# Patient Record
Sex: FEMALE | Race: WHITE | Employment: UNEMPLOYED | URBAN - METROPOLITAN AREA
[De-identification: names, ages, dates, MRNs, and addresses within clinical notes are randomized per-mention and may not be internally consistent; named-entity substitution may affect disease eponyms.]

---

## 2018-10-13 ENCOUNTER — APPOINTMENT (OUTPATIENT)
Dept: GENERAL RADIOLOGY | Age: 73
DRG: 189 | End: 2018-10-13
Payer: MEDICARE

## 2018-10-13 ENCOUNTER — HOSPITAL ENCOUNTER (INPATIENT)
Age: 73
LOS: 3 days | Discharge: HOME | DRG: 189 | End: 2018-10-16
Attending: EMERGENCY MEDICINE | Admitting: INTERNAL MEDICINE
Payer: MEDICARE

## 2018-10-13 DIAGNOSIS — R09.02 HYPOXIA: ICD-10-CM

## 2018-10-13 DIAGNOSIS — J44.1 COPD EXACERBATION (HCC): Primary | ICD-10-CM

## 2018-10-13 PROBLEM — J44.9 COPD (CHRONIC OBSTRUCTIVE PULMONARY DISEASE) (HCC): Status: ACTIVE | Noted: 2018-10-13

## 2018-10-13 LAB
A/G RATIO: 1 (ref 1.1–2.2)
ALBUMIN SERPL-MCNC: 4 G/DL (ref 3.4–5)
ALP BLD-CCNC: 126 U/L (ref 40–129)
ALT SERPL-CCNC: 17 U/L (ref 10–40)
ANION GAP SERPL CALCULATED.3IONS-SCNC: 15 MMOL/L (ref 3–16)
APTT: 30.1 SEC (ref 26–36)
AST SERPL-CCNC: 67 U/L (ref 15–37)
BACTERIA: ABNORMAL /HPF
BILIRUB SERPL-MCNC: 0.7 MG/DL (ref 0–1)
BILIRUBIN URINE: NEGATIVE
BLOOD, URINE: NEGATIVE
BUN BLDV-MCNC: 16 MG/DL (ref 7–20)
CALCIUM SERPL-MCNC: 9 MG/DL (ref 8.3–10.6)
CHLORIDE BLD-SCNC: 97 MMOL/L (ref 99–110)
CLARITY: ABNORMAL
CO2: 24 MMOL/L (ref 21–32)
COLOR: YELLOW
COMMENT UA: ABNORMAL
CREAT SERPL-MCNC: 0.5 MG/DL (ref 0.6–1.2)
EPITHELIAL CELLS, UA: >36 /HPF (ref 0–5)
GFR AFRICAN AMERICAN: >60
GFR NON-AFRICAN AMERICAN: >60
GLOBULIN: 4.1 G/DL
GLUCOSE BLD-MCNC: 134 MG/DL (ref 70–99)
GLUCOSE URINE: NEGATIVE MG/DL
HCT VFR BLD CALC: 30.8 % (ref 36–48)
HEMOGLOBIN: 9.2 G/DL (ref 12–16)
HYALINE CASTS: 3 /LPF (ref 0–8)
INR BLD: 0.95 (ref 0.86–1.14)
KETONES, URINE: NEGATIVE MG/DL
LEUKOCYTE ESTERASE, URINE: NEGATIVE
MCH RBC QN AUTO: 21 PG (ref 26–34)
MCHC RBC AUTO-ENTMCNC: 29.9 G/DL (ref 31–36)
MCV RBC AUTO: 70.3 FL (ref 80–100)
MICROSCOPIC EXAMINATION: YES
NITRITE, URINE: NEGATIVE
PDW BLD-RTO: 20.4 % (ref 12.4–15.4)
PH UA: 7.5
PLATELET # BLD: 347 K/UL (ref 135–450)
PMV BLD AUTO: 7.3 FL (ref 5–10.5)
POTASSIUM SERPL-SCNC: 3.5 MMOL/L (ref 3.5–5.1)
POTASSIUM SERPL-SCNC: 5.8 MMOL/L (ref 3.5–5.1)
PRO-BNP: 204 PG/ML (ref 0–124)
PROTEIN UA: ABNORMAL MG/DL
PROTHROMBIN TIME: 10.8 SEC (ref 9.8–13)
RBC # BLD: 4.39 M/UL (ref 4–5.2)
RBC UA: 5 /HPF (ref 0–4)
REPORT: NORMAL
RESPIRATORY PANEL PCR: NORMAL
SODIUM BLD-SCNC: 136 MMOL/L (ref 136–145)
SPECIFIC GRAVITY UA: 1.01
TOTAL PROTEIN: 8.1 G/DL (ref 6.4–8.2)
TRICHOMONAS: ABNORMAL /HPF
TROPONIN: <0.01 NG/ML
URINE TYPE: ABNORMAL
UROBILINOGEN, URINE: 1 E.U./DL
WBC # BLD: 9.3 K/UL (ref 4–11)
WBC UA: 7 /HPF (ref 0–5)

## 2018-10-13 PROCEDURE — 84484 ASSAY OF TROPONIN QUANT: CPT

## 2018-10-13 PROCEDURE — 96365 THER/PROPH/DIAG IV INF INIT: CPT

## 2018-10-13 PROCEDURE — 94640 AIRWAY INHALATION TREATMENT: CPT

## 2018-10-13 PROCEDURE — 87486 CHLMYD PNEUM DNA AMP PROBE: CPT

## 2018-10-13 PROCEDURE — 96375 TX/PRO/DX INJ NEW DRUG ADDON: CPT

## 2018-10-13 PROCEDURE — 96372 THER/PROPH/DIAG INJ SC/IM: CPT

## 2018-10-13 PROCEDURE — 96376 TX/PRO/DX INJ SAME DRUG ADON: CPT

## 2018-10-13 PROCEDURE — 87798 DETECT AGENT NOS DNA AMP: CPT

## 2018-10-13 PROCEDURE — 6360000002 HC RX W HCPCS: Performed by: INTERNAL MEDICINE

## 2018-10-13 PROCEDURE — G0378 HOSPITAL OBSERVATION PER HR: HCPCS

## 2018-10-13 PROCEDURE — 96366 THER/PROPH/DIAG IV INF ADDON: CPT

## 2018-10-13 PROCEDURE — 6360000002 HC RX W HCPCS: Performed by: EMERGENCY MEDICINE

## 2018-10-13 PROCEDURE — 6370000000 HC RX 637 (ALT 250 FOR IP): Performed by: INTERNAL MEDICINE

## 2018-10-13 PROCEDURE — 99285 EMERGENCY DEPT VISIT HI MDM: CPT

## 2018-10-13 PROCEDURE — 85610 PROTHROMBIN TIME: CPT

## 2018-10-13 PROCEDURE — 93005 ELECTROCARDIOGRAM TRACING: CPT | Performed by: EMERGENCY MEDICINE

## 2018-10-13 PROCEDURE — 84132 ASSAY OF SERUM POTASSIUM: CPT

## 2018-10-13 PROCEDURE — 2700000000 HC OXYGEN THERAPY PER DAY

## 2018-10-13 PROCEDURE — 94760 N-INVAS EAR/PLS OXIMETRY 1: CPT

## 2018-10-13 PROCEDURE — 87633 RESP VIRUS 12-25 TARGETS: CPT

## 2018-10-13 PROCEDURE — 81001 URINALYSIS AUTO W/SCOPE: CPT

## 2018-10-13 PROCEDURE — 93010 ELECTROCARDIOGRAM REPORT: CPT | Performed by: INTERNAL MEDICINE

## 2018-10-13 PROCEDURE — 85730 THROMBOPLASTIN TIME PARTIAL: CPT

## 2018-10-13 PROCEDURE — 80053 COMPREHEN METABOLIC PANEL: CPT

## 2018-10-13 PROCEDURE — 96374 THER/PROPH/DIAG INJ IV PUSH: CPT

## 2018-10-13 PROCEDURE — 83880 ASSAY OF NATRIURETIC PEPTIDE: CPT

## 2018-10-13 PROCEDURE — 85027 COMPLETE CBC AUTOMATED: CPT

## 2018-10-13 PROCEDURE — 2500000003 HC RX 250 WO HCPCS: Performed by: INTERNAL MEDICINE

## 2018-10-13 PROCEDURE — 1200000000 HC SEMI PRIVATE

## 2018-10-13 PROCEDURE — 94664 DEMO&/EVAL PT USE INHALER: CPT

## 2018-10-13 PROCEDURE — 6370000000 HC RX 637 (ALT 250 FOR IP): Performed by: NURSE PRACTITIONER

## 2018-10-13 PROCEDURE — 87581 M.PNEUMON DNA AMP PROBE: CPT

## 2018-10-13 PROCEDURE — 6370000000 HC RX 637 (ALT 250 FOR IP): Performed by: EMERGENCY MEDICINE

## 2018-10-13 PROCEDURE — 2580000003 HC RX 258: Performed by: INTERNAL MEDICINE

## 2018-10-13 PROCEDURE — 71045 X-RAY EXAM CHEST 1 VIEW: CPT

## 2018-10-13 RX ORDER — AMLODIPINE BESYLATE 5 MG/1
5 TABLET ORAL DAILY
Status: ON HOLD | COMMUNITY
End: 2018-10-16

## 2018-10-13 RX ORDER — CYCLOBENZAPRINE HCL 10 MG
5 TABLET ORAL 3 TIMES DAILY PRN
Status: DISCONTINUED | OUTPATIENT
Start: 2018-10-13 | End: 2018-10-13

## 2018-10-13 RX ORDER — FERROUS SULFATE TAB EC 324 MG (65 MG FE EQUIVALENT) 324 (65 FE) MG
324 TABLET DELAYED RESPONSE ORAL
Status: DISCONTINUED | OUTPATIENT
Start: 2018-10-14 | End: 2018-10-16 | Stop reason: HOSPADM

## 2018-10-13 RX ORDER — FERROUS SULFATE 325(65) MG
325 TABLET ORAL
COMMUNITY
End: 2019-05-23

## 2018-10-13 RX ORDER — LORAZEPAM 0.5 MG/1
0.5 TABLET ORAL DAILY PRN
Status: DISCONTINUED | OUTPATIENT
Start: 2018-10-13 | End: 2018-10-13

## 2018-10-13 RX ORDER — ERGOCALCIFEROL 1.25 MG/1
50000 CAPSULE ORAL WEEKLY
Status: DISCONTINUED | OUTPATIENT
Start: 2018-10-13 | End: 2018-10-13

## 2018-10-13 RX ORDER — DULOXETIN HYDROCHLORIDE 60 MG/1
60 CAPSULE, DELAYED RELEASE ORAL DAILY
Status: DISCONTINUED | OUTPATIENT
Start: 2018-10-13 | End: 2018-10-13

## 2018-10-13 RX ORDER — METHYLPREDNISOLONE SODIUM SUCCINATE 40 MG/ML
40 INJECTION, POWDER, LYOPHILIZED, FOR SOLUTION INTRAMUSCULAR; INTRAVENOUS DAILY
Status: DISCONTINUED | OUTPATIENT
Start: 2018-10-14 | End: 2018-10-15

## 2018-10-13 RX ORDER — PANTOPRAZOLE SODIUM 40 MG/1
40 TABLET, DELAYED RELEASE ORAL
Status: DISCONTINUED | OUTPATIENT
Start: 2018-10-13 | End: 2018-10-16 | Stop reason: HOSPADM

## 2018-10-13 RX ORDER — FOLIC ACID 1 MG/1
1 TABLET ORAL DAILY
COMMUNITY
End: 2019-05-23

## 2018-10-13 RX ORDER — CHOLECALCIFEROL (VITAMIN D3) 125 MCG
500 CAPSULE ORAL DAILY
COMMUNITY
End: 2019-05-23

## 2018-10-13 RX ORDER — MULTIVITAMIN WITH IRON
100 TABLET ORAL DAILY
COMMUNITY
End: 2019-05-23

## 2018-10-13 RX ORDER — OLMESARTAN MEDOXOMIL AND HYDROCHLOROTHIAZIDE 20/12.5 20; 12.5 MG/1; MG/1
1 TABLET ORAL DAILY
Status: DISCONTINUED | OUTPATIENT
Start: 2018-10-13 | End: 2018-10-13

## 2018-10-13 RX ORDER — FOLIC ACID 1 MG/1
1 TABLET ORAL DAILY
Status: DISCONTINUED | OUTPATIENT
Start: 2018-10-14 | End: 2018-10-16 | Stop reason: HOSPADM

## 2018-10-13 RX ORDER — HYDROCHLOROTHIAZIDE 25 MG/1
12.5 TABLET ORAL DAILY
Status: DISCONTINUED | OUTPATIENT
Start: 2018-10-13 | End: 2018-10-13

## 2018-10-13 RX ORDER — DIPHENHYDRAMINE HCL 25 MG
50 TABLET ORAL ONCE
Status: COMPLETED | OUTPATIENT
Start: 2018-10-13 | End: 2018-10-13

## 2018-10-13 RX ORDER — NICOTINE 21 MG/24HR
1 PATCH, TRANSDERMAL 24 HOURS TRANSDERMAL EVERY 24 HOURS
Status: ON HOLD | COMMUNITY
End: 2018-10-16

## 2018-10-13 RX ORDER — SODIUM CHLORIDE 0.9 % (FLUSH) 0.9 %
10 SYRINGE (ML) INJECTION EVERY 12 HOURS SCHEDULED
Status: DISCONTINUED | OUTPATIENT
Start: 2018-10-13 | End: 2018-10-16 | Stop reason: HOSPADM

## 2018-10-13 RX ORDER — IPRATROPIUM BROMIDE AND ALBUTEROL SULFATE 2.5; .5 MG/3ML; MG/3ML
1 SOLUTION RESPIRATORY (INHALATION)
Status: DISCONTINUED | OUTPATIENT
Start: 2018-10-13 | End: 2018-10-16 | Stop reason: HOSPADM

## 2018-10-13 RX ORDER — DULOXETIN HYDROCHLORIDE 30 MG/1
30 CAPSULE, DELAYED RELEASE ORAL DAILY
Status: ON HOLD | COMMUNITY
End: 2018-10-16

## 2018-10-13 RX ORDER — ONDANSETRON 2 MG/ML
4 INJECTION INTRAMUSCULAR; INTRAVENOUS EVERY 6 HOURS PRN
Status: DISCONTINUED | OUTPATIENT
Start: 2018-10-13 | End: 2018-10-16 | Stop reason: HOSPADM

## 2018-10-13 RX ORDER — NICOTINE 21 MG/24HR
1 PATCH, TRANSDERMAL 24 HOURS TRANSDERMAL DAILY
Status: DISCONTINUED | OUTPATIENT
Start: 2018-10-13 | End: 2018-10-16 | Stop reason: HOSPADM

## 2018-10-13 RX ORDER — DULOXETIN HYDROCHLORIDE 30 MG/1
30 CAPSULE, DELAYED RELEASE ORAL DAILY
Status: DISCONTINUED | OUTPATIENT
Start: 2018-10-14 | End: 2018-10-16 | Stop reason: HOSPADM

## 2018-10-13 RX ORDER — DEXTROAMPHETAMINE SULFATE 10 MG/1
20 TABLET ORAL 3 TIMES DAILY
Status: ON HOLD | COMMUNITY
End: 2018-12-13 | Stop reason: ALTCHOICE

## 2018-10-13 RX ORDER — BUDESONIDE AND FORMOTEROL FUMARATE DIHYDRATE 160; 4.5 UG/1; UG/1
2 AEROSOL RESPIRATORY (INHALATION) 2 TIMES DAILY
Status: ON HOLD | COMMUNITY
End: 2018-10-16

## 2018-10-13 RX ORDER — METHYLPREDNISOLONE SODIUM SUCCINATE 125 MG/2ML
125 INJECTION, POWDER, LYOPHILIZED, FOR SOLUTION INTRAMUSCULAR; INTRAVENOUS ONCE
Status: COMPLETED | OUTPATIENT
Start: 2018-10-13 | End: 2018-10-13

## 2018-10-13 RX ORDER — AMLODIPINE BESYLATE 5 MG/1
5 TABLET ORAL DAILY
Status: DISCONTINUED | OUTPATIENT
Start: 2018-10-14 | End: 2018-10-16 | Stop reason: HOSPADM

## 2018-10-13 RX ORDER — LORAZEPAM 0.5 MG/1
0.5 TABLET ORAL NIGHTLY PRN
Status: DISCONTINUED | OUTPATIENT
Start: 2018-10-13 | End: 2018-10-16 | Stop reason: HOSPADM

## 2018-10-13 RX ORDER — LORAZEPAM 0.5 MG/1
0.5 TABLET ORAL EVERY 6 HOURS PRN
Status: DISCONTINUED | OUTPATIENT
Start: 2018-10-13 | End: 2018-10-13

## 2018-10-13 RX ORDER — DEXTROAMPHETAMINE SULFATE 10 MG/1
10 CAPSULE, EXTENDED RELEASE ORAL DAILY
Status: DISCONTINUED | OUTPATIENT
Start: 2018-10-13 | End: 2018-10-13

## 2018-10-13 RX ORDER — IPRATROPIUM BROMIDE AND ALBUTEROL SULFATE 2.5; .5 MG/3ML; MG/3ML
1 SOLUTION RESPIRATORY (INHALATION) ONCE
Status: COMPLETED | OUTPATIENT
Start: 2018-10-13 | End: 2018-10-13

## 2018-10-13 RX ORDER — LOSARTAN POTASSIUM 50 MG/1
50 TABLET ORAL DAILY
Status: DISCONTINUED | OUTPATIENT
Start: 2018-10-13 | End: 2018-10-13

## 2018-10-13 RX ORDER — SODIUM CHLORIDE 0.9 % (FLUSH) 0.9 %
10 SYRINGE (ML) INJECTION PRN
Status: DISCONTINUED | OUTPATIENT
Start: 2018-10-13 | End: 2018-10-16 | Stop reason: HOSPADM

## 2018-10-13 RX ADMIN — IPRATROPIUM BROMIDE AND ALBUTEROL SULFATE 1 AMPULE: .5; 3 SOLUTION RESPIRATORY (INHALATION) at 11:58

## 2018-10-13 RX ADMIN — IPRATROPIUM BROMIDE AND ALBUTEROL SULFATE 1 AMPULE: .5; 3 SOLUTION RESPIRATORY (INHALATION) at 06:05

## 2018-10-13 RX ADMIN — DULOXETINE HYDROCHLORIDE 60 MG: 60 CAPSULE, DELAYED RELEASE ORAL at 09:59

## 2018-10-13 RX ADMIN — HYDROCHLOROTHIAZIDE 12.5 MG: 25 TABLET ORAL at 10:00

## 2018-10-13 RX ADMIN — ENOXAPARIN SODIUM 40 MG: 40 INJECTION SUBCUTANEOUS at 10:01

## 2018-10-13 RX ADMIN — DIPHENHYDRAMINE HCL 50 MG: 25 TABLET ORAL at 23:23

## 2018-10-13 RX ADMIN — METHYLPREDNISOLONE SODIUM SUCCINATE 125 MG: 125 INJECTION, POWDER, FOR SOLUTION INTRAMUSCULAR; INTRAVENOUS at 06:14

## 2018-10-13 RX ADMIN — LOSARTAN POTASSIUM 50 MG: 50 TABLET ORAL at 09:59

## 2018-10-13 RX ADMIN — LORAZEPAM 0.5 MG: 0.5 TABLET ORAL at 16:42

## 2018-10-13 RX ADMIN — MOMETASONE FUROATE AND FORMOTEROL FUMARATE DIHYDRATE 2 PUFF: 200; 5 AEROSOL RESPIRATORY (INHALATION) at 11:58

## 2018-10-13 RX ADMIN — PANTOPRAZOLE SODIUM 40 MG: 40 TABLET, DELAYED RELEASE ORAL at 09:59

## 2018-10-13 RX ADMIN — IPRATROPIUM BROMIDE AND ALBUTEROL SULFATE 1 AMPULE: .5; 3 SOLUTION RESPIRATORY (INHALATION) at 15:41

## 2018-10-13 RX ADMIN — DOXYCYCLINE 100 MG: 100 INJECTION, POWDER, LYOPHILIZED, FOR SOLUTION INTRAVENOUS at 10:00

## 2018-10-13 RX ADMIN — IPRATROPIUM BROMIDE AND ALBUTEROL SULFATE 1 AMPULE: .5; 3 SOLUTION RESPIRATORY (INHALATION) at 20:06

## 2018-10-13 RX ADMIN — Medication 10 ML: at 22:35

## 2018-10-13 RX ADMIN — MOMETASONE FUROATE AND FORMOTEROL FUMARATE DIHYDRATE 2 PUFF: 200; 5 AEROSOL RESPIRATORY (INHALATION) at 20:06

## 2018-10-13 RX ADMIN — Medication 10 ML: at 10:00

## 2018-10-13 RX ADMIN — DOXYCYCLINE 100 MG: 100 INJECTION, POWDER, LYOPHILIZED, FOR SOLUTION INTRAVENOUS at 22:35

## 2018-10-13 NOTE — PROGRESS NOTES
Patient arrived to room 4115 from ED. Awake, a&o x4. Patient has a LLQ colostomy since May '18. Patient states she moved here in July and has not been able to get all of her prescriptions filled since then. Oriented to room and call light. Bed alarm on and call light within reach.  Will monitor Electronically signed by Selvin Hernandez RN on 10/13/2018 at 9:00 AM

## 2018-10-13 NOTE — H&P
Hospital Medicine History & Physical      PCP: Uma Jackson MD    Date of Admission: 10/13/2018    Date of Service: Pt seen/examined on 10/13/18 and Admitted to Inpatient    Chief Complaint:  Shortness of breath    History Of Present Illness: The patient is a 72 y.o. female who presents to [facility] with worsening shortness of breath. Patient states this has been ongoing for the past few days. It has recently gotten worse. She has been out of her home medications Symbicort for the past week and try to get a prescription from her primary care doctor without avail. Patient presented to the emergency department short of breath, using accessory muscles, saturating 91% on room air while sitting in the bed, wheezing. Patient admits to smoking    Past Medical History:        Diagnosis Date    [facility]     Depression     Gastric ulcer, unspecified as acute or chronic, without mention of hemorrhage, perforation, or obstruction     Hiatal hernia     Hypertension     Parkinson's disease ([facility]        Past Surgical History:        Procedure Laterality Date    CHOLECYSTECTOMY      COLOSTOMY  2018    HYSTERECTOMY      TUBAL LIGATION         Medications Prior to Admission:    Prior to Admission medications    Medication Sig Start Date End Date Taking? Authorizing Provider   duloxetine (CYMBALTA) 60 MG capsule Take 60 mg by mouth daily. Historical Provider, MD   dexlansoprazole (DEXILANT) 60 MG CPDR capsule Take 60 mg by mouth daily. Historical Provider, MD   olmesartan-hydrochlorothiazide (BENICAR HCT) 20-12.5 MG per tablet Take 1 tablet by mouth daily. Historical Provider, MD   albuterol-ipratropium (COMBIVENT)  MCG/ACT inhaler Inhale 2 puffs into the lungs every 6 hours as needed.     Historical Provider, MD vitamin D (ERGOCALCIFEROL) 01472 UNIT CAPS capsule Take 50,000 Units by mouth once a week. Historical Provider, MD   cyclobenzaprine (FLEXERIL) 10 MG tablet Take 5 mg by mouth 3 times daily as needed. Historical Provider, MD   lorazepam (ATIVAN) 0.5 MG tablet Take 0.5 mg by mouth every 6 hours as needed. Historical Provider, MD   dextroamphetamine (DEXEDRINE SPANSULE) 10 MG SR capsule Take 10 mg by mouth daily. Historical Provider, MD   albuterol (PROVENTIL HFA) 108 (90 BASE) MCG/ACT inhaler Inhale 2 puffs into the lungs every 4 hours as needed for Wheezing. Dispense with aerochamber 3/31/11   Denia Muro MD       Allergies:  Aspirin    Social History:  The patient currently lives At home    TOBACCO:   reports that she has been smoking Cigarettes. She has been smoking about 1.00 pack per day. She has never used smokeless tobacco.  ETOH:   reports that she drinks about 1.2 - 1.8 oz of alcohol per week . Family History:  Reviewed in detail and negative for DM, Early CAD, Cancer, CVA. Positive as follows:    History reviewed. No pertinent family history. REVIEW OF SYSTEMS:   Positive for as noted in the HPI. All other systems reviewed and negative. PHYSICAL EXAM:    BP (!) 168/79   Pulse 102   Temp 98.1 °F (36.7 °C)   Resp 18   Wt 145 lb 8.1 oz (66 kg)   SpO2 95%   BMI 26.61 kg/m²     General appearance: No apparent distress appears stated age and cooperative. HEENT Normal cephalic, atraumatic without obvious deformity. Pupils equal, round, and reactive to light. Extra ocular muscles intact. Conjunctivae/corneas clear. Neck: Supple, No jugular venous distention/bruits. Trachea midline without thyromegaly or adenopathy with full range of motion.   Lungs: Diminished breath sounds bilateral  Heart: Regular rate and rhythm with Normal S1/S2 without murmurs, rubs or gallops, point of maximum impulse non-displaced  Abdomen: Left-sided ostomy in place with output   Extremities: No

## 2018-10-13 NOTE — ED TRIAGE NOTES
Patient from home via ems c/o trouble breathing, r/a sat 82%. Duoneb in progress, sitting up on stretcher. Alert and oriented x 4. Has been out of all meds for 1 week. Duoneb x 2 by ems. Reluctant to take steroids, they make \"me crazy. \"  Denies pain. MD sagastume at bedside.

## 2018-10-14 LAB
ALBUMIN SERPL-MCNC: 4.1 G/DL (ref 3.4–5)
ANION GAP SERPL CALCULATED.3IONS-SCNC: 16 MMOL/L (ref 3–16)
BASOPHILS ABSOLUTE: 0.1 K/UL (ref 0–0.2)
BASOPHILS RELATIVE PERCENT: 0.4 %
BUN BLDV-MCNC: 17 MG/DL (ref 7–20)
CALCIUM SERPL-MCNC: 9.5 MG/DL (ref 8.3–10.6)
CHLORIDE BLD-SCNC: 92 MMOL/L (ref 99–110)
CO2: 26 MMOL/L (ref 21–32)
CREAT SERPL-MCNC: 0.7 MG/DL (ref 0.6–1.2)
EOSINOPHILS ABSOLUTE: 0 K/UL (ref 0–0.6)
EOSINOPHILS RELATIVE PERCENT: 0 %
GFR AFRICAN AMERICAN: >60
GFR NON-AFRICAN AMERICAN: >60
GLUCOSE BLD-MCNC: 111 MG/DL (ref 70–99)
HCT VFR BLD CALC: 30.4 % (ref 36–48)
HEMOGLOBIN: 9 G/DL (ref 12–16)
LYMPHOCYTES ABSOLUTE: 2 K/UL (ref 1–5.1)
LYMPHOCYTES RELATIVE PERCENT: 13.1 %
MCH RBC QN AUTO: 20.7 PG (ref 26–34)
MCHC RBC AUTO-ENTMCNC: 29.5 G/DL (ref 31–36)
MCV RBC AUTO: 70.4 FL (ref 80–100)
MONOCYTES ABSOLUTE: 0.7 K/UL (ref 0–1.3)
MONOCYTES RELATIVE PERCENT: 4.8 %
NEUTROPHILS ABSOLUTE: 12.5 K/UL (ref 1.7–7.7)
NEUTROPHILS RELATIVE PERCENT: 81.7 %
PDW BLD-RTO: 20.5 % (ref 12.4–15.4)
PHOSPHORUS: 3.7 MG/DL (ref 2.5–4.9)
PLATELET # BLD: 306 K/UL (ref 135–450)
PMV BLD AUTO: 7 FL (ref 5–10.5)
POTASSIUM SERPL-SCNC: 3.2 MMOL/L (ref 3.5–5.1)
RBC # BLD: 4.33 M/UL (ref 4–5.2)
SODIUM BLD-SCNC: 134 MMOL/L (ref 136–145)
WBC # BLD: 15.2 K/UL (ref 4–11)

## 2018-10-14 PROCEDURE — 2700000000 HC OXYGEN THERAPY PER DAY

## 2018-10-14 PROCEDURE — 6360000002 HC RX W HCPCS: Performed by: INTERNAL MEDICINE

## 2018-10-14 PROCEDURE — 6370000000 HC RX 637 (ALT 250 FOR IP): Performed by: INTERNAL MEDICINE

## 2018-10-14 PROCEDURE — 85025 COMPLETE CBC W/AUTO DIFF WBC: CPT

## 2018-10-14 PROCEDURE — 94760 N-INVAS EAR/PLS OXIMETRY 1: CPT

## 2018-10-14 PROCEDURE — 2500000003 HC RX 250 WO HCPCS: Performed by: INTERNAL MEDICINE

## 2018-10-14 PROCEDURE — 94640 AIRWAY INHALATION TREATMENT: CPT

## 2018-10-14 PROCEDURE — G0378 HOSPITAL OBSERVATION PER HR: HCPCS

## 2018-10-14 PROCEDURE — 96366 THER/PROPH/DIAG IV INF ADDON: CPT

## 2018-10-14 PROCEDURE — 1200000000 HC SEMI PRIVATE

## 2018-10-14 PROCEDURE — 36415 COLL VENOUS BLD VENIPUNCTURE: CPT

## 2018-10-14 PROCEDURE — 2580000003 HC RX 258: Performed by: INTERNAL MEDICINE

## 2018-10-14 PROCEDURE — 6370000000 HC RX 637 (ALT 250 FOR IP): Performed by: NURSE PRACTITIONER

## 2018-10-14 PROCEDURE — 96376 TX/PRO/DX INJ SAME DRUG ADON: CPT

## 2018-10-14 PROCEDURE — 96372 THER/PROPH/DIAG INJ SC/IM: CPT

## 2018-10-14 PROCEDURE — 80069 RENAL FUNCTION PANEL: CPT

## 2018-10-14 RX ORDER — POTASSIUM CHLORIDE 20 MEQ/1
40 TABLET, EXTENDED RELEASE ORAL PRN
Status: DISCONTINUED | OUTPATIENT
Start: 2018-10-14 | End: 2018-10-16 | Stop reason: HOSPADM

## 2018-10-14 RX ORDER — LORAZEPAM 0.5 MG/1
0.5 TABLET ORAL ONCE
Status: DISCONTINUED | OUTPATIENT
Start: 2018-10-14 | End: 2018-10-15

## 2018-10-14 RX ORDER — POTASSIUM CHLORIDE 1.5 G/1.77G
40 POWDER, FOR SOLUTION ORAL PRN
Status: DISCONTINUED | OUTPATIENT
Start: 2018-10-14 | End: 2018-10-16 | Stop reason: HOSPADM

## 2018-10-14 RX ORDER — POTASSIUM CHLORIDE 7.45 MG/ML
10 INJECTION INTRAVENOUS PRN
Status: DISCONTINUED | OUTPATIENT
Start: 2018-10-14 | End: 2018-10-16 | Stop reason: HOSPADM

## 2018-10-14 RX ADMIN — LORAZEPAM 0.5 MG: 0.5 TABLET ORAL at 21:01

## 2018-10-14 RX ADMIN — DOXYCYCLINE 100 MG: 100 INJECTION, POWDER, LYOPHILIZED, FOR SOLUTION INTRAVENOUS at 08:53

## 2018-10-14 RX ADMIN — DULOXETINE HYDROCHLORIDE 30 MG: 30 CAPSULE, DELAYED RELEASE ORAL at 08:57

## 2018-10-14 RX ADMIN — Medication 10 ML: at 08:57

## 2018-10-14 RX ADMIN — PANTOPRAZOLE SODIUM 40 MG: 40 TABLET, DELAYED RELEASE ORAL at 06:43

## 2018-10-14 RX ADMIN — IPRATROPIUM BROMIDE AND ALBUTEROL SULFATE 1 AMPULE: .5; 3 SOLUTION RESPIRATORY (INHALATION) at 15:47

## 2018-10-14 RX ADMIN — IPRATROPIUM BROMIDE AND ALBUTEROL SULFATE 1 AMPULE: .5; 3 SOLUTION RESPIRATORY (INHALATION) at 11:38

## 2018-10-14 RX ADMIN — Medication 10 ML: at 21:03

## 2018-10-14 RX ADMIN — FERROUS SULFATE TAB EC 324 MG (65 MG FE EQUIVALENT) 324 MG: 324 (65 FE) TABLET DELAYED RESPONSE at 08:57

## 2018-10-14 RX ADMIN — IPRATROPIUM BROMIDE AND ALBUTEROL SULFATE 1 AMPULE: .5; 3 SOLUTION RESPIRATORY (INHALATION) at 07:50

## 2018-10-14 RX ADMIN — MOMETASONE FUROATE AND FORMOTEROL FUMARATE DIHYDRATE 2 PUFF: 200; 5 AEROSOL RESPIRATORY (INHALATION) at 07:50

## 2018-10-14 RX ADMIN — MOMETASONE FUROATE AND FORMOTEROL FUMARATE DIHYDRATE 2 PUFF: 200; 5 AEROSOL RESPIRATORY (INHALATION) at 21:08

## 2018-10-14 RX ADMIN — METHYLPREDNISOLONE SODIUM SUCCINATE 40 MG: 40 INJECTION, POWDER, FOR SOLUTION INTRAMUSCULAR; INTRAVENOUS at 08:57

## 2018-10-14 RX ADMIN — POTASSIUM CHLORIDE 40 MEQ: 20 TABLET, EXTENDED RELEASE ORAL at 13:27

## 2018-10-14 RX ADMIN — DOXYCYCLINE 100 MG: 100 INJECTION, POWDER, LYOPHILIZED, FOR SOLUTION INTRAVENOUS at 21:03

## 2018-10-14 RX ADMIN — FOLIC ACID 1 MG: 1 TABLET ORAL at 08:57

## 2018-10-14 RX ADMIN — IPRATROPIUM BROMIDE AND ALBUTEROL SULFATE 1 AMPULE: .5; 3 SOLUTION RESPIRATORY (INHALATION) at 21:08

## 2018-10-14 RX ADMIN — ENOXAPARIN SODIUM 40 MG: 40 INJECTION SUBCUTANEOUS at 08:56

## 2018-10-14 RX ADMIN — AMLODIPINE BESYLATE 5 MG: 5 TABLET ORAL at 08:57

## 2018-10-14 NOTE — PROGRESS NOTES
GENERAL;  Code Status: Full Code    PT/OT Eval Status: NA    Dispo - Inpt, DC niharika if off oxygen    Chato Chau MD

## 2018-10-15 PROBLEM — G20 PARKINSON'S DISEASE (HCC): Chronic | Status: ACTIVE | Noted: 2018-10-15

## 2018-10-15 PROBLEM — J96.01 ACUTE RESPIRATORY FAILURE WITH HYPOXIA (HCC): Status: ACTIVE | Noted: 2018-10-15

## 2018-10-15 PROBLEM — G20.A1 PARKINSON'S DISEASE: Chronic | Status: ACTIVE | Noted: 2018-10-15

## 2018-10-15 PROBLEM — Z72.0 TOBACCO ABUSE: Chronic | Status: ACTIVE | Noted: 2018-10-15

## 2018-10-15 PROBLEM — E87.6 HYPOKALEMIA: Status: ACTIVE | Noted: 2018-10-15

## 2018-10-15 PROBLEM — I10 BENIGN ESSENTIAL HTN: Chronic | Status: ACTIVE | Noted: 2018-10-15

## 2018-10-15 PROBLEM — J44.1 COPD WITH ACUTE EXACERBATION (HCC): Status: ACTIVE | Noted: 2018-10-13

## 2018-10-15 LAB
ANION GAP SERPL CALCULATED.3IONS-SCNC: 15 MMOL/L (ref 3–16)
BUN BLDV-MCNC: 28 MG/DL (ref 7–20)
CALCIUM SERPL-MCNC: 9.7 MG/DL (ref 8.3–10.6)
CHLORIDE BLD-SCNC: 92 MMOL/L (ref 99–110)
CO2: 25 MMOL/L (ref 21–32)
CREAT SERPL-MCNC: 0.9 MG/DL (ref 0.6–1.2)
EKG ATRIAL RATE: 111 BPM
EKG DIAGNOSIS: NORMAL
EKG P AXIS: 78 DEGREES
EKG P-R INTERVAL: 162 MS
EKG Q-T INTERVAL: 344 MS
EKG QRS DURATION: 76 MS
EKG QTC CALCULATION (BAZETT): 467 MS
EKG R AXIS: 35 DEGREES
EKG T AXIS: 38 DEGREES
EKG VENTRICULAR RATE: 111 BPM
GFR AFRICAN AMERICAN: >60
GFR NON-AFRICAN AMERICAN: >60
GLUCOSE BLD-MCNC: 147 MG/DL (ref 70–99)
POTASSIUM SERPL-SCNC: 4.1 MMOL/L (ref 3.5–5.1)
PROCALCITONIN: 0.04 NG/ML (ref 0–0.15)
SODIUM BLD-SCNC: 132 MMOL/L (ref 136–145)

## 2018-10-15 PROCEDURE — 1200000000 HC SEMI PRIVATE

## 2018-10-15 PROCEDURE — G0378 HOSPITAL OBSERVATION PER HR: HCPCS

## 2018-10-15 PROCEDURE — 6370000000 HC RX 637 (ALT 250 FOR IP): Performed by: INTERNAL MEDICINE

## 2018-10-15 PROCEDURE — 94761 N-INVAS EAR/PLS OXIMETRY MLT: CPT

## 2018-10-15 PROCEDURE — 36415 COLL VENOUS BLD VENIPUNCTURE: CPT

## 2018-10-15 PROCEDURE — 96372 THER/PROPH/DIAG INJ SC/IM: CPT

## 2018-10-15 PROCEDURE — 6360000002 HC RX W HCPCS: Performed by: INTERNAL MEDICINE

## 2018-10-15 PROCEDURE — 94640 AIRWAY INHALATION TREATMENT: CPT

## 2018-10-15 PROCEDURE — 2700000000 HC OXYGEN THERAPY PER DAY

## 2018-10-15 PROCEDURE — 2500000003 HC RX 250 WO HCPCS: Performed by: INTERNAL MEDICINE

## 2018-10-15 PROCEDURE — 6370000000 HC RX 637 (ALT 250 FOR IP): Performed by: NURSE PRACTITIONER

## 2018-10-15 PROCEDURE — 84145 PROCALCITONIN (PCT): CPT

## 2018-10-15 PROCEDURE — 94668 MNPJ CHEST WALL SBSQ: CPT

## 2018-10-15 PROCEDURE — 96376 TX/PRO/DX INJ SAME DRUG ADON: CPT

## 2018-10-15 PROCEDURE — 2580000003 HC RX 258: Performed by: INTERNAL MEDICINE

## 2018-10-15 PROCEDURE — 96366 THER/PROPH/DIAG IV INF ADDON: CPT

## 2018-10-15 PROCEDURE — 94667 MNPJ CHEST WALL 1ST: CPT

## 2018-10-15 PROCEDURE — 80048 BASIC METABOLIC PNL TOTAL CA: CPT

## 2018-10-15 PROCEDURE — 94664 DEMO&/EVAL PT USE INHALER: CPT

## 2018-10-15 RX ORDER — HYDROXYZINE PAMOATE 25 MG/1
25 CAPSULE ORAL 3 TIMES DAILY PRN
Status: DISCONTINUED | OUTPATIENT
Start: 2018-10-15 | End: 2018-10-16 | Stop reason: HOSPADM

## 2018-10-15 RX ORDER — GUAIFENESIN 600 MG/1
600 TABLET, EXTENDED RELEASE ORAL 2 TIMES DAILY
Status: DISCONTINUED | OUTPATIENT
Start: 2018-10-15 | End: 2018-10-16 | Stop reason: HOSPADM

## 2018-10-15 RX ORDER — PREDNISONE 20 MG/1
40 TABLET ORAL DAILY
Status: DISCONTINUED | OUTPATIENT
Start: 2018-10-15 | End: 2018-10-16 | Stop reason: HOSPADM

## 2018-10-15 RX ADMIN — DOXYCYCLINE 100 MG: 100 INJECTION, POWDER, LYOPHILIZED, FOR SOLUTION INTRAVENOUS at 09:22

## 2018-10-15 RX ADMIN — IPRATROPIUM BROMIDE AND ALBUTEROL SULFATE 1 AMPULE: .5; 3 SOLUTION RESPIRATORY (INHALATION) at 07:50

## 2018-10-15 RX ADMIN — FOLIC ACID 1 MG: 1 TABLET ORAL at 09:22

## 2018-10-15 RX ADMIN — Medication 10 ML: at 10:42

## 2018-10-15 RX ADMIN — IPRATROPIUM BROMIDE AND ALBUTEROL SULFATE 1 AMPULE: .5; 3 SOLUTION RESPIRATORY (INHALATION) at 19:42

## 2018-10-15 RX ADMIN — MOMETASONE FUROATE AND FORMOTEROL FUMARATE DIHYDRATE 2 PUFF: 200; 5 AEROSOL RESPIRATORY (INHALATION) at 07:50

## 2018-10-15 RX ADMIN — METHYLPREDNISOLONE SODIUM SUCCINATE 40 MG: 40 INJECTION, POWDER, FOR SOLUTION INTRAMUSCULAR; INTRAVENOUS at 09:22

## 2018-10-15 RX ADMIN — IPRATROPIUM BROMIDE AND ALBUTEROL SULFATE 1 AMPULE: .5; 3 SOLUTION RESPIRATORY (INHALATION) at 11:24

## 2018-10-15 RX ADMIN — GUAIFENESIN 600 MG: 600 TABLET, EXTENDED RELEASE ORAL at 10:54

## 2018-10-15 RX ADMIN — MOMETASONE FUROATE AND FORMOTEROL FUMARATE DIHYDRATE 2 PUFF: 200; 5 AEROSOL RESPIRATORY (INHALATION) at 19:43

## 2018-10-15 RX ADMIN — Medication 10 ML: at 20:31

## 2018-10-15 RX ADMIN — DULOXETINE HYDROCHLORIDE 30 MG: 30 CAPSULE, DELAYED RELEASE ORAL at 09:22

## 2018-10-15 RX ADMIN — PREDNISONE 40 MG: 20 TABLET ORAL at 10:54

## 2018-10-15 RX ADMIN — GUAIFENESIN 600 MG: 600 TABLET, EXTENDED RELEASE ORAL at 20:30

## 2018-10-15 RX ADMIN — LORAZEPAM 0.5 MG: 0.5 TABLET ORAL at 20:30

## 2018-10-15 RX ADMIN — PANTOPRAZOLE SODIUM 40 MG: 40 TABLET, DELAYED RELEASE ORAL at 07:48

## 2018-10-15 RX ADMIN — AMLODIPINE BESYLATE 5 MG: 5 TABLET ORAL at 10:54

## 2018-10-15 RX ADMIN — Medication 10 ML: at 09:23

## 2018-10-15 RX ADMIN — FERROUS SULFATE TAB EC 324 MG (65 MG FE EQUIVALENT) 324 MG: 324 (65 FE) TABLET DELAYED RESPONSE at 09:22

## 2018-10-15 RX ADMIN — ENOXAPARIN SODIUM 40 MG: 40 INJECTION SUBCUTANEOUS at 09:23

## 2018-10-15 RX ADMIN — IPRATROPIUM BROMIDE AND ALBUTEROL SULFATE 1 AMPULE: .5; 3 SOLUTION RESPIRATORY (INHALATION) at 17:00

## 2018-10-15 ASSESSMENT — PAIN SCALES - GENERAL
PAINLEVEL_OUTOF10: 4

## 2018-10-15 NOTE — PLAN OF CARE
Problem: Falls - Risk of:  Goal: Will remain free from falls  Will remain free from falls   Outcome: Ongoing  D: Pt at risk for falls. A: Instructed pt to use call button to request assistance with ambulation. Fall sign posted. Non-skid socks on. Bed alarm on.   R: Pt verbalized understanding to call for assistance with ambulation.

## 2018-10-15 NOTE — PROGRESS NOTES
Hospital Medicine Progress Note      Admit Date: 10/13/2018         Overnight Events: none    CC: F/U for shortness of breath    HPI: The patient is a 72 y.o.  female with a medical history significant for COPD for which she continues to smoke 2PPD (has been smoking more since she had her colectomy/colostomy placement) and Parkinson's Disease for which it does not appear she is medicated for it who presented to [facility] with worsening shortness of breath for the past few days prior to admission. She reportedly had been out of Symbicort for the past week and was trying to get a prescription from her primary care doctor without avail. SpO2 was 91% on RA with wheezing noted. She was admitted with COPD exacerbation. Interval History/Subjective: States she wants nicotine patches at discharge. Wants to see a psychiatrist while she is here (reported this to the [facility]). Admits to smoking 2PPD-states she has been smoking since age 17. Review of Systems:     Comprehensive ROS negative except as mentioned below.     General ROS:  []  fevers  []  chills  []  fatigue  []  weakness  []  night sweats  []  body aches [] Other:  HEENT ROS:  []  trauma  []  headache  []  visual changes  []  double vision  []  blurred vision  []  tinnitus  []  vertigo  []  ear ache  []  drainage  []  bleeding gums  []  hoarseness  []  voice change  []  difficult/painful swallowing  []  stuffiness  []  rhinorrhea  []  sneezing  []  epistaxis [] Other:  Respiratory ROS:  [x]  cough  [x]  SOB  []  wheezing  []  changes in sputum production or quality  []  hemoptysis  []  pleurisy  []  snoring [] Other:  Cardiovascular ROS:  [] palpitations  []  pain  []  GARBER  []  orthopnea  []  syncope  []  lower extremity edema [] Other:  Gastrointestinal ROS:  []  Dysphagia  [] ABD pain  []  nausea  []  vomiting  []  indigestion  []  diarrhea  []  constipation [] Other:  Genitourinary:  []  frequency  []  polyuria  []  nocturia  [] sodium chloride flush, ondansetron, LORazepam    PHYSICAL EXAM:  /79   Pulse 111   Temp 98.2 °F (36.8 °C) (Oral)   Resp 18   Ht 5' 2\" (1.575 m)   Wt 136 lb 11 oz (62 kg)   SpO2 95%   BMI 25.00 kg/m²       Intake/Output Summary (Last 24 hours) at 10/15/18 1710  Last data filed at 10/15/18 1034   Gross per 24 hour   Intake              480 ml   Output               10 ml   Net              470 ml       General: Alert and oriented. Resting in bed in no apparent distress. HEENT: Normocephalic. Atraumatic. Pupils equal and reactive. EOM intact. Oral mucosa pink/moist/intact. Neck: Supple. Symmetrical. Trachea midline. Lungs: Diminished auscultation bilaterally. Respirations even and unlabored. Chest: Exam unremarkable. Cardiac: S1/S2 noted. Regular Rhythm and rate. Abdomen/GI: Soft. Non-tender. Non-distended. BS+. Ostomy with brown soft stool. Extremities: PP+. Atraumatic. No redness/cyanosis/edema noted. Brisk cap refill. Skin: Dry and intact. No lesions noted. Neuro: bilateral upper extremity tremors noted. Psych: rapid speech pattern, clearly anxious. LABS:    Lab Results   Component Value Date    WBC 15.2 (H) 10/14/2018    HGB 9.0 (L) 10/14/2018    HCT 30.4 (L) 10/14/2018    MCV 70.4 (L) 10/14/2018     10/14/2018    LYMPHOPCT 13.1 10/14/2018    RBC 4.33 10/14/2018    MCH 20.7 (L) 10/14/2018    MCHC 29.5 (L) 10/14/2018    RDW 20.5 (H) 10/14/2018       Lab Results   Component Value Date    CREATININE 0.9 10/15/2018    BUN 28 (H) 10/15/2018     (L) 10/15/2018    K 4.1 10/15/2018    CL 92 (L) 10/15/2018    CO2 25 10/15/2018       Lab Results   Component Value Date    MG 2.00 07/02/2018       Lab Results   Component Value Date    ALT 17 10/13/2018    AST 67 (H) 10/13/2018    ALKPHOS 126 10/13/2018    BILITOT 0.7 10/13/2018        No flowsheet data found. Imaging:  XR CHEST 1 VW   Final Result   No acute disease.              Assessment & Plan:      COPD with acute

## 2018-10-16 ENCOUNTER — TELEPHONE (OUTPATIENT)
Dept: FAMILY MEDICINE CLINIC | Age: 73
End: 2018-10-16

## 2018-10-16 VITALS
DIASTOLIC BLOOD PRESSURE: 72 MMHG | OXYGEN SATURATION: 93 % | HEART RATE: 98 BPM | BODY MASS INDEX: 24.91 KG/M2 | WEIGHT: 135.36 LBS | RESPIRATION RATE: 18 BRPM | SYSTOLIC BLOOD PRESSURE: 136 MMHG | HEIGHT: 62 IN | TEMPERATURE: 98.7 F

## 2018-10-16 PROCEDURE — 6370000000 HC RX 637 (ALT 250 FOR IP): Performed by: INTERNAL MEDICINE

## 2018-10-16 PROCEDURE — 94668 MNPJ CHEST WALL SBSQ: CPT

## 2018-10-16 PROCEDURE — 99407 BEHAV CHNG SMOKING > 10 MIN: CPT

## 2018-10-16 PROCEDURE — 6360000002 HC RX W HCPCS: Performed by: INTERNAL MEDICINE

## 2018-10-16 PROCEDURE — 6370000000 HC RX 637 (ALT 250 FOR IP): Performed by: NURSE PRACTITIONER

## 2018-10-16 PROCEDURE — G0378 HOSPITAL OBSERVATION PER HR: HCPCS

## 2018-10-16 PROCEDURE — 94760 N-INVAS EAR/PLS OXIMETRY 1: CPT

## 2018-10-16 PROCEDURE — 94640 AIRWAY INHALATION TREATMENT: CPT

## 2018-10-16 PROCEDURE — 96372 THER/PROPH/DIAG INJ SC/IM: CPT

## 2018-10-16 PROCEDURE — 99221 1ST HOSP IP/OBS SF/LOW 40: CPT | Performed by: PSYCHIATRY & NEUROLOGY

## 2018-10-16 PROCEDURE — 94664 DEMO&/EVAL PT USE INHALER: CPT

## 2018-10-16 PROCEDURE — 2580000003 HC RX 258: Performed by: INTERNAL MEDICINE

## 2018-10-16 RX ORDER — DULOXETIN HYDROCHLORIDE 30 MG/1
30 CAPSULE, DELAYED RELEASE ORAL DAILY
Qty: 30 CAPSULE | Refills: 3 | Status: SHIPPED | OUTPATIENT
Start: 2018-10-16 | End: 2018-10-23 | Stop reason: SDUPTHER

## 2018-10-16 RX ORDER — GUAIFENESIN 600 MG/1
600 TABLET, EXTENDED RELEASE ORAL 2 TIMES DAILY
Qty: 6 TABLET | Refills: 0 | Status: SHIPPED | OUTPATIENT
Start: 2018-10-16 | End: 2019-01-08

## 2018-10-16 RX ORDER — NICOTINE 21 MG/24HR
1 PATCH, TRANSDERMAL 24 HOURS TRANSDERMAL EVERY 24 HOURS
Qty: 30 PATCH | Refills: 3 | Status: SHIPPED | OUTPATIENT
Start: 2018-10-16 | End: 2018-10-23 | Stop reason: SDUPTHER

## 2018-10-16 RX ORDER — PREDNISONE 20 MG/1
TABLET ORAL
Qty: 6 TABLET | Refills: 0 | Status: SHIPPED | OUTPATIENT
Start: 2018-10-16 | End: 2018-10-23

## 2018-10-16 RX ORDER — BUDESONIDE AND FORMOTEROL FUMARATE DIHYDRATE 160; 4.5 UG/1; UG/1
2 AEROSOL RESPIRATORY (INHALATION) 2 TIMES DAILY
Qty: 1 INHALER | Refills: 3 | Status: SHIPPED | OUTPATIENT
Start: 2018-10-16 | End: 2018-10-16

## 2018-10-16 RX ORDER — BUDESONIDE AND FORMOTEROL FUMARATE DIHYDRATE 160; 4.5 UG/1; UG/1
2 AEROSOL RESPIRATORY (INHALATION) 2 TIMES DAILY
Qty: 1 INHALER | Refills: 3 | Status: CANCELLED | OUTPATIENT
Start: 2018-10-16

## 2018-10-16 RX ORDER — PREDNISONE 20 MG/1
TABLET ORAL
Qty: 6 TABLET | Refills: 0 | Status: SHIPPED | OUTPATIENT
Start: 2018-10-16 | End: 2018-10-16

## 2018-10-16 RX ORDER — AMLODIPINE BESYLATE 5 MG/1
5 TABLET ORAL DAILY
Qty: 30 TABLET | Refills: 3 | Status: SHIPPED | OUTPATIENT
Start: 2018-10-16 | End: 2018-10-23 | Stop reason: SDUPTHER

## 2018-10-16 RX ORDER — DEXLANSOPRAZOLE 60 MG/1
60 CAPSULE, DELAYED RELEASE ORAL DAILY
Qty: 30 CAPSULE | Refills: 1 | Status: ON HOLD | OUTPATIENT
Start: 2018-10-16 | End: 2018-12-16 | Stop reason: HOSPADM

## 2018-10-16 RX ORDER — HYDROXYZINE HYDROCHLORIDE 10 MG/1
10 TABLET, FILM COATED ORAL 3 TIMES DAILY PRN
Qty: 21 TABLET | Refills: 0 | Status: SHIPPED | OUTPATIENT
Start: 2018-10-16 | End: 2018-10-23 | Stop reason: SDUPTHER

## 2018-10-16 RX ORDER — GUAIFENESIN 600 MG/1
600 TABLET, EXTENDED RELEASE ORAL 2 TIMES DAILY
Qty: 6 TABLET | Refills: 0 | Status: SHIPPED | OUTPATIENT
Start: 2018-10-16 | End: 2018-10-16

## 2018-10-16 RX ORDER — BUDESONIDE AND FORMOTEROL FUMARATE DIHYDRATE 160; 4.5 UG/1; UG/1
2 AEROSOL RESPIRATORY (INHALATION) 2 TIMES DAILY
Qty: 1 INHALER | Refills: 3 | Status: SHIPPED | OUTPATIENT
Start: 2018-10-16 | End: 2018-10-23

## 2018-10-16 RX ORDER — ALBUTEROL SULFATE 90 UG/1
2 AEROSOL, METERED RESPIRATORY (INHALATION) EVERY 4 HOURS PRN
Qty: 1 INHALER | Refills: 1 | Status: SHIPPED | OUTPATIENT
Start: 2018-10-16 | End: 2018-10-23 | Stop reason: SDUPTHER

## 2018-10-16 RX ORDER — NICOTINE 21 MG/24HR
1 PATCH, TRANSDERMAL 24 HOURS TRANSDERMAL EVERY 24 HOURS
Qty: 30 PATCH | Refills: 3 | Status: CANCELLED | OUTPATIENT
Start: 2018-10-16 | End: 2019-10-16

## 2018-10-16 RX ADMIN — MOMETASONE FUROATE AND FORMOTEROL FUMARATE DIHYDRATE 2 PUFF: 200; 5 AEROSOL RESPIRATORY (INHALATION) at 08:18

## 2018-10-16 RX ADMIN — DULOXETINE HYDROCHLORIDE 30 MG: 30 CAPSULE, DELAYED RELEASE ORAL at 07:59

## 2018-10-16 RX ADMIN — IPRATROPIUM BROMIDE AND ALBUTEROL SULFATE 1 AMPULE: .5; 3 SOLUTION RESPIRATORY (INHALATION) at 13:38

## 2018-10-16 RX ADMIN — AMLODIPINE BESYLATE 5 MG: 5 TABLET ORAL at 07:59

## 2018-10-16 RX ADMIN — PREDNISONE 40 MG: 20 TABLET ORAL at 07:59

## 2018-10-16 RX ADMIN — PANTOPRAZOLE SODIUM 40 MG: 40 TABLET, DELAYED RELEASE ORAL at 06:48

## 2018-10-16 RX ADMIN — HYDROXYZINE PAMOATE 25 MG: 25 CAPSULE ORAL at 00:35

## 2018-10-16 RX ADMIN — FOLIC ACID 1 MG: 1 TABLET ORAL at 07:59

## 2018-10-16 RX ADMIN — GUAIFENESIN 600 MG: 600 TABLET, EXTENDED RELEASE ORAL at 07:59

## 2018-10-16 RX ADMIN — Medication 10 ML: at 07:58

## 2018-10-16 RX ADMIN — FERROUS SULFATE TAB EC 324 MG (65 MG FE EQUIVALENT) 324 MG: 324 (65 FE) TABLET DELAYED RESPONSE at 07:59

## 2018-10-16 RX ADMIN — ENOXAPARIN SODIUM 40 MG: 40 INJECTION SUBCUTANEOUS at 07:58

## 2018-10-16 RX ADMIN — IPRATROPIUM BROMIDE AND ALBUTEROL SULFATE 1 AMPULE: .5; 3 SOLUTION RESPIRATORY (INHALATION) at 08:18

## 2018-10-16 RX ADMIN — IPRATROPIUM BROMIDE AND ALBUTEROL SULFATE 1 AMPULE: .5; 3 SOLUTION RESPIRATORY (INHALATION) at 16:19

## 2018-10-16 NOTE — PLAN OF CARE
Problem: Falls - Risk of:  Goal: Will remain free from falls  Will remain free from falls   Outcome: Ongoing  Fall risk assessed. Precautions in place. Bed low and locked with side rails up x2. Nonskid socks on when OOB. Bed alarm utilized. Floor kept free of clutter. Call light within reach. Frequent checks performed. No falls at this time.

## 2018-10-16 NOTE — CONSULTS
Psychiatry Consultation/Initial Inpatient Akua Oneil M.D.  10/16/2018  10:43 AM      Referring Provider:  Bhavin Maier MD    Recommendations:    1. Depression NOS-this pt has been depressed and needing her psych meds, which include cymbalta 30 and, apparently, dexedrine 20 tid. She'd also like some f/u psych care. I called [facility] in [place] [phone], they noted that pt did indeed take dextroamphetamine for depression at a dose of 20 mg tid. I tried to call Dr. Regine Singh, pt's psychiatrist there, [phone], no answer. Tried getting a message to him at [facility] [employer], [phone] all per pt's request.  [facility] hasn't filled dexedrine for this pt since April. Given this and the usual precautions it is wise to take c addictive meds, I do not feel comfortable rxing this pt's amphetamines at this point. Perhaps if Dr. Regine Singh calls me back. I agree with cymbalta 30. Referred pt to [facility], and any psychiatrist from her insurance. I'd ask SW, if they get a second to maybe help pt access a list of psychiatrists from her options via medicaid/medicare. Thank you for allowing me to care for this patient. Please call the psych consult line with any further questions. Diagnosis:    Axis I  Depression NOS, DANIELLE by hx.     Axis III       Diagnosis Date    Col[facility] in place [facility])     SBO/diverticulosis     COPD (chronic obstructive pulmonary disease) (HCC)     Depression     DANIELLE (generalized anxiety disorder)     Gastric ulcer, unspecified as acute or chronic, without mention of hemorrhage, perforation, or obstruction     Hiatal hernia     Hypertension     Parkinson's disease ([facility]     Tobacco abuse       Principal Problem:    COPD with acute exacerbation ([facility]  Active Problems:    Acute respiratory failure with hypoxia (HCC)    Hypokalemia    Benign essential HTN    Parkinson's disease ([facility]    Tobacco abuse  Resolved Problems:    * No resolved hospital problems. *       [place] IV  New move, hates living conditions.  guaiFENesin  600 mg Oral BID    predniSONE  40 mg Oral Daily    pantoprazole  40 mg Oral QAM AC    ipratropium-albuterol  1 ampule Inhalation Q4H WA    sodium chloride flush  10 mL Intravenous 2 times per day    enoxaparin  40 mg Subcutaneous Daily    mometasone-formoterol  2 puff Inhalation BID    nicotine  1 patch Transdermal Daily    DULoxetine  30 mg Oral Daily    amLODIPine  5 mg Oral Daily    ferrous sulfate  324 mg Oral Daily with breakfast    folic acid  1 mg Oral Daily     hydrOXYzine, potassium chloride **OR** potassium chloride **OR** potassium chloride, sodium chloride flush, ondansetron, LORazepam    Examination  Review of Systems - + obvious tremor.     Recent Results (from the past 168 hour(s))   EKG 12 Lead    Collection Time: 10/13/18  6:04 AM   Result Value Ref Range    Ventricular Rate 111 BPM    Atrial Rate 111 BPM    P-R Interval 162 ms    QRS Duration 76 ms    Q-T Interval 344 ms    QTc Calculation (Bazett) 467 ms    P Axis 78 degrees    R Axis 35 degrees    T Axis 38 degrees    Diagnosis       Sinus tachycardiaNonspecific ST and T wave abnormalityConfirmed by WARREN VARNER, Roopaine Show ([phone]) on 10/13/2018 12:36:27 PM   CBC    Collection Time: 10/13/18  6:08 AM   Result Value Ref Range    WBC 9.3 4.0 - 11.0 K/uL    RBC 4.39 4.00 - 5.20 M/uL    Hemoglobin 9.2 (L) 12.0 - 16.0 g/dL    Hematocrit 30.8 (L) 36.0 - 48.0 %    MCV 70.3 (L) 80.0 - 100.0 fL    MCH 21.0 (L) 26.0 - 34.0 pg    MCHC 29.9 (L) 31.0 - 36.0 g/dL    RDW 20.4 (H) 12.4 - 15.4 %    Platelets 618 811 - 689 K/uL    MPV 7.3 5.0 - 10.5 fL   Comprehensive Metabolic Panel    Collection Time: 10/13/18  6:08 AM   Result Value Ref Range    Sodium 136 136 - 145 mmol/L    Potassium 5.8 (H) 3.5 - 5.1 mmol/L    Chloride 97 (L) 99 - 110 mmol/L    CO2 24 21 - 32 mmol/L    Anion Gap 15 3 - 16    Glucose 134 (H) 70 - 99 mg/dL    BUN 16 7 - 20 mg/dL    CREATININE 0.5 (L) 0.6 - Respiratory Pathogens Panel PCR Result: Not Detected  See additional report for complete Respiratory Pathogens Panel     Respiratory Panel Film Array Report    Collection Time: 10/13/18 12:08 PM   Result Value Ref Range    Report SEE IMAGE    Renal Function Panel    Collection Time: 10/14/18  7:22 AM   Result Value Ref Range    Sodium 134 (L) 136 - 145 mmol/L    Potassium 3.2 (L) 3.5 - 5.1 mmol/L    Chloride 92 (L) 99 - 110 mmol/L    CO2 26 21 - 32 mmol/L    Anion Gap 16 3 - 16    Glucose 111 (H) 70 - 99 mg/dL    BUN 17 7 - 20 mg/dL    CREATININE 0.7 0.6 - 1.2 mg/dL    GFR Non-African American >60 >60    GFR African American >60 >60    Calcium 9.5 8.3 - 10.6 mg/dL    Phosphorus 3.7 2.5 - 4.9 mg/dL    Alb 4.1 3.4 - 5.0 g/dL   CBC Auto Differential    Collection Time: 10/14/18  7:23 AM   Result Value Ref Range    WBC 15.2 (H) 4.0 - 11.0 K/uL    RBC 4.33 4.00 - 5.20 M/uL    Hemoglobin 9.0 (L) 12.0 - 16.0 g/dL    Hematocrit 30.4 (L) 36.0 - 48.0 %    MCV 70.4 (L) 80.0 - 100.0 fL    MCH 20.7 (L) 26.0 - 34.0 pg    MCHC 29.5 (L) 31.0 - 36.0 g/dL    RDW 20.5 (H) 12.4 - 15.4 %    Platelets 394 875 - 445 K/uL    MPV 7.0 5.0 - 10.5 fL    Neutrophils % 81.7 %    Lymphocytes % 13.1 %    Monocytes % 4.8 %    Eosinophils % 0.0 %    Basophils % 0.4 %    Neutrophils # 12.5 (H) 1.7 - 7.7 K/uL    Lymphocytes # 2.0 1.0 - 5.1 K/uL    Monocytes # 0.7 0.0 - 1.3 K/uL    Eosinophils # 0.0 0.0 - 0.6 K/uL    Basophils # 0.1 0.0 - 0.2 K/uL   Basic Metabolic Panel    Collection Time: 10/15/18 10:55 AM   Result Value Ref Range    Sodium 132 (L) 136 - 145 mmol/L    Potassium 4.1 3.5 - 5.1 mmol/L    Chloride 92 (L) 99 - 110 mmol/L    CO2 25 21 - 32 mmol/L    Anion Gap 15 3 - 16    Glucose 147 (H) 70 - 99 mg/dL    BUN 28 (H) 7 - 20 mg/dL    CREATININE 0.9 0.6 - 1.2 mg/dL    GFR Non-African American >60 >60    GFR African American >60 >60    Calcium 9.7 8.3 - 10.6 mg/dL   Procalcitonin    Collection Time: 10/15/18 10:55 AM   Result Value Ref Range

## 2018-10-16 NOTE — CARE COORDINATION
Later pt states she does not have a ride home. Cab voucher in soft chart for dc.     Electronically signed by CHAS Cortez on 10/16/2018 at 4:07 PM

## 2018-10-16 NOTE — CONSULTS
[facility]  COPD PROGRAM      NAME:  [address] RECORD NUMBER:  [id number]  AGE: 72 y.o.    GENDER: female  : 1945  TODAY'S DATE:  10/16/2018    Subjective:     VISIT TYPE: evaluation     ADMITTING PHYSICIAN:  Yamil Medrano MD    PAST MEDICAL HISTORY        Diagnosis Date    Colostomy in place [facility])     SBO/diverticulosis     [facility]) ([facility]     Depression     DANIELLE (generalized anxiety disorder)     Gastric ulcer, unspecified as acute or chronic, without mention of hemorrhage, perforation, or obstruction     Hiatal hernia     Hypertension     Parkinson's disease ([facility]     Tobacco abuse            Social History  Social History   Substance Use Topics    Smoking status: Current Every Day Smoker     Packs/day: 1.00     Types: Cigarettes    Smokeless tobacco: Never Used    Alcohol use 1.2 - 1.8 oz/week     2 - 3 Cans of beer per week      Comment: daily              Past Surgical History  Past Surgical History:   Procedure Laterality Date    CHOLECYSTECTOMY      COLOSTOMY  2018    HYSTERECTOMY      TUBAL LIGATION             ALLERGIES    Allergies   Allergen Reactions    Aspirin Other (See Comments)     Ulcers in stomach       MEDICATIONS  Scheduled Meds:   guaiFENesin  600 mg Oral BID    predniSONE  40 mg Oral Daily    pantoprazole  40 mg Oral QAM AC    ipratropium-albuterol  1 ampule Inhalation Q4H WA    sodium chloride flush  10 mL Intravenous 2 times per day    enoxaparin  40 mg Subcutaneous Daily    mometasone-formoterol  2 puff Inhalation BID    nicotine  1 patch Transdermal Daily    DULoxetine  30 mg Oral Daily    amLODIPine  5 mg Oral Daily    ferrous sulfate  324 mg Oral Daily with breakfast    folic acid  1 mg Oral Daily               ADMIT DATE: 10/13/2018      Objective:     ADMISSION DIAGNOSIS:   COPD (chronic obstructive pulmonary disease) ([facility]) [J44.9]     /72   Pulse 98   Temp 98.7 °F says Symbicort makes her shake and frequently does not take it. Pt is anxious about moving ASAP. Takes her house mate has 5 cats and the fifth and cat hair are making her sick. Social Work is helping her with lists of senior care options. Pt was educated on S/S of COPD, infection prevention, energy conservation, and when to call the doctor (yellow zone)Spoke about smoking cessation. Pt says its time that she quits. Tips and support given.     Patient rates program/content 10     Electronically signed by Natalia Rodriguez RCP, COPD Coordinator,  on 10/16/2018 at 3:39 PM

## 2018-10-16 NOTE — CARE COORDINATION
Pt requested BSC, spoke w/ [facility] in DME, would not be covered under her medicare or medicaid benefit and cost would be 107.00, informed pt and she declined. Suggested that she try [facility] for a considerable savings.  Plans to call a friend to pick her up \"if he's not too drunk\" Asked that if he has been drinking to let staff know and will be provided cab voucher

## 2018-10-17 NOTE — PROGRESS NOTES
CLINICAL PHARMACY NOTE: MEDS TO [address] [facility] Patient?: No  Total # of Prescriptions Filled: 6   The following medications were delivered to the patient:  · Prednisone  · Proair  · Symbicort  · Amlodipine  · Duloxetine  · Hydroxyzine  Total # of Interventions Completed: 4  Time Spent (min): 45    Additional Documentation:  - patient wanted rx's that were not covered or expensive to be transferred to [facility].   - Insurance investigation  - Lanette Evaluation (patient ended up having insurance)  - [employer] was too soon and was transferred to [employer]  PharmD Candidate 2019

## 2018-10-18 ENCOUNTER — TELEPHONE (OUTPATIENT)
Dept: FAMILY MEDICINE CLINIC | Age: 73
End: 2018-10-18

## 2018-10-23 ENCOUNTER — OFFICE VISIT (OUTPATIENT)
Dept: PRIMARY CARE CLINIC | Age: 73
End: 2018-10-23
Payer: MEDICARE

## 2018-10-23 VITALS
WEIGHT: 137.2 LBS | SYSTOLIC BLOOD PRESSURE: 122 MMHG | DIASTOLIC BLOOD PRESSURE: 72 MMHG | TEMPERATURE: 98 F | HEIGHT: 62 IN | BODY MASS INDEX: 25.25 KG/M2 | HEART RATE: 86 BPM | OXYGEN SATURATION: 92 %

## 2018-10-23 DIAGNOSIS — Z87.891 PERSONAL HISTORY OF NICOTINE DEPENDENCE: ICD-10-CM

## 2018-10-23 DIAGNOSIS — Z00.00 PREVENTATIVE HEALTH CARE: Primary | ICD-10-CM

## 2018-10-23 DIAGNOSIS — Z23 NEED FOR DIPHTHERIA-TETANUS-PERTUSSIS (TDAP) VACCINE: ICD-10-CM

## 2018-10-23 DIAGNOSIS — Z12.39 BREAST CANCER SCREENING: ICD-10-CM

## 2018-10-23 DIAGNOSIS — Z72.0 TOBACCO ABUSE: ICD-10-CM

## 2018-10-23 DIAGNOSIS — F33.41 RECURRENT MAJOR DEPRESSIVE DISORDER, IN PARTIAL REMISSION (HCC): ICD-10-CM

## 2018-10-23 DIAGNOSIS — J44.9 CHRONIC OBSTRUCTIVE PULMONARY DISEASE, UNSPECIFIED COPD TYPE (HCC): ICD-10-CM

## 2018-10-23 DIAGNOSIS — I10 ESSENTIAL HYPERTENSION: ICD-10-CM

## 2018-10-23 DIAGNOSIS — Z43.3 COLOSTOMY CARE (HCC): ICD-10-CM

## 2018-10-23 PROBLEM — F10.10 ALCOHOL ABUSE: Status: ACTIVE | Noted: 2018-09-24

## 2018-10-23 PROBLEM — E87.6 HYPOKALEMIA: Status: RESOLVED | Noted: 2018-10-15 | Resolved: 2018-10-23

## 2018-10-23 PROBLEM — G20.A1 PARKINSON'S DISEASE: Chronic | Status: RESOLVED | Noted: 2018-10-15 | Resolved: 2018-10-23

## 2018-10-23 PROBLEM — J96.01 ACUTE RESPIRATORY FAILURE WITH HYPOXIA (HCC): Status: RESOLVED | Noted: 2018-10-15 | Resolved: 2018-10-23

## 2018-10-23 PROBLEM — G20 PARKINSON'S DISEASE (HCC): Chronic | Status: RESOLVED | Noted: 2018-10-15 | Resolved: 2018-10-23

## 2018-10-23 PROCEDURE — G8400 PT W/DXA NO RESULTS DOC: HCPCS | Performed by: INTERNAL MEDICINE

## 2018-10-23 PROCEDURE — 1090F PRES/ABSN URINE INCON ASSESS: CPT | Performed by: INTERNAL MEDICINE

## 2018-10-23 PROCEDURE — G8482 FLU IMMUNIZE ORDER/ADMIN: HCPCS | Performed by: INTERNAL MEDICINE

## 2018-10-23 PROCEDURE — 3017F COLORECTAL CA SCREEN DOC REV: CPT | Performed by: INTERNAL MEDICINE

## 2018-10-23 PROCEDURE — 1123F ACP DISCUSS/DSCN MKR DOCD: CPT | Performed by: INTERNAL MEDICINE

## 2018-10-23 PROCEDURE — 1111F DSCHRG MED/CURRENT MED MERGE: CPT | Performed by: INTERNAL MEDICINE

## 2018-10-23 PROCEDURE — G0444 DEPRESSION SCREEN ANNUAL: HCPCS | Performed by: INTERNAL MEDICINE

## 2018-10-23 PROCEDURE — G8427 DOCREV CUR MEDS BY ELIG CLIN: HCPCS | Performed by: INTERNAL MEDICINE

## 2018-10-23 PROCEDURE — G8419 CALC BMI OUT NRM PARAM NOF/U: HCPCS | Performed by: INTERNAL MEDICINE

## 2018-10-23 PROCEDURE — 99204 OFFICE O/P NEW MOD 45 MIN: CPT | Performed by: INTERNAL MEDICINE

## 2018-10-23 PROCEDURE — 4004F PT TOBACCO SCREEN RCVD TLK: CPT | Performed by: INTERNAL MEDICINE

## 2018-10-23 PROCEDURE — 1101F PT FALLS ASSESS-DOCD LE1/YR: CPT | Performed by: INTERNAL MEDICINE

## 2018-10-23 PROCEDURE — 3023F SPIROM DOC REV: CPT | Performed by: INTERNAL MEDICINE

## 2018-10-23 PROCEDURE — 4040F PNEUMOC VAC/ADMIN/RCVD: CPT | Performed by: INTERNAL MEDICINE

## 2018-10-23 PROCEDURE — G8926 SPIRO NO PERF OR DOC: HCPCS | Performed by: INTERNAL MEDICINE

## 2018-10-23 RX ORDER — HYDROXYZINE HYDROCHLORIDE 10 MG/1
10 TABLET, FILM COATED ORAL 3 TIMES DAILY PRN
Qty: 90 TABLET | Refills: 0 | Status: SHIPPED | OUTPATIENT
Start: 2018-10-23 | End: 2018-10-30

## 2018-10-23 RX ORDER — AMLODIPINE BESYLATE 5 MG/1
5 TABLET ORAL DAILY
Qty: 30 TABLET | Refills: 3 | Status: ON HOLD | OUTPATIENT
Start: 2018-10-23 | End: 2018-12-16 | Stop reason: HOSPADM

## 2018-10-23 RX ORDER — DULOXETIN HYDROCHLORIDE 30 MG/1
30 CAPSULE, DELAYED RELEASE ORAL DAILY
Qty: 30 CAPSULE | Refills: 3 | Status: SHIPPED | OUTPATIENT
Start: 2018-10-23 | End: 2019-05-23

## 2018-10-23 RX ORDER — NICOTINE 21 MG/24HR
1 PATCH, TRANSDERMAL 24 HOURS TRANSDERMAL EVERY 24 HOURS
Qty: 30 PATCH | Refills: 3 | Status: SHIPPED | OUTPATIENT
Start: 2018-10-23 | End: 2019-04-05

## 2018-10-23 RX ORDER — ALBUTEROL SULFATE 90 UG/1
2 AEROSOL, METERED RESPIRATORY (INHALATION) EVERY 4 HOURS PRN
Qty: 1 INHALER | Refills: 1 | Status: SHIPPED | OUTPATIENT
Start: 2018-10-23 | End: 2018-11-01

## 2018-10-23 RX ORDER — BUPROPION HYDROCHLORIDE 150 MG/1
150 TABLET, EXTENDED RELEASE ORAL 2 TIMES DAILY
Qty: 60 TABLET | Refills: 3 | Status: SHIPPED | OUTPATIENT
Start: 2018-10-23 | End: 2018-11-02 | Stop reason: CLARIF

## 2018-10-23 ASSESSMENT — PATIENT HEALTH QUESTIONNAIRE - PHQ9
7. TROUBLE CONCENTRATING ON THINGS, SUCH AS READING THE NEWSPAPER OR WATCHING TELEVISION: 3
3. TROUBLE FALLING OR STAYING ASLEEP: 2
9. THOUGHTS THAT YOU WOULD BE BETTER OFF DEAD, OR OF HURTING YOURSELF: 0
SUM OF ALL RESPONSES TO PHQ9 QUESTIONS 1 & 2: 6
5. POOR APPETITE OR OVEREATING: 0
6. FEELING BAD ABOUT YOURSELF - OR THAT YOU ARE A FAILURE OR HAVE LET YOURSELF OR YOUR FAMILY DOWN: 3
4. FEELING TIRED OR HAVING LITTLE ENERGY: 2
SUM OF ALL RESPONSES TO PHQ QUESTIONS 1-9: 19
8. MOVING OR SPEAKING SO SLOWLY THAT OTHER PEOPLE COULD HAVE NOTICED. OR THE OPPOSITE, BEING SO FIGETY OR RESTLESS THAT YOU HAVE BEEN MOVING AROUND A LOT MORE THAN USUAL: 3
2. FEELING DOWN, DEPRESSED OR HOPELESS: 3
SUM OF ALL RESPONSES TO PHQ QUESTIONS 1-9: 19
10. IF YOU CHECKED OFF ANY PROBLEMS, HOW DIFFICULT HAVE THESE PROBLEMS MADE IT FOR YOU TO DO YOUR WORK, TAKE CARE OF THINGS AT HOME, OR GET ALONG WITH OTHER PEOPLE: 2
1. LITTLE INTEREST OR PLEASURE IN DOING THINGS: 3

## 2018-10-23 ASSESSMENT — ENCOUNTER SYMPTOMS
NAUSEA: 0
BACK PAIN: 0
CONSTIPATION: 0
COUGH: 0
VOMITING: 0
SHORTNESS OF BREATH: 0
ABDOMINAL PAIN: 0
DIARRHEA: 0

## 2018-10-23 NOTE — PROGRESS NOTES
Chief Complaint:   Chief Complaint   Patient presents with    New Patient     return to [place] from [place]; HX: familial tremor, mjr depres. disorder    Other     HX: colostomy           HPI: Gerardo Smith is a 72 y.o. female, with a history of COPD, bowel obstruction s/p colostomy, depression, tobacco abuse, essential tremor,  who presents for a new patient visit. Colostomy: Ms. Sandip Bardales was treated for partial obstruction of her bowel in  May 2018, while living in [place], [place]. She had to get emergency surgery and now has a colostomy bag. The pt would like to see a surgeon about having the procedure reversed. Depression: Pt reports she was diagnosed with severe depression many years ago. She reports she is on cymblata, and ativan for her depression. She is interested in seeing psychiatrist.     COPD and tobacco abuse: Ms. Sandip Bardales has had COPD for over 10 years. Ms. Sandip Bardales is a current smoker. She has smoked 1 pack per day since age 17. Pt has had 2 ED visits this year for COPD attacks. She was also hospitalized at [facility] from 10/13 to 10/16 for a COPD exacerbation. Tremors:  Pt reports having tremors since she was young. Tremors run in her family as her father and nephew also have the condition. Pt says taking symbicort makes her tremors worse.      Cancer screening tests:   Colonoscopy:  needs  Breast cancer screening:  Needs     Vaccinations:   Flu:   tdap    Past Medical History:   Diagnosis Date    Colostomy in place [facility])     SBO/diverticulosis     [facility] obstructive [facility]) [facility]     Depression     DANIELLE (generalized anxiety disorder)     Gastric ulcer, unspecified as acute or chronic, without mention of hemorrhage, perforation, or obstruction     Hiatal hernia     Hypertension     Parkinson's disease ([facility]     Tobacco abuse        Past Surgical History:   Procedure Laterality Date    CHOLECYSTECTOMY      COLOSTOMY  2018    HYSTERECTOMY      TUBAL LIGATION

## 2018-11-01 DIAGNOSIS — J44.9 CHRONIC OBSTRUCTIVE PULMONARY DISEASE, UNSPECIFIED COPD TYPE (HCC): ICD-10-CM

## 2018-11-01 RX ORDER — ALBUTEROL SULFATE 90 UG/1
2 AEROSOL, METERED RESPIRATORY (INHALATION) EVERY 6 HOURS PRN
Qty: 1 INHALER | Refills: 3 | Status: SHIPPED | OUTPATIENT
Start: 2018-11-01 | End: 2019-07-01 | Stop reason: SDUPTHER

## 2018-11-02 ENCOUNTER — INITIAL CONSULT (OUTPATIENT)
Dept: SURGERY | Age: 73
End: 2018-11-02
Payer: MEDICARE

## 2018-11-02 VITALS
SYSTOLIC BLOOD PRESSURE: 118 MMHG | HEIGHT: 62 IN | HEART RATE: 104 BPM | BODY MASS INDEX: 25.58 KG/M2 | DIASTOLIC BLOOD PRESSURE: 71 MMHG | WEIGHT: 139 LBS

## 2018-11-02 DIAGNOSIS — Z93.3 COLOSTOMY PRESENT (HCC): ICD-10-CM

## 2018-11-02 DIAGNOSIS — K56.699 COLON STRICTURE (HCC): Primary | ICD-10-CM

## 2018-11-02 PROCEDURE — 1090F PRES/ABSN URINE INCON ASSESS: CPT | Performed by: SURGERY

## 2018-11-02 PROCEDURE — 3017F COLORECTAL CA SCREEN DOC REV: CPT | Performed by: SURGERY

## 2018-11-02 PROCEDURE — G8482 FLU IMMUNIZE ORDER/ADMIN: HCPCS | Performed by: SURGERY

## 2018-11-02 PROCEDURE — 1101F PT FALLS ASSESS-DOCD LE1/YR: CPT | Performed by: SURGERY

## 2018-11-02 PROCEDURE — 99203 OFFICE O/P NEW LOW 30 MIN: CPT | Performed by: SURGERY

## 2018-11-02 PROCEDURE — G8419 CALC BMI OUT NRM PARAM NOF/U: HCPCS | Performed by: SURGERY

## 2018-11-02 PROCEDURE — G8427 DOCREV CUR MEDS BY ELIG CLIN: HCPCS | Performed by: SURGERY

## 2018-11-08 ENCOUNTER — TELEPHONE (OUTPATIENT)
Dept: SURGERY | Age: 73
End: 2018-11-08

## 2018-11-08 ENCOUNTER — HOSPITAL ENCOUNTER (EMERGENCY)
Age: 73
Discharge: HOME | End: 2018-11-08
Attending: EMERGENCY MEDICINE
Payer: MEDICARE

## 2018-11-08 ENCOUNTER — TELEPHONE (OUTPATIENT)
Dept: OTHER | Facility: CLINIC | Age: 73
End: 2018-11-08

## 2018-11-08 ENCOUNTER — NURSE TRIAGE (OUTPATIENT)
Dept: OTHER | Facility: CLINIC | Age: 73
End: 2018-11-08

## 2018-11-08 VITALS
RESPIRATION RATE: 16 BRPM | DIASTOLIC BLOOD PRESSURE: 85 MMHG | HEART RATE: 111 BPM | SYSTOLIC BLOOD PRESSURE: 134 MMHG | OXYGEN SATURATION: 95 % | TEMPERATURE: 98.2 F

## 2018-11-08 DIAGNOSIS — R11.2 NON-INTRACTABLE VOMITING WITH NAUSEA, UNSPECIFIED VOMITING TYPE: Primary | ICD-10-CM

## 2018-11-08 DIAGNOSIS — Z43.3 COLOSTOMY CARE (HCC): Primary | ICD-10-CM

## 2018-11-08 PROCEDURE — 99283 EMERGENCY DEPT VISIT LOW MDM: CPT

## 2018-11-08 RX ORDER — ONDANSETRON 4 MG/1
4 TABLET, ORALLY DISINTEGRATING ORAL EVERY 8 HOURS PRN
Qty: 12 TABLET | Refills: 0 | Status: ON HOLD | OUTPATIENT
Start: 2018-11-08 | End: 2018-12-14

## 2018-11-08 ASSESSMENT — PAIN DESCRIPTION - LOCATION: LOCATION: ABDOMEN

## 2018-11-08 ASSESSMENT — PAIN DESCRIPTION - FREQUENCY: FREQUENCY: CONTINUOUS

## 2018-11-08 ASSESSMENT — PAIN DESCRIPTION - PAIN TYPE: TYPE: ACUTE PAIN

## 2018-11-08 ASSESSMENT — PAIN SCALES - GENERAL: PAINLEVEL_OUTOF10: 6

## 2018-11-08 ASSESSMENT — PAIN DESCRIPTION - ORIENTATION: ORIENTATION: UPPER

## 2018-11-15 NOTE — PROGRESS NOTES
5.8 oz (61.4 kg)     BP Readings from Last 3 Encounters:   11/08/18 134/85   11/02/18 118/71   10/23/18 122/72          REVIEW OF SYSTEMS:   Pertinent positives are in HPI, otherwise all systems reviewed and negative    PHYSICAL EXAM:    CONSTITUTIONAL:  awake, alert, no apparent distress and normal weight  ENT:  normocephalic, without obvious abnormality  NECK:  supple, symmetrical, trachea midline   LUNGS:  Resp easy and unlabored  CARDIOVASCULAR:  regular rate and rhythm  ABDOMEN:  Midline scar well healed, soft, non-distended, non-tender, voluntary guarding absent, and hernia absent; left colostomy with appliance in place  MUSCULOSKELETAL: No edema  NEUROLOGIC:  Mental Status Exam:  Level of Alertness:   awake  Orientation:  person, place, time        DATA:  No results for input(s): WBC, HGB, HCT, PLT, NA, K, CL, CO2, BUN, CREATININE, MG, PHOS, CALCIUM, PTT, INR, AST, ALT, BILITOT, BILIDIR, NITRU, COLORU, BACTERIA in the last 72 hours. Invalid input(s): PT, WBCU, RBCU, LEUKOCYTESUA  CBC with Differential:    Lab Results   Component Value Date    WBC 15.2 10/14/2018    RBC 4.33 10/14/2018    HGB 9.0 10/14/2018    HCT 30.4 10/14/2018     10/14/2018    MCV 70.4 10/14/2018    MCH 20.7 10/14/2018    MCHC 29.5 10/14/2018    RDW 20.5 10/14/2018    SEGSPCT 64.4 03/31/2011    LYMPHOPCT 13.1 10/14/2018    MONOPCT 4.8 10/14/2018    EOSPCT 11.9 03/31/2011    BASOPCT 0.4 10/14/2018    MONOSABS 0.7 10/14/2018    LYMPHSABS 2.0 10/14/2018    EOSABS 0.0 10/14/2018    BASOSABS 0.1 10/14/2018    DIFFTYPE Auto 03/31/2011     BMP:    Lab Results   Component Value Date     10/15/2018    K 4.1 10/15/2018    K 3.4 07/02/2018    CL 92 10/15/2018    CO2 25 10/15/2018    BUN 28 10/15/2018    LABALBU 4.1 10/14/2018    CREATININE 0.9 10/15/2018    CALCIUM 9.7 10/15/2018    GFRAA >60 10/15/2018    GFRAA >60 03/31/2011    LABGLOM >60 10/15/2018    GLUCOSE 147 10/15/2018           ASSESSMENT:     Diagnosis Orders   1.  Colon stricture ([facility]  Taina Mabry MD, Gastroenterology, [facility]   2. Colostomy present ([facility]  Taina Mabry MD, Gastroenterology, [facility]         PLAN:    Ms. Katerine Hendricks has a new colostomy after sigmoid colectomy for colonic stricture that was performed in May of 2018. Discussed colostomy reversal with the patient and the steps needed to safely undergo reversal. The technical aspects, risks, benefits and complications of the procedure were discussed with the patient, including possible infection, bleeding, and anastomotic leak with potential for ostomy. She is going to try to obtain her operative report before her next visit. In order to optimize her prior to surgery, she will need to have a colonoscopy to evaluate for any polyps/masses. GI referral made to Dr. Julius Aldana. Discussed smoking cessation with the patient as smoking leads to higher rates of wound complications and anastomotic leak. She is going to continue to work with Dr. Ezequiel Hernandez to stop smoking. We will need to check nutrition labs prior to surgery to make sure she is nutritionally sound to be able to heal from surgery. I recommended boost/ensure shakes as supplements. Will have her follow up in 1 month to evaluate her progress prior to scheduling surgery.     Electronically signed by Emma Ibanez MD

## 2018-11-26 DIAGNOSIS — Z93.3 COLOSTOMY PRESENT (HCC): ICD-10-CM

## 2018-11-26 DIAGNOSIS — K56.699 COLON STRICTURE (HCC): ICD-10-CM

## 2018-11-26 LAB
PREALBUMIN: 19.9 MG/DL (ref 20–40)
TRANSFERRIN: 388 MG/DL (ref 200–360)

## 2018-11-29 ENCOUNTER — APPOINTMENT (OUTPATIENT)
Dept: CT IMAGING | Age: 73
End: 2018-11-29
Payer: MEDICARE

## 2018-11-29 ENCOUNTER — HOSPITAL ENCOUNTER (EMERGENCY)
Age: 73
Discharge: HOME | End: 2018-11-29
Attending: EMERGENCY MEDICINE
Payer: MEDICARE

## 2018-11-29 VITALS
HEIGHT: 62 IN | OXYGEN SATURATION: 98 % | TEMPERATURE: 97.8 F | HEART RATE: 88 BPM | DIASTOLIC BLOOD PRESSURE: 56 MMHG | WEIGHT: 139.33 LBS | RESPIRATION RATE: 18 BRPM | SYSTOLIC BLOOD PRESSURE: 114 MMHG | BODY MASS INDEX: 25.64 KG/M2

## 2018-11-29 DIAGNOSIS — E87.6 HYPOKALEMIA: ICD-10-CM

## 2018-11-29 DIAGNOSIS — S01.21XA LACERATION OF NOSE, INITIAL ENCOUNTER: ICD-10-CM

## 2018-11-29 DIAGNOSIS — S02.2XXA CLOSED FRACTURE OF NASAL BONE, INITIAL ENCOUNTER: ICD-10-CM

## 2018-11-29 DIAGNOSIS — Z78.9 ALCOHOL USE: ICD-10-CM

## 2018-11-29 DIAGNOSIS — S01.01XA LACERATION OF SCALP, INITIAL ENCOUNTER: Primary | ICD-10-CM

## 2018-11-29 LAB
A/G RATIO: 1.1 (ref 1.1–2.2)
ALBUMIN SERPL-MCNC: 3.7 G/DL (ref 3.4–5)
ALP BLD-CCNC: 129 U/L (ref 40–129)
ALT SERPL-CCNC: 46 U/L (ref 10–40)
ANION GAP SERPL CALCULATED.3IONS-SCNC: 17 MMOL/L (ref 3–16)
ANISOCYTOSIS: ABNORMAL
AST SERPL-CCNC: 100 U/L (ref 15–37)
BASOPHILS ABSOLUTE: 0 K/UL (ref 0–0.2)
BASOPHILS RELATIVE PERCENT: 0 %
BILIRUB SERPL-MCNC: <0.2 MG/DL (ref 0–1)
BUN BLDV-MCNC: 10 MG/DL (ref 7–20)
CALCIUM SERPL-MCNC: 9 MG/DL (ref 8.3–10.6)
CHLORIDE BLD-SCNC: 100 MMOL/L (ref 99–110)
CO2: 25 MMOL/L (ref 21–32)
CREAT SERPL-MCNC: 0.6 MG/DL (ref 0.6–1.2)
EKG ATRIAL RATE: 78 BPM
EKG DIAGNOSIS: NORMAL
EKG P AXIS: 115 DEGREES
EKG P-R INTERVAL: 156 MS
EKG Q-T INTERVAL: 438 MS
EKG QRS DURATION: 92 MS
EKG QTC CALCULATION (BAZETT): 499 MS
EKG R AXIS: 49 DEGREES
EKG T AXIS: 57 DEGREES
EKG VENTRICULAR RATE: 78 BPM
EOSINOPHILS ABSOLUTE: 0.2 K/UL (ref 0–0.6)
EOSINOPHILS RELATIVE PERCENT: 5 %
ETHANOL: 212 MG/DL (ref 0–0.08)
GFR AFRICAN AMERICAN: >60
GFR NON-AFRICAN AMERICAN: >60
GLOBULIN: 3.3 G/DL
GLUCOSE BLD-MCNC: 116 MG/DL (ref 70–99)
HCT VFR BLD CALC: 31.4 % (ref 36–48)
HEMOGLOBIN: 9.5 G/DL (ref 12–16)
LYMPHOCYTES ABSOLUTE: 1.6 K/UL (ref 1–5.1)
LYMPHOCYTES RELATIVE PERCENT: 45 %
MCH RBC QN AUTO: 23.5 PG (ref 26–34)
MCHC RBC AUTO-ENTMCNC: 30.3 G/DL (ref 31–36)
MCV RBC AUTO: 77.5 FL (ref 80–100)
MICROCYTES: ABNORMAL
MONOCYTES ABSOLUTE: 0.1 K/UL (ref 0–1.3)
MONOCYTES RELATIVE PERCENT: 3 %
NEUTROPHILS ABSOLUTE: 1.7 K/UL (ref 1.7–7.7)
NEUTROPHILS RELATIVE PERCENT: 47 %
OVALOCYTES: ABNORMAL
PDW BLD-RTO: 28.4 % (ref 12.4–15.4)
PLATELET # BLD: 293 K/UL (ref 135–450)
PMV BLD AUTO: 7.2 FL (ref 5–10.5)
POIKILOCYTES: ABNORMAL
POTASSIUM SERPL-SCNC: 3.1 MMOL/L (ref 3.5–5.1)
RBC # BLD: 4.05 M/UL (ref 4–5.2)
SODIUM BLD-SCNC: 142 MMOL/L (ref 136–145)
TOTAL PROTEIN: 7 G/DL (ref 6.4–8.2)
WBC # BLD: 3.6 K/UL (ref 4–11)

## 2018-11-29 PROCEDURE — 6370000000 HC RX 637 (ALT 250 FOR IP): Performed by: EMERGENCY MEDICINE

## 2018-11-29 PROCEDURE — 80053 COMPREHEN METABOLIC PANEL: CPT

## 2018-11-29 PROCEDURE — 93005 ELECTROCARDIOGRAM TRACING: CPT | Performed by: EMERGENCY MEDICINE

## 2018-11-29 PROCEDURE — 99285 EMERGENCY DEPT VISIT HI MDM: CPT

## 2018-11-29 PROCEDURE — 93010 ELECTROCARDIOGRAM REPORT: CPT | Performed by: INTERNAL MEDICINE

## 2018-11-29 PROCEDURE — 70486 CT MAXILLOFACIAL W/O DYE: CPT

## 2018-11-29 PROCEDURE — 96360 HYDRATION IV INFUSION INIT: CPT

## 2018-11-29 PROCEDURE — 6360000002 HC RX W HCPCS: Performed by: EMERGENCY MEDICINE

## 2018-11-29 PROCEDURE — 85025 COMPLETE CBC W/AUTO DIFF WBC: CPT

## 2018-11-29 PROCEDURE — G0480 DRUG TEST DEF 1-7 CLASSES: HCPCS

## 2018-11-29 PROCEDURE — 96361 HYDRATE IV INFUSION ADD-ON: CPT

## 2018-11-29 PROCEDURE — 2580000003 HC RX 258: Performed by: EMERGENCY MEDICINE

## 2018-11-29 PROCEDURE — 70450 CT HEAD/BRAIN W/O DYE: CPT

## 2018-11-29 PROCEDURE — 90471 IMMUNIZATION ADMIN: CPT | Performed by: EMERGENCY MEDICINE

## 2018-11-29 PROCEDURE — 4500000025 HC ED LEVEL 5 PROCEDURE

## 2018-11-29 PROCEDURE — 90715 TDAP VACCINE 7 YRS/> IM: CPT | Performed by: EMERGENCY MEDICINE

## 2018-11-29 RX ORDER — POTASSIUM CHLORIDE 1.5 G/1.77G
20 POWDER, FOR SOLUTION ORAL 2 TIMES DAILY
Status: DISCONTINUED | OUTPATIENT
Start: 2018-11-29 | End: 2018-11-29 | Stop reason: HOSPADM

## 2018-11-29 RX ORDER — ACETAMINOPHEN AND CODEINE PHOSPHATE 300; 30 MG/1; MG/1
1 TABLET ORAL EVERY 4 HOURS PRN
Qty: 20 TABLET | Refills: 0 | Status: SHIPPED | OUTPATIENT
Start: 2018-11-29 | End: 2018-12-03

## 2018-11-29 RX ORDER — 0.9 % SODIUM CHLORIDE 0.9 %
1000 INTRAVENOUS SOLUTION INTRAVENOUS ONCE
Status: COMPLETED | OUTPATIENT
Start: 2018-11-29 | End: 2018-11-29

## 2018-11-29 RX ORDER — CEPHALEXIN 500 MG/1
500 CAPSULE ORAL 4 TIMES DAILY
Qty: 28 CAPSULE | Refills: 0 | Status: SHIPPED | OUTPATIENT
Start: 2018-11-29 | End: 2018-12-06

## 2018-11-29 RX ADMIN — TETANUS TOXOID, REDUCED DIPHTHERIA TOXOID AND ACELLULAR PERTUSSIS VACCINE, ADSORBED 0.5 ML: 5; 2.5; 8; 8; 2.5 SUSPENSION INTRAMUSCULAR at 07:02

## 2018-11-29 RX ADMIN — POTASSIUM CHLORIDE 20 MEQ: 1.5 POWDER, FOR SOLUTION ORAL at 07:49

## 2018-11-29 RX ADMIN — SODIUM CHLORIDE 1000 ML: 9 INJECTION, SOLUTION INTRAVENOUS at 06:03

## 2018-11-29 ASSESSMENT — ENCOUNTER SYMPTOMS
GASTROINTESTINAL NEGATIVE: 1
RESPIRATORY NEGATIVE: 1
EYES NEGATIVE: 1
ROS SKIN COMMENTS: PER ABOVE

## 2018-11-29 ASSESSMENT — PAIN DESCRIPTION - PAIN TYPE: TYPE: ACUTE PAIN

## 2018-11-29 ASSESSMENT — PAIN DESCRIPTION - DESCRIPTORS: DESCRIPTORS: ACHING

## 2018-11-29 ASSESSMENT — PAIN SCALES - GENERAL: PAINLEVEL_OUTOF10: 9

## 2018-11-29 ASSESSMENT — PAIN DESCRIPTION - LOCATION: LOCATION: NOSE

## 2018-11-29 NOTE — ED PROVIDER NOTES
[facility]  eMERGENCY dEPARTMENT eNCOUnter      Pt Name: Miriam Cain  MRN: [id number]  [place] 1945  Date of evaluation: 11/29/2018  Provider: Zoila Garcia MD    [address] [place] [address]       Chief Complaint   Patient presents with    Fall     Pt to ED via [facility] d/t fall. Pt states she went to her kitchen to get a carrot and she fell on the floor. Pt states she has been drinking gin. Pt did hit her head. Lacerations to nose and left side of head. Denies any LOC and is not sure if she is on blood thinners. HISTORY OF PRESENT ILLNESS   (Location/Symptom, Timing/Onset,Context/Setting, Quality, Duration, Modifying Factors, Severity)  Note limiting factors. HPI: iMriam Cain is a 72 y.o. female, with hx of HTN, who presents to the emergency department After a mechanical fall with a head laceration and laceration over her nasal bridge. Patient is not sure if he lost consciousness. Patient describes her fall as \"socks and ceramic tile or not a good combination. \"  Patient notes that she has been drinking. Unknown last tetanus shot. Denies any SOB, palpitations, CP or sx prior to fall. NursingNotes were reviewed. REVIEW OF SYSTEMS    (2-9 systems for level 4, 10 or more for level 5)     Review of Systems   Constitutional: Negative for activity change, chills and fatigue. HENT:        Head injury and nose lac   Eyes: Negative. Respiratory: Negative. Cardiovascular: Negative. Gastrointestinal: Negative. Genitourinary: Negative. Musculoskeletal: Negative. Skin:        Per above   Neurological: Positive for headaches. Negative for weakness and light-headedness. Psychiatric/Behavioral: Negative. Except as noted above the remainder of the review of systems was reviewed and negative.      PAST MEDICAL HISTORY     Past Medical History:   Diagnosis Date    Colostomy in place [facility])     SBO/diverticulosis     COPD discharge patient to a family member was here to take her home. Patient aware that she will have scarring, but that the wrists or scarring is reduced due to wound closure today. Low potassium noted and are placed in ED. Patient did change diet and have this rechecked with her PMD.  At time of discharge, pt happy with plan. CRITICAL CARE TIME   Total Critical Care time was 0 minutes, excluding separately reportable procedures. CONSULTS:  None      Procedures   PROCEDURE  Laceration Repair Procedure Note  Performed by: myself  Consent:  Verbal consent obtained by patient. Risk of infection and pain discussed, as well as alternatives. Anesthesia:  The patient was placed in the appropriate position and anesthesia obtained by infiltration using 1% Lidocaine with epinephrine. Repair type:  simple      Pre-procedure:  Patient was prepped and draped in usual sterile fashion   Laceration details:    Location: left scalp and nasal bridge    Length (cm):  5 cm and 2 cm  Preparation:  Patient was prepped and draped in usual sterile fashion   Exploration: Hemostasis achieved with direct pressure, entire depth of wound probed and visualized    Contaminated: no    Treatment:   Amount of cleaning:  Extensive     Irrigation solution:  High pressure NS  Visualized foreign bodies/material removed: no FBs    Skin repair:   Repair method:  Sutures for nose and staples for scalp  Suture size:  6-0 prolene  Suture material:  Per above  Suture technique:  Simple interrupted  Approximation:  Close  Number of sutures: 4 suture for nose, 6 staples for scalp  Dressing:  Sterile dressing and antibiotic ointment  Patient tolerance of procedure: Tolerated well, no immediate complications      FINAL IMPRESSION      1. Laceration of scalp, initial encounter    2. Laceration of nose, initial encounter    3. Closed fracture of nasal bone, initial encounter    4. Hypokalemia    5.  Alcohol use          DISPOSITION/PLAN   DISPOSITION PATIENT REFERRED TO:  Melisa Chan MD  [address] [facility] [address] [place] [address]  [phone]      nasal bone fracture and laceration    [facility] Emergency Department  [address]  [phone]    If symptoms worsen      DISCHARGE MEDICATIONS:  Discharge Medication List as of 11/29/2018  7:51 AM      START taking these medications    Details   acetaminophen-codeine (TYLENOL/CODEINE #3) 300-30 MG per tablet Take 1 tablet by mouth every 4 hours as needed for Pain for up to 4 days. ., Disp-20 tablet, R-0Print      cephALEXin (KEFLEX) 500 MG capsule Take 1 capsule by mouth 4 times daily for 7 days, Disp-28 capsule, R-0Print                (Please note that portions of this note were completed with a voice recognition program.Efforts were made to edit the dictations but occasionally words are mis-transcribed.)    Gus Gonzalez MD (electronically signed)  Attending Emergency Physician        Emperatriz Calderon MD  11/29/18 MD [place]  12/08/18 [id number]

## 2018-12-03 ENCOUNTER — OFFICE VISIT (OUTPATIENT)
Dept: SURGERY | Age: 73
End: 2018-12-03
Payer: MEDICARE

## 2018-12-03 VITALS — DIASTOLIC BLOOD PRESSURE: 80 MMHG | SYSTOLIC BLOOD PRESSURE: 138 MMHG | WEIGHT: 139 LBS | BODY MASS INDEX: 25.42 KG/M2

## 2018-12-03 DIAGNOSIS — R10.84 GENERALIZED ABDOMINAL PAIN: ICD-10-CM

## 2018-12-03 DIAGNOSIS — R11.2 NON-INTRACTABLE VOMITING WITH NAUSEA, UNSPECIFIED VOMITING TYPE: Primary | ICD-10-CM

## 2018-12-03 LAB
BILIRUBIN URINE: NEGATIVE
BLOOD, URINE: ABNORMAL
CLARITY: ABNORMAL
COLOR: YELLOW
GLUCOSE URINE: NEGATIVE MG/DL
KETONES, URINE: NEGATIVE MG/DL
LEUKOCYTE ESTERASE, URINE: ABNORMAL
MICROSCOPIC EXAMINATION: YES
NITRITE, URINE: NEGATIVE
PH UA: 6
PROTEIN UA: ABNORMAL MG/DL
SPECIFIC GRAVITY UA: 1.01
URINE REFLEX TO CULTURE: YES
URINE TYPE: ABNORMAL
UROBILINOGEN, URINE: 1 E.U./DL

## 2018-12-03 PROCEDURE — 1101F PT FALLS ASSESS-DOCD LE1/YR: CPT | Performed by: SURGERY

## 2018-12-03 PROCEDURE — G8482 FLU IMMUNIZE ORDER/ADMIN: HCPCS | Performed by: SURGERY

## 2018-12-03 PROCEDURE — 1090F PRES/ABSN URINE INCON ASSESS: CPT | Performed by: SURGERY

## 2018-12-03 PROCEDURE — 4040F PNEUMOC VAC/ADMIN/RCVD: CPT | Performed by: SURGERY

## 2018-12-03 PROCEDURE — G8427 DOCREV CUR MEDS BY ELIG CLIN: HCPCS | Performed by: SURGERY

## 2018-12-03 PROCEDURE — 99213 OFFICE O/P EST LOW 20 MIN: CPT | Performed by: SURGERY

## 2018-12-03 PROCEDURE — G8419 CALC BMI OUT NRM PARAM NOF/U: HCPCS | Performed by: SURGERY

## 2018-12-03 PROCEDURE — 4004F PT TOBACCO SCREEN RCVD TLK: CPT | Performed by: SURGERY

## 2018-12-03 PROCEDURE — 1123F ACP DISCUSS/DSCN MKR DOCD: CPT | Performed by: SURGERY

## 2018-12-03 PROCEDURE — 3017F COLORECTAL CA SCREEN DOC REV: CPT | Performed by: SURGERY

## 2018-12-03 PROCEDURE — G8400 PT W/DXA NO RESULTS DOC: HCPCS | Performed by: SURGERY

## 2018-12-03 RX ORDER — PROCHLORPERAZINE MALEATE 10 MG
10 TABLET ORAL EVERY 6 HOURS PRN
Qty: 60 TABLET | Refills: 1 | Status: SHIPPED | OUTPATIENT
Start: 2018-12-03 | End: 2019-04-05

## 2018-12-04 DIAGNOSIS — N30.00 ACUTE CYSTITIS WITHOUT HEMATURIA: Primary | ICD-10-CM

## 2018-12-04 LAB
BACTERIA: ABNORMAL /HPF
EPITHELIAL CELLS, UA: 3 /HPF (ref 0–5)
HYALINE CASTS: 5 /LPF (ref 0–8)
RBC UA: ABNORMAL /HPF (ref 0–2)
WBC UA: 4 /HPF (ref 0–5)

## 2018-12-04 RX ORDER — CIPROFLOXACIN 500 MG/1
500 TABLET, FILM COATED ORAL 2 TIMES DAILY
Qty: 10 TABLET | Refills: 0 | Status: SHIPPED | OUTPATIENT
Start: 2018-12-04 | End: 2018-12-09

## 2018-12-05 LAB — URINE CULTURE, ROUTINE: NORMAL

## 2018-12-12 ENCOUNTER — APPOINTMENT (OUTPATIENT)
Dept: CT IMAGING | Age: 73
DRG: 871 | End: 2018-12-12
Payer: MEDICARE

## 2018-12-12 ENCOUNTER — HOSPITAL ENCOUNTER (INPATIENT)
Age: 73
LOS: 6 days | Discharge: NURSING, REHAB OR LONG-TERM CARE | DRG: 871 | End: 2018-12-18
Attending: EMERGENCY MEDICINE | Admitting: EMERGENCY MEDICINE
Payer: MEDICARE

## 2018-12-12 ENCOUNTER — APPOINTMENT (OUTPATIENT)
Dept: GENERAL RADIOLOGY | Age: 73
DRG: 871 | End: 2018-12-12
Payer: MEDICARE

## 2018-12-12 DIAGNOSIS — R06.02 SOB (SHORTNESS OF BREATH): ICD-10-CM

## 2018-12-12 DIAGNOSIS — K85.90 ACUTE PANCREATITIS, UNSPECIFIED COMPLICATION STATUS, UNSPECIFIED PANCREATITIS TYPE: ICD-10-CM

## 2018-12-12 DIAGNOSIS — E87.20 METABOLIC ACIDOSIS: ICD-10-CM

## 2018-12-12 DIAGNOSIS — R10.84 ACUTE GENERALIZED ABDOMINAL PAIN: ICD-10-CM

## 2018-12-12 DIAGNOSIS — A41.9 SEPSIS, DUE TO UNSPECIFIED ORGANISM: Primary | ICD-10-CM

## 2018-12-12 DIAGNOSIS — R11.2 NAUSEA AND VOMITING IN ADULT: ICD-10-CM

## 2018-12-12 DIAGNOSIS — R53.1 GENERALIZED WEAKNESS: ICD-10-CM

## 2018-12-12 DIAGNOSIS — K52.9 ACUTE COLITIS: ICD-10-CM

## 2018-12-12 LAB
A/G RATIO: 1.1 (ref 1.1–2.2)
ALBUMIN SERPL-MCNC: 4.1 G/DL (ref 3.4–5)
ALP BLD-CCNC: 192 U/L (ref 40–129)
ALT SERPL-CCNC: 145 U/L (ref 10–40)
ANION GAP SERPL CALCULATED.3IONS-SCNC: 34 MMOL/L (ref 3–16)
AST SERPL-CCNC: 319 U/L (ref 15–37)
BACTERIA: ABNORMAL /HPF
BASE EXCESS VENOUS: -6.7 MMOL/L
BASOPHILS ABSOLUTE: 0.1 K/UL (ref 0–0.2)
BASOPHILS RELATIVE PERCENT: 0.5 %
BILIRUB SERPL-MCNC: 0.4 MG/DL (ref 0–1)
BILIRUBIN URINE: ABNORMAL
BLOOD, URINE: ABNORMAL
BUN BLDV-MCNC: 17 MG/DL (ref 7–20)
CALCIUM SERPL-MCNC: 9 MG/DL (ref 8.3–10.6)
CARBOXYHEMOGLOBIN: 3.6 %
CASTS: ABNORMAL /LPF
CHLORIDE BLD-SCNC: 89 MMOL/L (ref 99–110)
CLARITY: CLEAR
CO2: 17 MMOL/L (ref 21–32)
COLOR: YELLOW
CREAT SERPL-MCNC: 0.7 MG/DL (ref 0.6–1.2)
EOSINOPHILS ABSOLUTE: 0 K/UL (ref 0–0.6)
EOSINOPHILS RELATIVE PERCENT: 0 %
EPITHELIAL CELLS, UA: 7 /HPF (ref 0–5)
GFR AFRICAN AMERICAN: >60
GFR NON-AFRICAN AMERICAN: >60
GLOBULIN: 3.7 G/DL
GLUCOSE BLD-MCNC: 107 MG/DL (ref 70–99)
GLUCOSE URINE: NEGATIVE MG/DL
HCO3 VENOUS: 18 MMOL/L (ref 23–29)
HCT VFR BLD CALC: 31.4 % (ref 36–48)
HEMOGLOBIN: 9.4 G/DL (ref 12–16)
KETONES, URINE: >=80 MG/DL
LACTIC ACID, SEPSIS: 4 MMOL/L (ref 0.4–1.9)
LACTIC ACID: 3.7 MMOL/L (ref 0.4–2)
LEUKOCYTE ESTERASE, URINE: ABNORMAL
LIPASE: 90 U/L (ref 13–60)
LYMPHOCYTES ABSOLUTE: 2 K/UL (ref 1–5.1)
LYMPHOCYTES RELATIVE PERCENT: 8.8 %
MCH RBC QN AUTO: 23.9 PG (ref 26–34)
MCHC RBC AUTO-ENTMCNC: 30 G/DL (ref 31–36)
MCV RBC AUTO: 79.6 FL (ref 80–100)
METHEMOGLOBIN VENOUS: 0.9 %
MICROSCOPIC EXAMINATION: YES
MONOCYTES ABSOLUTE: 0.5 K/UL (ref 0–1.3)
MONOCYTES RELATIVE PERCENT: 2.4 %
NEUTROPHILS ABSOLUTE: 20.4 K/UL (ref 1.7–7.7)
NEUTROPHILS RELATIVE PERCENT: 88.3 %
NITRITE, URINE: NEGATIVE
O2 CONTENT, VEN: 11 ML/DL
O2 SAT, VEN: 86 %
O2 THERAPY: ABNORMAL
PCO2, VEN: 35 MMHG (ref 40–50)
PDW BLD-RTO: 27.4 % (ref 12.4–15.4)
PH UA: 6
PH VENOUS: 7.33 (ref 7.35–7.45)
PLATELET # BLD: 370 K/UL (ref 135–450)
PMV BLD AUTO: 7.2 FL (ref 5–10.5)
PO2, VEN: 58 MMHG
POTASSIUM REFLEX MAGNESIUM: 3.8 MMOL/L (ref 3.5–5.1)
PRO-BNP: 286 PG/ML (ref 0–124)
PROTEIN UA: 30 MG/DL
RAPID INFLUENZA  B AGN: NEGATIVE
RAPID INFLUENZA A AGN: NEGATIVE
RBC # BLD: 3.94 M/UL (ref 4–5.2)
RBC UA: ABNORMAL /HPF (ref 0–2)
SODIUM BLD-SCNC: 140 MMOL/L (ref 136–145)
SPECIFIC GRAVITY UA: >=1.03
TCO2 CALC VENOUS: 19 MMOL/L
TOTAL PROTEIN: 7.8 G/DL (ref 6.4–8.2)
TROPONIN: <0.01 NG/ML
URINE REFLEX TO CULTURE: YES
URINE TYPE: ABNORMAL
UROBILINOGEN, URINE: 0.2 E.U./DL
WBC # BLD: 23.1 K/UL (ref 4–11)
WBC UA: 170 /HPF (ref 0–5)
YEAST: PRESENT /HPF

## 2018-12-12 PROCEDURE — 87040 BLOOD CULTURE FOR BACTERIA: CPT

## 2018-12-12 PROCEDURE — 74176 CT ABD & PELVIS W/O CONTRAST: CPT

## 2018-12-12 PROCEDURE — 96361 HYDRATE IV INFUSION ADD-ON: CPT

## 2018-12-12 PROCEDURE — 81001 URINALYSIS AUTO W/SCOPE: CPT

## 2018-12-12 PROCEDURE — 83880 ASSAY OF NATRIURETIC PEPTIDE: CPT

## 2018-12-12 PROCEDURE — 94640 AIRWAY INHALATION TREATMENT: CPT

## 2018-12-12 PROCEDURE — 80053 COMPREHEN METABOLIC PANEL: CPT

## 2018-12-12 PROCEDURE — 36415 COLL VENOUS BLD VENIPUNCTURE: CPT

## 2018-12-12 PROCEDURE — 83605 ASSAY OF LACTIC ACID: CPT

## 2018-12-12 PROCEDURE — 6360000002 HC RX W HCPCS: Performed by: PHYSICIAN ASSISTANT

## 2018-12-12 PROCEDURE — 94762 N-INVAS EAR/PLS OXIMTRY CONT: CPT

## 2018-12-12 PROCEDURE — 99285 EMERGENCY DEPT VISIT HI MDM: CPT

## 2018-12-12 PROCEDURE — 71045 X-RAY EXAM CHEST 1 VIEW: CPT

## 2018-12-12 PROCEDURE — 83690 ASSAY OF LIPASE: CPT

## 2018-12-12 PROCEDURE — 2060000000 HC ICU INTERMEDIATE R&B

## 2018-12-12 PROCEDURE — 94664 DEMO&/EVAL PT USE INHALER: CPT

## 2018-12-12 PROCEDURE — 6370000000 HC RX 637 (ALT 250 FOR IP): Performed by: PHYSICIAN ASSISTANT

## 2018-12-12 PROCEDURE — 85025 COMPLETE CBC W/AUTO DIFF WBC: CPT

## 2018-12-12 PROCEDURE — 96365 THER/PROPH/DIAG IV INF INIT: CPT

## 2018-12-12 PROCEDURE — 82803 BLOOD GASES ANY COMBINATION: CPT

## 2018-12-12 PROCEDURE — 93005 ELECTROCARDIOGRAM TRACING: CPT | Performed by: PHYSICIAN ASSISTANT

## 2018-12-12 PROCEDURE — 87086 URINE CULTURE/COLONY COUNT: CPT

## 2018-12-12 PROCEDURE — 96375 TX/PRO/DX INJ NEW DRUG ADDON: CPT

## 2018-12-12 PROCEDURE — 87804 INFLUENZA ASSAY W/OPTIC: CPT

## 2018-12-12 PROCEDURE — 2580000003 HC RX 258: Performed by: PHYSICIAN ASSISTANT

## 2018-12-12 PROCEDURE — 84484 ASSAY OF TROPONIN QUANT: CPT

## 2018-12-12 RX ORDER — ONDANSETRON 2 MG/ML
4 INJECTION INTRAMUSCULAR; INTRAVENOUS EVERY 30 MIN PRN
Status: COMPLETED | OUTPATIENT
Start: 2018-12-12 | End: 2018-12-17

## 2018-12-12 RX ORDER — SODIUM CHLORIDE, SODIUM LACTATE, POTASSIUM CHLORIDE, CALCIUM CHLORIDE 600; 310; 30; 20 MG/100ML; MG/100ML; MG/100ML; MG/100ML
30 INJECTION, SOLUTION INTRAVENOUS ONCE
Status: COMPLETED | OUTPATIENT
Start: 2018-12-12 | End: 2018-12-13

## 2018-12-12 RX ORDER — IPRATROPIUM BROMIDE AND ALBUTEROL SULFATE 2.5; .5 MG/3ML; MG/3ML
1 SOLUTION RESPIRATORY (INHALATION) ONCE
Status: COMPLETED | OUTPATIENT
Start: 2018-12-12 | End: 2018-12-12

## 2018-12-12 RX ORDER — SODIUM CHLORIDE, SODIUM LACTATE, POTASSIUM CHLORIDE, AND CALCIUM CHLORIDE .6; .31; .03; .02 G/100ML; G/100ML; G/100ML; G/100ML
1000 INJECTION, SOLUTION INTRAVENOUS ONCE
Status: COMPLETED | OUTPATIENT
Start: 2018-12-12 | End: 2018-12-12

## 2018-12-12 RX ORDER — ALBUTEROL SULFATE 2.5 MG/3ML
5 SOLUTION RESPIRATORY (INHALATION) ONCE
Status: COMPLETED | OUTPATIENT
Start: 2018-12-12 | End: 2018-12-12

## 2018-12-12 RX ORDER — AMLODIPINE BESYLATE 5 MG/1
5 TABLET ORAL DAILY
Status: CANCELLED | OUTPATIENT
Start: 2018-12-13

## 2018-12-12 RX ADMIN — SODIUM CHLORIDE, SODIUM LACTATE, POTASSIUM CHLORIDE, AND CALCIUM CHLORIDE 1503 ML: .6; .31; .03; .02 INJECTION, SOLUTION INTRAVENOUS at 22:50

## 2018-12-12 RX ADMIN — IPRATROPIUM BROMIDE AND ALBUTEROL SULFATE 1 AMPULE: .5; 3 SOLUTION RESPIRATORY (INHALATION) at 21:17

## 2018-12-12 RX ADMIN — ALBUTEROL SULFATE 5 MG: 2.5 SOLUTION RESPIRATORY (INHALATION) at 21:18

## 2018-12-12 RX ADMIN — SODIUM CHLORIDE, POTASSIUM CHLORIDE, SODIUM LACTATE AND CALCIUM CHLORIDE 1000 ML: 600; 310; 30; 20 INJECTION, SOLUTION INTRAVENOUS at 21:37

## 2018-12-12 RX ADMIN — PIPERACILLIN SODIUM AND TAZOBACTAM SODIUM 3.38 G: 3; .375 INJECTION, POWDER, FOR SOLUTION INTRAVENOUS at 22:51

## 2018-12-12 RX ADMIN — ONDANSETRON 4 MG: 2 INJECTION INTRAMUSCULAR; INTRAVENOUS at 21:37

## 2018-12-12 ASSESSMENT — PAIN SCALES - GENERAL
PAINLEVEL_OUTOF10: 2
PAINLEVEL_OUTOF10: 8

## 2018-12-12 ASSESSMENT — PAIN SCALES - WONG BAKER
WONGBAKER_NUMERICALRESPONSE: 8
WONGBAKER_NUMERICALRESPONSE: 2

## 2018-12-12 ASSESSMENT — PAIN DESCRIPTION - DESCRIPTORS
DESCRIPTORS: ACHING
DESCRIPTORS: ACHING

## 2018-12-12 ASSESSMENT — PAIN DESCRIPTION - FREQUENCY
FREQUENCY: CONTINUOUS
FREQUENCY: CONTINUOUS

## 2018-12-12 ASSESSMENT — PAIN DESCRIPTION - PAIN TYPE
TYPE: CHRONIC PAIN
TYPE: CHRONIC PAIN

## 2018-12-13 ENCOUNTER — ANESTHESIA EVENT (OUTPATIENT)
Dept: ENDOSCOPY | Age: 73
DRG: 871 | End: 2018-12-13
Payer: MEDICARE

## 2018-12-13 ENCOUNTER — ANESTHESIA (OUTPATIENT)
Dept: ENDOSCOPY | Age: 73
DRG: 871 | End: 2018-12-13
Payer: MEDICARE

## 2018-12-13 VITALS — SYSTOLIC BLOOD PRESSURE: 106 MMHG | DIASTOLIC BLOOD PRESSURE: 53 MMHG

## 2018-12-13 LAB
A/G RATIO: 1.1 (ref 1.1–2.2)
ABO/RH: NORMAL
ALBUMIN SERPL-MCNC: 3 G/DL (ref 3.4–5)
ALP BLD-CCNC: 132 U/L (ref 40–129)
ALT SERPL-CCNC: 89 U/L (ref 10–40)
AMPHETAMINE SCREEN, URINE: NORMAL
ANION GAP SERPL CALCULATED.3IONS-SCNC: 20 MMOL/L (ref 3–16)
ANTIBODY SCREEN: NORMAL
AST SERPL-CCNC: 150 U/L (ref 15–37)
BARBITURATE SCREEN URINE: NORMAL
BASOPHILS ABSOLUTE: 0.1 K/UL (ref 0–0.2)
BASOPHILS RELATIVE PERCENT: 1 %
BENZODIAZEPINE SCREEN, URINE: NORMAL
BILIRUB SERPL-MCNC: 0.3 MG/DL (ref 0–1)
BUN BLDV-MCNC: 17 MG/DL (ref 7–20)
C DIFFICILE TOXIN, EIA: NORMAL
CALCIUM SERPL-MCNC: 8 MG/DL (ref 8.3–10.6)
CANNABINOID SCREEN URINE: NORMAL
CHLORIDE BLD-SCNC: 98 MMOL/L (ref 99–110)
CHOLESTEROL, FASTING: 159 MG/DL (ref 0–199)
CO2: 24 MMOL/L (ref 21–32)
COCAINE METABOLITE SCREEN URINE: NORMAL
CREAT SERPL-MCNC: 0.6 MG/DL (ref 0.6–1.2)
EOSINOPHILS ABSOLUTE: 0 K/UL (ref 0–0.6)
EOSINOPHILS RELATIVE PERCENT: 0.3 %
ESTIMATED AVERAGE GLUCOSE: 108.3 MG/DL
ETHANOL: NORMAL MG/DL (ref 0–0.08)
FOLATE: 6.25 NG/ML (ref 4.78–24.2)
GFR AFRICAN AMERICAN: >60
GFR NON-AFRICAN AMERICAN: >60
GLOBULIN: 2.7 G/DL
GLUCOSE BLD-MCNC: 110 MG/DL (ref 70–99)
HBA1C MFR BLD: 5.4 %
HCT VFR BLD CALC: 15.4 % (ref 36–48)
HCT VFR BLD CALC: 22.9 % (ref 36–48)
HCT VFR BLD CALC: 23.7 % (ref 36–48)
HCT VFR BLD CALC: 24.1 % (ref 36–48)
HDLC SERPL-MCNC: 95 MG/DL (ref 40–60)
HEMOGLOBIN: 4.6 G/DL (ref 12–16)
HEMOGLOBIN: 7 G/DL (ref 12–16)
HEMOGLOBIN: 7.1 G/DL (ref 12–16)
HEMOGLOBIN: 7.1 G/DL (ref 12–16)
LACTIC ACID, SEPSIS: 2.6 MMOL/L (ref 0.4–1.9)
LACTIC ACID: 0.3 MMOL/L (ref 0.4–2)
LACTIC ACID: 0.6 MMOL/L (ref 0.4–2)
LDL CHOLESTEROL CALCULATED: 53 MG/DL
LIPASE: 151 U/L (ref 13–60)
LYMPHOCYTES ABSOLUTE: 1.4 K/UL (ref 1–5.1)
LYMPHOCYTES RELATIVE PERCENT: 10 %
Lab: NORMAL
MCH RBC QN AUTO: 23.7 PG (ref 26–34)
MCHC RBC AUTO-ENTMCNC: 30.4 G/DL (ref 31–36)
MCV RBC AUTO: 78.2 FL (ref 80–100)
METHADONE SCREEN, URINE: NORMAL
MONOCYTES ABSOLUTE: 0.5 K/UL (ref 0–1.3)
MONOCYTES RELATIVE PERCENT: 3.7 %
NEUTROPHILS ABSOLUTE: 12.1 K/UL (ref 1.7–7.7)
NEUTROPHILS RELATIVE PERCENT: 85 %
OPIATE SCREEN URINE: NORMAL
OXYCODONE URINE: NORMAL
PDW BLD-RTO: 26.8 % (ref 12.4–15.4)
PH UA: 5
PHENCYCLIDINE SCREEN URINE: NORMAL
PLATELET # BLD: 273 K/UL (ref 135–450)
PMV BLD AUTO: 7 FL (ref 5–10.5)
POTASSIUM SERPL-SCNC: 3.1 MMOL/L (ref 3.5–5.1)
PROPOXYPHENE SCREEN: NORMAL
RBC # BLD: 2.93 M/UL (ref 4–5.2)
SODIUM BLD-SCNC: 142 MMOL/L (ref 136–145)
TOTAL PROTEIN: 5.7 G/DL (ref 6.4–8.2)
TRIGLYCERIDE, FASTING: 53 MG/DL (ref 0–150)
VITAMIN B-12: 1028 PG/ML (ref 211–911)
VITAMIN D 25-HYDROXY: 43.7 NG/ML
VLDLC SERPL CALC-MCNC: 11 MG/DL
WBC # BLD: 14.3 K/UL (ref 4–11)

## 2018-12-13 PROCEDURE — 3700000000 HC ANESTHESIA ATTENDED CARE: Performed by: INTERNAL MEDICINE

## 2018-12-13 PROCEDURE — 88305 TISSUE EXAM BY PATHOLOGIST: CPT

## 2018-12-13 PROCEDURE — C9113 INJ PANTOPRAZOLE SODIUM, VIA: HCPCS | Performed by: STUDENT IN AN ORGANIZED HEALTH CARE EDUCATION/TRAINING PROGRAM

## 2018-12-13 PROCEDURE — 2580000003 HC RX 258: Performed by: NURSE ANESTHETIST, CERTIFIED REGISTERED

## 2018-12-13 PROCEDURE — 82306 VITAMIN D 25 HYDROXY: CPT

## 2018-12-13 PROCEDURE — 2580000003 HC RX 258: Performed by: INTERNAL MEDICINE

## 2018-12-13 PROCEDURE — 87324 CLOSTRIDIUM AG IA: CPT

## 2018-12-13 PROCEDURE — 6370000000 HC RX 637 (ALT 250 FOR IP): Performed by: HOSPITALIST

## 2018-12-13 PROCEDURE — 6370000000 HC RX 637 (ALT 250 FOR IP): Performed by: INTERNAL MEDICINE

## 2018-12-13 PROCEDURE — 80061 LIPID PANEL: CPT

## 2018-12-13 PROCEDURE — 2500000003 HC RX 250 WO HCPCS: Performed by: STUDENT IN AN ORGANIZED HEALTH CARE EDUCATION/TRAINING PROGRAM

## 2018-12-13 PROCEDURE — G8979 MOBILITY GOAL STATUS: HCPCS | Performed by: PHYSICAL THERAPIST

## 2018-12-13 PROCEDURE — 2500000003 HC RX 250 WO HCPCS: Performed by: HOSPITALIST

## 2018-12-13 PROCEDURE — G8987 SELF CARE CURRENT STATUS: HCPCS

## 2018-12-13 PROCEDURE — 6360000002 HC RX W HCPCS: Performed by: STUDENT IN AN ORGANIZED HEALTH CARE EDUCATION/TRAINING PROGRAM

## 2018-12-13 PROCEDURE — 99222 1ST HOSP IP/OBS MODERATE 55: CPT | Performed by: SURGERY

## 2018-12-13 PROCEDURE — C9113 INJ PANTOPRAZOLE SODIUM, VIA: HCPCS | Performed by: HOSPITALIST

## 2018-12-13 PROCEDURE — 6360000002 HC RX W HCPCS: Performed by: NURSE ANESTHETIST, CERTIFIED REGISTERED

## 2018-12-13 PROCEDURE — 83036 HEMOGLOBIN GLYCOSYLATED A1C: CPT

## 2018-12-13 PROCEDURE — 97166 OT EVAL MOD COMPLEX 45 MIN: CPT

## 2018-12-13 PROCEDURE — 94640 AIRWAY INHALATION TREATMENT: CPT

## 2018-12-13 PROCEDURE — 2709999900 HC NON-CHARGEABLE SUPPLY: Performed by: INTERNAL MEDICINE

## 2018-12-13 PROCEDURE — 93010 ELECTROCARDIOGRAM REPORT: CPT | Performed by: INTERNAL MEDICINE

## 2018-12-13 PROCEDURE — 6370000000 HC RX 637 (ALT 250 FOR IP): Performed by: STUDENT IN AN ORGANIZED HEALTH CARE EDUCATION/TRAINING PROGRAM

## 2018-12-13 PROCEDURE — 83690 ASSAY OF LIPASE: CPT

## 2018-12-13 PROCEDURE — 85018 HEMOGLOBIN: CPT

## 2018-12-13 PROCEDURE — 94760 N-INVAS EAR/PLS OXIMETRY 1: CPT

## 2018-12-13 PROCEDURE — 2580000003 HC RX 258: Performed by: HOSPITALIST

## 2018-12-13 PROCEDURE — 6360000002 HC RX W HCPCS: Performed by: HOSPITALIST

## 2018-12-13 PROCEDURE — 85025 COMPLETE CBC W/AUTO DIFF WBC: CPT

## 2018-12-13 PROCEDURE — 94664 DEMO&/EVAL PT USE INHALER: CPT

## 2018-12-13 PROCEDURE — 87046 STOOL CULTR AEROBIC BACT EA: CPT

## 2018-12-13 PROCEDURE — 2580000003 HC RX 258: Performed by: STUDENT IN AN ORGANIZED HEALTH CARE EDUCATION/TRAINING PROGRAM

## 2018-12-13 PROCEDURE — G8988 SELF CARE GOAL STATUS: HCPCS

## 2018-12-13 PROCEDURE — G8978 MOBILITY CURRENT STATUS: HCPCS | Performed by: PHYSICAL THERAPIST

## 2018-12-13 PROCEDURE — 85014 HEMATOCRIT: CPT

## 2018-12-13 PROCEDURE — 2500000003 HC RX 250 WO HCPCS: Performed by: NURSE ANESTHETIST, CERTIFIED REGISTERED

## 2018-12-13 PROCEDURE — 7100000001 HC PACU RECOVERY - ADDTL 15 MIN: Performed by: INTERNAL MEDICINE

## 2018-12-13 PROCEDURE — G0480 DRUG TEST DEF 1-7 CLASSES: HCPCS

## 2018-12-13 PROCEDURE — 97530 THERAPEUTIC ACTIVITIES: CPT

## 2018-12-13 PROCEDURE — 97163 PT EVAL HIGH COMPLEX 45 MIN: CPT | Performed by: PHYSICAL THERAPIST

## 2018-12-13 PROCEDURE — 2700000000 HC OXYGEN THERAPY PER DAY

## 2018-12-13 PROCEDURE — 36415 COLL VENOUS BLD VENIPUNCTURE: CPT

## 2018-12-13 PROCEDURE — 87449 NOS EACH ORGANISM AG IA: CPT

## 2018-12-13 PROCEDURE — 3609012400 HC EGD TRANSORAL BIOPSY SINGLE/MULTIPLE: Performed by: INTERNAL MEDICINE

## 2018-12-13 PROCEDURE — 80307 DRUG TEST PRSMV CHEM ANLYZR: CPT

## 2018-12-13 PROCEDURE — 6360000002 HC RX W HCPCS: Performed by: PHYSICIAN ASSISTANT

## 2018-12-13 PROCEDURE — 97530 THERAPEUTIC ACTIVITIES: CPT | Performed by: PHYSICAL THERAPIST

## 2018-12-13 PROCEDURE — 82746 ASSAY OF FOLIC ACID SERUM: CPT

## 2018-12-13 PROCEDURE — 80053 COMPREHEN METABOLIC PANEL: CPT

## 2018-12-13 PROCEDURE — 82607 VITAMIN B-12: CPT

## 2018-12-13 PROCEDURE — 86850 RBC ANTIBODY SCREEN: CPT

## 2018-12-13 PROCEDURE — 0DB68ZX EXCISION OF STOMACH, VIA NATURAL OR ARTIFICIAL OPENING ENDOSCOPIC, DIAGNOSTIC: ICD-10-PCS | Performed by: INTERNAL MEDICINE

## 2018-12-13 PROCEDURE — C9113 INJ PANTOPRAZOLE SODIUM, VIA: HCPCS | Performed by: INTERNAL MEDICINE

## 2018-12-13 PROCEDURE — 94761 N-INVAS EAR/PLS OXIMETRY MLT: CPT

## 2018-12-13 PROCEDURE — 86900 BLOOD TYPING SEROLOGIC ABO: CPT

## 2018-12-13 PROCEDURE — 7100000000 HC PACU RECOVERY - FIRST 15 MIN: Performed by: INTERNAL MEDICINE

## 2018-12-13 PROCEDURE — 87493 C DIFF AMPLIFIED PROBE: CPT

## 2018-12-13 PROCEDURE — 87505 NFCT AGENT DETECTION GI: CPT

## 2018-12-13 PROCEDURE — 2060000000 HC ICU INTERMEDIATE R&B

## 2018-12-13 PROCEDURE — 6360000002 HC RX W HCPCS: Performed by: INTERNAL MEDICINE

## 2018-12-13 PROCEDURE — 2580000003 HC RX 258

## 2018-12-13 PROCEDURE — 83605 ASSAY OF LACTIC ACID: CPT

## 2018-12-13 PROCEDURE — 86901 BLOOD TYPING SEROLOGIC RH(D): CPT

## 2018-12-13 RX ORDER — LORAZEPAM 1 MG/1
2 TABLET ORAL
Status: DISCONTINUED | OUTPATIENT
Start: 2018-12-13 | End: 2018-12-18 | Stop reason: HOSPADM

## 2018-12-13 RX ORDER — LORAZEPAM 1 MG/1
4 TABLET ORAL
Status: DISCONTINUED | OUTPATIENT
Start: 2018-12-13 | End: 2018-12-18 | Stop reason: HOSPADM

## 2018-12-13 RX ORDER — PANTOPRAZOLE SODIUM 40 MG/10ML
40 INJECTION, POWDER, LYOPHILIZED, FOR SOLUTION INTRAVENOUS 2 TIMES DAILY
Status: DISCONTINUED | OUTPATIENT
Start: 2018-12-13 | End: 2018-12-13

## 2018-12-13 RX ORDER — DULOXETIN HYDROCHLORIDE 30 MG/1
30 CAPSULE, DELAYED RELEASE ORAL DAILY
Status: DISCONTINUED | OUTPATIENT
Start: 2018-12-13 | End: 2018-12-18 | Stop reason: HOSPADM

## 2018-12-13 RX ORDER — LORAZEPAM 0.5 MG/1
0.5 TABLET ORAL NIGHTLY PRN
Status: DISCONTINUED | OUTPATIENT
Start: 2018-12-13 | End: 2018-12-18 | Stop reason: HOSPADM

## 2018-12-13 RX ORDER — SODIUM CHLORIDE 9 MG/ML
INJECTION, SOLUTION INTRAVENOUS CONTINUOUS PRN
Status: DISCONTINUED | OUTPATIENT
Start: 2018-12-13 | End: 2018-12-13 | Stop reason: SDUPTHER

## 2018-12-13 RX ORDER — FERROUS SULFATE TAB EC 324 MG (65 MG FE EQUIVALENT) 324 (65 FE) MG
324 TABLET DELAYED RESPONSE ORAL EVERY OTHER DAY
Status: DISCONTINUED | OUTPATIENT
Start: 2018-12-15 | End: 2018-12-14

## 2018-12-13 RX ORDER — CIPROFLOXACIN 2 MG/ML
400 INJECTION, SOLUTION INTRAVENOUS EVERY 12 HOURS
Status: DISCONTINUED | OUTPATIENT
Start: 2018-12-13 | End: 2018-12-14

## 2018-12-13 RX ORDER — LORAZEPAM 2 MG/ML
2 INJECTION INTRAMUSCULAR
Status: DISCONTINUED | OUTPATIENT
Start: 2018-12-13 | End: 2018-12-18 | Stop reason: HOSPADM

## 2018-12-13 RX ORDER — FOLIC ACID 1 MG/1
1 TABLET ORAL DAILY
Status: DISCONTINUED | OUTPATIENT
Start: 2018-12-16 | End: 2018-12-18 | Stop reason: HOSPADM

## 2018-12-13 RX ORDER — DEXTROAMPHETAMINE SULFATE 10 MG/1
20 TABLET ORAL 3 TIMES DAILY
Status: DISCONTINUED | OUTPATIENT
Start: 2018-12-13 | End: 2018-12-13

## 2018-12-13 RX ORDER — GUAIFENESIN 600 MG/1
600 TABLET, EXTENDED RELEASE ORAL 2 TIMES DAILY
Status: DISCONTINUED | OUTPATIENT
Start: 2018-12-13 | End: 2018-12-18 | Stop reason: HOSPADM

## 2018-12-13 RX ORDER — PANTOPRAZOLE SODIUM 40 MG/10ML
40 INJECTION, POWDER, LYOPHILIZED, FOR SOLUTION INTRAVENOUS 2 TIMES DAILY
Status: DISCONTINUED | OUTPATIENT
Start: 2018-12-13 | End: 2018-12-14

## 2018-12-13 RX ORDER — NICOTINE 21 MG/24HR
1 PATCH, TRANSDERMAL 24 HOURS TRANSDERMAL EVERY 24 HOURS
Status: DISCONTINUED | OUTPATIENT
Start: 2018-12-13 | End: 2018-12-18 | Stop reason: HOSPADM

## 2018-12-13 RX ORDER — THIAMINE HYDROCHLORIDE 100 MG/ML
500 INJECTION, SOLUTION INTRAMUSCULAR; INTRAVENOUS EVERY 8 HOURS
Status: DISCONTINUED | OUTPATIENT
Start: 2018-12-13 | End: 2018-12-13

## 2018-12-13 RX ORDER — LANOLIN ALCOHOL/MO/W.PET/CERES
100 CREAM (GRAM) TOPICAL DAILY
Status: DISCONTINUED | OUTPATIENT
Start: 2018-12-13 | End: 2018-12-18 | Stop reason: HOSPADM

## 2018-12-13 RX ORDER — FOLIC ACID 1 MG/1
1 TABLET ORAL DAILY
Status: DISCONTINUED | OUTPATIENT
Start: 2018-12-13 | End: 2018-12-13

## 2018-12-13 RX ORDER — 0.9 % SODIUM CHLORIDE 0.9 %
10 VIAL (ML) INJECTION EVERY 12 HOURS SCHEDULED
Status: DISCONTINUED | OUTPATIENT
Start: 2018-12-13 | End: 2018-12-18 | Stop reason: HOSPADM

## 2018-12-13 RX ORDER — LORAZEPAM 2 MG/ML
3 INJECTION INTRAMUSCULAR
Status: DISCONTINUED | OUTPATIENT
Start: 2018-12-13 | End: 2018-12-18 | Stop reason: HOSPADM

## 2018-12-13 RX ORDER — SODIUM CHLORIDE 0.9 % (FLUSH) 0.9 %
10 SYRINGE (ML) INJECTION EVERY 12 HOURS SCHEDULED
Status: DISCONTINUED | OUTPATIENT
Start: 2018-12-13 | End: 2018-12-13 | Stop reason: SDUPTHER

## 2018-12-13 RX ORDER — SODIUM CHLORIDE 9 MG/ML
INJECTION, SOLUTION INTRAVENOUS CONTINUOUS
Status: DISCONTINUED | OUTPATIENT
Start: 2018-12-13 | End: 2018-12-13

## 2018-12-13 RX ORDER — SODIUM CHLORIDE 0.9 % (FLUSH) 0.9 %
10 SYRINGE (ML) INJECTION PRN
Status: DISCONTINUED | OUTPATIENT
Start: 2018-12-13 | End: 2018-12-18 | Stop reason: HOSPADM

## 2018-12-13 RX ORDER — LORAZEPAM 1 MG/1
1 TABLET ORAL
Status: DISCONTINUED | OUTPATIENT
Start: 2018-12-13 | End: 2018-12-18 | Stop reason: HOSPADM

## 2018-12-13 RX ORDER — SODIUM CHLORIDE, SODIUM LACTATE, POTASSIUM CHLORIDE, CALCIUM CHLORIDE 600; 310; 30; 20 MG/100ML; MG/100ML; MG/100ML; MG/100ML
INJECTION, SOLUTION INTRAVENOUS
Status: DISPENSED
Start: 2018-12-13 | End: 2018-12-14

## 2018-12-13 RX ORDER — SODIUM CHLORIDE 0.9 % (FLUSH) 0.9 %
10 SYRINGE (ML) INJECTION EVERY 12 HOURS SCHEDULED
Status: DISCONTINUED | OUTPATIENT
Start: 2018-12-13 | End: 2018-12-17 | Stop reason: SDUPTHER

## 2018-12-13 RX ORDER — FERROUS SULFATE TAB EC 324 MG (65 MG FE EQUIVALENT) 324 (65 FE) MG
324 TABLET DELAYED RESPONSE ORAL
Status: DISCONTINUED | OUTPATIENT
Start: 2018-12-13 | End: 2018-12-13

## 2018-12-13 RX ORDER — POLYETHYLENE GLYCOL 3350 17 G/17G
238 POWDER, FOR SOLUTION ORAL ONCE
Status: COMPLETED | OUTPATIENT
Start: 2018-12-13 | End: 2018-12-13

## 2018-12-13 RX ORDER — LORAZEPAM 2 MG/ML
4 INJECTION INTRAMUSCULAR
Status: DISCONTINUED | OUTPATIENT
Start: 2018-12-13 | End: 2018-12-18 | Stop reason: HOSPADM

## 2018-12-13 RX ORDER — 0.9 % SODIUM CHLORIDE 0.9 %
10 VIAL (ML) INJECTION PRN
Status: DISCONTINUED | OUTPATIENT
Start: 2018-12-13 | End: 2018-12-18 | Stop reason: HOSPADM

## 2018-12-13 RX ORDER — SODIUM CHLORIDE 0.9 % (FLUSH) 0.9 %
10 SYRINGE (ML) INJECTION PRN
Status: DISCONTINUED | OUTPATIENT
Start: 2018-12-13 | End: 2018-12-13 | Stop reason: SDUPTHER

## 2018-12-13 RX ORDER — POTASSIUM CHLORIDE 7.45 MG/ML
10 INJECTION INTRAVENOUS
Status: DISPENSED | OUTPATIENT
Start: 2018-12-13 | End: 2018-12-13

## 2018-12-13 RX ORDER — 0.9 % SODIUM CHLORIDE 0.9 %
10 VIAL (ML) INJECTION 2 TIMES DAILY
Status: DISCONTINUED | OUTPATIENT
Start: 2018-12-13 | End: 2018-12-14

## 2018-12-13 RX ORDER — PROPOFOL 10 MG/ML
INJECTION, EMULSION INTRAVENOUS PRN
Status: DISCONTINUED | OUTPATIENT
Start: 2018-12-13 | End: 2018-12-13 | Stop reason: SDUPTHER

## 2018-12-13 RX ORDER — LIDOCAINE HYDROCHLORIDE 20 MG/ML
INJECTION, SOLUTION INFILTRATION; PERINEURAL PRN
Status: DISCONTINUED | OUTPATIENT
Start: 2018-12-13 | End: 2018-12-13 | Stop reason: SDUPTHER

## 2018-12-13 RX ORDER — SODIUM CHLORIDE 9 MG/ML
INJECTION, SOLUTION INTRAVENOUS
Status: COMPLETED
Start: 2018-12-13 | End: 2018-12-13

## 2018-12-13 RX ORDER — CHOLECALCIFEROL (VITAMIN D3) 125 MCG
500 CAPSULE ORAL DAILY
Status: DISCONTINUED | OUTPATIENT
Start: 2018-12-13 | End: 2018-12-18 | Stop reason: HOSPADM

## 2018-12-13 RX ORDER — IPRATROPIUM BROMIDE AND ALBUTEROL SULFATE 2.5; .5 MG/3ML; MG/3ML
1 SOLUTION RESPIRATORY (INHALATION) EVERY 6 HOURS PRN
Status: DISCONTINUED | OUTPATIENT
Start: 2018-12-13 | End: 2018-12-18 | Stop reason: HOSPADM

## 2018-12-13 RX ORDER — LORAZEPAM 2 MG/ML
1 INJECTION INTRAMUSCULAR
Status: DISCONTINUED | OUTPATIENT
Start: 2018-12-13 | End: 2018-12-18 | Stop reason: HOSPADM

## 2018-12-13 RX ORDER — SODIUM CHLORIDE, SODIUM LACTATE, POTASSIUM CHLORIDE, CALCIUM CHLORIDE 600; 310; 30; 20 MG/100ML; MG/100ML; MG/100ML; MG/100ML
INJECTION, SOLUTION INTRAVENOUS CONTINUOUS
Status: DISCONTINUED | OUTPATIENT
Start: 2018-12-13 | End: 2018-12-14

## 2018-12-13 RX ORDER — POTASSIUM CHLORIDE 20 MEQ/1
40 TABLET, EXTENDED RELEASE ORAL ONCE
Status: COMPLETED | OUTPATIENT
Start: 2018-12-13 | End: 2018-12-13

## 2018-12-13 RX ORDER — LORAZEPAM 1 MG/1
3 TABLET ORAL
Status: DISCONTINUED | OUTPATIENT
Start: 2018-12-13 | End: 2018-12-18 | Stop reason: HOSPADM

## 2018-12-13 RX ADMIN — SODIUM CHLORIDE, POTASSIUM CHLORIDE, SODIUM LACTATE AND CALCIUM CHLORIDE: 600; 310; 30; 20 INJECTION, SOLUTION INTRAVENOUS at 20:44

## 2018-12-13 RX ADMIN — PYRIDOXINE HCL TAB 50 MG 100 MG: 50 TAB at 09:35

## 2018-12-13 RX ADMIN — SODIUM CHLORIDE: 9 INJECTION, SOLUTION INTRAVENOUS at 16:32

## 2018-12-13 RX ADMIN — Medication 500 MCG: at 09:35

## 2018-12-13 RX ADMIN — SODIUM CHLORIDE, POTASSIUM CHLORIDE, SODIUM LACTATE AND CALCIUM CHLORIDE: 600; 310; 30; 20 INJECTION, SOLUTION INTRAVENOUS at 09:34

## 2018-12-13 RX ADMIN — POTASSIUM CHLORIDE 40 MEQ: 20 TABLET, EXTENDED RELEASE ORAL at 09:35

## 2018-12-13 RX ADMIN — GUAIFENESIN 600 MG: 600 TABLET, EXTENDED RELEASE ORAL at 00:44

## 2018-12-13 RX ADMIN — LORAZEPAM 4 MG: 2 INJECTION INTRAMUSCULAR; INTRAVENOUS at 12:03

## 2018-12-13 RX ADMIN — PROPOFOL 80 MG: 10 INJECTION, EMULSION INTRAVENOUS at 16:40

## 2018-12-13 RX ADMIN — PIPERACILLIN SODIUM,TAZOBACTAM SODIUM 3.38 G: 3; .375 INJECTION, POWDER, FOR SOLUTION INTRAVENOUS at 07:03

## 2018-12-13 RX ADMIN — IPRATROPIUM BROMIDE AND ALBUTEROL SULFATE 1 AMPULE: .5; 3 SOLUTION RESPIRATORY (INHALATION) at 09:38

## 2018-12-13 RX ADMIN — PANTOPRAZOLE SODIUM 40 MG: 40 INJECTION, POWDER, FOR SOLUTION INTRAVENOUS at 20:44

## 2018-12-13 RX ADMIN — SODIUM CHLORIDE: 9 INJECTION, SOLUTION INTRAVENOUS at 00:43

## 2018-12-13 RX ADMIN — METRONIDAZOLE 500 MG: 500 INJECTION, SOLUTION INTRAVENOUS at 20:44

## 2018-12-13 RX ADMIN — LIDOCAINE HYDROCHLORIDE 75 MG: 20 INJECTION, SOLUTION INFILTRATION; PERINEURAL at 16:40

## 2018-12-13 RX ADMIN — Medication 10 ML: at 00:44

## 2018-12-13 RX ADMIN — SODIUM CHLORIDE 10 ML: 9 INJECTION INTRAMUSCULAR; INTRAVENOUS; SUBCUTANEOUS at 20:56

## 2018-12-13 RX ADMIN — PANTOPRAZOLE SODIUM 40 MG: 40 INJECTION, POWDER, FOR SOLUTION INTRAVENOUS at 02:22

## 2018-12-13 RX ADMIN — POLYETHYLENE GLYCOL 3350 238 G: 17 POWDER, FOR SOLUTION ORAL at 20:53

## 2018-12-13 RX ADMIN — GUAIFENESIN 600 MG: 600 TABLET, EXTENDED RELEASE ORAL at 20:57

## 2018-12-13 RX ADMIN — Medication 250 MG: at 02:22

## 2018-12-13 RX ADMIN — DULOXETINE HYDROCHLORIDE 30 MG: 30 CAPSULE, DELAYED RELEASE ORAL at 09:35

## 2018-12-13 RX ADMIN — GUAIFENESIN 600 MG: 600 TABLET, EXTENDED RELEASE ORAL at 09:35

## 2018-12-13 RX ADMIN — CIPROFLOXACIN 400 MG: 2 INJECTION, SOLUTION INTRAVENOUS at 05:45

## 2018-12-13 RX ADMIN — SODIUM CHLORIDE: 9 INJECTION, SOLUTION INTRAVENOUS at 01:38

## 2018-12-13 RX ADMIN — FOLIC ACID: 5 INJECTION, SOLUTION INTRAMUSCULAR; INTRAVENOUS; SUBCUTANEOUS at 09:58

## 2018-12-13 RX ADMIN — POTASSIUM CHLORIDE 10 MEQ: 7.46 INJECTION, SOLUTION INTRAVENOUS at 10:11

## 2018-12-13 RX ADMIN — Medication 250 MG: at 05:46

## 2018-12-13 RX ADMIN — LORAZEPAM 0.5 MG: 0.5 TABLET ORAL at 00:44

## 2018-12-13 RX ADMIN — ONDANSETRON 4 MG: 2 INJECTION INTRAMUSCULAR; INTRAVENOUS at 12:05

## 2018-12-13 RX ADMIN — BISACODYL 10 MG: 5 TABLET, COATED ORAL at 20:57

## 2018-12-13 RX ADMIN — Medication 10 ML: at 21:01

## 2018-12-13 RX ADMIN — METRONIDAZOLE 500 MG: 500 INJECTION, SOLUTION INTRAVENOUS at 12:15

## 2018-12-13 RX ADMIN — SODIUM CHLORIDE 8 MG/HR: 9 INJECTION, SOLUTION INTRAVENOUS at 09:59

## 2018-12-13 RX ADMIN — TIOTROPIUM BROMIDE 18 MCG: 18 CAPSULE ORAL; RESPIRATORY (INHALATION) at 09:26

## 2018-12-13 RX ADMIN — Medication 10 ML: at 10:11

## 2018-12-13 RX ADMIN — ONDANSETRON 4 MG: 2 INJECTION INTRAMUSCULAR; INTRAVENOUS at 11:03

## 2018-12-13 RX ADMIN — CIPROFLOXACIN 400 MG: 2 INJECTION, SOLUTION INTRAVENOUS at 18:17

## 2018-12-13 RX ADMIN — THIAMINE HYDROCHLORIDE 500 MG: 100 INJECTION, SOLUTION INTRAMUSCULAR; INTRAVENOUS at 20:43

## 2018-12-13 RX ADMIN — FERROUS SULFATE TAB EC 324 MG (65 MG FE EQUIVALENT) 324 MG: 324 (65 FE) TABLET DELAYED RESPONSE at 09:35

## 2018-12-13 RX ADMIN — METRONIDAZOLE 500 MG: 500 INJECTION, SOLUTION INTRAVENOUS at 04:48

## 2018-12-13 ASSESSMENT — PAIN DESCRIPTION - DESCRIPTORS
DESCRIPTORS: ACHING

## 2018-12-13 ASSESSMENT — PAIN SCALES - GENERAL
PAINLEVEL_OUTOF10: 0
PAINLEVEL_OUTOF10: 2
PAINLEVEL_OUTOF10: 3

## 2018-12-13 ASSESSMENT — PAIN SCALES - WONG BAKER
WONGBAKER_NUMERICALRESPONSE: 2

## 2018-12-13 ASSESSMENT — PAIN DESCRIPTION - PAIN TYPE
TYPE: CHRONIC PAIN

## 2018-12-13 ASSESSMENT — LIFESTYLE VARIABLES: SMOKING_STATUS: 1

## 2018-12-13 ASSESSMENT — PAIN DESCRIPTION - FREQUENCY
FREQUENCY: CONTINUOUS

## 2018-12-13 ASSESSMENT — COPD QUESTIONNAIRES: CAT_SEVERITY: MODERATE

## 2018-12-13 NOTE — CONSULTS
88.3 %    Lymphocytes % 8.8 %    Monocytes % 2.4 %    Eosinophils % 0.0 %    Basophils % 0.5 %    Neutrophils # 20.4 (H) 1.7 - 7.7 K/uL    Lymphocytes # 2.0 1.0 - 5.1 K/uL    Monocytes # 0.5 0.0 - 1.3 K/uL    Eosinophils # 0.0 0.0 - 0.6 K/uL    Basophils # 0.1 0.0 - 0.2 K/uL   Comprehensive Metabolic Panel w/ Reflex to MG    Collection Time: 12/12/18  8:57 PM   Result Value Ref Range    Sodium 140 136 - 145 mmol/L    Potassium reflex Magnesium 3.8 3.5 - 5.1 mmol/L    Chloride 89 (L) 99 - 110 mmol/L    CO2 17 (L) 21 - 32 mmol/L    Anion Gap 34 (H) 3 - 16    Glucose 107 (H) 70 - 99 mg/dL    BUN 17 7 - 20 mg/dL    CREATININE 0.7 0.6 - 1.2 mg/dL    GFR Non-African American >60 >60    GFR African American >60 >60    Calcium 9.0 8.3 - 10.6 mg/dL    Total Protein 7.8 6.4 - 8.2 g/dL    Alb 4.1 3.4 - 5.0 g/dL    Albumin/Globulin Ratio 1.1 1.1 - 2.2    Total Bilirubin 0.4 0.0 - 1.0 mg/dL    Alkaline Phosphatase 192 (H) 40 - 129 U/L     (H) 10 - 40 U/L     (H) 15 - 37 U/L    Globulin 3.7 g/dL   Troponin    Collection Time: 12/12/18  8:57 PM   Result Value Ref Range    Troponin <0.01 <0.01 ng/mL   Brain Natriuretic Peptide    Collection Time: 12/12/18  8:57 PM   Result Value Ref Range    Pro- (H) 0 - 124 pg/mL   Lactic Acid, Plasma    Collection Time: 12/12/18  8:57 PM   Result Value Ref Range    Lactic Acid 3.7 (H) 0.4 - 2.0 mmol/L   Blood Gas, Venous    Collection Time: 12/12/18  8:57 PM   Result Value Ref Range    pH, Geronimo 7.331 (L) 7.350 - 7.450    pCO2, Geronimo 35.0 (L) 40.0 - 50.0 mmHg    pO2, Geronimo 58 Not Established mmHg    HCO3, Venous 18 (L) 23 - 29 mmol/L    Base Excess, Geronimo -6.7 Not Established mmol/L    O2 Sat, Geronimo 86 Not Established %    Carboxyhemoglobin 3.6 %    MetHgb, Geronimo 0.9 <1.5 %    TC02 (Calc), Geronimo 19 Not Established mmol/L    O2 Content, Geronimo 11 Not Established mL/dL    O2 Therapy Unknown    Lipase    Collection Time: 12/12/18  8:57 PM   Result Value Ref Range    Lipase 90.0 (H) 13.0 - 60.0 Lipase 151.0 (H) 13.0 - 60.0 U/L   Comprehensive Metabolic Panel    Collection Time: 12/13/18  5:12 AM   Result Value Ref Range    Sodium 142 136 - 145 mmol/L    Potassium 3.1 (L) 3.5 - 5.1 mmol/L    Chloride 98 (L) 99 - 110 mmol/L    CO2 24 21 - 32 mmol/L    Anion Gap 20 (H) 3 - 16    Glucose 110 (H) 70 - 99 mg/dL    BUN 17 7 - 20 mg/dL    CREATININE 0.6 0.6 - 1.2 mg/dL    GFR Non-African American >60 >60    GFR African American >60 >60    Calcium 8.0 (L) 8.3 - 10.6 mg/dL    Total Protein 5.7 (L) 6.4 - 8.2 g/dL    Alb 3.0 (L) 3.4 - 5.0 g/dL    Albumin/Globulin Ratio 1.1 1.1 - 2.2    Total Bilirubin 0.3 0.0 - 1.0 mg/dL    Alkaline Phosphatase 132 (H) 40 - 129 U/L    ALT 89 (H) 10 - 40 U/L     (H) 15 - 37 U/L    Globulin 2.7 g/dL   Ethanol    Collection Time: 12/13/18  5:12 AM   Result Value Ref Range    Ethanol Lvl None Detected mg/dL   Lipid, Fasting    Collection Time: 12/13/18  9:17 AM   Result Value Ref Range    Cholesterol, Fasting 159 0 - 199 mg/dL    Triglyceride, Fasting 53 0 - 150 mg/dL    HDL 95 (H) 40 - 60 mg/dL    LDL Calculated 53 <100 mg/dL    VLDL Cholesterol Calculated 11 Not Established mg/dL   Vitamin B12 & Folate    Collection Time: 12/13/18  9:17 AM   Result Value Ref Range    Vitamin B-12 1028 (H) 211 - 911 pg/mL    Folate 6.25 4.78 - 24.20 ng/mL   Vitamin D 25 Hydroxy    Collection Time: 12/13/18  9:17 AM   Result Value Ref Range    Vit D, 25-Hydroxy 43.7 >=30 ng/mL     Other Labs      Imaging  CT ABDOMEN PELVIS WO CONTRAST Additional Contrast? None   Final Result   Mural thickening involving the distal large bowel, compatible with   nonspecific colitis. Large parastomal hernia containing loops of both large and small bowel. No convincing evidence of obstruction is identified. Again, there are   several small bowel loops with mild gaseous dilation within the hernia sac,   but that is probably reactive ileus, as the proximal small bowel is not   dilated.       Question subtle peripancreatic fat stranding at the pancreatic tail. Correlate with any clinical evidence of acute pancreatitis. Hepatic steatosis. Age-indeterminate compression fracture involving the   superior endplate of L1, probably subacute to chronic. XR CHEST PORTABLE   Final Result   1. No acute cardiopulmonary disease. ASSESSMENT AND RECOMMENDATIONS   72 y.o. female with a PMH of Tobacco abuse, Parkinson disease, Hypertension, Hiatal hernia, PUD, COPD, Colostomy who presented 12/12/2018 with hematemesis. We have been consulted regarding hematemesis. On admission her labs show a hemoglobin of 9.4, her LFTs show Alk Phos 132, ALT 89, , WBC 23.1, platelets 118. Patient has laceration to scalp with 6 staples, laceration to nose with 4 sutures. A CT abdomen shows patient s/p partial colectomy. Colostomy LLQ with large adjacent parastomal hernia containing loops of large and small bowel. Gaseous distention of small bowel ? Reactive ileus vs obstruction. Fatty infiltration of the liver. Peripancreatic fat stranding at the pancreatic tail. IMPRESSION:    1. Hematemesis - coffee ground emesis - ? Bleeding ulcer vs nasal/facial injury from previous fall   2. Anemia -  7/2018 hemoglobin 9.2 this admission hemoglobin 9.4  3. Hx of peptic ulcer disease on dexilant  4. Nasal laceration w/nasal fracture   5. Pancreatitis - mild lipase 151  6. Elevated LFTs -   7. Alcohol abuse - on CIWA protocol and banana bag      RECOMMENDATIONS:      IVF @ 125mL/hr  Remain NPO for now  Plan for EGD today to evaluate coffee ground emesis  Montior LFTs   Monitor H&H; transfuse for hemoglobin less than 7    If you have any questions or need any further information, please feel free to contact our consult team.  Thank you for allowing us to participate in the care of Renetta Anthony.     Electronically signed by XIOMARA Blankenship on 12/13/2018 at 10:49 AM      Attending physician addendum:      I have

## 2018-12-13 NOTE — H&P
Hospital Medicine History & Physical      PCP: Uzair Villalpando MD    Date of Admission: 12/12/2018    Date of Service: Pt seen/examined on 12/12/18 and Admitted to Inpatient with expected LOS greater than two midnights due to medical therapy. Chief Complaint:   Vomiting, hematemesis      History Of Present Illness:     72 y.o. female with possible medical history of colonic obstruction status post sigmoid colectomy with end colostomy, Parkinson's disease, essential hypertension, COPD, tobacco abuse, , anxiety/depression presented to emergency room with history of intractable vomiting and hematemesis. .      The patient states that she has been having  vomiting for the past 1-2 weeks up to 4 episodes per day. Today she had 2 episodes of hematemesis prompting her to come to emergency room. She denies any increased stool output from her colostomy. No increased abdominal pain or distention. No fevers, chills or rigors. .  She also complains of increased tremors that have worsened recently. She has a reported history of alcohol abuse although she strongly denies this, and states she only drinks one beer per day. She also has a chronic cough and chronic shortness of breath due to COPD but these are unchanged. . She continues to smoke 1 pack per day. .      Of not the patient had a sigmoid stricture and underwent sigmoid colectomy with end colostomy in May/2018 in [place]. . She was seen by General surgery last month with plans to a colostomy reversal in 1 month after she had a   colonoscopy to rule out polyps or masses, but has not followed up since. .      In the emergency room today, CT of the abdomen showed dural thickening involving the distal lungs:, Compatible with nonspecific colitis. Large parastomal hernia containing loops of both large and small bowel. No convincing evidence of obstruction.   Questionable subtle peripancreatic fat stranding of the pancreatic tail r/o pancreatitis, and hepatic CPDR delayed release capsule Take 1 capsule by mouth daily 10/16/18   ABIGAIL Contreras CNP   dextroamphetamine (DEXTROSTAT) 10 MG tablet Take 20 mg by mouth 3 times daily. Duane Bourdon Historical Provider, MD   ferrous sulfate 325 (65 Fe) MG tablet Take 325 mg by mouth daily (with breakfast)    Historical Provider, MD   folic acid (FOLVITE) 1 MG tablet Take 1 mg by mouth daily    Historical Provider, MD   vitamin B-6 (PYRIDOXINE) 100 MG tablet Take 100 mg by mouth daily    Historical Provider, MD   vitamin B-12 (CYANOCOBALAMIN) 500 MCG tablet Take 500 mcg by mouth daily    Historical Provider, MD   vitamin D (ERGOCALCIFEROL) 44072 UNIT CAPS capsule Take 50,000 Units by mouth once a week     Historical Provider, MD   lorazepam (ATIVAN) 0.5 MG tablet Take 0.5 mg by mouth daily as needed for Anxiety. Duane Bourdon Historical Provider, MD       Allergies:  Aspirin and Morphine    Social History:      The patient currently lives     TOBACCO:   reports that she has been smoking Cigarettes. She has a 60.00 pack-year smoking history. She has never used smokeless tobacco.  ETOH:   reports that she drinks about 1.2 - 1.8 oz of alcohol per week . Family History:       Reviewed in detail and negative for DM, CAD, Cancer, CVA. Positive as follows:    No family history on file. REVIEW OF SYSTEMS:   Pertinent positives as noted in the HPI. All other systems reviewed and negative. PHYSICAL EXAM:    /60   Pulse 115   Temp 99.5 °F (37.5 °C) (Oral)   Resp 18   Ht 5' 2\" (1.575 m)   Wt 134 lb 4.2 oz (60.9 kg)   SpO2 92%   BMI 24.56 kg/m²     General appearance:  No apparent distress, appears stated age and cooperative. HEENT:  Normal cephalic, atraumatic without obvious deformity. Pupils equal, round, and reactive to light. Extra ocular muscles intact. Conjunctivae/corneas clear. Neck: Supple, with full range of motion. No jugular venous distention. Trachea midline. Respiratory:  Normal respiratory effort.  Clear to

## 2018-12-13 NOTE — ED PROVIDER NOTES
[address]  [address] [facility] [address]  Dept: [phone]  Loc: [phone]    eMERGENCY dEPARTMENT eNCOUnter        [address] [facility]    Chief Complaint   Patient presents with    Emesis     x 1 week    Shortness of Breath    Fatigue    Abdominal Pain    Suture / Staple Removal       HPI    Obdulio Pedroza is a 72 y.o. female who presents with abdominal pain associated with shortness of breath, nausea and vomiting. Onset was 1 week ago, progressively worsening. The context was a spontaneous onset. The quality of the pain is cramping, and the severity is 8/10. No aggravating or alleviating factors. REVIEW OF SYSTEMS    Neurologic: no LOC  Cardiac: No Chest Pain, no syncope  Respiratory: see HPI, no cough  GI: see HPI, No Bloody Stool or Diarrhea  : No Dysuria or Hematuria  General: no measured Fever  All other systems reviewed and are negative.     PAST MEDICAL & SURGICAL HISTORY    Past Medical History:   Diagnosis Date    Colostomy in place [facility])     SBO/diverticulosis     COPD (chronic obstructive pulmonary disease) [facility]     Depression     DANIELLE (generalized anxiety disorder)     Gastric ulcer, unspecified as acute or chronic, without mention of hemorrhage, perforation, or obstruction     Hiatal hernia     Hypertension     Parkinson's disease ([facility]     Tobacco abuse      Past Surgical History:   Procedure Laterality Date    CHOLECYSTECTOMY      COLOSTOMY  2018    HYSTERECTOMY      TUBAL LIGATION         CURRENT MEDICATIONS    Current Outpatient Rx   Medication Sig Dispense Refill    prochlorperazine (COMPAZINE) 10 MG tablet Take 1 tablet by mouth every 6 hours as needed (nausea) 60 tablet 1    ondansetron (ZOFRAN ODT) 4 MG disintegrating tablet Take 1 tablet by mouth every 8 hours as needed for Nausea or Vomiting 12 tablet 0    albuterol sulfate HFA (PROAIR HFA) 108 (90 Base) MCG/ACT inhaler Inhale 2

## 2018-12-13 NOTE — ANESTHESIA PRE PROCEDURE
23.7 12/13/2018    MCV 78.2 12/13/2018    RDW 26.8 12/13/2018     12/13/2018       CMP:   Lab Results   Component Value Date     12/13/2018    K 3.1 12/13/2018    K 3.8 12/12/2018    CL 98 12/13/2018    CO2 24 12/13/2018    BUN 17 12/13/2018    CREATININE 0.6 12/13/2018    GFRAA >60 12/13/2018    GFRAA >60 03/31/2011    AGRATIO 1.1 12/13/2018    LABGLOM >60 12/13/2018    GLUCOSE 110 12/13/2018    PROT 5.7 12/13/2018    CALCIUM 8.0 12/13/2018    BILITOT 0.3 12/13/2018    ALKPHOS 132 12/13/2018     12/13/2018    ALT 89 12/13/2018       POC Tests: No results for input(s): POCGLU, POCNA, POCK, POCCL, POCBUN, POCHEMO, POCHCT in the last 72 hours. Coags:   Lab Results   Component Value Date    PROTIME 10.8 10/13/2018    INR 0.95 10/13/2018    APTT 30.1 10/13/2018       HCG (If Applicable): No results found for: PREGTESTUR, PREGSERUM, HCG, HCGQUANT     ABGs: No results found for: PHART, PO2ART, XIV5ZYR, UZM4IJU, BEART, L7HIEHBF     Type & Screen (If Applicable):  No results found for: LABABO, [address]    Anesthesia Evaluation  Patient summary reviewed  Airway: Mallampati: II  TM distance: >3 FB   Neck ROM: full  Mouth opening: > = 3 FB Dental:          Pulmonary:   (+) COPD: moderate,  rhonchi,  current smoker                           Cardiovascular:    (+) hypertension:,         Rhythm: regular  Rate: normal                    Neuro/Psych:   (+) psychiatric history:             ROS comment: PD GI/Hepatic/Renal:   (+) hiatal hernia, PUD,           Endo/Other:        (-) hypothyroidism               Abdominal:           Vascular:     - DVT and PE. Anesthesia Plan      MAC and TIVA     ASA 3       Induction: intravenous. Anesthetic plan and risks discussed with patient. Plan discussed with CRNA. DOS STAFF ADDENDUM:    Pt seen and examined, chart reviewed (including anesthesia, drug and allergy history).   No interval changes to history and physical

## 2018-12-13 NOTE — OP NOTE
Endoscopy Note    Patient: Alen Ramirez  : 1945  Acct#:     Procedure: Esophagogastroduodenoscopy with biopsy                         Date:  2018     Surgeon:   Iris Pinto MD    Referring Physician:  Liliana Montenegro MD    Indications:  72 y.o. female with a PMH of tobacco abuse, Parkinson disease, Hypertension, Hiatal hernia, PUD, COPD, diverticulosis s/p colostomy (sounds like secondary to a colonic stricture, done in [place] in 2018) who presented 2018 with hematemesis. On admission her labs show a hemoglobin of 9.4, her LFTs show Alk Phos 132, ALT 89, , WBC 23.1, platelets 170. Patient had laceration to scalp with 6 staples, laceration to nose with 4 sutures on 18. Hgb dropped from 9.4 --> 7.0 overnight, so we are performing an EGD to evaluate for peptic ulcer disease, Marti Martinez tear, or other source of GI bleed. Postoperative Diagnosis:    1) Mild erythema of the antrum. Biopsies were obtained from the antrum and body of the stomach to rule out active gastritis and H.pylori gastritis. 2) Very small clean-based ulcer in the duodenal bulb (<5mm) with no active bleeding. Anesthesia:  Administered by monitored anesthesia care. Consent:  The patient or their legal guardian has signed an informed consent, and is aware of the potential risks, benefits, alternatives, and potential complications of this procedure. These include, but are not limited to hemorrhage, bleeding, post procedural pain, perforation, phlebitis, aspiration, hypotension, hypoxia, cardiovascular events such as arryhthmia, and possibly death. Description of Procedure: The patient was then taken to the endoscopy suite, placed in the left lateral decubitus position and the above IV sedation was administrered. The Olympus video endoscope was placed through the patient's oropharynx without difficulty to the extent of the 2nd portion of the duodenum.   Both forward and retroflexed views of the stomach were obtained. Findings:    Esophagus: The esophagus appeared normal without evidence of Flynn's esophagus or reflux esophagitis. Stomach: The stomach had mild erythema of the antrum. Biopsies were obtained from the antrum and body of the stomach to rule out active gastritis and H.pylori gastritis. Retroflexed views were unremarkable. Duodenum: There was a very small clean-based ulcer (<5mm) seen in the duodenal bulb. The 2nd portion of the duodenum appeared normal with normal villous pattern. The scope was then withdrawn back into the stomach, it was decompressed, and the scope was completely withdrawn. The patient tolerated the procedure well and was taken to the post anesthesia care unit in good condition. Estimated blood loss: None    Impression:   1) Mild erythema of the antrum. Biopsies were obtained from the antrum and body of the stomach to rule out active gastritis and H.pylori gastritis. 2) Very small clean-based ulcer in the duodenal bulb (<5mm) with no active bleeding. Recommendations:   1) The patient had biopsies taken today. The patient should call for results in 7 days if they have not heard from our office. Our number is [phone].  2) Continue Protonix 40mg twice daily. 3) Clear liquid diet. Prep for colonoscopy tomorrow for further work-up of source of anemia.        LACEY Leos [address] [facility]

## 2018-12-13 NOTE — PROGRESS NOTES
3598--Pt admitteded  To PACU from endo. Monitors placed. O2 on aqt 3l/nc. Pt asleep resp regular, moderate depth. Ivs patent. Abd soft.

## 2018-12-13 NOTE — PROGRESS NOTES
rehabilitation services?: Yes  Additional Pertinent Hx: per H&P note:  72 y.o. female with possible medical history of colonic obstruction status post sigmoid colectomy with end colostomy, Parkinson's disease, essential hypertension, COPD, tobacco abuse, , anxiety/depression presented to emergency room with history of intractable vomiting and hematemesis. . The patient states that she has been having  vomiting for the past 1-2 weeks up to 4 episodes per day. Today she had 2 episodes of hematemesis prompting her to come to emergency room. She denies any increased stool output from her colostomy. No increased abdominal pain or distention. No fevers, chills or rigors. .  She also complains of increased tremors that have worsened recently. She has a reported history of alcohol abuse although she strongly denies this, and states she only drinks one beer per day. She also has a chronic cough and chronic shortness of breath due to COPD but these are unchanged. . She continues to smoke 1 pack per day. . Of not the patient had a sigmoid stricture and underwent sigmoid colectomy with end colostomy in May/2018 in [place]. . She was seen by General surgery last month with plans to a colostomy reversal in 1 month after she had a   colonoscopy to rule out polyps or masses, but has not followed up since. .  In the emergency room today, CT of the abdomen showed dural thickening involving the distal lungs:, Compatible with nonspecific colitis. Large parastomal hernia containing loops of both large and small bowel. No convincing evidence of obstruction.   Questionable subtle peripancreatic fat stranding of the pancreatic tail r/o pancreatitis, and hepatic steatosis  Response To Previous Treatment: Not applicable  Family / Caregiver Present: No  Follows Commands:  (pt becomes very irritated with staff easily but able to follow commands)  Subjective  Subjective: pt irritated by questions therapist asking pt regarding home Dependent/Total  Rolling to Right: Dependent/Total  Scooting: Dependent/Total (x2)  Transfers  Sit to Stand: Unable to assess  Stand to sit: Unable to assess  Comment: pt refused to attempt getting up OOB      assisted with positioning in bed for comfort and pressure relief; all needs in reach           Assessment   Body structures, Functions, Activity limitations: Decreased functional mobility   Assessment: pt is a 71 yo female who was adm to [facility] with sepsis, colitis, UTI and possible pancreatitis; pt also may have Parkinson's disease although not formally diagnosed per neurology note. Pt is well below her baseline and is needing more assist than family can safely provide at home;  Feel pt will benefit from continued therapy at discharge  Prognosis: Fair  Decision Making: High Complexity  Patient Education: role and purpose of PT  Barriers to Learning: easily irritated  REQUIRES PT FOLLOW UP: Yes  Activity Tolerance  Activity Tolerance: Treatment limited secondary to agitation         Plan   Plan  Times per week: 3-5  Current Treatment Recommendations: Functional Mobility Training  Safety Devices  Type of devices: Call light within reach, Bed alarm in place, Left in bed, Nurse notified    G-Code  PT G-Codes  Score: 7  Functional Limitation: Mobility: Walking and moving around  Mobility: Walking and Moving Around Current Status (): At least 80 percent but less than 100 percent impaired, limited or restricted  Mobility: Walking and Moving Around Goal Status ():  At least 60 percent but less than 80 percent impaired, limited or restricted  OutComes Score                                           AM-PAC Score  AM-PAC Inpatient Mobility Raw Score : 7  AM-PAC Inpatient T-Scale Score : 26.42  Mobility Inpatient CMS 0-100% Score: 92.36  Mobility Inpatient CMS G-Code Modifier : CM          Goals  Short term goals  Time Frame for Short term goals: by discharge  Short term goal 1: bed mob min A  Short term goal 2: transfers Min A  Short term goal 3: amb 22' with RW min A  Patient Goals   Patient goals : pt did not state a goal       Therapy Time   Individual Concurrent Group Co-treatment   Time In 1140         Time Out 9643         Minutes 26               If patient is discharged prior to the next physical therapy visit;  Please see last written PT note for discharge status.     BIA SHEFFIELD, PT

## 2018-12-13 NOTE — H&P
capsule 1    ferrous sulfate 325 (65 Fe) MG tablet Take 325 mg by mouth daily (with breakfast)      folic acid (FOLVITE) 1 MG tablet Take 1 mg by mouth daily      vitamin B-6 (PYRIDOXINE) 100 MG tablet Take 100 mg by mouth daily      vitamin B-12 (CYANOCOBALAMIN) 500 MCG tablet Take 500 mcg by mouth daily      vitamin D (ERGOCALCIFEROL) 17099 UNIT CAPS capsule Take 50,000 Units by mouth once a week           Allergies:  Fentanyl; Aspirin; and Morphine    Social History:   TOBACCO:   reports that she has been smoking Cigarettes. She has a 60.00 pack-year smoking history. She has never used smokeless tobacco.  ETOH:   reports that she drinks about 1.2 - 1.8 oz of alcohol per week . DRUGS:   reports that she does not use drugs. PHYSICAL EXAM:      Vital Signs: BP (!) 172/73   Pulse 97   Temp 99.1 °F (37.3 °C) (Oral)   Resp 20   Ht 5' 2\" (1.575 m)   Wt 135 lb 5.8 oz (61.4 kg)   SpO2 97%   BMI 24.76 kg/m²    Airway: No stridor or wheezing noted. Good air movement  Pulmonary: without wheezes. Clear to auscultation  Cardiac:regular rate and rhythm without loud murmurs  Abdomen:soft, nontender,  Bowel sounds present    Pre-Procedure Assessment / Plan:  1) EGD    ASA Grade:  ASA 3 - Patient with moderate systemic disease with functional limitations  Mallampati Classification:  Class II    Level of Sedation Plan: Moderate sedation    Post Procedure plan: Return to same level of care    I assessed the patient and find that the patient is in satisfactory condition to proceed with the planned procedure and sedation plan. I have explained the risk, benefits, and alternatives to the procedure; the patient understands and agrees to proceed.        Gloria Taylor MD  12/13/2018

## 2018-12-13 NOTE — CONSULTS
well-nourished  Eyes: unable to visualize the fundi  Cardiovascular: pulses symmetric in all 4 extremities. No peripheral edema. Psychiatric: cooperative with examination, no psychotic behavior noted. Neurologic  Mental status:   orientation to person, place, time, situation   General fund of knowledge grossly intact   Memory grossly intact   Attention intact as able to attend well to the exam     Language fluent in conversation. No aphasia. Comprehension intact; follows simple commands  Cranial nerves:   CN2: visual fields full w/o extinction on confrontational testing  CN 3,4,6: extraocular muscles intact. Pupils are equal, round, reactive bilaterally. CN5: facial sensation symmetric   CN7: face symmetric without dysarthria. Constant involuntary oral movements. CN8: hearing grossly intact  CN9: palate elevated symmetrically  CN11: trap full strength on shoulder shrug  CN12: tongue midline with protrusion  Strength: limited motion of her right upper limb, apparently secondary to a chronic shoulder issue. She is able to lift her LUE, and both of her lower limbs antigravity appropriately. An asymmetric resting tremor is noted (right upper limb > left upper limb), while also seeing a more subtle tremor in her right lower limb. These tremors worsen with action. Deep tendon reflexes: normal in all 4 extremities  Sensory: light touch intact in all 4 extremities. Cerebellar/coordination: finger nose finger difficult to assess given action tremor. Tone: normal in all 4 extremities  Gait: deferred at this time. Labs  Glucose 110  Na 142  K 3.1  BUN 17  Creatinine 0.6  Lactic acid 4.0 -> 2.6   -> 89   -> 150    WBC 23.1K -> 14.3K  Hg 9.4 -> 7.0  Platelets 647    UA moderate leukocyte esterase, 170 WBCs. Culture pending. Rapid influenza A/B negative  Blood cultures pending    Studies  CT abdomen/pelvis 12/12/18  Non-specific colitis. Questionable pancreatitis.     Large

## 2018-12-13 NOTE — CONSULTS
General Surgery Consult Note      Cody De Santiago   : 1945 MRN: [id number]  Date of Admission: 2018  Admitting Demarcus Kirby MD  Primary Care Physician: Oscar Villa MD    Reason for Consult: parastomal hernia, colitis    Diagnosis Present on Admission:   Colitis      History of Present Illness  Cody De Santiago is a 72 y.o. female with history of sigmoid colectomy with end colostomy in May who was admitted on 2018 for nausea, vomiting, and abdominal pain. Slowly progressed over past few days. Denies any loose stool via colostomy. Denies fevers or chills. Past Medical History:   Diagnosis Date    Colostomy in place [facility])     SBO/diverticulosis     [facility] obstructive [facility] disease) [facility])     Depression     DANIELLE (generalized anxiety disorder)     Gastric ulcer, unspecified as acute or chronic, without mention of hemorrhage, perforation, or obstruction     Hiatal hernia     Hypertension     Parkinson's disease ([facility] [address]     Tobacco abuse      Past Surgical History:   Procedure Laterality Date    CHOLECYSTECTOMY      COLOSTOMY      HYSTERECTOMY      TUBAL LIGATION       Family history reviewed and otherwise negative. History reviewed. No pertinent family history. Social History     Social History    Marital status: Single     Spouse name: N/A    Number of children: N/A    Years of education: N/A     Occupational History    Not on file.      Social History Main Topics    Smoking status: Current Every Day Smoker     Packs/day: 1.00     Years: 60.00     Types: Cigarettes    Smokeless tobacco: Never Used    Alcohol use 1.2 - 1.8 oz/week     2 - 3 Cans of beer per week      Comment: daily     Drug use: No    Sexual activity: No     Other Topics Concern    Not on file     Social History Narrative    Moved to the area from [place], [place] with family     Allergies   Allergen Reactions    Fentanyl Other (See Comments)    Aspirin Other (See Comments)     Ulcers in stomach    Morphine      Feels like she will have a heart attack     Prior to Admission medications    Medication Sig Start Date End Date Taking?  Authorizing Provider   prochlorperazine (COMPAZINE) 10 MG tablet Take 1 tablet by mouth every 6 hours as needed (nausea) 12/3/18   Milo Saucedo MD   ondansetron (ZOFRAN ODT) 4 MG disintegrating tablet Take 1 tablet by mouth every 8 hours as needed for Nausea or Vomiting 11/8/18   Syeda Graham MD   albuterol sulfate HFA (PROAIR HFA) 108 (90 Base) MCG/ACT inhaler Inhale 2 puffs into the lungs every 6 hours as needed for Wheezing 86/6/29   Garth Pearson MD   nicotine (NICODERM CQ) 21 MG/24HR Place 1 patch onto the skin every 24 hours 62/31/63   Garth Pearson MD   amLODIPine (NORVASC) 5 MG tablet Take 1 tablet by mouth daily 50/40/51   Garth Pearson MD   Umeclidinium Bromide 62.5 MCG/INH AEPB Inhale 1 puff into the lungs daily 69/37/37   Garth Pearson MD   DULoxetine (CYMBALTA) 30 MG extended release capsule Take 1 capsule by mouth daily 62/05/85   Garth Pearson MD   guaiFENesin [facility]) 600 MG extended release tablet Take 1 tablet by mouth 2 times daily 10/16/18   ABIGAIL Camargo CNP   dexlansoprazole (DEXILANT) 60 MG CPDR delayed release capsule Take 1 capsule by mouth daily 10/16/18   ABIGAIL Camargo CNP   ferrous sulfate 325 (65 Fe) MG tablet Take 325 mg by mouth daily (with breakfast)    Historical Provider, MD   folic acid (FOLVITE) 1 MG tablet Take 1 mg by mouth daily    Historical Provider, MD   vitamin B-6 (PYRIDOXINE) 100 MG tablet Take 100 mg by mouth daily    Historical Provider, MD   vitamin B-12 (CYANOCOBALAMIN) 500 MCG tablet Take 500 mcg by mouth daily    Historical Provider, MD   vitamin D (ERGOCALCIFEROL) 50839 UNIT CAPS capsule Take 50,000 Units by mouth once a week     Historical Provider, MD       Review of Systems  Pertinent positives are in HPI, otherwise all systems evidence of acute pancreatitis. Hepatic steatosis. Age-indeterminate compression fracture involving the   superior endplate of L1, probably subacute to chronic. XR CHEST PORTABLE   Final Result   1. No acute cardiopulmonary disease. CT personally reviewed, results as above    Assessment  Issa Peres is a 72 y.o. female admitted for sepsis secondary to UTI and colitis, found to have a parastomal hernia    Plan    1. Sepsis secondary to UTI and colitis  · Continue IV antibiotics  · Leukocytosis improving since admission  · Await culture results  2. Colitis/hx of sigmoid diverticulitis with stricture  · Will need colonoscopy as outpatient  · Plan to reverse colostomy when nutrition improved and she has stopped smoking  3.  Hx of recent fall, possible EtOH abuse  · Monitor for withdrawal symptoms  · Will removed sutures/staples during this admission as she is 2 weeks out and hasn't sought out removal  4. anemia  · Hgb 7  · GI workup in progress, anemia with mild gastritis  · Colonoscopy soon, timing per Jeremiah Garcia MD

## 2018-12-14 ENCOUNTER — ANESTHESIA EVENT (OUTPATIENT)
Dept: ENDOSCOPY | Age: 73
DRG: 871 | End: 2018-12-14
Payer: MEDICARE

## 2018-12-14 ENCOUNTER — ANESTHESIA (OUTPATIENT)
Dept: ENDOSCOPY | Age: 73
DRG: 871 | End: 2018-12-14
Payer: MEDICARE

## 2018-12-14 VITALS
SYSTOLIC BLOOD PRESSURE: 98 MMHG | DIASTOLIC BLOOD PRESSURE: 54 MMHG | OXYGEN SATURATION: 100 % | RESPIRATION RATE: 18 BRPM

## 2018-12-14 LAB
A/G RATIO: 1.1 (ref 1.1–2.2)
ALBUMIN SERPL-MCNC: 2.9 G/DL (ref 3.4–5)
ALBUMIN SERPL-MCNC: 3 G/DL (ref 3.4–5)
ALP BLD-CCNC: 120 U/L (ref 40–129)
ALT SERPL-CCNC: 59 U/L (ref 10–40)
ANION GAP SERPL CALCULATED.3IONS-SCNC: 12 MMOL/L (ref 3–16)
ANION GAP SERPL CALCULATED.3IONS-SCNC: 16 MMOL/L (ref 3–16)
AST SERPL-CCNC: 59 U/L (ref 15–37)
BASOPHILS ABSOLUTE: 0 K/UL (ref 0–0.2)
BASOPHILS RELATIVE PERCENT: 0.1 %
BILIRUB SERPL-MCNC: <0.2 MG/DL (ref 0–1)
BUN BLDV-MCNC: 3 MG/DL (ref 7–20)
BUN BLDV-MCNC: 6 MG/DL (ref 7–20)
CALCIUM SERPL-MCNC: 8.2 MG/DL (ref 8.3–10.6)
CALCIUM SERPL-MCNC: 8.4 MG/DL (ref 8.3–10.6)
CHLORIDE BLD-SCNC: 101 MMOL/L (ref 99–110)
CHLORIDE BLD-SCNC: 97 MMOL/L (ref 99–110)
CLOSTRIDIUM DIFFICILE DNA AMPLIFICATION: ABNORMAL
CLOSTRIDIUM DIFFICILE DNA AMPLIFICATION: ABNORMAL
CO2: 24 MMOL/L (ref 21–32)
CO2: 26 MMOL/L (ref 21–32)
CREAT SERPL-MCNC: <0.5 MG/DL (ref 0.6–1.2)
CREAT SERPL-MCNC: <0.5 MG/DL (ref 0.6–1.2)
EKG ATRIAL RATE: 120 BPM
EKG DIAGNOSIS: NORMAL
EKG P AXIS: 70 DEGREES
EKG P-R INTERVAL: 136 MS
EKG Q-T INTERVAL: 346 MS
EKG QRS DURATION: 80 MS
EKG QTC CALCULATION (BAZETT): 484 MS
EKG R AXIS: 52 DEGREES
EKG T AXIS: 16 DEGREES
EKG VENTRICULAR RATE: 118 BPM
EOSINOPHILS ABSOLUTE: 0.3 K/UL (ref 0–0.6)
EOSINOPHILS RELATIVE PERCENT: 2.4 %
FERRITIN: 37.1 NG/ML (ref 15–150)
GFR AFRICAN AMERICAN: >60
GFR AFRICAN AMERICAN: >60
GFR NON-AFRICAN AMERICAN: >60
GFR NON-AFRICAN AMERICAN: >60
GI BACTERIAL PATHOGENS BY PCR: NORMAL
GLOBULIN: 2.7 G/DL
GLUCOSE BLD-MCNC: 173 MG/DL (ref 70–99)
GLUCOSE BLD-MCNC: 87 MG/DL (ref 70–99)
HCT VFR BLD CALC: 23.8 % (ref 36–48)
HCT VFR BLD CALC: 25.3 % (ref 36–48)
HEMATOLOGY PATH CONSULT: NORMAL
HEMOGLOBIN: 7.1 G/DL (ref 12–16)
HEMOGLOBIN: 7.4 G/DL (ref 12–16)
IRON SATURATION: 10 % (ref 15–50)
IRON: 23 UG/DL (ref 37–145)
LYMPHOCYTES ABSOLUTE: 0.9 K/UL (ref 1–5.1)
LYMPHOCYTES RELATIVE PERCENT: 7.4 %
MAGNESIUM: 1.5 MG/DL (ref 1.8–2.4)
MAGNESIUM: 2.1 MG/DL (ref 1.8–2.4)
MCH RBC QN AUTO: 23.8 PG (ref 26–34)
MCHC RBC AUTO-ENTMCNC: 29.7 G/DL (ref 31–36)
MCV RBC AUTO: 80.2 FL (ref 80–100)
MONOCYTES ABSOLUTE: 0.5 K/UL (ref 0–1.3)
MONOCYTES RELATIVE PERCENT: 4.5 %
NEUTROPHILS ABSOLUTE: 10.1 K/UL (ref 1.7–7.7)
NEUTROPHILS RELATIVE PERCENT: 85.6 %
ORGANISM: ABNORMAL
PDW BLD-RTO: 26.7 % (ref 12.4–15.4)
PHOSPHORUS: 1 MG/DL (ref 2.5–4.9)
PLATELET # BLD: 263 K/UL (ref 135–450)
PMV BLD AUTO: 7.2 FL (ref 5–10.5)
POTASSIUM SERPL-SCNC: 2.9 MMOL/L (ref 3.5–5.1)
POTASSIUM SERPL-SCNC: 3.5 MMOL/L (ref 3.5–5.1)
RBC # BLD: 2.97 M/UL (ref 4–5.2)
SODIUM BLD-SCNC: 135 MMOL/L (ref 136–145)
SODIUM BLD-SCNC: 141 MMOL/L (ref 136–145)
TOTAL IRON BINDING CAPACITY: 241 UG/DL (ref 260–445)
TOTAL PROTEIN: 5.6 G/DL (ref 6.4–8.2)
URINE CULTURE, ROUTINE: NORMAL
WBC # BLD: 11.8 K/UL (ref 4–11)

## 2018-12-14 PROCEDURE — 6360000002 HC RX W HCPCS: Performed by: STUDENT IN AN ORGANIZED HEALTH CARE EDUCATION/TRAINING PROGRAM

## 2018-12-14 PROCEDURE — 2700000000 HC OXYGEN THERAPY PER DAY

## 2018-12-14 PROCEDURE — 94640 AIRWAY INHALATION TREATMENT: CPT

## 2018-12-14 PROCEDURE — 2500000003 HC RX 250 WO HCPCS

## 2018-12-14 PROCEDURE — 2580000003 HC RX 258

## 2018-12-14 PROCEDURE — 99232 SBSQ HOSP IP/OBS MODERATE 35: CPT | Performed by: SURGERY

## 2018-12-14 PROCEDURE — 80053 COMPREHEN METABOLIC PANEL: CPT

## 2018-12-14 PROCEDURE — 88305 TISSUE EXAM BY PATHOLOGIST: CPT

## 2018-12-14 PROCEDURE — 7100000000 HC PACU RECOVERY - FIRST 15 MIN: Performed by: INTERNAL MEDICINE

## 2018-12-14 PROCEDURE — 6360000002 HC RX W HCPCS

## 2018-12-14 PROCEDURE — 6360000002 HC RX W HCPCS: Performed by: INTERNAL MEDICINE

## 2018-12-14 PROCEDURE — 83540 ASSAY OF IRON: CPT

## 2018-12-14 PROCEDURE — 36415 COLL VENOUS BLD VENIPUNCTURE: CPT

## 2018-12-14 PROCEDURE — 1200000000 HC SEMI PRIVATE

## 2018-12-14 PROCEDURE — 94664 DEMO&/EVAL PT USE INHALER: CPT

## 2018-12-14 PROCEDURE — 2580000003 HC RX 258: Performed by: INTERNAL MEDICINE

## 2018-12-14 PROCEDURE — 83735 ASSAY OF MAGNESIUM: CPT

## 2018-12-14 PROCEDURE — 82728 ASSAY OF FERRITIN: CPT

## 2018-12-14 PROCEDURE — 85014 HEMATOCRIT: CPT

## 2018-12-14 PROCEDURE — 3609010600 HC COLONOSCOPY POLYPECTOMY SNARE/COLD BIOPSY: Performed by: INTERNAL MEDICINE

## 2018-12-14 PROCEDURE — 6370000000 HC RX 637 (ALT 250 FOR IP): Performed by: HOSPITALIST

## 2018-12-14 PROCEDURE — 7100000001 HC PACU RECOVERY - ADDTL 15 MIN: Performed by: INTERNAL MEDICINE

## 2018-12-14 PROCEDURE — 2500000003 HC RX 250 WO HCPCS: Performed by: STUDENT IN AN ORGANIZED HEALTH CARE EDUCATION/TRAINING PROGRAM

## 2018-12-14 PROCEDURE — 3700000001 HC ADD 15 MINUTES (ANESTHESIA): Performed by: INTERNAL MEDICINE

## 2018-12-14 PROCEDURE — 83550 IRON BINDING TEST: CPT

## 2018-12-14 PROCEDURE — C9113 INJ PANTOPRAZOLE SODIUM, VIA: HCPCS | Performed by: INTERNAL MEDICINE

## 2018-12-14 PROCEDURE — 2580000003 HC RX 258: Performed by: STUDENT IN AN ORGANIZED HEALTH CARE EDUCATION/TRAINING PROGRAM

## 2018-12-14 PROCEDURE — 94761 N-INVAS EAR/PLS OXIMETRY MLT: CPT

## 2018-12-14 PROCEDURE — 6370000000 HC RX 637 (ALT 250 FOR IP): Performed by: INTERNAL MEDICINE

## 2018-12-14 PROCEDURE — 3700000000 HC ANESTHESIA ATTENDED CARE: Performed by: INTERNAL MEDICINE

## 2018-12-14 PROCEDURE — 85025 COMPLETE CBC W/AUTO DIFF WBC: CPT

## 2018-12-14 PROCEDURE — 2500000003 HC RX 250 WO HCPCS: Performed by: HOSPITALIST

## 2018-12-14 PROCEDURE — 0DBH8ZX EXCISION OF CECUM, VIA NATURAL OR ARTIFICIAL OPENING ENDOSCOPIC, DIAGNOSTIC: ICD-10-PCS | Performed by: INTERNAL MEDICINE

## 2018-12-14 PROCEDURE — 85018 HEMOGLOBIN: CPT

## 2018-12-14 PROCEDURE — 2709999900 HC NON-CHARGEABLE SUPPLY: Performed by: INTERNAL MEDICINE

## 2018-12-14 PROCEDURE — 6360000002 HC RX W HCPCS: Performed by: HOSPITALIST

## 2018-12-14 PROCEDURE — 6370000000 HC RX 637 (ALT 250 FOR IP): Performed by: STUDENT IN AN ORGANIZED HEALTH CARE EDUCATION/TRAINING PROGRAM

## 2018-12-14 RX ORDER — POTASSIUM CHLORIDE 1.5 G/1.77G
40 POWDER, FOR SOLUTION ORAL ONCE
Status: COMPLETED | OUTPATIENT
Start: 2018-12-14 | End: 2018-12-14

## 2018-12-14 RX ORDER — PROPOFOL 10 MG/ML
INJECTION, EMULSION INTRAVENOUS CONTINUOUS PRN
Status: DISCONTINUED | OUTPATIENT
Start: 2018-12-14 | End: 2018-12-14 | Stop reason: SDUPTHER

## 2018-12-14 RX ORDER — POTASSIUM CHLORIDE 7.45 MG/ML
10 INJECTION INTRAVENOUS PRN
Status: DISCONTINUED | OUTPATIENT
Start: 2018-12-14 | End: 2018-12-18 | Stop reason: HOSPADM

## 2018-12-14 RX ORDER — DEXTROSE, SODIUM CHLORIDE, AND POTASSIUM CHLORIDE 5; .45; .3 G/100ML; G/100ML; G/100ML
INJECTION INTRAVENOUS CONTINUOUS
Status: DISCONTINUED | OUTPATIENT
Start: 2018-12-14 | End: 2018-12-14

## 2018-12-14 RX ORDER — MIDAZOLAM HYDROCHLORIDE 1 MG/ML
INJECTION INTRAMUSCULAR; INTRAVENOUS PRN
Status: DISCONTINUED | OUTPATIENT
Start: 2018-12-14 | End: 2018-12-14 | Stop reason: SDUPTHER

## 2018-12-14 RX ORDER — MAGNESIUM SULFATE 1 G/100ML
1 INJECTION INTRAVENOUS PRN
Status: DISCONTINUED | OUTPATIENT
Start: 2018-12-14 | End: 2018-12-18 | Stop reason: HOSPADM

## 2018-12-14 RX ORDER — LIDOCAINE HYDROCHLORIDE 20 MG/ML
INJECTION, SOLUTION EPIDURAL; INFILTRATION; INTRACAUDAL; PERINEURAL PRN
Status: DISCONTINUED | OUTPATIENT
Start: 2018-12-14 | End: 2018-12-14 | Stop reason: SDUPTHER

## 2018-12-14 RX ORDER — POTASSIUM CHLORIDE 20 MEQ/1
40 TABLET, EXTENDED RELEASE ORAL PRN
Status: DISCONTINUED | OUTPATIENT
Start: 2018-12-14 | End: 2018-12-18 | Stop reason: HOSPADM

## 2018-12-14 RX ORDER — POTASSIUM CHLORIDE 1.5 G/1.77G
40 POWDER, FOR SOLUTION ORAL PRN
Status: DISCONTINUED | OUTPATIENT
Start: 2018-12-14 | End: 2018-12-18 | Stop reason: HOSPADM

## 2018-12-14 RX ORDER — MAGNESIUM SULFATE IN WATER 40 MG/ML
2 INJECTION, SOLUTION INTRAVENOUS ONCE
Status: COMPLETED | OUTPATIENT
Start: 2018-12-14 | End: 2018-12-14

## 2018-12-14 RX ORDER — ONDANSETRON 2 MG/ML
4 INJECTION INTRAMUSCULAR; INTRAVENOUS
Status: DISCONTINUED | OUTPATIENT
Start: 2018-12-14 | End: 2018-12-14

## 2018-12-14 RX ORDER — MEPERIDINE HYDROCHLORIDE 25 MG/ML
12.5 INJECTION INTRAMUSCULAR; INTRAVENOUS; SUBCUTANEOUS EVERY 5 MIN PRN
Status: DISCONTINUED | OUTPATIENT
Start: 2018-12-14 | End: 2018-12-14

## 2018-12-14 RX ORDER — PANTOPRAZOLE SODIUM 40 MG/1
40 TABLET, DELAYED RELEASE ORAL
Status: DISCONTINUED | OUTPATIENT
Start: 2018-12-14 | End: 2018-12-18 | Stop reason: HOSPADM

## 2018-12-14 RX ORDER — PROPOFOL 10 MG/ML
INJECTION, EMULSION INTRAVENOUS PRN
Status: DISCONTINUED | OUTPATIENT
Start: 2018-12-14 | End: 2018-12-14 | Stop reason: SDUPTHER

## 2018-12-14 RX ORDER — SODIUM CHLORIDE 9 MG/ML
INJECTION, SOLUTION INTRAVENOUS CONTINUOUS PRN
Status: DISCONTINUED | OUTPATIENT
Start: 2018-12-14 | End: 2018-12-14 | Stop reason: SDUPTHER

## 2018-12-14 RX ORDER — DEXTROSE, SODIUM CHLORIDE, AND POTASSIUM CHLORIDE 5; .45; .3 G/100ML; G/100ML; G/100ML
INJECTION INTRAVENOUS CONTINUOUS
Status: DISPENSED | OUTPATIENT
Start: 2018-12-14 | End: 2018-12-14

## 2018-12-14 RX ADMIN — PANTOPRAZOLE SODIUM 40 MG: 40 INJECTION, POWDER, FOR SOLUTION INTRAVENOUS at 09:38

## 2018-12-14 RX ADMIN — CIPROFLOXACIN 400 MG: 2 INJECTION, SOLUTION INTRAVENOUS at 04:39

## 2018-12-14 RX ADMIN — TIOTROPIUM BROMIDE 18 MCG: 18 CAPSULE ORAL; RESPIRATORY (INHALATION) at 08:49

## 2018-12-14 RX ADMIN — Medication 125 MG: at 16:51

## 2018-12-14 RX ADMIN — DULOXETINE HYDROCHLORIDE 30 MG: 30 CAPSULE, DELAYED RELEASE ORAL at 09:38

## 2018-12-14 RX ADMIN — SODIUM CHLORIDE 10 ML: 9 INJECTION INTRAMUSCULAR; INTRAVENOUS; SUBCUTANEOUS at 09:39

## 2018-12-14 RX ADMIN — LIDOCAINE HYDROCHLORIDE 20 MG: 20 INJECTION, SOLUTION EPIDURAL; INFILTRATION; INTRACAUDAL; PERINEURAL at 11:35

## 2018-12-14 RX ADMIN — MIDAZOLAM 1 MG: 1 INJECTION INTRAMUSCULAR; INTRAVENOUS at 11:35

## 2018-12-14 RX ADMIN — THIAMINE HYDROCHLORIDE 500 MG: 100 INJECTION, SOLUTION INTRAMUSCULAR; INTRAVENOUS at 23:09

## 2018-12-14 RX ADMIN — GUAIFENESIN 600 MG: 600 TABLET, EXTENDED RELEASE ORAL at 20:19

## 2018-12-14 RX ADMIN — MIDAZOLAM 1 MG: 1 INJECTION INTRAMUSCULAR; INTRAVENOUS at 11:45

## 2018-12-14 RX ADMIN — Medication 500 MCG: at 09:38

## 2018-12-14 RX ADMIN — THIAMINE HYDROCHLORIDE 500 MG: 100 INJECTION, SOLUTION INTRAMUSCULAR; INTRAVENOUS at 13:17

## 2018-12-14 RX ADMIN — SODIUM CHLORIDE: 9 INJECTION, SOLUTION INTRAVENOUS at 11:35

## 2018-12-14 RX ADMIN — METRONIDAZOLE 500 MG: 500 INJECTION, SOLUTION INTRAVENOUS at 04:39

## 2018-12-14 RX ADMIN — FOLIC ACID: 5 INJECTION, SOLUTION INTRAMUSCULAR; INTRAVENOUS; SUBCUTANEOUS at 10:06

## 2018-12-14 RX ADMIN — MAGNESIUM SULFATE HEPTAHYDRATE 2 G: 40 INJECTION, SOLUTION INTRAVENOUS at 14:43

## 2018-12-14 RX ADMIN — GUAIFENESIN 600 MG: 600 TABLET, EXTENDED RELEASE ORAL at 09:38

## 2018-12-14 RX ADMIN — THIAMINE HYDROCHLORIDE 500 MG: 100 INJECTION, SOLUTION INTRAMUSCULAR; INTRAVENOUS at 02:37

## 2018-12-14 RX ADMIN — IRON SUCROSE 200 MG: 20 INJECTION, SOLUTION INTRAVENOUS at 20:25

## 2018-12-14 RX ADMIN — POTASSIUM CHLORIDE, DEXTROSE MONOHYDRATE AND SODIUM CHLORIDE: 300; 5; 450 INJECTION, SOLUTION INTRAVENOUS at 13:08

## 2018-12-14 RX ADMIN — PYRIDOXINE HCL TAB 50 MG 100 MG: 50 TAB at 09:38

## 2018-12-14 RX ADMIN — LORAZEPAM 0.5 MG: 0.5 TABLET ORAL at 20:19

## 2018-12-14 RX ADMIN — POTASSIUM CHLORIDE 40 MEQ: 1.5 POWDER, FOR SOLUTION ORAL at 09:38

## 2018-12-14 RX ADMIN — PROPOFOL 50 MG: 10 INJECTION, EMULSION INTRAVENOUS at 11:35

## 2018-12-14 RX ADMIN — PANTOPRAZOLE SODIUM 40 MG: 40 TABLET, DELAYED RELEASE ORAL at 16:51

## 2018-12-14 RX ADMIN — PROPOFOL 100 MCG/KG/MIN: 10 INJECTION, EMULSION INTRAVENOUS at 11:35

## 2018-12-14 RX ADMIN — Medication 125 MG: at 13:18

## 2018-12-14 ASSESSMENT — PULMONARY FUNCTION TESTS
PIF_VALUE: 0

## 2018-12-14 ASSESSMENT — LIFESTYLE VARIABLES: SMOKING_STATUS: 1

## 2018-12-14 ASSESSMENT — PAIN SCALES - GENERAL
PAINLEVEL_OUTOF10: 6
PAINLEVEL_OUTOF10: 0
PAINLEVEL_OUTOF10: 0

## 2018-12-14 ASSESSMENT — COPD QUESTIONNAIRES: CAT_SEVERITY: MODERATE

## 2018-12-14 NOTE — PROGRESS NOTES
Pt. Unable to remain alert enough to swallow pills or drink bowel prep. Will page NP for NG to administer prep if unable to wake up more during shift.

## 2018-12-14 NOTE — PROGRESS NOTES
Pt arrived to pacu from endo asleep. VSS on monitor. Non rebreather switched to 4L nc. Pt breathes easy. Ab soft. Pt wakes resting quietly in bed.

## 2018-12-14 NOTE — ANESTHESIA PRE PROCEDURE
(including anesthesia, drug and allergy history). No interval changes to history and physical examination. Anesthetic plan, risks, benefits, alternatives, and personnel involved discussed with patient. Patient verbalized an understanding and agrees to proceed.       Reggie Crowe MD  December 14, 2018  11:23 AM        Reggie Crowe MD   12/14/2018

## 2018-12-14 NOTE — ANESTHESIA POSTPROCEDURE EVALUATION
Department of Anesthesiology  Postprocedure Note    Patient: León Bateman  MRN: [id number]  YOB: 1945  Date of evaluation: 12/14/2018  Time:  1:46 PM     Procedure Summary     Date:  12/14/18 Room / Location:  [facility] 04 / [facility] ENDOSCOPY    Anesthesia Start:  1126 Anesthesia Stop:  3617    Procedure:  COLONOSCOPY POLYPECTOMY SNARE/COLD BIOPSY (N/A ) Diagnosis:  (Hematemesis)    Surgeon:  Olya Hawkins MD Responsible Provider:  Tyler Perez MD    Anesthesia Type:  MAC, TIVA ASA Status:  3          Anesthesia Type: MAC, TIVA    Jo Ann Phase I: Jo Ann Score: 9    Jo Ann Phase II:      Last vitals: Reviewed and per EMR flowsheets.        Anesthesia Post Evaluation    Patient location during evaluation: PACU  Patient participation: complete - patient participated  Level of consciousness: awake and alert  Pain score: 0  Airway patency: patent  Nausea & Vomiting: no nausea and no vomiting  Complications: no  Cardiovascular status: blood pressure returned to baseline  Respiratory status: acceptable  Hydration status: euvolemic

## 2018-12-14 NOTE — PROGRESS NOTES
Intravenous 2 times per day    folic acid, thiamine, multi-vitamin with vitamin K infusion   Intravenous Daily    thiamine (VITAMIN B1) IVPB  500 mg Intravenous Q8H    sodium chloride (PF)  10 mL Intravenous 2 times per day     Continuous Infusions:   dextrose 5% and 0.45% NaCl with KCl 40 mEq 100 mL/hr at 12/14/18 1308     PRN Meds:potassium chloride **OR** potassium chloride **OR** potassium chloride, magnesium sulfate, LORazepam, ipratropium-albuterol, sodium chloride flush, LORazepam **OR** LORazepam **OR** LORazepam **OR** LORazepam **OR** LORazepam **OR** LORazepam **OR** LORazepam **OR** LORazepam, sodium chloride (PF), ondansetron  I/O last 3 completed shifts: In: 700 [I.V.:700]  Out: 1300 [Stool:1300]  No intake/output data recorded. Intake/Output Summary (Last 24 hours) at 12/14/18 1557  Last data filed at 12/14/18 1202   Gross per 24 hour   Intake              700 ml   Output             1300 ml   Net             -600 ml     CBC:   Recent Labs      12/12/18 2057 12/13/18 0512 12/13/18   1941  12/14/18   0129  12/14/18   0743   WBC  23.1*  14.3*   --    --    --   11.8*   HGB  9.4*  7.0*   < >  7.1*  7.4*  7.1*   PLT  370  273   --    --    --   263    < > = values in this interval not displayed. BMP:    Recent Labs      12/12/18 2057 12/13/18   0512 12/14/18   0743   NA  140  142  141   K  3.8  3.1*  2.9*   CL  89*  98*  101   CO2  17*  24  24   BUN  17  17  6*   CREATININE  0.7  0.6  <0.5*   GLUCOSE  107*  110*  87     Hepatic:   Recent Labs      12/12/18 2057 12/13/18   0512 12/14/18   0743   AST  319*  150*  59*   ALT  145*  89*  59*   BILITOT  0.4  0.3  <0.2   ALKPHOS  192*  132*  120     Troponin:   Recent Labs      12/12/18 2057   TROPONINI  <0.01     BNP: No results for input(s): BNP in the last 72 hours.   Lipids:   Recent Labs      12/13/18   0917   HDL  95*     ABGs: No results found for: PHART, PO2ART, HIX5GTL  INR: No results for input(s): INR in the last 72 hours.  -----------------------------------------------------------------  CT ABDOMEN PELVIS WO CONTRAST Additional Contrast? None   Final Result   Mural thickening involving the distal large bowel, compatible with   nonspecific colitis. Large parastomal hernia containing loops of both large and small bowel. No convincing evidence of obstruction is identified. Again, there are   several small bowel loops with mild gaseous dilation within the hernia sac,   but that is probably reactive ileus, as the proximal small bowel is not   dilated. Question subtle peripancreatic fat stranding at the pancreatic tail. Correlate with any clinical evidence of acute pancreatitis. Hepatic steatosis. Age-indeterminate compression fracture involving the   superior endplate of L1, probably subacute to chronic. XR CHEST PORTABLE   Final Result   1. No acute cardiopulmonary disease. Assessment/Plan:     Sepsis secondary due to C-diff collitis (tachycardia, leukocytosis, colitis on CT, patient with nausea/vomiting). - PO Vancomycin  - Stop IV flagyl/cipro  - GI consulted    Concern for Upper GI Bleed   Patient with microcytic anemia. Reported hematemesis/cofee ground emesis. Hx of PUD. No known cirrhosis. - Found to have small clean-based ulcer, with no active bleeding  - GI following  - PPI BID  - Trend H&H q6h, transfuse Hb <7  - Colonoscopy pending    Microcytic anemia  - As above    Suspected Mild Pancreatitis  CT findings with peripancreatic fat stranding at the tail which can be suggestive of pancreatitis. Also rising lipase (90-->151). Patient without obvious abdominal pain, but with nausea and vomiting. Likely alcohol induced. - GI following  - Monitor    Parastomal Hernia. Hx of Colonic Obstruction. S/p sigmoid colectomy with end colostomy 5/2018.  Dr. Cindy Montana plans for reversal after nutrition improved and stopped drinking/smoking.   - Several bowel loops with some dilation within the hernia sac on CT.  No obvious obstruction.  - No acute intervention per general surgery    Elevated liver enzymes  - Likely due to alcohol, down trending  - Monitor    Alcohol use disorder  - CIWA protocol  - Rally pack/ thiamine     COPD  - Stable, baseline SOB/cough  - Cont home anticholinergic   - Duoneb PRN    Tobbaco use disorder  - Cessation education    Parkinson's Disease  - Appears to have previous diagnosis  - Neurology consulted on admission, outpatient follow up    HTN  - Cont amlodopine    Hypokalemia  - Monitor and replace     Lactic acidosis  - resolved     FEN - DIET GENERAL;  PPx - PPI, SCD  CODE - DNR-CCA   Dispo - GMF    Pt seen and discussed w/ attending, MD Michelle Vann MD  Internal Medicine Resident, PGY-3  12/14/2018, 3:57 PM

## 2018-12-14 NOTE — OP NOTE
Colonoscopy Procedure Note      Patient: Jennie Carver  : 1945  Acct#:     Procedure: Colonoscopy with polypectomy (cold biopsy)    Date:  2018    Surgeon:  Maurice Ni MD    Referring Physician:  Kain Gimenez MD    Previous Colonoscopy: NO  Date: N/A  Greater than 3 years: N/A    Preoperative Diagnosis:  72 y. o. female with a PMH of EtOH abuse, Parkinson disease, Hypertension, Hiatal hernia, PUD, COPD, diverticulosis s/p colostomy (sounds like secondary to a colonic stricture, done in [place] in 2018) who presented 2018 with hematemesis. On admission her labs show a hemoglobin of 9.4, her LFTs show Alk Phos 132, ALT 89, , WBC 23.1, platelets 857. Patient had laceration to scalp with 6 staples, laceration to nose with 4 sutures on 18. Hgb dropped from 9.4 --> 7.0 on 18. EGD 18 showed a small duodenal ulcer and nonerosive gastritis. We are performing a colonoscopy today for further work-up of anemia. She has been found to be C. Dif positive and is on oral vancomycin. Postoperative Diagnosis:    1)  Scattered white plaques throughout the colon consistent with known C. Dif colitis. 2)  3mm cecal polyp. This was completely removed with biopsy polypectomy. There was no residual polyp or significant bleeding at the polypectomy site. Consent:  The patient or their legal guardian has signed a consent, and is aware of the potential risks, benefits, alternatives, and potential complications of this procedure. These include, but are not limited to hemorrhage, bleeding, post procedural pain, perforation, phlebitis, aspiration, hypotension, hypoxia, cardiovascular events such as arryhthmia, and possibly death. Additionally, the possibility of missed colonic polyps and interval colon cancer was discussed in the consent. Anesthesia:  Administered by monitored anesthesia care. Procedure:    An informed consent was obtained from the patient

## 2018-12-14 NOTE — PROGRESS NOTES
140 lb 10.5 oz (63.8 kg)   Height:           I/O last 3 completed shifts: In: 929 [P.O.:60; I.V.:669; IV Piggyback:200]  Out: 2436 [Urine:140; Stool:1390]    Physical Exam: ***  Gen: Resting in bed, NAD   HEENT: normocephalic, atraumatic. No scleral icterus. CV: RRR no MRG   Pul: CTAB   Abd: Good bowel sounds throughout, soft, NT/ND, no masses, no HSM   Ext: No edema   Neuro: No asterixis   Skin: No jaundice, spider angiomas, suarez erythema     LABS AND IMAGING     Recent Results (from the past 24 hour(s))   Lipid, Fasting    Collection Time: 12/13/18  9:17 AM   Result Value Ref Range    Cholesterol, Fasting 159 0 - 199 mg/dL    Triglyceride, Fasting 53 0 - 150 mg/dL    HDL 95 (H) 40 - 60 mg/dL    LDL Calculated 53 <100 mg/dL    VLDL Cholesterol Calculated 11 Not Established mg/dL   Vitamin B12 & Folate    Collection Time: 12/13/18  9:17 AM   Result Value Ref Range    Vitamin B-12 1028 (H) 211 - 911 pg/mL    Folate 6.25 4.78 - 24.20 ng/mL   Vitamin D 25 Hydroxy    Collection Time: 12/13/18  9:17 AM   Result Value Ref Range    Vit D, 25-Hydroxy 43.7 >=30 ng/mL   Hemoglobin A1C    Collection Time: 12/13/18  9:17 AM   Result Value Ref Range    Hemoglobin A1C 5.4 See comment %    eAG 108.3 mg/dL   GI Bacterial Pathogens By PCR    Collection Time: 12/13/18 10:42 AM   Result Value Ref Range    GI Bacterial Pathogens By PCR       No Shigella spp/EIEC DNA detected  No Shiga toxin-producing gene(s) detected  No Campylobacter spp. (jejuni and coli)DNA detected  No Salmonella spp.  DNA detected  Normal Range:  None detected     Urine Drug Screen    Collection Time: 12/13/18 10:43 AM   Result Value Ref Range    Amphetamine Screen, Urine Neg Negative <1000ng/mL    Barbiturate Screen, Ur Neg Negative <200 ng/mL    Benzodiazepine Screen, Urine Neg Negative <200 ng/mL    Cannabinoid Scrn, Ur Neg Negative <50 ng/mL    Cocaine Metabolite Screen, Urine Neg Negative <300 ng/mL    Opiate Scrn, Ur Neg Negative <300 ng/mL    PCP

## 2018-12-15 ENCOUNTER — APPOINTMENT (OUTPATIENT)
Dept: GENERAL RADIOLOGY | Age: 73
DRG: 871 | End: 2018-12-15
Payer: MEDICARE

## 2018-12-15 PROBLEM — A04.72 COLITIS DUE TO CLOSTRIDIUM DIFFICILE: Status: ACTIVE | Noted: 2018-12-15

## 2018-12-15 PROBLEM — K85.20 ALCOHOL-INDUCED ACUTE PANCREATITIS WITHOUT INFECTION OR NECROSIS: Status: ACTIVE | Noted: 2018-12-15

## 2018-12-15 PROBLEM — K26.4 GASTROINTESTINAL HEMORRHAGE ASSOCIATED WITH DUODENAL ULCER: Status: ACTIVE | Noted: 2018-12-15

## 2018-12-15 LAB
A/G RATIO: 1 (ref 1.1–2.2)
ALBUMIN SERPL-MCNC: 2.7 G/DL (ref 3.4–5)
ALP BLD-CCNC: 111 U/L (ref 40–129)
ALT SERPL-CCNC: 49 U/L (ref 10–40)
ANION GAP SERPL CALCULATED.3IONS-SCNC: 12 MMOL/L (ref 3–16)
AST SERPL-CCNC: 41 U/L (ref 15–37)
BASOPHILS ABSOLUTE: 0 K/UL (ref 0–0.2)
BASOPHILS RELATIVE PERCENT: 0.3 %
BILIRUB SERPL-MCNC: <0.2 MG/DL (ref 0–1)
BUN BLDV-MCNC: <2 MG/DL (ref 7–20)
CALCIUM SERPL-MCNC: 8.4 MG/DL (ref 8.3–10.6)
CHLORIDE BLD-SCNC: 97 MMOL/L (ref 99–110)
CO2: 29 MMOL/L (ref 21–32)
CREAT SERPL-MCNC: <0.5 MG/DL (ref 0.6–1.2)
EOSINOPHILS ABSOLUTE: 0.3 K/UL (ref 0–0.6)
EOSINOPHILS RELATIVE PERCENT: 3.2 %
GFR AFRICAN AMERICAN: >60
GFR NON-AFRICAN AMERICAN: >60
GLOBULIN: 2.8 G/DL
GLUCOSE BLD-MCNC: 106 MG/DL (ref 70–99)
HCT VFR BLD CALC: 23.7 % (ref 36–48)
HEMOGLOBIN: 7.2 G/DL (ref 12–16)
LYMPHOCYTES ABSOLUTE: 0.7 K/UL (ref 1–5.1)
LYMPHOCYTES RELATIVE PERCENT: 8.3 %
MCH RBC QN AUTO: 23.9 PG (ref 26–34)
MCHC RBC AUTO-ENTMCNC: 30.3 G/DL (ref 31–36)
MCV RBC AUTO: 78.9 FL (ref 80–100)
MONOCYTES ABSOLUTE: 0.6 K/UL (ref 0–1.3)
MONOCYTES RELATIVE PERCENT: 7.3 %
NEUTROPHILS ABSOLUTE: 7.1 K/UL (ref 1.7–7.7)
NEUTROPHILS RELATIVE PERCENT: 80.9 %
PDW BLD-RTO: 26.1 % (ref 12.4–15.4)
PLATELET # BLD: 301 K/UL (ref 135–450)
PMV BLD AUTO: 7.3 FL (ref 5–10.5)
POTASSIUM SERPL-SCNC: 3.2 MMOL/L (ref 3.5–5.1)
RBC # BLD: 3.01 M/UL (ref 4–5.2)
SODIUM BLD-SCNC: 138 MMOL/L (ref 136–145)
TOTAL PROTEIN: 5.5 G/DL (ref 6.4–8.2)
WBC # BLD: 8.7 K/UL (ref 4–11)

## 2018-12-15 PROCEDURE — 6370000000 HC RX 637 (ALT 250 FOR IP): Performed by: INTERNAL MEDICINE

## 2018-12-15 PROCEDURE — 94664 DEMO&/EVAL PT USE INHALER: CPT

## 2018-12-15 PROCEDURE — 71045 X-RAY EXAM CHEST 1 VIEW: CPT

## 2018-12-15 PROCEDURE — 6370000000 HC RX 637 (ALT 250 FOR IP): Performed by: HOSPITALIST

## 2018-12-15 PROCEDURE — 6360000002 HC RX W HCPCS: Performed by: INTERNAL MEDICINE

## 2018-12-15 PROCEDURE — 99232 SBSQ HOSP IP/OBS MODERATE 35: CPT | Performed by: SURGERY

## 2018-12-15 PROCEDURE — 94667 MNPJ CHEST WALL 1ST: CPT

## 2018-12-15 PROCEDURE — 85025 COMPLETE CBC W/AUTO DIFF WBC: CPT

## 2018-12-15 PROCEDURE — 2700000000 HC OXYGEN THERAPY PER DAY

## 2018-12-15 PROCEDURE — 2580000003 HC RX 258: Performed by: STUDENT IN AN ORGANIZED HEALTH CARE EDUCATION/TRAINING PROGRAM

## 2018-12-15 PROCEDURE — 6360000002 HC RX W HCPCS: Performed by: STUDENT IN AN ORGANIZED HEALTH CARE EDUCATION/TRAINING PROGRAM

## 2018-12-15 PROCEDURE — 2500000003 HC RX 250 WO HCPCS: Performed by: STUDENT IN AN ORGANIZED HEALTH CARE EDUCATION/TRAINING PROGRAM

## 2018-12-15 PROCEDURE — 2580000003 HC RX 258: Performed by: INTERNAL MEDICINE

## 2018-12-15 PROCEDURE — 80053 COMPREHEN METABOLIC PANEL: CPT

## 2018-12-15 PROCEDURE — 94760 N-INVAS EAR/PLS OXIMETRY 1: CPT

## 2018-12-15 PROCEDURE — 94640 AIRWAY INHALATION TREATMENT: CPT

## 2018-12-15 PROCEDURE — 36415 COLL VENOUS BLD VENIPUNCTURE: CPT

## 2018-12-15 PROCEDURE — 1200000000 HC SEMI PRIVATE

## 2018-12-15 RX ORDER — FUROSEMIDE 10 MG/ML
20 INJECTION INTRAMUSCULAR; INTRAVENOUS ONCE
Status: COMPLETED | OUTPATIENT
Start: 2018-12-15 | End: 2018-12-15

## 2018-12-15 RX ORDER — THIAMINE MONONITRATE (VIT B1) 100 MG
100 TABLET ORAL DAILY
Status: DISCONTINUED | OUTPATIENT
Start: 2018-12-15 | End: 2018-12-18 | Stop reason: HOSPADM

## 2018-12-15 RX ADMIN — FOLIC ACID: 5 INJECTION, SOLUTION INTRAMUSCULAR; INTRAVENOUS; SUBCUTANEOUS at 10:19

## 2018-12-15 RX ADMIN — Medication 500 MCG: at 10:00

## 2018-12-15 RX ADMIN — Medication 10 ML: at 20:05

## 2018-12-15 RX ADMIN — LORAZEPAM 0.5 MG: 0.5 TABLET ORAL at 20:04

## 2018-12-15 RX ADMIN — IRON SUCROSE 200 MG: 20 INJECTION, SOLUTION INTRAVENOUS at 14:23

## 2018-12-15 RX ADMIN — DULOXETINE HYDROCHLORIDE 30 MG: 30 CAPSULE, DELAYED RELEASE ORAL at 10:00

## 2018-12-15 RX ADMIN — TIOTROPIUM BROMIDE 18 MCG: 18 CAPSULE ORAL; RESPIRATORY (INHALATION) at 09:11

## 2018-12-15 RX ADMIN — THIAMINE HYDROCHLORIDE 500 MG: 100 INJECTION, SOLUTION INTRAMUSCULAR; INTRAVENOUS at 03:23

## 2018-12-15 RX ADMIN — PANTOPRAZOLE SODIUM 40 MG: 40 TABLET, DELAYED RELEASE ORAL at 16:33

## 2018-12-15 RX ADMIN — Medication 125 MG: at 11:56

## 2018-12-15 RX ADMIN — GUAIFENESIN 600 MG: 600 TABLET, EXTENDED RELEASE ORAL at 10:00

## 2018-12-15 RX ADMIN — Medication 10 ML: at 10:02

## 2018-12-15 RX ADMIN — Medication 100 MG: at 11:56

## 2018-12-15 RX ADMIN — Medication 125 MG: at 00:39

## 2018-12-15 RX ADMIN — GUAIFENESIN 600 MG: 600 TABLET, EXTENDED RELEASE ORAL at 20:04

## 2018-12-15 RX ADMIN — Medication 125 MG: at 06:36

## 2018-12-15 RX ADMIN — PYRIDOXINE HCL TAB 50 MG 100 MG: 50 TAB at 10:00

## 2018-12-15 RX ADMIN — Medication 125 MG: at 18:43

## 2018-12-15 RX ADMIN — Medication 10 ML: at 10:01

## 2018-12-15 RX ADMIN — PANTOPRAZOLE SODIUM 40 MG: 40 TABLET, DELAYED RELEASE ORAL at 06:36

## 2018-12-15 RX ADMIN — FUROSEMIDE 20 MG: 10 INJECTION, SOLUTION INTRAMUSCULAR; INTRAVENOUS at 11:56

## 2018-12-15 ASSESSMENT — PAIN SCALES - GENERAL
PAINLEVEL_OUTOF10: 0

## 2018-12-15 NOTE — PROGRESS NOTES
Gastroenterology Progress Note    Chitra Varela is a 72 y.o. female patient. Hospitalization Day:3    SUBJECTIVE:  NO complaints. ROS:  Gastrointestinal ROS: no abdominal pain, change in bowel habits, or black or bloody stools    Physical    VITALS:  BP (!) 145/67   Pulse 103   Temp 97.9 °F (36.6 °C)   Resp 18   Ht 5' 2\" (1.575 m)   Wt 139 lb 15.9 oz (63.5 kg)   SpO2 96%   BMI 25.60 kg/m²   TEMPERATURE:  Current - Temp: 97.9 °F (36.6 °C); Max - Temp  Av.8 °F (36.6 °C)  Min: 97.5 °F (36.4 °C)  Max: 98.2 °F (36.8 °C)    General:  Alert and oriented,  No apparent distress  Skin- without jaundice  Eyes: anicteric sclera  Cardiac: RRR, Nl s1s2, without murmurs  Lungs CTA Bilaterally, normal effort  Abdomen soft, ND, NT, no HSM, Bowel sounds normal  Ext: without edema  Neuro: no asterixis     Data    Data Review:    Recent Labs      18   0512   18   0129  18   0743  12/15/18   0529   WBC  14.3*   --    --   11.8*  8.7   HGB  7.0*   < >  7.4*  7.1*  7.2*   HCT  22.9*   < >  25.3*  23.8*  23.7*   MCV  78.2*   --    --   80.2  78.9*   PLT  273   --    --   263  301    < > = values in this interval not displayed. Recent Labs      18   0743  18   1717  12/15/18   0529   NA  141  135*  138   K  2.9*  3.5  3.2*   CL  101  97*  97*   CO2  24  26  29   PHOS   --   1.0*   --    BUN  6*  3*  <2*   CREATININE  <0.5*  <0.5*  <0.5*     Recent Labs      18   0512  18   0743  12/15/18   0529   AST  150*  59*  41*   ALT  89*  59*  49*   BILITOT  0.3  <0.2  <0.2   ALKPHOS  132*  120  111     Recent Labs      18   0512   LIPASE  90.0*  151.0*     Results for Ez Muller (MRN [id number]) as of 12/15/2018 11:38   Ref.  Range 2018 07:43   Ferritin Latest Ref Range: 15.0 - 150.0 ng/mL 37.1   Iron Latest Ref Range: 37 - 145 ug/dL 23 (L)   Iron Saturation Latest Ref Range: 15 - 50 % 10 (L)   TIBC Latest Ref Range: 260 - 445 ug/dL 241 (L)       No

## 2018-12-15 NOTE — PROGRESS NOTES
reactive ileus, as the proximal small bowel is not   dilated. Question subtle peripancreatic fat stranding at the pancreatic tail. Correlate with any clinical evidence of acute pancreatitis. Hepatic steatosis. Age-indeterminate compression fracture involving the   superior endplate of L1, probably subacute to chronic. XR CHEST PORTABLE   Final Result   1. No acute cardiopulmonary disease. CONSULTS:    IP CONSULT TO HOSPITALIST  IP CONSULT TO GI  IP CONSULT TO NEUROLOGY  IP CONSULT TO GENERAL SURGERY    ASSESSMENT AND PLAN:      Active Problems:    Essential hypertension    Alcohol abuse    Colostomy care ([facility] [address]    Colitis    Colitis due to Clostridium difficile    Alcohol-induced acute pancreatitis without infection or necrosis    Gastrointestinal hemorrhage associated with duodenal ulcer  Resolved Problems:    Parkinson's disease ([address]    72 y.o. female with possible medical history of colonic obstruction status post sigmoid colectomy with end colostomy, Parkinson's disease, essential hypertension, COPD, tobacco abuse, , anxiety/depression presented to emergency room with history of intractable vomiting and hematemesis. Of not the patient had a sigmoid stricture and underwent sigmoid colectomy with end colostomy in May/2018 in [place]. . She was seen by General surgery last month with plans to a colostomy reversal in 1 month after she had a   colonoscopy to rule out polyps or masses, but has not followed up since. .In the emergency room today, CT of the abdomen showed dural thickening involving the distal lungs:, Compatible with nonspecific colitis. Large parastomal hernia containing loops of both large and small bowel. No convincing evidence of obstruction. Questionable subtle peripancreatic fat stranding of the pancreatic tail r/o pancreatitis, and hepatic steatosis. She was found to have sinus tachycardia at rate of 122, leukocytosis of 23,000, lipase was 90. [place] She was admitted

## 2018-12-15 NOTE — PROGRESS NOTES
12/15/18   0529   WBC   --    --    < >   --   8.7   HGB   --    --    < >   --   7.2*   HCT   --    --    < >   --   23.7*   PLT   --    --    < >   --   301   NA   --    --    < >  135*  138   K   --    --    < >  3.5  3.2*   CL   --    --    < >  97*  97*   CO2   --    --    < >  26  29   BUN   --    --    < >  3*  <2*   CREATININE   --    --    < >  <0.5*  <0.5*   MG   --    --    < >  2.10   --    PHOS   --    --    --   1.0*   --    CALCIUM   --    --    < >  8.2*  8.4   AST   --    --    < >   --   41*   ALT   --    --    < >   --   49*   BILITOT   --    --    < >   --   <0.2   NITRU  Negative   --    --    --    --    COLORU  Yellow   --    --    --    --    BACTERIA   --   1+*   --    --    --     < > = values in this interval not displayed.        Ari Mireles MD

## 2018-12-16 LAB
A/G RATIO: 1.1 (ref 1.1–2.2)
ALBUMIN SERPL-MCNC: 3.1 G/DL (ref 3.4–5)
ALP BLD-CCNC: 117 U/L (ref 40–129)
ALT SERPL-CCNC: 42 U/L (ref 10–40)
ANION GAP SERPL CALCULATED.3IONS-SCNC: 14 MMOL/L (ref 3–16)
AST SERPL-CCNC: 32 U/L (ref 15–37)
BASOPHILS ABSOLUTE: 0.1 K/UL (ref 0–0.2)
BASOPHILS RELATIVE PERCENT: 0.8 %
BILIRUB SERPL-MCNC: <0.2 MG/DL (ref 0–1)
BUN BLDV-MCNC: <2 MG/DL (ref 7–20)
CALCIUM SERPL-MCNC: 8.9 MG/DL (ref 8.3–10.6)
CHLORIDE BLD-SCNC: 88 MMOL/L (ref 99–110)
CO2: 36 MMOL/L (ref 21–32)
CREAT SERPL-MCNC: <0.5 MG/DL (ref 0.6–1.2)
EOSINOPHILS ABSOLUTE: 0.2 K/UL (ref 0–0.6)
EOSINOPHILS RELATIVE PERCENT: 3.4 %
GFR AFRICAN AMERICAN: >60
GFR NON-AFRICAN AMERICAN: >60
GLOBULIN: 2.9 G/DL
GLUCOSE BLD-MCNC: 108 MG/DL (ref 70–99)
HCT VFR BLD CALC: 26.4 % (ref 36–48)
HEMOGLOBIN: 8.2 G/DL (ref 12–16)
LYMPHOCYTES ABSOLUTE: 1.2 K/UL (ref 1–5.1)
LYMPHOCYTES RELATIVE PERCENT: 17.3 %
MCH RBC QN AUTO: 24.2 PG (ref 26–34)
MCHC RBC AUTO-ENTMCNC: 31 G/DL (ref 31–36)
MCV RBC AUTO: 78.2 FL (ref 80–100)
MONOCYTES ABSOLUTE: 0.7 K/UL (ref 0–1.3)
MONOCYTES RELATIVE PERCENT: 10.5 %
NEUTROPHILS ABSOLUTE: 4.6 K/UL (ref 1.7–7.7)
NEUTROPHILS RELATIVE PERCENT: 68 %
PDW BLD-RTO: 26.9 % (ref 12.4–15.4)
PLATELET # BLD: 369 K/UL (ref 135–450)
PMV BLD AUTO: 7.2 FL (ref 5–10.5)
POTASSIUM SERPL-SCNC: 2.6 MMOL/L (ref 3.5–5.1)
RBC # BLD: 3.38 M/UL (ref 4–5.2)
SODIUM BLD-SCNC: 138 MMOL/L (ref 136–145)
TOTAL PROTEIN: 6 G/DL (ref 6.4–8.2)
WBC # BLD: 6.7 K/UL (ref 4–11)

## 2018-12-16 PROCEDURE — 80053 COMPREHEN METABOLIC PANEL: CPT

## 2018-12-16 PROCEDURE — 94667 MNPJ CHEST WALL 1ST: CPT

## 2018-12-16 PROCEDURE — 85025 COMPLETE CBC W/AUTO DIFF WBC: CPT

## 2018-12-16 PROCEDURE — 2580000003 HC RX 258: Performed by: STUDENT IN AN ORGANIZED HEALTH CARE EDUCATION/TRAINING PROGRAM

## 2018-12-16 PROCEDURE — 2700000000 HC OXYGEN THERAPY PER DAY

## 2018-12-16 PROCEDURE — 6370000000 HC RX 637 (ALT 250 FOR IP): Performed by: INTERNAL MEDICINE

## 2018-12-16 PROCEDURE — 6370000000 HC RX 637 (ALT 250 FOR IP): Performed by: HOSPITALIST

## 2018-12-16 PROCEDURE — 94640 AIRWAY INHALATION TREATMENT: CPT

## 2018-12-16 PROCEDURE — 2580000003 HC RX 258: Performed by: INTERNAL MEDICINE

## 2018-12-16 PROCEDURE — 6360000002 HC RX W HCPCS: Performed by: STUDENT IN AN ORGANIZED HEALTH CARE EDUCATION/TRAINING PROGRAM

## 2018-12-16 PROCEDURE — 6370000000 HC RX 637 (ALT 250 FOR IP): Performed by: STUDENT IN AN ORGANIZED HEALTH CARE EDUCATION/TRAINING PROGRAM

## 2018-12-16 PROCEDURE — 1200000000 HC SEMI PRIVATE

## 2018-12-16 PROCEDURE — 94760 N-INVAS EAR/PLS OXIMETRY 1: CPT

## 2018-12-16 PROCEDURE — 6360000002 HC RX W HCPCS: Performed by: INTERNAL MEDICINE

## 2018-12-16 RX ORDER — POTASSIUM CHLORIDE 20 MEQ/1
20 TABLET, EXTENDED RELEASE ORAL ONCE
Status: COMPLETED | OUTPATIENT
Start: 2018-12-16 | End: 2018-12-16

## 2018-12-16 RX ORDER — GUAIFENESIN/DEXTROMETHORPHAN 100-10MG/5
5 SYRUP ORAL EVERY 4 HOURS PRN
Status: DISCONTINUED | OUTPATIENT
Start: 2018-12-16 | End: 2018-12-18 | Stop reason: HOSPADM

## 2018-12-16 RX ORDER — ACETAMINOPHEN 325 MG/1
650 TABLET ORAL EVERY 4 HOURS PRN
Status: DISCONTINUED | OUTPATIENT
Start: 2018-12-16 | End: 2018-12-18 | Stop reason: HOSPADM

## 2018-12-16 RX ORDER — POTASSIUM CHLORIDE 750 MG/1
10 TABLET, FILM COATED, EXTENDED RELEASE ORAL ONCE
Status: COMPLETED | OUTPATIENT
Start: 2018-12-16 | End: 2018-12-16

## 2018-12-16 RX ORDER — LANOLIN ALCOHOL/MO/W.PET/CERES
100 CREAM (GRAM) TOPICAL DAILY
Qty: 30 TABLET | Refills: 3 | Status: SHIPPED | OUTPATIENT
Start: 2018-12-17

## 2018-12-16 RX ORDER — PANTOPRAZOLE SODIUM 40 MG/1
40 TABLET, DELAYED RELEASE ORAL
Qty: 60 TABLET | Refills: 0 | Status: ON HOLD | OUTPATIENT
Start: 2018-12-16 | End: 2019-01-11 | Stop reason: HOSPADM

## 2018-12-16 RX ORDER — GUAIFENESIN/DEXTROMETHORPHAN 100-10MG/5
5 SYRUP ORAL EVERY 4 HOURS PRN
Qty: 120 ML | Refills: 0 | Status: SHIPPED | OUTPATIENT
Start: 2018-12-16 | End: 2018-12-21

## 2018-12-16 RX ADMIN — GUAIFENESIN AND DEXTROMETHORPHAN 5 ML: 100; 10 SYRUP ORAL at 17:52

## 2018-12-16 RX ADMIN — Medication 10 ML: at 10:55

## 2018-12-16 RX ADMIN — ACETAMINOPHEN 650 MG: 325 TABLET ORAL at 10:54

## 2018-12-16 RX ADMIN — POTASSIUM CHLORIDE 20 MEQ: 20 TABLET, EXTENDED RELEASE ORAL at 09:30

## 2018-12-16 RX ADMIN — FOLIC ACID 1 MG: 1 TABLET ORAL at 09:34

## 2018-12-16 RX ADMIN — Medication 500 MCG: at 09:34

## 2018-12-16 RX ADMIN — IRON SUCROSE 200 MG: 20 INJECTION, SOLUTION INTRAVENOUS at 13:04

## 2018-12-16 RX ADMIN — POTASSIUM CHLORIDE 40 MEQ: 20 TABLET, EXTENDED RELEASE ORAL at 09:34

## 2018-12-16 RX ADMIN — POTASSIUM CHLORIDE 10 MEQ: 750 TABLET, EXTENDED RELEASE ORAL at 10:55

## 2018-12-16 RX ADMIN — Medication 10 ML: at 13:04

## 2018-12-16 RX ADMIN — Medication 125 MG: at 00:42

## 2018-12-16 RX ADMIN — PANTOPRAZOLE SODIUM 40 MG: 40 TABLET, DELAYED RELEASE ORAL at 16:55

## 2018-12-16 RX ADMIN — Medication 10 ML: at 09:35

## 2018-12-16 RX ADMIN — GUAIFENESIN AND DEXTROMETHORPHAN 5 ML: 100; 10 SYRUP ORAL at 10:54

## 2018-12-16 RX ADMIN — DULOXETINE HYDROCHLORIDE 30 MG: 30 CAPSULE, DELAYED RELEASE ORAL at 09:33

## 2018-12-16 RX ADMIN — PANTOPRAZOLE SODIUM 40 MG: 40 TABLET, DELAYED RELEASE ORAL at 09:34

## 2018-12-16 RX ADMIN — Medication 125 MG: at 16:55

## 2018-12-16 RX ADMIN — LORAZEPAM 0.5 MG: 0.5 TABLET ORAL at 22:06

## 2018-12-16 RX ADMIN — Medication 100 MG: at 09:33

## 2018-12-16 RX ADMIN — Medication 10 ML: at 22:17

## 2018-12-16 RX ADMIN — Medication 125 MG: at 13:04

## 2018-12-16 RX ADMIN — TIOTROPIUM BROMIDE 18 MCG: 18 CAPSULE ORAL; RESPIRATORY (INHALATION) at 09:17

## 2018-12-16 RX ADMIN — GUAIFENESIN 600 MG: 600 TABLET, EXTENDED RELEASE ORAL at 09:33

## 2018-12-16 RX ADMIN — PYRIDOXINE HCL TAB 50 MG 100 MG: 50 TAB at 09:34

## 2018-12-16 RX ADMIN — POTASSIUM CHLORIDE 10 MEQ: 7.46 INJECTION, SOLUTION INTRAVENOUS at 07:08

## 2018-12-16 RX ADMIN — Medication 125 MG: at 07:03

## 2018-12-16 RX ADMIN — ACETAMINOPHEN 650 MG: 325 TABLET ORAL at 22:02

## 2018-12-16 RX ADMIN — GUAIFENESIN 600 MG: 600 TABLET, EXTENDED RELEASE ORAL at 22:02

## 2018-12-16 ASSESSMENT — PAIN DESCRIPTION - FREQUENCY: FREQUENCY: CONTINUOUS

## 2018-12-16 ASSESSMENT — PAIN SCALES - GENERAL
PAINLEVEL_OUTOF10: 4
PAINLEVEL_OUTOF10: 6

## 2018-12-16 ASSESSMENT — PAIN DESCRIPTION - PAIN TYPE: TYPE: ACUTE PAIN

## 2018-12-16 ASSESSMENT — PAIN DESCRIPTION - DESCRIPTORS: DESCRIPTORS: ACHING

## 2018-12-16 ASSESSMENT — PAIN DESCRIPTION - LOCATION: LOCATION: GENERALIZED

## 2018-12-16 NOTE — PROGRESS NOTES
Progress Note  Admit Date: 2018      PCP: Lori Walls MD     CC: F/U for sepsis, AMS    SUBJECTIVE / Interval History:  Patient complains of generalized aching. Low  ostomy today   Has cough       Allergies  Fentanyl; Aspirin; and Morphine    Medications    Scheduled Meds:   thiamine  100 mg Oral Daily    vancomycin  125 mg Oral 4 times per day    iron sucrose  200 mg Intravenous Q24H    pantoprazole  40 mg Oral BID AC    guaiFENesin  600 mg Oral BID    vitamin B-12  500 mcg Oral Daily    nicotine  1 patch Transdermal Q24H    vitamin B-6  100 mg Oral Daily    DULoxetine  30 mg Oral Daily    tiotropium  18 mcg Inhalation Daily    folic acid  1 mg Oral Daily    sodium chloride flush  10 mL Intravenous 2 times per day    sodium chloride (PF)  10 mL Intravenous 2 times per day     Continuous Infusions:    PRN Meds:  acetaminophen, guaiFENesin-dextromethorphan, potassium chloride **OR** potassium chloride **OR** potassium chloride, magnesium sulfate, LORazepam, ipratropium-albuterol, sodium chloride flush, LORazepam **OR** LORazepam **OR** LORazepam **OR** LORazepam **OR** LORazepam **OR** LORazepam **OR** LORazepam **OR** LORazepam, sodium chloride (PF), ondansetron    Vitals    TEMPERATURE:  Current - Temp: 97.9 °F (36.6 °C); Max - Temp  Av.2 °F (36.8 °C)  Min: 97.9 °F (36.6 °C)  Max: 98.8 °F (37.1 °C)  RESPIRATIONS RANGE: Resp  Av.6  Min: 16  Max: 20  PULSE RANGE: Pulse  Av.6  Min: 88  Max: 106  BLOOD PRESSURE RANGE:  Systolic (41CLT), LELO:406 , Min:112 , RAY:328   ; Diastolic (71XVR), CGW:17, Min:62, Max:78    PULSE OXIMETRY RANGE: SpO2  Av %  Min: 94 %  Max: 97 %  24HR INTAKE/OUTPUT:      Intake/Output Summary (Last 24 hours) at 18 1205  Last data filed at 18 1101   Gross per 24 hour   Intake             1320 ml   Output              500 ml   Net              820 ml       Exam:    Gen: No distress. Ill looking   Eyes: PERRL. No sclera icterus.  No conjunctival injection. ENT: No discharge. Pharynx clear. External appearance of ears and nose normal.  Neck: Trachea midline. No obvious mass. Resp: No accessory muscle use. No crackles. No wheezes. Few Basal  rhonchi. No dullness on percussion. CV: Regular rate. Regular rhythm. No murmur or rub. No edema. GI: Non-tender. Non-distended. No hernia. Ostomy +   Skin: Warm, dry, normal texture and turgor. No nodule on exposed extremities. Lymph: No cervical LAD. No supraclavicular LAD. M/S: No cyanosis. No clubbing. No joint deformity. Neuro: Moves all four extremities. CN 2-12 tested, no defect noted. involnatry movement of tongue and hands   Psych: Oriented x 3. No anxiety. Awake. Alert. Data    LABS  CBC:   Recent Labs      12/14/18   0743  12/15/18   0529  12/16/18   0552   WBC  11.8*  8.7  6.7   HGB  7.1*  7.2*  8.2*   HCT  23.8*  23.7*  26.4*   MCV  80.2  78.9*  78.2*   PLT  263  301  369     BMP:   Recent Labs      12/14/18   1717  12/15/18   0529  12/16/18   0552   NA  135*  138  138   K  3.5  3.2*  2.6*   CL  97*  97*  88*   CO2  26  29  36*   PHOS  1.0*   --    --    BUN  3*  <2*  <2*   CREATININE  <0.5*  <0.5*  <0.5*     POC GLUCOSE:  No results for input(s): POCGLU in the last 72 hours. LIVER PROFILE:   Recent Labs      12/14/18   0743  12/14/18   1717  12/15/18   0529  12/16/18   0552   AST  59*   --   41*  32   ALT  59*   --   49*  42*   LABALBU  2.9*  3.0*  2.7*  3.1*   BILITOT  <0.2   --   <0.2  <0.2   ALKPHOS  120   --   111  117     PT/INR: No results for input(s): PROTIME, INR in the last 72 hours. APTT: No results for input(s): APTT in the last 72 hours. UA:  No results for input(s): NITRITE, COLORU, PHUR, LABCAST, WBCUA, RBCUA, MUCUS, TRICHOMONAS, YEAST, BACTERIA, CLARITYU, SPECGRAV, LEUKOCYTESUR, UROBILINOGEN, BILIRUBINUR, BLOODU, GLUCOSEU, KETUA, AMORPHOUS in the last 72 hours.   Microbiology:  Wound Culture:   Recent Labs      12/13/18   1220   ORG  C. difficile toxin B gene

## 2018-12-17 LAB
A/G RATIO: 0.8 (ref 1.1–2.2)
ALBUMIN SERPL-MCNC: 2.8 G/DL (ref 3.4–5)
ALP BLD-CCNC: 110 U/L (ref 40–129)
ALT SERPL-CCNC: 34 U/L (ref 10–40)
ANION GAP SERPL CALCULATED.3IONS-SCNC: 14 MMOL/L (ref 3–16)
AST SERPL-CCNC: 32 U/L (ref 15–37)
BASOPHILS ABSOLUTE: 0.2 K/UL (ref 0–0.2)
BASOPHILS RELATIVE PERCENT: 3.4 %
BILIRUB SERPL-MCNC: <0.2 MG/DL (ref 0–1)
BLOOD CULTURE, ROUTINE: NORMAL
BUN BLDV-MCNC: 3 MG/DL (ref 7–20)
CALCIUM SERPL-MCNC: 9.6 MG/DL (ref 8.3–10.6)
CHLORIDE BLD-SCNC: 93 MMOL/L (ref 99–110)
CO2: 30 MMOL/L (ref 21–32)
CREAT SERPL-MCNC: <0.5 MG/DL (ref 0.6–1.2)
EOSINOPHILS ABSOLUTE: 0.2 K/UL (ref 0–0.6)
EOSINOPHILS RELATIVE PERCENT: 3.6 %
GFR AFRICAN AMERICAN: >60
GFR NON-AFRICAN AMERICAN: >60
GLOBULIN: 3.5 G/DL
GLUCOSE BLD-MCNC: 108 MG/DL (ref 70–99)
HCT VFR BLD CALC: 27.5 % (ref 36–48)
HEMOGLOBIN: 8.8 G/DL (ref 12–16)
LYMPHOCYTES ABSOLUTE: 1.3 K/UL (ref 1–5.1)
LYMPHOCYTES RELATIVE PERCENT: 19.7 %
MCH RBC QN AUTO: 25 PG (ref 26–34)
MCHC RBC AUTO-ENTMCNC: 31.8 G/DL (ref 31–36)
MCV RBC AUTO: 78.6 FL (ref 80–100)
MONOCYTES ABSOLUTE: 0.9 K/UL (ref 0–1.3)
MONOCYTES RELATIVE PERCENT: 12.7 %
NEUTROPHILS ABSOLUTE: 4.1 K/UL (ref 1.7–7.7)
NEUTROPHILS RELATIVE PERCENT: 60.6 %
PDW BLD-RTO: 28.2 % (ref 12.4–15.4)
PLATELET # BLD: 442 K/UL (ref 135–450)
PMV BLD AUTO: 7.1 FL (ref 5–10.5)
POTASSIUM SERPL-SCNC: 3.3 MMOL/L (ref 3.5–5.1)
RBC # BLD: 3.5 M/UL (ref 4–5.2)
SODIUM BLD-SCNC: 137 MMOL/L (ref 136–145)
TOTAL PROTEIN: 6.3 G/DL (ref 6.4–8.2)
WBC # BLD: 6.7 K/UL (ref 4–11)

## 2018-12-17 PROCEDURE — 6370000000 HC RX 637 (ALT 250 FOR IP): Performed by: STUDENT IN AN ORGANIZED HEALTH CARE EDUCATION/TRAINING PROGRAM

## 2018-12-17 PROCEDURE — 2580000003 HC RX 258: Performed by: INTERNAL MEDICINE

## 2018-12-17 PROCEDURE — 94667 MNPJ CHEST WALL 1ST: CPT

## 2018-12-17 PROCEDURE — 6370000000 HC RX 637 (ALT 250 FOR IP): Performed by: INTERNAL MEDICINE

## 2018-12-17 PROCEDURE — 6360000002 HC RX W HCPCS: Performed by: PHYSICIAN ASSISTANT

## 2018-12-17 PROCEDURE — 94664 DEMO&/EVAL PT USE INHALER: CPT

## 2018-12-17 PROCEDURE — 97535 SELF CARE MNGMENT TRAINING: CPT

## 2018-12-17 PROCEDURE — 97530 THERAPEUTIC ACTIVITIES: CPT

## 2018-12-17 PROCEDURE — 6370000000 HC RX 637 (ALT 250 FOR IP): Performed by: HOSPITALIST

## 2018-12-17 PROCEDURE — 97116 GAIT TRAINING THERAPY: CPT | Performed by: PHYSICAL THERAPIST

## 2018-12-17 PROCEDURE — 94640 AIRWAY INHALATION TREATMENT: CPT

## 2018-12-17 PROCEDURE — 1200000000 HC SEMI PRIVATE

## 2018-12-17 PROCEDURE — 36415 COLL VENOUS BLD VENIPUNCTURE: CPT

## 2018-12-17 PROCEDURE — 2500000003 HC RX 250 WO HCPCS: Performed by: INTERNAL MEDICINE

## 2018-12-17 PROCEDURE — 85025 COMPLETE CBC W/AUTO DIFF WBC: CPT

## 2018-12-17 PROCEDURE — 2700000000 HC OXYGEN THERAPY PER DAY

## 2018-12-17 PROCEDURE — 97530 THERAPEUTIC ACTIVITIES: CPT | Performed by: PHYSICAL THERAPIST

## 2018-12-17 PROCEDURE — 94668 MNPJ CHEST WALL SBSQ: CPT

## 2018-12-17 PROCEDURE — 94760 N-INVAS EAR/PLS OXIMETRY 1: CPT

## 2018-12-17 PROCEDURE — 80053 COMPREHEN METABOLIC PANEL: CPT

## 2018-12-17 RX ORDER — FERROUS SULFATE TAB EC 324 MG (65 MG FE EQUIVALENT) 324 (65 FE) MG
324 TABLET DELAYED RESPONSE ORAL 2 TIMES DAILY WITH MEALS
Status: DISCONTINUED | OUTPATIENT
Start: 2018-12-17 | End: 2018-12-18 | Stop reason: HOSPADM

## 2018-12-17 RX ADMIN — FOLIC ACID 1 MG: 1 TABLET ORAL at 08:41

## 2018-12-17 RX ADMIN — LORAZEPAM 0.5 MG: 0.5 TABLET ORAL at 20:32

## 2018-12-17 RX ADMIN — GUAIFENESIN 600 MG: 600 TABLET, EXTENDED RELEASE ORAL at 20:23

## 2018-12-17 RX ADMIN — Medication 10 ML: at 20:23

## 2018-12-17 RX ADMIN — Medication 125 MG: at 00:32

## 2018-12-17 RX ADMIN — Medication 10 ML: at 08:54

## 2018-12-17 RX ADMIN — Medication 125 MG: at 11:30

## 2018-12-17 RX ADMIN — DULOXETINE HYDROCHLORIDE 30 MG: 30 CAPSULE, DELAYED RELEASE ORAL at 08:42

## 2018-12-17 RX ADMIN — GUAIFENESIN AND DEXTROMETHORPHAN 5 ML: 100; 10 SYRUP ORAL at 11:29

## 2018-12-17 RX ADMIN — Medication 125 MG: at 17:44

## 2018-12-17 RX ADMIN — PANTOPRAZOLE SODIUM 40 MG: 40 TABLET, DELAYED RELEASE ORAL at 08:42

## 2018-12-17 RX ADMIN — ONDANSETRON 4 MG: 2 INJECTION INTRAMUSCULAR; INTRAVENOUS at 09:41

## 2018-12-17 RX ADMIN — FERROUS SULFATE TAB EC 324 MG (65 MG FE EQUIVALENT) 324 MG: 324 (65 FE) TABLET DELAYED RESPONSE at 17:43

## 2018-12-17 RX ADMIN — PYRIDOXINE HCL TAB 50 MG 100 MG: 50 TAB at 08:41

## 2018-12-17 RX ADMIN — GUAIFENESIN AND DEXTROMETHORPHAN 5 ML: 100; 10 SYRUP ORAL at 17:44

## 2018-12-17 RX ADMIN — Medication 500 MCG: at 08:42

## 2018-12-17 RX ADMIN — Medication 100 MG: at 08:41

## 2018-12-17 RX ADMIN — ACETAMINOPHEN 650 MG: 325 TABLET ORAL at 22:35

## 2018-12-17 RX ADMIN — PANTOPRAZOLE SODIUM 40 MG: 40 TABLET, DELAYED RELEASE ORAL at 17:43

## 2018-12-17 RX ADMIN — GUAIFENESIN 600 MG: 600 TABLET, EXTENDED RELEASE ORAL at 08:42

## 2018-12-17 RX ADMIN — POTASSIUM CHLORIDE 40 MEQ: 20 TABLET, EXTENDED RELEASE ORAL at 08:41

## 2018-12-17 RX ADMIN — MICONAZOLE NITRATE: 20 POWDER TOPICAL at 20:23

## 2018-12-17 RX ADMIN — TIOTROPIUM BROMIDE 18 MCG: 18 CAPSULE ORAL; RESPIRATORY (INHALATION) at 09:25

## 2018-12-17 RX ADMIN — Medication 125 MG: at 05:58

## 2018-12-17 ASSESSMENT — PAIN SCALES - GENERAL
PAINLEVEL_OUTOF10: 0
PAINLEVEL_OUTOF10: 4
PAINLEVEL_OUTOF10: 0
PAINLEVEL_OUTOF10: 0
PAINLEVEL_OUTOF10: 2

## 2018-12-17 ASSESSMENT — PAIN DESCRIPTION - LOCATION: LOCATION: GENERALIZED

## 2018-12-17 ASSESSMENT — PAIN DESCRIPTION - PAIN TYPE: TYPE: ACUTE PAIN

## 2018-12-17 NOTE — PROGRESS NOTES
Occupational Therapy  Facility/Department: [facility]  Daily Treatment Note  NAME: Emma Liriano  : 1945  MRN: [id number]    Date of Service: 2018    Discharge Recommendations:Karyn Doherty scored a 13/24 on the AM-PAC ADL Inpatient form. Current research shows that an AM-PAC score of 17 or less is typically not associated with a discharge to the patient's home setting. Based on the patients AM-PAC score and their current ADL deficits, it is recommended that the patient have 3-5 sessions per week of Occupational Therapy at d/c to increase the patients independence. 3-5 sessions per week  OT Equipment Recommendations  Other: TBD     Patient Diagnosis(es): The primary encounter diagnosis was Sepsis, due to unspecified organism [facility]). Diagnoses of Acute generalized abdominal pain, Acute colitis, Acute pancreatitis, unspecified complication status, unspecified pancreatitis type, SOB (shortness of breath), Nausea and vomiting in adult, Generalized weakness, and Metabolic acidosis were also pertinent to this visit. has a past medical history of Colostomy in place [facility]); COPD (chronic obstructive pulmonary disease) ([facility] [address] Depression; DANIELLE (generalized anxiety disorder); Gastric ulcer, unspecified as acute or chronic, without mention of hemorrhage, perforation, or obstruction; Hiatal hernia; Hypertension; Parkinson's disease ([facility] [address] and Tobacco abuse.   has a past surgical history that includes Hysterectomy; Cholecystectomy; Tubal ligation; colostomy (2018); Upper gastrointestinal endoscopy (2018); Upper gastrointestinal endoscopy (N/A, 2018); Colonoscopy (2018); and Colonoscopy (N/A, 2018).     Restrictions  Restrictions/Precautions  Restrictions/Precautions: Fall Risk  Position Activity Restriction  Other position/activity restrictions: colostomy  Subjective   General  Chart Reviewed: Yes  Patient assessed for rehabilitation services?: Yes  Additional Pertinent Hx: Patient is a 72 y.o. female who presents d/t UTI, colitis, and possible pancreatitis and possible Parkinson's disease although not formally diagnosed. Family / Caregiver Present: Yes ( )  Referring Practitioner: Thai Harrell MD  Subjective  Subjective: Patient presents in bed and agreeable to OT/PT txt with min encouragement. Pain Assessment  Patient Currently in Pain: Denies  Vital Signs  Patient Currently in Pain: Denies   Orientation  Orientation  Overall Orientation Status: Within Functional Limits  Objective    ADL  Grooming: Maximum assistance (assist with combing hair in recliner d/t pt presenting with knots in hair ; assist with no rinse shampoo cap )  UE Bathing: Dependent/Total (sponge bath in bed )  LE Bathing: Dependent/Total (sponge bath in bed )  UE Dressing: Setup (doff/don gown )  LE Dressing: Dependent/Total (assist for donning socks )  Toileting: Dependent/Total (colostomy )        Balance  Sitting Balance: Stand by assistance  Standing Balance: Minimal assistance (x2 with HHA)  Standing Balance  Sit to stand: Minimal assistance (/CGA )  Stand to sit: Minimal assistance (/CGA )  Functional Mobility  Functional - Mobility Device: Other (HHA)  Activity: Other  Assist Level: Minimal assistance (x2)  Functional Mobility Comments: Patient completed fxl mobility from EOB > recliner of 5 ft with min Ax 2 followed by recliner > hallway door > recliner with HHA min A x 2. No LOB however unbalanced and reports some dizziness during session. Bed mobility  Rolling to Right: Stand by assistance  Supine to Sit: Moderate assistance;Minimal assistance  Scooting: Minimal assistance  Transfers  Sit to stand: Minimal assistance (/CGA )  Stand to sit: Minimal assistance (/CGA )  Transfer Comments: EOB > recliner ; recliner <> recliner          Cognition  Overall Cognitive Status: Exceptions  Arousal/Alertness: Appropriate responses to stimuli  Following Commands:  Follows one step

## 2018-12-18 VITALS
RESPIRATION RATE: 16 BRPM | BODY MASS INDEX: 24.34 KG/M2 | OXYGEN SATURATION: 94 % | HEART RATE: 89 BPM | TEMPERATURE: 97.8 F | WEIGHT: 132.28 LBS | DIASTOLIC BLOOD PRESSURE: 65 MMHG | HEIGHT: 62 IN | SYSTOLIC BLOOD PRESSURE: 135 MMHG

## 2018-12-18 LAB
A/G RATIO: 0.9 (ref 1.1–2.2)
ALBUMIN SERPL-MCNC: 3.1 G/DL (ref 3.4–5)
ALP BLD-CCNC: 105 U/L (ref 40–129)
ALT SERPL-CCNC: 27 U/L (ref 10–40)
ANION GAP SERPL CALCULATED.3IONS-SCNC: 12 MMOL/L (ref 3–16)
ANISOCYTOSIS: ABNORMAL
AST SERPL-CCNC: 24 U/L (ref 15–37)
BANDED NEUTROPHILS RELATIVE PERCENT: 2 % (ref 0–7)
BASOPHILS ABSOLUTE: 0 K/UL (ref 0–0.2)
BASOPHILS RELATIVE PERCENT: 0 %
BILIRUB SERPL-MCNC: <0.2 MG/DL (ref 0–1)
BUN BLDV-MCNC: 5 MG/DL (ref 7–20)
CALCIUM SERPL-MCNC: 9.8 MG/DL (ref 8.3–10.6)
CHLORIDE BLD-SCNC: 93 MMOL/L (ref 99–110)
CO2: 32 MMOL/L (ref 21–32)
CREAT SERPL-MCNC: <0.5 MG/DL (ref 0.6–1.2)
CULTURE, BLOOD 2: NORMAL
EOSINOPHILS ABSOLUTE: 0.2 K/UL (ref 0–0.6)
EOSINOPHILS RELATIVE PERCENT: 3 %
GFR AFRICAN AMERICAN: >60
GFR NON-AFRICAN AMERICAN: >60
GLOBULIN: 3.3 G/DL
GLUCOSE BLD-MCNC: 101 MG/DL (ref 70–99)
HCT VFR BLD CALC: 29.5 % (ref 36–48)
HEMOGLOBIN: 9.3 G/DL (ref 12–16)
HYPOCHROMIA: ABNORMAL
LYMPHOCYTES ABSOLUTE: 1.1 K/UL (ref 1–5.1)
LYMPHOCYTES RELATIVE PERCENT: 15 %
MAGNESIUM: 1.6 MG/DL (ref 1.8–2.4)
MCH RBC QN AUTO: 25.1 PG (ref 26–34)
MCHC RBC AUTO-ENTMCNC: 31.4 G/DL (ref 31–36)
MCV RBC AUTO: 80.1 FL (ref 80–100)
MICROCYTES: ABNORMAL
MONOCYTES ABSOLUTE: 0.8 K/UL (ref 0–1.3)
MONOCYTES RELATIVE PERCENT: 12 %
MYELOCYTE PERCENT: 5 %
NEUTROPHILS ABSOLUTE: 4.9 K/UL (ref 1.7–7.7)
NEUTROPHILS RELATIVE PERCENT: 63 %
OVALOCYTES: ABNORMAL
PDW BLD-RTO: 28.4 % (ref 12.4–15.4)
PLATELET # BLD: 526 K/UL (ref 135–450)
PLATELET SLIDE REVIEW: ABNORMAL
PMV BLD AUTO: 7.2 FL (ref 5–10.5)
POIKILOCYTES: ABNORMAL
POLYCHROMASIA: ABNORMAL
POTASSIUM SERPL-SCNC: 3.4 MMOL/L (ref 3.5–5.1)
RBC # BLD: 3.68 M/UL (ref 4–5.2)
SLIDE REVIEW: ABNORMAL
SODIUM BLD-SCNC: 137 MMOL/L (ref 136–145)
TARGET CELLS: ABNORMAL
TOTAL PROTEIN: 6.4 G/DL (ref 6.4–8.2)
WBC # BLD: 7 K/UL (ref 4–11)

## 2018-12-18 PROCEDURE — 6370000000 HC RX 637 (ALT 250 FOR IP): Performed by: INTERNAL MEDICINE

## 2018-12-18 PROCEDURE — 6370000000 HC RX 637 (ALT 250 FOR IP): Performed by: STUDENT IN AN ORGANIZED HEALTH CARE EDUCATION/TRAINING PROGRAM

## 2018-12-18 PROCEDURE — 83735 ASSAY OF MAGNESIUM: CPT

## 2018-12-18 PROCEDURE — 85025 COMPLETE CBC W/AUTO DIFF WBC: CPT

## 2018-12-18 PROCEDURE — 6370000000 HC RX 637 (ALT 250 FOR IP): Performed by: HOSPITALIST

## 2018-12-18 PROCEDURE — 94664 DEMO&/EVAL PT USE INHALER: CPT

## 2018-12-18 PROCEDURE — 80053 COMPREHEN METABOLIC PANEL: CPT

## 2018-12-18 PROCEDURE — 94640 AIRWAY INHALATION TREATMENT: CPT

## 2018-12-18 PROCEDURE — 94667 MNPJ CHEST WALL 1ST: CPT

## 2018-12-18 PROCEDURE — 94760 N-INVAS EAR/PLS OXIMETRY 1: CPT

## 2018-12-18 RX ORDER — PROPRANOLOL HYDROCHLORIDE 20 MG/1
10 TABLET ORAL 3 TIMES DAILY
Status: DISCONTINUED | OUTPATIENT
Start: 2018-12-18 | End: 2018-12-18 | Stop reason: HOSPADM

## 2018-12-18 RX ADMIN — DULOXETINE HYDROCHLORIDE 30 MG: 30 CAPSULE, DELAYED RELEASE ORAL at 09:12

## 2018-12-18 RX ADMIN — Medication 500 MCG: at 09:12

## 2018-12-18 RX ADMIN — Medication 100 MG: at 09:12

## 2018-12-18 RX ADMIN — GUAIFENESIN 600 MG: 600 TABLET, EXTENDED RELEASE ORAL at 09:11

## 2018-12-18 RX ADMIN — FOLIC ACID 1 MG: 1 TABLET ORAL at 09:12

## 2018-12-18 RX ADMIN — PYRIDOXINE HCL TAB 50 MG 100 MG: 50 TAB at 09:11

## 2018-12-18 RX ADMIN — POTASSIUM CHLORIDE 40 MEQ: 20 TABLET, EXTENDED RELEASE ORAL at 05:55

## 2018-12-18 RX ADMIN — Medication 125 MG: at 05:56

## 2018-12-18 RX ADMIN — FERROUS SULFATE TAB EC 324 MG (65 MG FE EQUIVALENT) 324 MG: 324 (65 FE) TABLET DELAYED RESPONSE at 09:11

## 2018-12-18 RX ADMIN — MICONAZOLE NITRATE: 20 POWDER TOPICAL at 09:16

## 2018-12-18 RX ADMIN — Medication 125 MG: at 13:09

## 2018-12-18 RX ADMIN — PROPRANOLOL HYDROCHLORIDE 10 MG: 20 TABLET ORAL at 13:09

## 2018-12-18 RX ADMIN — Medication 125 MG: at 00:30

## 2018-12-18 RX ADMIN — PANTOPRAZOLE SODIUM 40 MG: 40 TABLET, DELAYED RELEASE ORAL at 05:55

## 2018-12-18 RX ADMIN — TIOTROPIUM BROMIDE 18 MCG: 18 CAPSULE ORAL; RESPIRATORY (INHALATION) at 08:55

## 2018-12-18 NOTE — DISCHARGE SUMMARY
Large parastomal hernia containing loops of both large and small bowel.  No convincing evidence of obstruction.  Questionable subtle peripancreatic fat stranding of the pancreatic tail r/o pancreatitis, and hepatic steatosis. She was found to have sinus tachycardia at rate of 122, leukocytosis of 23,000, lipase was 90. Deepa [place] She was admitted with sepsis, colitis, possible GI bleed, microcytic anemia, mild pancreatitis, elevated liver enzymes and alcohol abuse disorder. Workup revealed her sepsis was secondary to C. difficile colitis and patient was started on by mouth vancomycin. She underwent a EGD which was negative for any GI bleed, but showed small ulcer  and a colonoscopy showed findings consistent with colitis. She does have a large parastomal hernia and surgery was consulted the planus outpatient repair once patient quits drinking and smoking. Patient is also an alcoholic and was started on CIWA.         Sepsis secondary due to C-diff colitis  - white count trending down      C-diff collitis   - PO Vancomycin day 3  - Stop IV flagyl/cipro        Concern  For upper GI Bleed -possible sec to bleeding from ulcer, no bleeding found on EGD  Patient with microcytic anemia. Reported hematemesis/cofee ground emesis. Hx of PUD. No known cirrhosis. - Found to have small clean-based ulcer, with no active bleeding  - GI following  - PPI BID  , transfuse Hb <7  - Colonoscopy findings suggestive of C. difficile colitis-      Microcytic anemia  - As above     Fluid overload- resolved   clinically  - CXR reviewed shows no abnormality         Suspected Mild Pancreatitis- resolved , resume diet  CT findings with peripancreatic fat stranding at the tail which can be suggestive of pancreatitis. Also rising lipase (90-->151). Patient without obvious abdominal pain, but with nausea and vomiting. Likely alcohol induced. - GI following  - Monitor     Parastomal Hernia- out pt FU    Hx of Colonic Obstruction.  S/p sigmoid colectomy with
hernia sac on CT. No obvious obstruction.  - No acute intervention per general surgery     Elevated liver enzymes  - Likely due to alcohol, down trending  - Monitor       Alcohol use disorder  - [facility] protocol  - MVI       COPD  - Stable, baseline SOB/cough  - Cont home meds       [employer]obb[employer] use disorder  - Cessation education       Parkinson's Disease  - Appears to have previous diagnosis  - Neurology consulted on admission, outpatient follow up       HTN  - BP stable       Hypokalemia  - Monitor and replace        Lactic acidosis  - resolved        Hypo  magnesium/hypokalemia  - replace and monitor      Consults:     IP CONSULT TO GI  IP CONSULT TO NEUROLOGY  IP CONSULT TO GENERAL SURGERY        Disposition: SNF    Discharged Condition: Stable    Code Status: Prior    Activity: activity as tolerated    Diet: regular diet          Labs:  For convenience and continuity at follow-up the following most recent labs are provided:    CBC:   Lab Results   Component Value Date    WBC 7.0 12/18/2018    HGB 9.3 12/18/2018    HCT 29.5 12/18/2018     12/18/2018       RENAL:   Lab Results   Component Value Date     12/18/2018    K 3.4 12/18/2018    K 3.8 12/12/2018    CL 93 12/18/2018    CO2 32 12/18/2018    BUN 5 12/18/2018    CREATININE <0.5 12/18/2018           Discharge Medications:    Josue Cummins   [place] Medication Instructions [id number]    Printed on:12/18/18 2090   Medication Information                      albuterol sulfate HFA (PROAIR HFA) 108 (90 Base) MCG/ACT inhaler  Inhale 2 puffs into the lungs every 6 hours as needed for Wheezing             DULoxetine (CYMBALTA) 30 MG extended release capsule  Take 1 capsule by mouth daily             ferrous sulfate 325 (65 Fe) MG tablet  Take 325 mg by mouth daily (with breakfast)             folic acid (FOLVITE) 1 MG tablet  Take 1 mg by mouth daily             guaiFENesin (MUCINEX) 600 MG extended release tablet  Take 1 tablet by mouth 2 times daily
hernia sac on CT. No obvious obstruction.  - No acute intervention per general surgery     Elevated liver enzymes  - Likely due to alcohol, down trending  - Monitor       Alcohol use disorder  - [facility] protocol  - MVI       COPD  - Stable, baseline SOB/cough  - Cont home meds       [employer] use disorder  - Cessation education       Parkinson's Disease  - Appears to have previous diagnosis  - Neurology consulted on admission, outpatient follow up       HTN  - BP stable       Hypokalemia  - Monitor and replace        Lactic acidosis  - resolved        Hypo  magnesium/hypokalemia  - replace and monitor      Consults:     IP CONSULT TO HOSPITALIST  IP CONSULT TO GI  IP CONSULT TO NEUROLOGY  IP CONSULT TO GENERAL SURGERY        Disposition: SNF    Discharged Condition: Stable    Code Status: DNR-CCA    Activity: activity as tolerated    Diet: regular diet          Labs:  For convenience and continuity at follow-up the following most recent labs are provided:    CBC:   Lab Results   Component Value Date    WBC 6.7 12/17/2018    HGB 8.8 12/17/2018    HCT 27.5 12/17/2018     12/17/2018       RENAL:   Lab Results   Component Value Date     12/17/2018    K 3.3 12/17/2018    K 3.8 12/12/2018    CL 93 12/17/2018    CO2 30 12/17/2018    BUN 3 12/17/2018    CREATININE <0.5 12/17/2018           Discharge Medications:    [place]   [place] Medication Instructions [id number]    Printed on:12/17/18 1311   Medication Information                      albuterol sulfate HFA (PROAIR HFA) 108 (90 Base) MCG/ACT inhaler  Inhale 2 puffs into the lungs every 6 hours as needed for Wheezing             DULoxetine (CYMBALTA) 30 MG extended release capsule  Take 1 capsule by mouth daily             ferrous sulfate 325 (65 Fe) MG tablet  Take 325 mg by mouth daily (with breakfast)             folic acid (FOLVITE) 1 MG tablet  Take 1 mg by mouth daily             guaiFENesin (MUCINEX) 600 MG extended release tablet  Take 1 tablet

## 2019-01-08 ENCOUNTER — APPOINTMENT (OUTPATIENT)
Dept: CT IMAGING | Age: 74
DRG: 389 | End: 2019-01-08
Payer: COMMERCIAL

## 2019-01-08 ENCOUNTER — APPOINTMENT (OUTPATIENT)
Dept: GENERAL RADIOLOGY | Age: 74
DRG: 389 | End: 2019-01-08
Payer: COMMERCIAL

## 2019-01-08 ENCOUNTER — HOSPITAL ENCOUNTER (INPATIENT)
Age: 74
LOS: 3 days | Discharge: NURSING, REHAB OR LONG-TERM CARE | DRG: 389 | End: 2019-01-11
Attending: EMERGENCY MEDICINE | Admitting: FAMILY MEDICINE
Payer: COMMERCIAL

## 2019-01-08 DIAGNOSIS — K56.600 PARTIAL SMALL BOWEL OBSTRUCTION (HCC): Primary | ICD-10-CM

## 2019-01-08 DIAGNOSIS — E86.0 DEHYDRATION: ICD-10-CM

## 2019-01-08 DIAGNOSIS — R10.9 ABDOMINAL PAIN, UNSPECIFIED ABDOMINAL LOCATION: ICD-10-CM

## 2019-01-08 PROBLEM — K56.609 SBO (SMALL BOWEL OBSTRUCTION) (HCC): Status: ACTIVE | Noted: 2019-01-08

## 2019-01-08 LAB
ALBUMIN SERPL-MCNC: 4.1 G/DL (ref 3.4–5)
ALP BLD-CCNC: 173 U/L (ref 40–129)
ALT SERPL-CCNC: 30 U/L (ref 10–40)
ANION GAP SERPL CALCULATED.3IONS-SCNC: 18 MMOL/L (ref 3–16)
AST SERPL-CCNC: 38 U/L (ref 15–37)
BACTERIA: ABNORMAL /HPF
BASOPHILS ABSOLUTE: 0.1 K/UL (ref 0–0.2)
BASOPHILS RELATIVE PERCENT: 0.7 %
BILIRUB SERPL-MCNC: 0.5 MG/DL (ref 0–1)
BILIRUBIN DIRECT: <0.2 MG/DL (ref 0–0.3)
BILIRUBIN URINE: NEGATIVE
BILIRUBIN, INDIRECT: ABNORMAL MG/DL (ref 0–1)
BLOOD, URINE: ABNORMAL
BUN BLDV-MCNC: 11 MG/DL (ref 7–20)
CALCIUM SERPL-MCNC: 9.3 MG/DL (ref 8.3–10.6)
CHLORIDE BLD-SCNC: 100 MMOL/L (ref 99–110)
CLARITY: ABNORMAL
CO2: 25 MMOL/L (ref 21–32)
COLOR: YELLOW
COMMENT UA: ABNORMAL
CREAT SERPL-MCNC: 0.5 MG/DL (ref 0.6–1.2)
EOSINOPHILS ABSOLUTE: 0.1 K/UL (ref 0–0.6)
EOSINOPHILS RELATIVE PERCENT: 0.8 %
EPITHELIAL CELLS, UA: 3 /HPF (ref 0–5)
ETHANOL: NORMAL MG/DL (ref 0–0.08)
GFR AFRICAN AMERICAN: >60
GFR NON-AFRICAN AMERICAN: >60
GLUCOSE BLD-MCNC: 126 MG/DL (ref 70–99)
GLUCOSE URINE: NEGATIVE MG/DL
HCT VFR BLD CALC: 39.9 % (ref 36–48)
HEMOGLOBIN: 12.6 G/DL (ref 12–16)
HYALINE CASTS: 9 /LPF (ref 0–8)
KETONES, URINE: ABNORMAL MG/DL
LACTIC ACID: 1.6 MMOL/L (ref 0.4–2)
LEUKOCYTE ESTERASE, URINE: ABNORMAL
LIPASE: 38 U/L (ref 13–60)
LYMPHOCYTES ABSOLUTE: 1.4 K/UL (ref 1–5.1)
LYMPHOCYTES RELATIVE PERCENT: 10.8 %
MCH RBC QN AUTO: 27.4 PG (ref 26–34)
MCHC RBC AUTO-ENTMCNC: 31.7 G/DL (ref 31–36)
MCV RBC AUTO: 86.5 FL (ref 80–100)
MICROSCOPIC EXAMINATION: YES
MONOCYTES ABSOLUTE: 0.4 K/UL (ref 0–1.3)
MONOCYTES RELATIVE PERCENT: 2.8 %
NEUTROPHILS ABSOLUTE: 10.9 K/UL (ref 1.7–7.7)
NEUTROPHILS RELATIVE PERCENT: 84.9 %
NITRITE, URINE: NEGATIVE
PDW BLD-RTO: 28.2 % (ref 12.4–15.4)
PH UA: 5.5
PLATELET # BLD: 388 K/UL (ref 135–450)
PMV BLD AUTO: 7.8 FL (ref 5–10.5)
POTASSIUM REFLEX MAGNESIUM: 3.8 MMOL/L (ref 3.5–5.1)
PROCALCITONIN: 0.05 NG/ML (ref 0–0.15)
PROTEIN UA: ABNORMAL MG/DL
RBC # BLD: 4.61 M/UL (ref 4–5.2)
RBC UA: 4 /HPF (ref 0–4)
REASON FOR REJECTION: NORMAL
REJECTED TEST: NORMAL
SODIUM BLD-SCNC: 143 MMOL/L (ref 136–145)
SPECIFIC GRAVITY UA: >1.03
TOTAL PROTEIN: 7.4 G/DL (ref 6.4–8.2)
URINE REFLEX TO CULTURE: YES
URINE TYPE: ABNORMAL
UROBILINOGEN, URINE: 0.2 E.U./DL
WBC # BLD: 12.9 K/UL (ref 4–11)
WBC UA: 49 /HPF (ref 0–5)
YEAST: PRESENT /HPF

## 2019-01-08 PROCEDURE — 6370000000 HC RX 637 (ALT 250 FOR IP): Performed by: FAMILY MEDICINE

## 2019-01-08 PROCEDURE — 85025 COMPLETE CBC W/AUTO DIFF WBC: CPT

## 2019-01-08 PROCEDURE — 96361 HYDRATE IV INFUSION ADD-ON: CPT

## 2019-01-08 PROCEDURE — 87086 URINE CULTURE/COLONY COUNT: CPT

## 2019-01-08 PROCEDURE — 96374 THER/PROPH/DIAG INJ IV PUSH: CPT

## 2019-01-08 PROCEDURE — 2580000003 HC RX 258: Performed by: EMERGENCY MEDICINE

## 2019-01-08 PROCEDURE — 80048 BASIC METABOLIC PNL TOTAL CA: CPT

## 2019-01-08 PROCEDURE — 80076 HEPATIC FUNCTION PANEL: CPT

## 2019-01-08 PROCEDURE — 6360000002 HC RX W HCPCS: Performed by: EMERGENCY MEDICINE

## 2019-01-08 PROCEDURE — 2500000003 HC RX 250 WO HCPCS

## 2019-01-08 PROCEDURE — 84145 PROCALCITONIN (PCT): CPT

## 2019-01-08 PROCEDURE — 71045 X-RAY EXAM CHEST 1 VIEW: CPT

## 2019-01-08 PROCEDURE — 81001 URINALYSIS AUTO W/SCOPE: CPT

## 2019-01-08 PROCEDURE — 83690 ASSAY OF LIPASE: CPT

## 2019-01-08 PROCEDURE — G0480 DRUG TEST DEF 1-7 CLASSES: HCPCS

## 2019-01-08 PROCEDURE — 83605 ASSAY OF LACTIC ACID: CPT

## 2019-01-08 PROCEDURE — 1200000000 HC SEMI PRIVATE

## 2019-01-08 PROCEDURE — 96375 TX/PRO/DX INJ NEW DRUG ADDON: CPT

## 2019-01-08 PROCEDURE — 99222 1ST HOSP IP/OBS MODERATE 55: CPT | Performed by: SURGERY

## 2019-01-08 PROCEDURE — 74177 CT ABD & PELVIS W/CONTRAST: CPT

## 2019-01-08 PROCEDURE — C9113 INJ PANTOPRAZOLE SODIUM, VIA: HCPCS | Performed by: FAMILY MEDICINE

## 2019-01-08 PROCEDURE — 6360000002 HC RX W HCPCS: Performed by: FAMILY MEDICINE

## 2019-01-08 PROCEDURE — 2580000003 HC RX 258: Performed by: FAMILY MEDICINE

## 2019-01-08 PROCEDURE — 6360000004 HC RX CONTRAST MEDICATION: Performed by: EMERGENCY MEDICINE

## 2019-01-08 PROCEDURE — 99285 EMERGENCY DEPT VISIT HI MDM: CPT

## 2019-01-08 RX ORDER — LORAZEPAM 1 MG/1
1 TABLET ORAL
Status: DISCONTINUED | OUTPATIENT
Start: 2019-01-08 | End: 2019-01-11 | Stop reason: HOSPADM

## 2019-01-08 RX ORDER — 0.9 % SODIUM CHLORIDE 0.9 %
1000 INTRAVENOUS SOLUTION INTRAVENOUS ONCE
Status: COMPLETED | OUTPATIENT
Start: 2019-01-08 | End: 2019-01-08

## 2019-01-08 RX ORDER — PANTOPRAZOLE SODIUM 40 MG/10ML
40 INJECTION, POWDER, LYOPHILIZED, FOR SOLUTION INTRAVENOUS DAILY
Status: DISCONTINUED | OUTPATIENT
Start: 2019-01-08 | End: 2019-01-10

## 2019-01-08 RX ORDER — SODIUM CHLORIDE 0.9 % (FLUSH) 0.9 %
10 SYRINGE (ML) INJECTION PRN
Status: DISCONTINUED | OUTPATIENT
Start: 2019-01-08 | End: 2019-01-08

## 2019-01-08 RX ORDER — SODIUM CHLORIDE 0.9 % (FLUSH) 0.9 %
10 SYRINGE (ML) INJECTION PRN
Status: DISCONTINUED | OUTPATIENT
Start: 2019-01-08 | End: 2019-01-11 | Stop reason: HOSPADM

## 2019-01-08 RX ORDER — LORAZEPAM 2 MG/ML
2 INJECTION INTRAMUSCULAR
Status: DISCONTINUED | OUTPATIENT
Start: 2019-01-08 | End: 2019-01-11 | Stop reason: HOSPADM

## 2019-01-08 RX ORDER — LORAZEPAM 1 MG/1
4 TABLET ORAL
Status: DISCONTINUED | OUTPATIENT
Start: 2019-01-08 | End: 2019-01-11 | Stop reason: HOSPADM

## 2019-01-08 RX ORDER — NICOTINE 21 MG/24HR
1 PATCH, TRANSDERMAL 24 HOURS TRANSDERMAL ONCE
Status: DISCONTINUED | OUTPATIENT
Start: 2019-01-08 | End: 2019-01-08 | Stop reason: DRUGHIGH

## 2019-01-08 RX ORDER — CHLORDIAZEPOXIDE HYDROCHLORIDE 5 MG/1
5 CAPSULE, GELATIN COATED ORAL 2 TIMES DAILY
Status: DISCONTINUED | OUTPATIENT
Start: 2019-01-08 | End: 2019-01-11 | Stop reason: HOSPADM

## 2019-01-08 RX ORDER — ALBUTEROL SULFATE 90 UG/1
2 AEROSOL, METERED RESPIRATORY (INHALATION) EVERY 6 HOURS PRN
Status: DISCONTINUED | OUTPATIENT
Start: 2019-01-08 | End: 2019-01-11 | Stop reason: HOSPADM

## 2019-01-08 RX ORDER — LORAZEPAM 1 MG/1
3 TABLET ORAL
Status: DISCONTINUED | OUTPATIENT
Start: 2019-01-08 | End: 2019-01-11 | Stop reason: HOSPADM

## 2019-01-08 RX ORDER — SODIUM CHLORIDE 0.9 % (FLUSH) 0.9 %
10 SYRINGE (ML) INJECTION EVERY 12 HOURS SCHEDULED
Status: DISCONTINUED | OUTPATIENT
Start: 2019-01-08 | End: 2019-01-08

## 2019-01-08 RX ORDER — LABETALOL HYDROCHLORIDE 5 MG/ML
INJECTION, SOLUTION INTRAVENOUS
Status: COMPLETED
Start: 2019-01-08 | End: 2019-01-08

## 2019-01-08 RX ORDER — ONDANSETRON 2 MG/ML
4 INJECTION INTRAMUSCULAR; INTRAVENOUS ONCE
Status: COMPLETED | OUTPATIENT
Start: 2019-01-08 | End: 2019-01-08

## 2019-01-08 RX ORDER — SODIUM CHLORIDE 0.9 % (FLUSH) 0.9 %
10 SYRINGE (ML) INJECTION EVERY 12 HOURS SCHEDULED
Status: DISCONTINUED | OUTPATIENT
Start: 2019-01-08 | End: 2019-01-11 | Stop reason: HOSPADM

## 2019-01-08 RX ORDER — ONDANSETRON 2 MG/ML
4 INJECTION INTRAMUSCULAR; INTRAVENOUS EVERY 6 HOURS PRN
Status: DISCONTINUED | OUTPATIENT
Start: 2019-01-08 | End: 2019-01-11 | Stop reason: HOSPADM

## 2019-01-08 RX ORDER — NICOTINE 21 MG/24HR
1 PATCH, TRANSDERMAL 24 HOURS TRANSDERMAL EVERY 24 HOURS
Status: DISCONTINUED | OUTPATIENT
Start: 2019-01-08 | End: 2019-01-11 | Stop reason: HOSPADM

## 2019-01-08 RX ORDER — LABETALOL HYDROCHLORIDE 5 MG/ML
5 INJECTION, SOLUTION INTRAVENOUS ONCE
Status: COMPLETED | OUTPATIENT
Start: 2019-01-08 | End: 2019-01-08

## 2019-01-08 RX ORDER — LORAZEPAM 2 MG/ML
3 INJECTION INTRAMUSCULAR
Status: DISCONTINUED | OUTPATIENT
Start: 2019-01-08 | End: 2019-01-11 | Stop reason: HOSPADM

## 2019-01-08 RX ORDER — THIAMINE HYDROCHLORIDE 100 MG/ML
100 INJECTION, SOLUTION INTRAMUSCULAR; INTRAVENOUS DAILY
Status: DISCONTINUED | OUTPATIENT
Start: 2019-01-08 | End: 2019-01-09

## 2019-01-08 RX ORDER — LORAZEPAM 1 MG/1
2 TABLET ORAL
Status: DISCONTINUED | OUTPATIENT
Start: 2019-01-08 | End: 2019-01-11 | Stop reason: HOSPADM

## 2019-01-08 RX ORDER — LORAZEPAM 2 MG/ML
1 INJECTION INTRAMUSCULAR
Status: DISCONTINUED | OUTPATIENT
Start: 2019-01-08 | End: 2019-01-11 | Stop reason: HOSPADM

## 2019-01-08 RX ORDER — 0.9 % SODIUM CHLORIDE 0.9 %
10 VIAL (ML) INJECTION DAILY
Status: DISCONTINUED | OUTPATIENT
Start: 2019-01-08 | End: 2019-01-10

## 2019-01-08 RX ORDER — SODIUM CHLORIDE 9 MG/ML
INJECTION, SOLUTION INTRAVENOUS CONTINUOUS
Status: DISCONTINUED | OUTPATIENT
Start: 2019-01-08 | End: 2019-01-11

## 2019-01-08 RX ORDER — LORAZEPAM 2 MG/ML
4 INJECTION INTRAMUSCULAR
Status: DISCONTINUED | OUTPATIENT
Start: 2019-01-08 | End: 2019-01-11 | Stop reason: HOSPADM

## 2019-01-08 RX ADMIN — SODIUM CHLORIDE 1000 ML: 9 INJECTION, SOLUTION INTRAVENOUS at 12:26

## 2019-01-08 RX ADMIN — ONDANSETRON 4 MG: 2 INJECTION INTRAMUSCULAR; INTRAVENOUS at 17:45

## 2019-01-08 RX ADMIN — PANTOPRAZOLE SODIUM 40 MG: 40 INJECTION, POWDER, FOR SOLUTION INTRAVENOUS at 17:45

## 2019-01-08 RX ADMIN — IOPAMIDOL 75 ML: 755 INJECTION, SOLUTION INTRAVENOUS at 13:15

## 2019-01-08 RX ADMIN — CHLORDIAZEPOXIDE HYDROCHLORIDE 5 MG: 5 CAPSULE ORAL at 22:42

## 2019-01-08 RX ADMIN — LABETALOL HYDROCHLORIDE 5 MG: 5 INJECTION, SOLUTION INTRAVENOUS at 16:42

## 2019-01-08 RX ADMIN — SODIUM CHLORIDE: 9 INJECTION, SOLUTION INTRAVENOUS at 17:45

## 2019-01-08 RX ADMIN — ONDANSETRON 4 MG: 2 INJECTION INTRAMUSCULAR; INTRAVENOUS at 12:26

## 2019-01-08 RX ADMIN — Medication 10 ML: at 17:49

## 2019-01-08 ASSESSMENT — ENCOUNTER SYMPTOMS
APNEA: 0
EYE PAIN: 0
CONSTIPATION: 1
NAUSEA: 1
ABDOMINAL DISTENTION: 0
PHOTOPHOBIA: 0
WHEEZING: 0
CHEST TIGHTNESS: 0
ABDOMINAL PAIN: 1
CHOKING: 0
EYE ITCHING: 0
SHORTNESS OF BREATH: 0
VOMITING: 1
COUGH: 0
EYE REDNESS: 0
EYE DISCHARGE: 0
STRIDOR: 0

## 2019-01-08 ASSESSMENT — PAIN SCALES - GENERAL
PAINLEVEL_OUTOF10: 0
PAINLEVEL_OUTOF10: 7
PAINLEVEL_OUTOF10: 0

## 2019-01-08 ASSESSMENT — PAIN DESCRIPTION - PAIN TYPE: TYPE: CHRONIC PAIN

## 2019-01-08 ASSESSMENT — PAIN DESCRIPTION - FREQUENCY: FREQUENCY: CONTINUOUS

## 2019-01-08 ASSESSMENT — PAIN DESCRIPTION - DESCRIPTORS: DESCRIPTORS: ACHING

## 2019-01-08 ASSESSMENT — PAIN SCALES - WONG BAKER: WONGBAKER_NUMERICALRESPONSE: 8

## 2019-01-09 ENCOUNTER — APPOINTMENT (OUTPATIENT)
Dept: GENERAL RADIOLOGY | Age: 74
DRG: 389 | End: 2019-01-09
Payer: COMMERCIAL

## 2019-01-09 LAB
ANION GAP SERPL CALCULATED.3IONS-SCNC: 11 MMOL/L (ref 3–16)
BUN BLDV-MCNC: 8 MG/DL (ref 7–20)
C DIFFICILE TOXIN, EIA: NORMAL
CALCIUM SERPL-MCNC: 7.6 MG/DL (ref 8.3–10.6)
CHLORIDE BLD-SCNC: 105 MMOL/L (ref 99–110)
CO2: 26 MMOL/L (ref 21–32)
CREAT SERPL-MCNC: <0.5 MG/DL (ref 0.6–1.2)
GFR AFRICAN AMERICAN: >60
GFR NON-AFRICAN AMERICAN: >60
GLUCOSE BLD-MCNC: 91 MG/DL (ref 70–99)
HCT VFR BLD CALC: 33.5 % (ref 36–48)
HEMOGLOBIN: 10.5 G/DL (ref 12–16)
MCH RBC QN AUTO: 27.8 PG (ref 26–34)
MCHC RBC AUTO-ENTMCNC: 31.5 G/DL (ref 31–36)
MCV RBC AUTO: 88.4 FL (ref 80–100)
PDW BLD-RTO: 28.1 % (ref 12.4–15.4)
PLATELET # BLD: 259 K/UL (ref 135–450)
PMV BLD AUTO: 7.7 FL (ref 5–10.5)
POTASSIUM REFLEX MAGNESIUM: 3.8 MMOL/L (ref 3.5–5.1)
RBC # BLD: 3.79 M/UL (ref 4–5.2)
SODIUM BLD-SCNC: 142 MMOL/L (ref 136–145)
URINE CULTURE, ROUTINE: NORMAL
WBC # BLD: 9.2 K/UL (ref 4–11)

## 2019-01-09 PROCEDURE — 97166 OT EVAL MOD COMPLEX 45 MIN: CPT

## 2019-01-09 PROCEDURE — 1200000000 HC SEMI PRIVATE

## 2019-01-09 PROCEDURE — 87493 C DIFF AMPLIFIED PROBE: CPT

## 2019-01-09 PROCEDURE — 2700000000 HC OXYGEN THERAPY PER DAY

## 2019-01-09 PROCEDURE — 99232 SBSQ HOSP IP/OBS MODERATE 35: CPT | Performed by: SURGERY

## 2019-01-09 PROCEDURE — 74019 RADEX ABDOMEN 2 VIEWS: CPT

## 2019-01-09 PROCEDURE — APPNB30 APP NON BILLABLE TIME 0-30 MINS: Performed by: PHYSICIAN ASSISTANT

## 2019-01-09 PROCEDURE — 6360000002 HC RX W HCPCS: Performed by: FAMILY MEDICINE

## 2019-01-09 PROCEDURE — 6370000000 HC RX 637 (ALT 250 FOR IP): Performed by: FAMILY MEDICINE

## 2019-01-09 PROCEDURE — 94640 AIRWAY INHALATION TREATMENT: CPT

## 2019-01-09 PROCEDURE — 85027 COMPLETE CBC AUTOMATED: CPT

## 2019-01-09 PROCEDURE — APPSS30 APP SPLIT SHARED TIME 16-30 MINUTES: Performed by: PHYSICIAN ASSISTANT

## 2019-01-09 PROCEDURE — G8987 SELF CARE CURRENT STATUS: HCPCS

## 2019-01-09 PROCEDURE — G8978 MOBILITY CURRENT STATUS: HCPCS

## 2019-01-09 PROCEDURE — 36415 COLL VENOUS BLD VENIPUNCTURE: CPT

## 2019-01-09 PROCEDURE — 97530 THERAPEUTIC ACTIVITIES: CPT

## 2019-01-09 PROCEDURE — 87324 CLOSTRIDIUM AG IA: CPT

## 2019-01-09 PROCEDURE — 94664 DEMO&/EVAL PT USE INHALER: CPT

## 2019-01-09 PROCEDURE — 80048 BASIC METABOLIC PNL TOTAL CA: CPT

## 2019-01-09 PROCEDURE — 87449 NOS EACH ORGANISM AG IA: CPT

## 2019-01-09 PROCEDURE — 6370000000 HC RX 637 (ALT 250 FOR IP): Performed by: INTERNAL MEDICINE

## 2019-01-09 PROCEDURE — G8979 MOBILITY GOAL STATUS: HCPCS

## 2019-01-09 PROCEDURE — 2580000003 HC RX 258: Performed by: FAMILY MEDICINE

## 2019-01-09 PROCEDURE — 97162 PT EVAL MOD COMPLEX 30 MIN: CPT

## 2019-01-09 PROCEDURE — 94760 N-INVAS EAR/PLS OXIMETRY 1: CPT

## 2019-01-09 PROCEDURE — C9113 INJ PANTOPRAZOLE SODIUM, VIA: HCPCS | Performed by: FAMILY MEDICINE

## 2019-01-09 PROCEDURE — G8988 SELF CARE GOAL STATUS: HCPCS

## 2019-01-09 PROCEDURE — 6360000002 HC RX W HCPCS: Performed by: SURGERY

## 2019-01-09 PROCEDURE — 97535 SELF CARE MNGMENT TRAINING: CPT

## 2019-01-09 PROCEDURE — 97116 GAIT TRAINING THERAPY: CPT

## 2019-01-09 PROCEDURE — 6370000000 HC RX 637 (ALT 250 FOR IP): Performed by: SURGERY

## 2019-01-09 RX ORDER — DOCUSATE SODIUM 100 MG/1
100 CAPSULE, LIQUID FILLED ORAL 2 TIMES DAILY
Status: DISCONTINUED | OUTPATIENT
Start: 2019-01-09 | End: 2019-01-11 | Stop reason: HOSPADM

## 2019-01-09 RX ORDER — CIPROFLOXACIN 2 MG/ML
400 INJECTION, SOLUTION INTRAVENOUS EVERY 12 HOURS
Status: DISCONTINUED | OUTPATIENT
Start: 2019-01-09 | End: 2019-01-10

## 2019-01-09 RX ORDER — GUAIFENESIN/DEXTROMETHORPHAN 100-10MG/5
5 SYRUP ORAL EVERY 4 HOURS PRN
Status: DISCONTINUED | OUTPATIENT
Start: 2019-01-09 | End: 2019-01-11 | Stop reason: HOSPADM

## 2019-01-09 RX ORDER — THIAMINE MONONITRATE (VIT B1) 100 MG
100 TABLET ORAL DAILY
Status: DISCONTINUED | OUTPATIENT
Start: 2019-01-09 | End: 2019-01-11 | Stop reason: HOSPADM

## 2019-01-09 RX ADMIN — CIPROFLOXACIN 400 MG: 2 INJECTION, SOLUTION INTRAVENOUS at 17:45

## 2019-01-09 RX ADMIN — GUAIFENESIN AND DEXTROMETHORPHAN 5 ML: 100; 10 SYRUP ORAL at 21:04

## 2019-01-09 RX ADMIN — SODIUM CHLORIDE: 9 INJECTION, SOLUTION INTRAVENOUS at 23:57

## 2019-01-09 RX ADMIN — PANTOPRAZOLE SODIUM 40 MG: 40 INJECTION, POWDER, FOR SOLUTION INTRAVENOUS at 08:47

## 2019-01-09 RX ADMIN — LORAZEPAM 1 MG: 2 INJECTION INTRAMUSCULAR; INTRAVENOUS at 21:04

## 2019-01-09 RX ADMIN — DOCUSATE SODIUM 100 MG: 100 CAPSULE, LIQUID FILLED ORAL at 09:28

## 2019-01-09 RX ADMIN — TIOTROPIUM BROMIDE 18 MCG: 18 CAPSULE ORAL; RESPIRATORY (INHALATION) at 09:40

## 2019-01-09 RX ADMIN — ONDANSETRON 4 MG: 2 INJECTION INTRAMUSCULAR; INTRAVENOUS at 08:59

## 2019-01-09 RX ADMIN — Medication 100 MG: at 12:06

## 2019-01-09 RX ADMIN — LORAZEPAM 1 MG: 2 INJECTION INTRAMUSCULAR; INTRAVENOUS at 01:06

## 2019-01-09 RX ADMIN — Medication 10 ML: at 08:47

## 2019-01-09 RX ADMIN — LORAZEPAM 1 MG: 1 TABLET ORAL at 17:53

## 2019-01-09 RX ADMIN — GUAIFENESIN AND DEXTROMETHORPHAN 5 ML: 100; 10 SYRUP ORAL at 12:06

## 2019-01-09 RX ADMIN — ENOXAPARIN SODIUM 40 MG: 40 INJECTION SUBCUTANEOUS at 08:47

## 2019-01-09 RX ADMIN — CHLORDIAZEPOXIDE HYDROCHLORIDE 5 MG: 5 CAPSULE ORAL at 08:47

## 2019-01-09 RX ADMIN — SODIUM CHLORIDE: 9 INJECTION, SOLUTION INTRAVENOUS at 03:25

## 2019-01-09 RX ADMIN — THIAMINE HYDROCHLORIDE 100 MG: 100 INJECTION, SOLUTION INTRAMUSCULAR; INTRAVENOUS at 00:28

## 2019-01-09 RX ADMIN — CHLORDIAZEPOXIDE HYDROCHLORIDE 5 MG: 5 CAPSULE ORAL at 21:05

## 2019-01-09 RX ADMIN — ONDANSETRON 4 MG: 2 INJECTION INTRAMUSCULAR; INTRAVENOUS at 01:06

## 2019-01-09 ASSESSMENT — PAIN DESCRIPTION - ORIENTATION: ORIENTATION: RIGHT

## 2019-01-09 ASSESSMENT — PAIN SCALES - GENERAL
PAINLEVEL_OUTOF10: 0

## 2019-01-09 ASSESSMENT — PAIN DESCRIPTION - LOCATION: LOCATION: SHOULDER

## 2019-01-09 ASSESSMENT — PAIN SCALES - WONG BAKER
WONGBAKER_NUMERICALRESPONSE: 8

## 2019-01-09 ASSESSMENT — PAIN DESCRIPTION - PAIN TYPE: TYPE: CHRONIC PAIN

## 2019-01-10 PROBLEM — K56.600 PARTIAL SMALL BOWEL OBSTRUCTION (HCC): Status: ACTIVE | Noted: 2019-01-08

## 2019-01-10 LAB
ANION GAP SERPL CALCULATED.3IONS-SCNC: 12 MMOL/L (ref 3–16)
BUN BLDV-MCNC: 3 MG/DL (ref 7–20)
CALCIUM SERPL-MCNC: 8.2 MG/DL (ref 8.3–10.6)
CHLORIDE BLD-SCNC: 104 MMOL/L (ref 99–110)
CLOSTRIDIUM DIFFICILE DNA AMPLIFICATION: ABNORMAL
CLOSTRIDIUM DIFFICILE DNA AMPLIFICATION: ABNORMAL
CO2: 25 MMOL/L (ref 21–32)
CREAT SERPL-MCNC: <0.5 MG/DL (ref 0.6–1.2)
GFR AFRICAN AMERICAN: >60
GFR NON-AFRICAN AMERICAN: >60
GLUCOSE BLD-MCNC: 162 MG/DL (ref 70–99)
HCT VFR BLD CALC: 36.4 % (ref 36–48)
HEMOGLOBIN: 11.5 G/DL (ref 12–16)
MAGNESIUM: 1.4 MG/DL (ref 1.8–2.4)
MCH RBC QN AUTO: 28.6 PG (ref 26–34)
MCHC RBC AUTO-ENTMCNC: 31.5 G/DL (ref 31–36)
MCV RBC AUTO: 90.8 FL (ref 80–100)
ORGANISM: ABNORMAL
PDW BLD-RTO: 27.4 % (ref 12.4–15.4)
PLATELET # BLD: 251 K/UL (ref 135–450)
PLATELET SLIDE REVIEW: ADEQUATE
PMV BLD AUTO: 7.4 FL (ref 5–10.5)
POTASSIUM REFLEX MAGNESIUM: 3.4 MMOL/L (ref 3.5–5.1)
PREALBUMIN: 14.7 MG/DL (ref 20–40)
RBC # BLD: 4.01 M/UL (ref 4–5.2)
SLIDE REVIEW: ABNORMAL
SODIUM BLD-SCNC: 141 MMOL/L (ref 136–145)
TRANSFERRIN: 224 MG/DL (ref 200–360)
WBC # BLD: 6.1 K/UL (ref 4–11)

## 2019-01-10 PROCEDURE — 94640 AIRWAY INHALATION TREATMENT: CPT

## 2019-01-10 PROCEDURE — 84466 ASSAY OF TRANSFERRIN: CPT

## 2019-01-10 PROCEDURE — 6360000002 HC RX W HCPCS: Performed by: FAMILY MEDICINE

## 2019-01-10 PROCEDURE — 36415 COLL VENOUS BLD VENIPUNCTURE: CPT

## 2019-01-10 PROCEDURE — 6370000000 HC RX 637 (ALT 250 FOR IP): Performed by: FAMILY MEDICINE

## 2019-01-10 PROCEDURE — APPNB30 APP NON BILLABLE TIME 0-30 MINS: Performed by: NURSE PRACTITIONER

## 2019-01-10 PROCEDURE — 99232 SBSQ HOSP IP/OBS MODERATE 35: CPT | Performed by: SURGERY

## 2019-01-10 PROCEDURE — 84134 ASSAY OF PREALBUMIN: CPT

## 2019-01-10 PROCEDURE — C9113 INJ PANTOPRAZOLE SODIUM, VIA: HCPCS | Performed by: FAMILY MEDICINE

## 2019-01-10 PROCEDURE — 1200000000 HC SEMI PRIVATE

## 2019-01-10 PROCEDURE — 85027 COMPLETE CBC AUTOMATED: CPT

## 2019-01-10 PROCEDURE — 80048 BASIC METABOLIC PNL TOTAL CA: CPT

## 2019-01-10 PROCEDURE — 83735 ASSAY OF MAGNESIUM: CPT

## 2019-01-10 PROCEDURE — 6360000002 HC RX W HCPCS: Performed by: NURSE PRACTITIONER

## 2019-01-10 PROCEDURE — 2700000000 HC OXYGEN THERAPY PER DAY

## 2019-01-10 PROCEDURE — 6370000000 HC RX 637 (ALT 250 FOR IP): Performed by: NURSE PRACTITIONER

## 2019-01-10 PROCEDURE — 6360000002 HC RX W HCPCS: Performed by: SURGERY

## 2019-01-10 PROCEDURE — 6370000000 HC RX 637 (ALT 250 FOR IP): Performed by: INTERNAL MEDICINE

## 2019-01-10 PROCEDURE — 2580000003 HC RX 258: Performed by: FAMILY MEDICINE

## 2019-01-10 PROCEDURE — 94761 N-INVAS EAR/PLS OXIMETRY MLT: CPT

## 2019-01-10 PROCEDURE — APPSS15 APP SPLIT SHARED TIME 0-15 MINUTES: Performed by: NURSE PRACTITIONER

## 2019-01-10 RX ORDER — VANCOMYCIN HYDROCHLORIDE 125 MG/1
125 CAPSULE ORAL EVERY 6 HOURS SCHEDULED
Status: CANCELLED | OUTPATIENT
Start: 2019-01-10 | End: 2019-01-24

## 2019-01-10 RX ORDER — POTASSIUM CHLORIDE 20 MEQ/1
40 TABLET, EXTENDED RELEASE ORAL ONCE
Status: COMPLETED | OUTPATIENT
Start: 2019-01-10 | End: 2019-01-10

## 2019-01-10 RX ORDER — VANCOMYCIN HYDROCHLORIDE 125 MG/1
125 CAPSULE ORAL 4 TIMES DAILY
Status: DISCONTINUED | OUTPATIENT
Start: 2019-01-10 | End: 2019-01-11 | Stop reason: HOSPADM

## 2019-01-10 RX ORDER — MAGNESIUM SULFATE IN WATER 40 MG/ML
2 INJECTION, SOLUTION INTRAVENOUS ONCE
Status: COMPLETED | OUTPATIENT
Start: 2019-01-10 | End: 2019-01-10

## 2019-01-10 RX ORDER — VANCOMYCIN HYDROCHLORIDE 125 MG/1
125 CAPSULE ORAL EVERY 6 HOURS SCHEDULED
Status: DISCONTINUED | OUTPATIENT
Start: 2019-01-10 | End: 2019-01-10

## 2019-01-10 RX ADMIN — SODIUM CHLORIDE: 9 INJECTION, SOLUTION INTRAVENOUS at 12:05

## 2019-01-10 RX ADMIN — ONDANSETRON 4 MG: 2 INJECTION INTRAMUSCULAR; INTRAVENOUS at 21:14

## 2019-01-10 RX ADMIN — VANCOMYCIN HYDROCHLORIDE 125 MG: 125 CAPSULE ORAL at 14:05

## 2019-01-10 RX ADMIN — PANTOPRAZOLE SODIUM 40 MG: 40 INJECTION, POWDER, FOR SOLUTION INTRAVENOUS at 10:17

## 2019-01-10 RX ADMIN — CHLORDIAZEPOXIDE HYDROCHLORIDE 5 MG: 5 CAPSULE ORAL at 19:56

## 2019-01-10 RX ADMIN — VANCOMYCIN HYDROCHLORIDE 125 MG: 125 CAPSULE ORAL at 17:48

## 2019-01-10 RX ADMIN — POTASSIUM CHLORIDE 40 MEQ: 20 TABLET, EXTENDED RELEASE ORAL at 14:05

## 2019-01-10 RX ADMIN — CHLORDIAZEPOXIDE HYDROCHLORIDE 5 MG: 5 CAPSULE ORAL at 10:17

## 2019-01-10 RX ADMIN — TIOTROPIUM BROMIDE 18 MCG: 18 CAPSULE ORAL; RESPIRATORY (INHALATION) at 09:30

## 2019-01-10 RX ADMIN — MAGNESIUM SULFATE HEPTAHYDRATE 2 G: 40 INJECTION, SOLUTION INTRAVENOUS at 14:05

## 2019-01-10 RX ADMIN — GUAIFENESIN AND DEXTROMETHORPHAN 5 ML: 100; 10 SYRUP ORAL at 11:16

## 2019-01-10 RX ADMIN — CIPROFLOXACIN 400 MG: 2 INJECTION, SOLUTION INTRAVENOUS at 04:45

## 2019-01-10 RX ADMIN — SODIUM CHLORIDE: 9 INJECTION, SOLUTION INTRAVENOUS at 21:14

## 2019-01-10 RX ADMIN — LORAZEPAM 1 MG: 2 INJECTION INTRAMUSCULAR; INTRAVENOUS at 22:13

## 2019-01-10 RX ADMIN — VANCOMYCIN HYDROCHLORIDE 125 MG: 125 CAPSULE ORAL at 19:56

## 2019-01-10 RX ADMIN — ENOXAPARIN SODIUM 40 MG: 40 INJECTION SUBCUTANEOUS at 10:17

## 2019-01-10 RX ADMIN — GUAIFENESIN AND DEXTROMETHORPHAN 5 ML: 100; 10 SYRUP ORAL at 17:57

## 2019-01-10 RX ADMIN — Medication 10 ML: at 10:20

## 2019-01-10 RX ADMIN — Medication 100 MG: at 10:17

## 2019-01-10 ASSESSMENT — PAIN SCALES - GENERAL: PAINLEVEL_OUTOF10: 0

## 2019-01-11 VITALS
OXYGEN SATURATION: 100 % | DIASTOLIC BLOOD PRESSURE: 80 MMHG | RESPIRATION RATE: 18 BRPM | TEMPERATURE: 98.5 F | HEIGHT: 62 IN | WEIGHT: 134.26 LBS | BODY MASS INDEX: 24.71 KG/M2 | SYSTOLIC BLOOD PRESSURE: 145 MMHG | HEART RATE: 76 BPM

## 2019-01-11 LAB
ALBUMIN SERPL-MCNC: 2.8 G/DL (ref 3.4–5)
ANION GAP SERPL CALCULATED.3IONS-SCNC: 8 MMOL/L (ref 3–16)
BUN BLDV-MCNC: 3 MG/DL (ref 7–20)
CALCIUM SERPL-MCNC: 8 MG/DL (ref 8.3–10.6)
CHLORIDE BLD-SCNC: 107 MMOL/L (ref 99–110)
CO2: 25 MMOL/L (ref 21–32)
CREAT SERPL-MCNC: <0.5 MG/DL (ref 0.6–1.2)
FOLATE: 4.35 NG/ML (ref 4.78–24.2)
GFR AFRICAN AMERICAN: >60
GFR NON-AFRICAN AMERICAN: >60
GLUCOSE BLD-MCNC: 101 MG/DL (ref 70–99)
HCT VFR BLD CALC: 31.7 % (ref 36–48)
HEMOGLOBIN: 9.8 G/DL (ref 12–16)
MAGNESIUM: 1.8 MG/DL (ref 1.8–2.4)
MCH RBC QN AUTO: 28.1 PG (ref 26–34)
MCHC RBC AUTO-ENTMCNC: 31 G/DL (ref 31–36)
MCV RBC AUTO: 90.5 FL (ref 80–100)
PDW BLD-RTO: 27.6 % (ref 12.4–15.4)
PHOSPHORUS: 2.4 MG/DL (ref 2.5–4.9)
PLATELET # BLD: 216 K/UL (ref 135–450)
PMV BLD AUTO: 7.2 FL (ref 5–10.5)
POTASSIUM SERPL-SCNC: 3.6 MMOL/L (ref 3.5–5.1)
RBC # BLD: 3.5 M/UL (ref 4–5.2)
SODIUM BLD-SCNC: 140 MMOL/L (ref 136–145)
VITAMIN B-12: 709 PG/ML (ref 211–911)
WBC # BLD: 5.2 K/UL (ref 4–11)

## 2019-01-11 PROCEDURE — 80069 RENAL FUNCTION PANEL: CPT

## 2019-01-11 PROCEDURE — 97116 GAIT TRAINING THERAPY: CPT

## 2019-01-11 PROCEDURE — 82607 VITAMIN B-12: CPT

## 2019-01-11 PROCEDURE — 85027 COMPLETE CBC AUTOMATED: CPT

## 2019-01-11 PROCEDURE — 2500000003 HC RX 250 WO HCPCS: Performed by: NURSE PRACTITIONER

## 2019-01-11 PROCEDURE — 2700000000 HC OXYGEN THERAPY PER DAY

## 2019-01-11 PROCEDURE — 6360000002 HC RX W HCPCS: Performed by: FAMILY MEDICINE

## 2019-01-11 PROCEDURE — 82746 ASSAY OF FOLIC ACID SERUM: CPT

## 2019-01-11 PROCEDURE — 36415 COLL VENOUS BLD VENIPUNCTURE: CPT

## 2019-01-11 PROCEDURE — 97535 SELF CARE MNGMENT TRAINING: CPT

## 2019-01-11 PROCEDURE — 83735 ASSAY OF MAGNESIUM: CPT

## 2019-01-11 PROCEDURE — 94640 AIRWAY INHALATION TREATMENT: CPT

## 2019-01-11 PROCEDURE — 99221 1ST HOSP IP/OBS SF/LOW 40: CPT | Performed by: INTERNAL MEDICINE

## 2019-01-11 PROCEDURE — 2580000003 HC RX 258: Performed by: NURSE PRACTITIONER

## 2019-01-11 PROCEDURE — 6370000000 HC RX 637 (ALT 250 FOR IP): Performed by: FAMILY MEDICINE

## 2019-01-11 PROCEDURE — 6370000000 HC RX 637 (ALT 250 FOR IP): Performed by: INTERNAL MEDICINE

## 2019-01-11 PROCEDURE — 94760 N-INVAS EAR/PLS OXIMETRY 1: CPT

## 2019-01-11 PROCEDURE — 99232 SBSQ HOSP IP/OBS MODERATE 35: CPT | Performed by: SURGERY

## 2019-01-11 PROCEDURE — APPSS15 APP SPLIT SHARED TIME 0-15 MINUTES: Performed by: NURSE PRACTITIONER

## 2019-01-11 PROCEDURE — 97530 THERAPEUTIC ACTIVITIES: CPT

## 2019-01-11 PROCEDURE — 6370000000 HC RX 637 (ALT 250 FOR IP): Performed by: NURSE PRACTITIONER

## 2019-01-11 PROCEDURE — APPNB30 APP NON BILLABLE TIME 0-30 MINS: Performed by: NURSE PRACTITIONER

## 2019-01-11 PROCEDURE — 2580000003 HC RX 258: Performed by: FAMILY MEDICINE

## 2019-01-11 RX ORDER — VANCOMYCIN HYDROCHLORIDE 125 MG/1
125 CAPSULE ORAL 4 TIMES DAILY
Qty: 40 CAPSULE | Refills: 0 | Status: SHIPPED | OUTPATIENT
Start: 2019-01-11 | End: 2019-01-21

## 2019-01-11 RX ADMIN — VANCOMYCIN HYDROCHLORIDE 125 MG: 125 CAPSULE ORAL at 09:00

## 2019-01-11 RX ADMIN — VANCOMYCIN HYDROCHLORIDE 125 MG: 125 CAPSULE ORAL at 12:46

## 2019-01-11 RX ADMIN — TIOTROPIUM BROMIDE 18 MCG: 18 CAPSULE ORAL; RESPIRATORY (INHALATION) at 09:31

## 2019-01-11 RX ADMIN — LORAZEPAM 1 MG: 1 TABLET ORAL at 13:16

## 2019-01-11 RX ADMIN — CHLORDIAZEPOXIDE HYDROCHLORIDE 5 MG: 5 CAPSULE ORAL at 09:00

## 2019-01-11 RX ADMIN — POTASSIUM PHOSPHATE, MONOBASIC AND POTASSIUM PHOSPHATE, DIBASIC 10 MMOL: 224; 236 INJECTION, SOLUTION INTRAVENOUS at 14:02

## 2019-01-11 RX ADMIN — Medication 100 MG: at 09:00

## 2019-01-11 RX ADMIN — GUAIFENESIN AND DEXTROMETHORPHAN 5 ML: 100; 10 SYRUP ORAL at 09:00

## 2019-01-11 RX ADMIN — LORAZEPAM 1 MG: 1 TABLET ORAL at 18:20

## 2019-01-11 RX ADMIN — SODIUM CHLORIDE: 9 INJECTION, SOLUTION INTRAVENOUS at 06:26

## 2019-01-11 RX ADMIN — ENOXAPARIN SODIUM 40 MG: 40 INJECTION SUBCUTANEOUS at 09:00

## 2019-01-11 RX ADMIN — VANCOMYCIN HYDROCHLORIDE 125 MG: 125 CAPSULE ORAL at 18:20

## 2019-01-11 ASSESSMENT — PAIN SCALES - GENERAL
PAINLEVEL_OUTOF10: 0
PAINLEVEL_OUTOF10: 0

## 2019-01-30 ENCOUNTER — TELEPHONE (OUTPATIENT)
Dept: PRIMARY CARE CLINIC | Age: 74
End: 2019-01-30

## 2019-01-30 ENCOUNTER — HOSPITAL ENCOUNTER (EMERGENCY)
Age: 74
Discharge: HOME | End: 2019-01-30
Attending: EMERGENCY MEDICINE
Payer: COMMERCIAL

## 2019-01-30 ENCOUNTER — APPOINTMENT (OUTPATIENT)
Dept: CT IMAGING | Age: 74
End: 2019-01-30
Payer: COMMERCIAL

## 2019-01-30 VITALS
WEIGHT: 128.31 LBS | RESPIRATION RATE: 28 BRPM | TEMPERATURE: 97.6 F | HEART RATE: 102 BPM | HEIGHT: 62 IN | OXYGEN SATURATION: 94 % | DIASTOLIC BLOOD PRESSURE: 90 MMHG | BODY MASS INDEX: 23.61 KG/M2 | SYSTOLIC BLOOD PRESSURE: 109 MMHG

## 2019-01-30 DIAGNOSIS — K20.90 ESOPHAGITIS: ICD-10-CM

## 2019-01-30 DIAGNOSIS — F10.10 ALCOHOL ABUSE: ICD-10-CM

## 2019-01-30 DIAGNOSIS — R11.2 NON-INTRACTABLE VOMITING WITH NAUSEA, UNSPECIFIED VOMITING TYPE: Primary | ICD-10-CM

## 2019-01-30 LAB
A/G RATIO: 1.1 (ref 1.1–2.2)
ALBUMIN SERPL-MCNC: 4.4 G/DL (ref 3.4–5)
ALP BLD-CCNC: 144 U/L (ref 40–129)
ALT SERPL-CCNC: 125 U/L (ref 10–40)
ANION GAP SERPL CALCULATED.3IONS-SCNC: 37 MMOL/L (ref 3–16)
ANISOCYTOSIS: ABNORMAL
AST SERPL-CCNC: 208 U/L (ref 15–37)
BACTERIA: ABNORMAL /HPF
BASOPHILS ABSOLUTE: 0.1 K/UL (ref 0–0.2)
BASOPHILS RELATIVE PERCENT: 0.5 %
BILIRUB SERPL-MCNC: 0.7 MG/DL (ref 0–1)
BILIRUBIN URINE: NEGATIVE
BLOOD, URINE: ABNORMAL
BUN BLDV-MCNC: 15 MG/DL (ref 7–20)
CALCIUM SERPL-MCNC: 9.8 MG/DL (ref 8.3–10.6)
CASTS: ABNORMAL /LPF
CHLORIDE BLD-SCNC: 88 MMOL/L (ref 99–110)
CLARITY: ABNORMAL
CO2: 15 MMOL/L (ref 21–32)
COLOR: YELLOW
CREAT SERPL-MCNC: 0.7 MG/DL (ref 0.6–1.2)
EOSINOPHILS ABSOLUTE: 0 K/UL (ref 0–0.6)
EOSINOPHILS RELATIVE PERCENT: 0.1 %
EPITHELIAL CELLS, UA: >36 /HPF (ref 0–5)
ETHANOL: NORMAL MG/DL (ref 0–0.08)
GFR AFRICAN AMERICAN: >60
GFR NON-AFRICAN AMERICAN: >60
GLOBULIN: 4.1 G/DL
GLUCOSE BLD-MCNC: 159 MG/DL (ref 70–99)
GLUCOSE URINE: NEGATIVE MG/DL
HCT VFR BLD CALC: 45.5 % (ref 36–48)
HEMOGLOBIN: 14.3 G/DL (ref 12–16)
KETONES, URINE: >=80 MG/DL
LACTIC ACID: 2.1 MMOL/L (ref 0.4–2)
LACTIC ACID: 3.1 MMOL/L (ref 0.4–2)
LEUKOCYTE ESTERASE, URINE: ABNORMAL
LIPASE: 20 U/L (ref 13–60)
LYMPHOCYTES ABSOLUTE: 0.6 K/UL (ref 1–5.1)
LYMPHOCYTES RELATIVE PERCENT: 2.7 %
MCH RBC QN AUTO: 29 PG (ref 26–34)
MCHC RBC AUTO-ENTMCNC: 31.3 G/DL (ref 31–36)
MCV RBC AUTO: 92.4 FL (ref 80–100)
MICROSCOPIC EXAMINATION: YES
MONOCYTES ABSOLUTE: 0.6 K/UL (ref 0–1.3)
MONOCYTES RELATIVE PERCENT: 2.9 %
NEUTROPHILS ABSOLUTE: 20.6 K/UL (ref 1.7–7.7)
NEUTROPHILS RELATIVE PERCENT: 93.8 %
NITRITE, URINE: NEGATIVE
PDW BLD-RTO: 22.3 % (ref 12.4–15.4)
PH UA: 5.5
PLATELET # BLD: 625 K/UL (ref 135–450)
PMV BLD AUTO: 8.2 FL (ref 5–10.5)
POTASSIUM SERPL-SCNC: 4.6 MMOL/L (ref 3.5–5.1)
PROTEIN UA: 30 MG/DL
RBC # BLD: 4.92 M/UL (ref 4–5.2)
RBC UA: 4 /HPF (ref 0–4)
RENAL EPITHELIAL, UA: ABNORMAL /HPF
SODIUM BLD-SCNC: 140 MMOL/L (ref 136–145)
SPECIFIC GRAVITY UA: 1.01
TOTAL PROTEIN: 8.5 G/DL (ref 6.4–8.2)
URINE REFLEX TO CULTURE: YES
URINE TYPE: ABNORMAL
UROBILINOGEN, URINE: 0.2 E.U./DL
WBC # BLD: 21.9 K/UL (ref 4–11)
WBC UA: 38 /HPF (ref 0–5)

## 2019-01-30 PROCEDURE — C9113 INJ PANTOPRAZOLE SODIUM, VIA: HCPCS | Performed by: PHYSICIAN ASSISTANT

## 2019-01-30 PROCEDURE — 99284 EMERGENCY DEPT VISIT MOD MDM: CPT

## 2019-01-30 PROCEDURE — 36415 COLL VENOUS BLD VENIPUNCTURE: CPT

## 2019-01-30 PROCEDURE — 96361 HYDRATE IV INFUSION ADD-ON: CPT

## 2019-01-30 PROCEDURE — 83605 ASSAY OF LACTIC ACID: CPT

## 2019-01-30 PROCEDURE — 74177 CT ABD & PELVIS W/CONTRAST: CPT

## 2019-01-30 PROCEDURE — 6360000004 HC RX CONTRAST MEDICATION: Performed by: PHYSICIAN ASSISTANT

## 2019-01-30 PROCEDURE — 83690 ASSAY OF LIPASE: CPT

## 2019-01-30 PROCEDURE — 2580000003 HC RX 258: Performed by: PHYSICIAN ASSISTANT

## 2019-01-30 PROCEDURE — 80053 COMPREHEN METABOLIC PANEL: CPT

## 2019-01-30 PROCEDURE — 81001 URINALYSIS AUTO W/SCOPE: CPT

## 2019-01-30 PROCEDURE — 85025 COMPLETE CBC W/AUTO DIFF WBC: CPT

## 2019-01-30 PROCEDURE — G0480 DRUG TEST DEF 1-7 CLASSES: HCPCS

## 2019-01-30 PROCEDURE — 96374 THER/PROPH/DIAG INJ IV PUSH: CPT

## 2019-01-30 PROCEDURE — 96375 TX/PRO/DX INJ NEW DRUG ADDON: CPT

## 2019-01-30 PROCEDURE — 87086 URINE CULTURE/COLONY COUNT: CPT

## 2019-01-30 PROCEDURE — 6360000002 HC RX W HCPCS: Performed by: PHYSICIAN ASSISTANT

## 2019-01-30 RX ORDER — ONDANSETRON 2 MG/ML
4 INJECTION INTRAMUSCULAR; INTRAVENOUS ONCE
Status: COMPLETED | OUTPATIENT
Start: 2019-01-30 | End: 2019-01-30

## 2019-01-30 RX ORDER — 0.9 % SODIUM CHLORIDE 0.9 %
1000 INTRAVENOUS SOLUTION INTRAVENOUS ONCE
Status: COMPLETED | OUTPATIENT
Start: 2019-01-30 | End: 2019-01-30

## 2019-01-30 RX ORDER — ONDANSETRON 4 MG/1
4-8 TABLET, ORALLY DISINTEGRATING ORAL EVERY 12 HOURS PRN
Qty: 12 TABLET | Refills: 0 | Status: SHIPPED | OUTPATIENT
Start: 2019-01-30 | End: 2019-05-23

## 2019-01-30 RX ORDER — PROMETHAZINE HYDROCHLORIDE 25 MG/1
25 SUPPOSITORY RECTAL EVERY 6 HOURS PRN
Qty: 7 SUPPOSITORY | Refills: 0 | Status: SHIPPED | OUTPATIENT
Start: 2019-01-30 | End: 2019-02-06

## 2019-01-30 RX ORDER — FAMOTIDINE 20 MG/1
20 TABLET, FILM COATED ORAL 2 TIMES DAILY
Qty: 60 TABLET | Refills: 0 | Status: SHIPPED | OUTPATIENT
Start: 2019-01-30 | End: 2019-05-23 | Stop reason: ALTCHOICE

## 2019-01-30 RX ORDER — PANTOPRAZOLE SODIUM 40 MG/10ML
40 INJECTION, POWDER, LYOPHILIZED, FOR SOLUTION INTRAVENOUS ONCE
Status: COMPLETED | OUTPATIENT
Start: 2019-01-30 | End: 2019-01-30

## 2019-01-30 RX ADMIN — SODIUM CHLORIDE 1000 ML: 9 INJECTION, SOLUTION INTRAVENOUS at 15:26

## 2019-01-30 RX ADMIN — ONDANSETRON 4 MG: 2 INJECTION INTRAMUSCULAR; INTRAVENOUS at 16:57

## 2019-01-30 RX ADMIN — PANTOPRAZOLE SODIUM 40 MG: 40 INJECTION, POWDER, FOR SOLUTION INTRAVENOUS at 17:43

## 2019-01-30 RX ADMIN — SODIUM CHLORIDE 1000 ML: 9 INJECTION, SOLUTION INTRAVENOUS at 16:54

## 2019-01-30 RX ADMIN — IOPAMIDOL 75 ML: 755 INJECTION, SOLUTION INTRAVENOUS at 15:45

## 2019-01-30 ASSESSMENT — PAIN - FUNCTIONAL ASSESSMENT: PAIN_FUNCTIONAL_ASSESSMENT: 0-10

## 2019-01-30 ASSESSMENT — ENCOUNTER SYMPTOMS
EYE DISCHARGE: 0
APNEA: 0
VOMITING: 1
SHORTNESS OF BREATH: 0
NAUSEA: 1
BACK PAIN: 0
SORE THROAT: 0
CHOKING: 0
EYE REDNESS: 0
ABDOMINAL PAIN: 0
FACIAL SWELLING: 0

## 2019-01-30 ASSESSMENT — PAIN SCALES - GENERAL: PAINLEVEL_OUTOF10: 8

## 2019-01-30 ASSESSMENT — PAIN DESCRIPTION - PAIN TYPE: TYPE: CHRONIC PAIN

## 2019-01-30 ASSESSMENT — PAIN DESCRIPTION - LOCATION: LOCATION: ABDOMEN

## 2019-01-31 LAB — URINE CULTURE, ROUTINE: NORMAL

## 2019-02-09 ENCOUNTER — APPOINTMENT (OUTPATIENT)
Dept: CT IMAGING | Age: 74
End: 2019-02-09
Payer: COMMERCIAL

## 2019-02-09 ENCOUNTER — HOSPITAL ENCOUNTER (EMERGENCY)
Age: 74
Discharge: HOME | End: 2019-02-09
Attending: EMERGENCY MEDICINE
Payer: COMMERCIAL

## 2019-02-09 VITALS
SYSTOLIC BLOOD PRESSURE: 114 MMHG | OXYGEN SATURATION: 95 % | WEIGHT: 132.06 LBS | HEART RATE: 87 BPM | BODY MASS INDEX: 24.3 KG/M2 | HEIGHT: 62 IN | RESPIRATION RATE: 18 BRPM | DIASTOLIC BLOOD PRESSURE: 92 MMHG | TEMPERATURE: 98.1 F
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
## 2019-02-09 DIAGNOSIS — S01.01XA LACERATION OF SCALP, INITIAL ENCOUNTER: ICD-10-CM

## 2019-02-09 DIAGNOSIS — S09.90XA INJURY OF HEAD, INITIAL ENCOUNTER: Primary | ICD-10-CM

## 2019-02-09 DIAGNOSIS — F10.920 ACUTE ALCOHOLIC INTOXICATION WITHOUT COMPLICATION (HCC): ICD-10-CM

## 2019-02-09 LAB
A/G RATIO: 1.1 (ref 1.1–2.2)
ALBUMIN SERPL-MCNC: 3.6 G/DL (ref 3.4–5)
ALP BLD-CCNC: 114 U/L (ref 40–129)
ALT SERPL-CCNC: 47 U/L (ref 10–40)
ANION GAP SERPL CALCULATED.3IONS-SCNC: 18 MMOL/L (ref 3–16)
APTT: 30.3 SEC (ref 26–36)
AST SERPL-CCNC: 70 U/L (ref 15–37)
BASOPHILS ABSOLUTE: 0.1 K/UL (ref 0–0.2)
BASOPHILS RELATIVE PERCENT: 2.4 %
BILIRUB SERPL-MCNC: <0.2 MG/DL (ref 0–1)
BUN BLDV-MCNC: 10 MG/DL (ref 7–20)
CALCIUM SERPL-MCNC: 9.5 MG/DL (ref 8.3–10.6)
CHLORIDE BLD-SCNC: 93 MMOL/L (ref 99–110)
CO2: 27 MMOL/L (ref 21–32)
CREAT SERPL-MCNC: <0.5 MG/DL (ref 0.6–1.2)
EOSINOPHILS ABSOLUTE: 0 K/UL (ref 0–0.6)
EOSINOPHILS RELATIVE PERCENT: 0.8 %
ETHANOL: 134 MG/DL (ref 0–0.08)
GFR AFRICAN AMERICAN: >60
GFR NON-AFRICAN AMERICAN: >60
GLOBULIN: 3.3 G/DL
GLUCOSE BLD-MCNC: 101 MG/DL (ref 70–99)
HCT VFR BLD CALC: 41.3 % (ref 36–48)
HEMOGLOBIN: 13.7 G/DL (ref 12–16)
INR BLD: 0.9 (ref 0.86–1.14)
LYMPHOCYTES ABSOLUTE: 1.9 K/UL (ref 1–5.1)
LYMPHOCYTES RELATIVE PERCENT: 36.4 %
MCH RBC QN AUTO: 30 PG (ref 26–34)
MCHC RBC AUTO-ENTMCNC: 33 G/DL (ref 31–36)
MCV RBC AUTO: 90.7 FL (ref 80–100)
MONOCYTES ABSOLUTE: 0.5 K/UL (ref 0–1.3)
MONOCYTES RELATIVE PERCENT: 9.4 %
NEUTROPHILS ABSOLUTE: 2.6 K/UL (ref 1.7–7.7)
NEUTROPHILS RELATIVE PERCENT: 51 %
PDW BLD-RTO: 19.4 % (ref 12.4–15.4)
PLATELET # BLD: 288 K/UL (ref 135–450)
PMV BLD AUTO: 7.3 FL (ref 5–10.5)
POTASSIUM SERPL-SCNC: 3.4 MMOL/L (ref 3.5–5.1)
PROTHROMBIN TIME: 10.3 SEC (ref 9.8–13)
RBC # BLD: 4.56 M/UL (ref 4–5.2)
SODIUM BLD-SCNC: 138 MMOL/L (ref 136–145)
TOTAL PROTEIN: 6.9 G/DL (ref 6.4–8.2)
WBC # BLD: 5.1 K/UL (ref 4–11)

## 2019-02-09 PROCEDURE — G0480 DRUG TEST DEF 1-7 CLASSES: HCPCS

## 2019-02-09 PROCEDURE — 99284 EMERGENCY DEPT VISIT MOD MDM: CPT

## 2019-02-09 PROCEDURE — 80053 COMPREHEN METABOLIC PANEL: CPT

## 2019-02-09 PROCEDURE — 6370000000 HC RX 637 (ALT 250 FOR IP): Performed by: EMERGENCY MEDICINE

## 2019-02-09 PROCEDURE — 85610 PROTHROMBIN TIME: CPT

## 2019-02-09 PROCEDURE — 85730 THROMBOPLASTIN TIME PARTIAL: CPT

## 2019-02-09 PROCEDURE — 72125 CT NECK SPINE W/O DYE: CPT

## 2019-02-09 PROCEDURE — 85025 COMPLETE CBC W/AUTO DIFF WBC: CPT

## 2019-02-09 PROCEDURE — 70450 CT HEAD/BRAIN W/O DYE: CPT

## 2019-02-09 RX ORDER — ACETAMINOPHEN 325 MG/1
650 TABLET ORAL ONCE
Status: COMPLETED | OUTPATIENT
Start: 2019-02-09 | End: 2019-02-09

## 2019-02-09 RX ADMIN — ACETAMINOPHEN 650 MG: 325 TABLET, FILM COATED ORAL at 07:51

## 2019-02-09 ASSESSMENT — PAIN DESCRIPTION - PAIN TYPE: TYPE: ACUTE PAIN

## 2019-02-09 ASSESSMENT — PAIN DESCRIPTION - ORIENTATION: ORIENTATION: POSTERIOR

## 2019-02-09 ASSESSMENT — PAIN DESCRIPTION - LOCATION: LOCATION: HEAD

## 2019-02-09 ASSESSMENT — PAIN SCALES - GENERAL
PAINLEVEL_OUTOF10: 0
PAINLEVEL_OUTOF10: 8
PAINLEVEL_OUTOF10: 4

## 2019-02-09 ASSESSMENT — PAIN DESCRIPTION - DESCRIPTORS: DESCRIPTORS: ACHING

## 2019-02-09 ASSESSMENT — PAIN DESCRIPTION - FREQUENCY: FREQUENCY: CONTINUOUS

## 2019-02-26 ENCOUNTER — TELEPHONE (OUTPATIENT)
Dept: PRIMARY CARE CLINIC | Age: 74
End: 2019-02-26

## 2019-03-18 ENCOUNTER — OFFICE VISIT (OUTPATIENT)
Dept: SURGERY | Age: 74
End: 2019-03-18
Payer: COMMERCIAL

## 2019-03-18 VITALS
HEART RATE: 94 BPM | BODY MASS INDEX: 23.41 KG/M2 | SYSTOLIC BLOOD PRESSURE: 136 MMHG | DIASTOLIC BLOOD PRESSURE: 80 MMHG | OXYGEN SATURATION: 98 % | WEIGHT: 128 LBS

## 2019-03-18 DIAGNOSIS — Z93.3 COLOSTOMY PRESENT (HCC): ICD-10-CM

## 2019-03-18 DIAGNOSIS — E44.0 MODERATE PROTEIN-CALORIE MALNUTRITION (HCC): ICD-10-CM

## 2019-03-18 DIAGNOSIS — Z72.0 TOBACCO ABUSE: ICD-10-CM

## 2019-03-18 DIAGNOSIS — Z93.3 COLOSTOMY PRESENT (HCC): Primary | ICD-10-CM

## 2019-03-18 LAB
PREALBUMIN: 33 MG/DL (ref 20–40)
TRANSFERRIN: 339 MG/DL (ref 200–360)

## 2019-03-18 PROCEDURE — 1101F PT FALLS ASSESS-DOCD LE1/YR: CPT | Performed by: SURGERY

## 2019-03-18 PROCEDURE — G8482 FLU IMMUNIZE ORDER/ADMIN: HCPCS | Performed by: SURGERY

## 2019-03-18 PROCEDURE — 99213 OFFICE O/P EST LOW 20 MIN: CPT | Performed by: SURGERY

## 2019-03-18 PROCEDURE — G8427 DOCREV CUR MEDS BY ELIG CLIN: HCPCS | Performed by: SURGERY

## 2019-03-18 PROCEDURE — 1090F PRES/ABSN URINE INCON ASSESS: CPT | Performed by: SURGERY

## 2019-03-18 PROCEDURE — 3017F COLORECTAL CA SCREEN DOC REV: CPT | Performed by: SURGERY

## 2019-03-18 PROCEDURE — G8420 CALC BMI NORM PARAMETERS: HCPCS | Performed by: SURGERY

## 2019-03-18 PROCEDURE — G8400 PT W/DXA NO RESULTS DOC: HCPCS | Performed by: SURGERY

## 2019-03-18 PROCEDURE — 4040F PNEUMOC VAC/ADMIN/RCVD: CPT | Performed by: SURGERY

## 2019-03-18 PROCEDURE — 4004F PT TOBACCO SCREEN RCVD TLK: CPT | Performed by: SURGERY

## 2019-03-18 PROCEDURE — 1123F ACP DISCUSS/DSCN MKR DOCD: CPT | Performed by: SURGERY

## 2019-03-20 LAB
3-OH-COTININE: 107 NG/ML
COTININE: 151 NG/ML
NICOTINE: 7 NG/ML

## 2019-04-03 ENCOUNTER — TELEPHONE (OUTPATIENT)
Dept: SURGERY | Age: 74
End: 2019-04-03

## 2019-04-03 NOTE — TELEPHONE ENCOUNTER
Tried to reach patient to confirm her scheduled surgery on 4-10-19 with Dr. Deangelo Diana. No answer.

## 2019-04-05 RX ORDER — ACETAMINOPHEN 325 MG/1
650 TABLET ORAL EVERY 6 HOURS PRN
Status: ON HOLD | COMMUNITY
End: 2019-10-27

## 2019-04-05 RX ORDER — LANOLIN ALCOHOL/MO/W.PET/CERES
3 CREAM (GRAM) TOPICAL NIGHTLY PRN
COMMUNITY
End: 2019-05-23

## 2019-04-05 NOTE — PROGRESS NOTES
C-Difficile admission screening and protocol:     * Admitted with diarrhea?no     *Prior history of C-Diff. In last 3 months?no     *Antibiotic use in the past 6-8 weeks?no     *Prior hospitalization or nursing home in the last month?   no

## 2019-04-05 NOTE — PROGRESS NOTES
Pre-operative instructions faxed with confirmation, to nursing home([facility] [address]) and nurse [place] .

## 2019-04-05 NOTE — PROGRESS NOTES
[facility] time____0800________        Surgery time___0940_________    Take the following medications with a sip of water:proair if needed Take your inhaler as directed the DOS and bring with you DOS. bromide and famotidine    Do not eat or drink anything after 12:00 midnight prior to your surgery. This includes water chewing gum, mints and ice chips. You may brush your teeth and gargle the morning of your surgery, but do not swallow the water     Please see your family doctor/pediatrician for a history and physical and/or concerning medications. Bring any test results/reports from your physicians office. If you are under the care of a heart doctor or specialist doctor, please be aware that you may be asked to them for clearance    You may be asked to stop blood thinners such as Coumadin, Plavix, Fragmin, Lovenox, etc., or any anti-inflammatories such as:  Aspirin, Ibuprofen, Advil, Naproxen prior to your surgery. We also ask that you stop any OTC medications such as fish oil, vitamin E, glucosamine, garlic, Multivitamins, COQ 10, etc.    We ask that you do not smoke 24 hours prior to surgery  We ask that you do not  drink any alcoholic beverages 24 hours prior to surgery     You must make arrangements for a responsible adult to take you home after your surgery. For your safety you will not be allowed to leave alone or drive yourself home. Your surgery will be cancelled if you do not have a ride home. Also for your safety, it is strongly suggested that someone stay with you the first 24 hours after your surgery. A parent or legal guardian must accompany a child scheduled for surgery and plan to stay at the hospital until the child is discharged. Please do not bring other children with you. For your comfort, please wear simple loose fitting clothing to the hospital.  Please do not bring valuables.     Do not wear any make-up or nail polish on your fingers or toes      For your safety, please do not wear any jewelry or body piercing's on the day of surgery. All jewelry must be removed. If you have dentures, they will be removed before going to operating room. For your convenience, we will provide you with a container. If you wear contact lenses or glasses, they will be removed, please bring a case for them. If you have a living will and a durable power of  for healthcare, please bring in a copy. As part of our patient safety program to minimize surgical site infections, we ask you to do the following:    · Please notify your surgeon if you develop any illness between         now and the  day of your surgery. · This includes a cough, cold, fever, sore throat, nausea,         or vomiting, and diarrhea, etc.  ·  Please notify your surgeon if you experience dizziness, shortness         of breath or blurred vision between now and the time of your surgery. Do not shave your operative site 96 hours prior to surgery. For face and neck surgery, men may use an electric razor 48 hours   prior to surgery. You may shower the night before surgery or the morning of   your surgery with an antibacterial soap. You will need to bring a photo ID and insurance card    [facility] has an onsite pharmacy, would you like to utilize our pharmacy     If you will be staying overnight and use a C-pap machine, please bring   your C-pap to hospital     Our goal is to provide you with excellent care, therefore, visitors will be limited to two(2) in the room at a time so that we may focus on providing this care for you. Please contact pre-admission testing if you have any further questions. [facility] phone number:  [phone] PAT fax number:  [phone]  Please note these are generalized instructions for all surgical cases, you may be provided with more specific instructions according to your surgery.

## 2019-04-09 ENCOUNTER — ANESTHESIA EVENT (OUTPATIENT)
Dept: OPERATING ROOM | Age: 74
DRG: 330 | End: 2019-04-09
Payer: COMMERCIAL

## 2019-04-10 ENCOUNTER — HOSPITAL ENCOUNTER (INPATIENT)
Age: 74
LOS: 8 days | Discharge: HOME | DRG: 330 | End: 2019-04-18
Attending: SURGERY | Admitting: SURGERY
Payer: COMMERCIAL

## 2019-04-10 ENCOUNTER — ANESTHESIA (OUTPATIENT)
Dept: OPERATING ROOM | Age: 74
DRG: 330 | End: 2019-04-10
Payer: COMMERCIAL

## 2019-04-10 VITALS
DIASTOLIC BLOOD PRESSURE: 59 MMHG | OXYGEN SATURATION: 99 % | TEMPERATURE: 96.8 F | SYSTOLIC BLOOD PRESSURE: 114 MMHG | RESPIRATION RATE: 7 BRPM

## 2019-04-10 DIAGNOSIS — Z98.890 HISTORY OF COLOSTOMY REVERSAL: Primary | ICD-10-CM

## 2019-04-10 DIAGNOSIS — Z93.3 COLOSTOMY PRESENT (HCC): ICD-10-CM

## 2019-04-10 PROCEDURE — 6360000002 HC RX W HCPCS: Performed by: NURSE ANESTHETIST, CERTIFIED REGISTERED

## 2019-04-10 PROCEDURE — 2500000003 HC RX 250 WO HCPCS: Performed by: SURGERY

## 2019-04-10 PROCEDURE — 7100000001 HC PACU RECOVERY - ADDTL 15 MIN: Performed by: SURGERY

## 2019-04-10 PROCEDURE — 3700000000 HC ANESTHESIA ATTENDED CARE: Performed by: SURGERY

## 2019-04-10 PROCEDURE — 0DN84ZZ RELEASE SMALL INTESTINE, PERCUTANEOUS ENDOSCOPIC APPROACH: ICD-10-PCS | Performed by: SURGERY

## 2019-04-10 PROCEDURE — 44626 REPAIR BOWEL OPENING: CPT | Performed by: SURGERY

## 2019-04-10 PROCEDURE — 6360000002 HC RX W HCPCS: Performed by: SURGERY

## 2019-04-10 PROCEDURE — 0DB80ZZ EXCISION OF SMALL INTESTINE, OPEN APPROACH: ICD-10-PCS | Performed by: SURGERY

## 2019-04-10 PROCEDURE — 2720000010 HC SURG SUPPLY STERILE: Performed by: SURGERY

## 2019-04-10 PROCEDURE — 6370000000 HC RX 637 (ALT 250 FOR IP): Performed by: SURGERY

## 2019-04-10 PROCEDURE — 2580000003 HC RX 258: Performed by: SURGERY

## 2019-04-10 PROCEDURE — 2500000003 HC RX 250 WO HCPCS: Performed by: NURSE ANESTHETIST, CERTIFIED REGISTERED

## 2019-04-10 PROCEDURE — 94760 N-INVAS EAR/PLS OXIMETRY 1: CPT

## 2019-04-10 PROCEDURE — 2580000003 HC RX 258: Performed by: ANESTHESIOLOGY

## 2019-04-10 PROCEDURE — 2500000003 HC RX 250 WO HCPCS

## 2019-04-10 PROCEDURE — 7100000000 HC PACU RECOVERY - FIRST 15 MIN: Performed by: SURGERY

## 2019-04-10 PROCEDURE — 0DBM0ZZ EXCISION OF DESCENDING COLON, OPEN APPROACH: ICD-10-PCS | Performed by: SURGERY

## 2019-04-10 PROCEDURE — 88302 TISSUE EXAM BY PATHOLOGIST: CPT

## 2019-04-10 PROCEDURE — 3600000005 HC SURGERY LEVEL 5 BASE: Performed by: SURGERY

## 2019-04-10 PROCEDURE — 44120 REMOVAL OF SMALL INTESTINE: CPT | Performed by: SURGERY

## 2019-04-10 PROCEDURE — 2709999900 HC NON-CHARGEABLE SUPPLY: Performed by: SURGERY

## 2019-04-10 PROCEDURE — 1200000000 HC SEMI PRIVATE

## 2019-04-10 PROCEDURE — 3600000015 HC SURGERY LEVEL 5 ADDTL 15MIN: Performed by: SURGERY

## 2019-04-10 PROCEDURE — 2700000000 HC OXYGEN THERAPY PER DAY

## 2019-04-10 PROCEDURE — C9290 INJ, BUPIVACAINE LIPOSOME: HCPCS | Performed by: SURGERY

## 2019-04-10 PROCEDURE — 88304 TISSUE EXAM BY PATHOLOGIST: CPT

## 2019-04-10 PROCEDURE — 3700000001 HC ADD 15 MINUTES (ANESTHESIA): Performed by: SURGERY

## 2019-04-10 RX ORDER — THIAMINE MONONITRATE (VIT B1) 100 MG
100 TABLET ORAL DAILY
Status: DISCONTINUED | OUTPATIENT
Start: 2019-04-11 | End: 2019-04-18 | Stop reason: HOSPADM

## 2019-04-10 RX ORDER — DEXTROSE, SODIUM CHLORIDE, AND POTASSIUM CHLORIDE 5; .45; .15 G/100ML; G/100ML; G/100ML
INJECTION INTRAVENOUS CONTINUOUS
Status: DISCONTINUED | OUTPATIENT
Start: 2019-04-10 | End: 2019-04-15

## 2019-04-10 RX ORDER — FENTANYL CITRATE 50 UG/ML
50 INJECTION, SOLUTION INTRAMUSCULAR; INTRAVENOUS EVERY 5 MIN PRN
Status: DISCONTINUED | OUTPATIENT
Start: 2019-04-10 | End: 2019-04-10 | Stop reason: HOSPADM

## 2019-04-10 RX ORDER — MAGNESIUM HYDROXIDE 1200 MG/15ML
LIQUID ORAL CONTINUOUS PRN
Status: COMPLETED | OUTPATIENT
Start: 2019-04-10 | End: 2019-04-10

## 2019-04-10 RX ORDER — PROPOFOL 10 MG/ML
INJECTION, EMULSION INTRAVENOUS PRN
Status: DISCONTINUED | OUTPATIENT
Start: 2019-04-10 | End: 2019-04-10 | Stop reason: SDUPTHER

## 2019-04-10 RX ORDER — ACETAMINOPHEN 325 MG/1
650 TABLET ORAL EVERY 6 HOURS
Status: DISCONTINUED | OUTPATIENT
Start: 2019-04-10 | End: 2019-04-18 | Stop reason: HOSPADM

## 2019-04-10 RX ORDER — ATROPINE SULFATE 1 MG/ML
INJECTION, SOLUTION INTRAMUSCULAR; INTRAVENOUS; SUBCUTANEOUS PRN
Status: DISCONTINUED | OUTPATIENT
Start: 2019-04-10 | End: 2019-04-10 | Stop reason: SDUPTHER

## 2019-04-10 RX ORDER — ONDANSETRON 2 MG/ML
4 INJECTION INTRAMUSCULAR; INTRAVENOUS EVERY 6 HOURS PRN
Status: DISCONTINUED | OUTPATIENT
Start: 2019-04-10 | End: 2019-04-18 | Stop reason: HOSPADM

## 2019-04-10 RX ORDER — FENTANYL CITRATE 50 UG/ML
INJECTION, SOLUTION INTRAMUSCULAR; INTRAVENOUS PRN
Status: DISCONTINUED | OUTPATIENT
Start: 2019-04-10 | End: 2019-04-10 | Stop reason: SDUPTHER

## 2019-04-10 RX ORDER — POTASSIUM CHLORIDE 7.45 MG/ML
10 INJECTION INTRAVENOUS PRN
Status: DISCONTINUED | OUTPATIENT
Start: 2019-04-10 | End: 2019-04-18 | Stop reason: HOSPADM

## 2019-04-10 RX ORDER — VECURONIUM BROMIDE 1 MG/ML
INJECTION, POWDER, LYOPHILIZED, FOR SOLUTION INTRAVENOUS PRN
Status: DISCONTINUED | OUTPATIENT
Start: 2019-04-10 | End: 2019-04-10 | Stop reason: SDUPTHER

## 2019-04-10 RX ORDER — LORAZEPAM 2 MG/ML
1 INJECTION INTRAMUSCULAR
Status: DISCONTINUED | OUTPATIENT
Start: 2019-04-10 | End: 2019-04-10 | Stop reason: HOSPADM

## 2019-04-10 RX ORDER — ONDANSETRON 2 MG/ML
INJECTION INTRAMUSCULAR; INTRAVENOUS PRN
Status: DISCONTINUED | OUTPATIENT
Start: 2019-04-10 | End: 2019-04-10 | Stop reason: SDUPTHER

## 2019-04-10 RX ORDER — OXYCODONE HYDROCHLORIDE 5 MG/1
5 TABLET ORAL EVERY 4 HOURS PRN
Status: DISCONTINUED | OUTPATIENT
Start: 2019-04-10 | End: 2019-04-18 | Stop reason: HOSPADM

## 2019-04-10 RX ORDER — LIDOCAINE HYDROCHLORIDE 20 MG/ML
INJECTION, SOLUTION EPIDURAL; INFILTRATION; INTRACAUDAL; PERINEURAL PRN
Status: DISCONTINUED | OUTPATIENT
Start: 2019-04-10 | End: 2019-04-10 | Stop reason: SDUPTHER

## 2019-04-10 RX ORDER — PHENYLEPHRINE HCL IN 0.9% NACL 1 MG/10 ML
SYRINGE (ML) INTRAVENOUS PRN
Status: DISCONTINUED | OUTPATIENT
Start: 2019-04-10 | End: 2019-04-10 | Stop reason: SDUPTHER

## 2019-04-10 RX ORDER — MIDAZOLAM HYDROCHLORIDE 1 MG/ML
INJECTION INTRAMUSCULAR; INTRAVENOUS PRN
Status: DISCONTINUED | OUTPATIENT
Start: 2019-04-10 | End: 2019-04-10 | Stop reason: SDUPTHER

## 2019-04-10 RX ORDER — HEPARIN SODIUM 5000 [USP'U]/ML
5000 INJECTION, SOLUTION INTRAVENOUS; SUBCUTANEOUS ONCE
Status: COMPLETED | OUTPATIENT
Start: 2019-04-10 | End: 2019-04-10

## 2019-04-10 RX ORDER — DOCUSATE SODIUM 100 MG/1
100 CAPSULE, LIQUID FILLED ORAL 2 TIMES DAILY
Status: DISCONTINUED | OUTPATIENT
Start: 2019-04-10 | End: 2019-04-18 | Stop reason: HOSPADM

## 2019-04-10 RX ORDER — CHOLECALCIFEROL (VITAMIN D3) 125 MCG
500 CAPSULE ORAL DAILY
Status: DISCONTINUED | OUTPATIENT
Start: 2019-04-11 | End: 2019-04-18 | Stop reason: HOSPADM

## 2019-04-10 RX ORDER — FOLIC ACID 1 MG/1
1 TABLET ORAL DAILY
Status: DISCONTINUED | OUTPATIENT
Start: 2019-04-11 | End: 2019-04-18 | Stop reason: HOSPADM

## 2019-04-10 RX ORDER — SODIUM CHLORIDE 0.9 % (FLUSH) 0.9 %
10 SYRINGE (ML) INJECTION PRN
Status: DISCONTINUED | OUTPATIENT
Start: 2019-04-10 | End: 2019-04-10 | Stop reason: HOSPADM

## 2019-04-10 RX ORDER — MAGNESIUM SULFATE 1 G/100ML
1 INJECTION INTRAVENOUS PRN
Status: DISCONTINUED | OUTPATIENT
Start: 2019-04-10 | End: 2019-04-18 | Stop reason: HOSPADM

## 2019-04-10 RX ORDER — SODIUM CHLORIDE 0.9 % (FLUSH) 0.9 %
10 SYRINGE (ML) INJECTION EVERY 12 HOURS SCHEDULED
Status: DISCONTINUED | OUTPATIENT
Start: 2019-04-10 | End: 2019-04-18 | Stop reason: HOSPADM

## 2019-04-10 RX ORDER — SODIUM CHLORIDE 0.9 % (FLUSH) 0.9 %
10 SYRINGE (ML) INJECTION PRN
Status: DISCONTINUED | OUTPATIENT
Start: 2019-04-10 | End: 2019-04-18 | Stop reason: HOSPADM

## 2019-04-10 RX ORDER — DEXAMETHASONE SODIUM PHOSPHATE 4 MG/ML
INJECTION, SOLUTION INTRA-ARTICULAR; INTRALESIONAL; INTRAMUSCULAR; INTRAVENOUS; SOFT TISSUE PRN
Status: DISCONTINUED | OUTPATIENT
Start: 2019-04-10 | End: 2019-04-10 | Stop reason: SDUPTHER

## 2019-04-10 RX ORDER — SODIUM CHLORIDE 0.9 % (FLUSH) 0.9 %
10 SYRINGE (ML) INJECTION EVERY 12 HOURS SCHEDULED
Status: DISCONTINUED | OUTPATIENT
Start: 2019-04-10 | End: 2019-04-10 | Stop reason: HOSPADM

## 2019-04-10 RX ORDER — OXYCODONE HYDROCHLORIDE 10 MG/1
10 TABLET ORAL EVERY 4 HOURS PRN
Status: DISCONTINUED | OUTPATIENT
Start: 2019-04-10 | End: 2019-04-18 | Stop reason: HOSPADM

## 2019-04-10 RX ORDER — ALBUTEROL SULFATE 90 UG/1
2 AEROSOL, METERED RESPIRATORY (INHALATION) EVERY 6 HOURS PRN
Status: DISCONTINUED | OUTPATIENT
Start: 2019-04-10 | End: 2019-04-18 | Stop reason: HOSPADM

## 2019-04-10 RX ORDER — FENTANYL CITRATE 50 UG/ML
25 INJECTION, SOLUTION INTRAMUSCULAR; INTRAVENOUS EVERY 5 MIN PRN
Status: DISCONTINUED | OUTPATIENT
Start: 2019-04-10 | End: 2019-04-10 | Stop reason: HOSPADM

## 2019-04-10 RX ORDER — SUCCINYLCHOLINE/SOD CL,ISO/PF 200MG/10ML
SYRINGE (ML) INTRAVENOUS PRN
Status: DISCONTINUED | OUTPATIENT
Start: 2019-04-10 | End: 2019-04-10 | Stop reason: SDUPTHER

## 2019-04-10 RX ORDER — LANOLIN ALCOHOL/MO/W.PET/CERES
100 CREAM (GRAM) TOPICAL DAILY
Status: DISCONTINUED | OUTPATIENT
Start: 2019-04-11 | End: 2019-04-18 | Stop reason: HOSPADM

## 2019-04-10 RX ORDER — GLYCOPYRROLATE 0.2 MG/ML
INJECTION INTRAMUSCULAR; INTRAVENOUS PRN
Status: DISCONTINUED | OUTPATIENT
Start: 2019-04-10 | End: 2019-04-10 | Stop reason: SDUPTHER

## 2019-04-10 RX ORDER — PANTOPRAZOLE SODIUM 40 MG/1
40 TABLET, DELAYED RELEASE ORAL DAILY
Status: DISCONTINUED | OUTPATIENT
Start: 2019-04-11 | End: 2019-04-11

## 2019-04-10 RX ORDER — SODIUM CHLORIDE 9 MG/ML
INJECTION, SOLUTION INTRAVENOUS CONTINUOUS
Status: DISCONTINUED | OUTPATIENT
Start: 2019-04-10 | End: 2019-04-10

## 2019-04-10 RX ORDER — ONDANSETRON 2 MG/ML
4 INJECTION INTRAMUSCULAR; INTRAVENOUS
Status: DISCONTINUED | OUTPATIENT
Start: 2019-04-10 | End: 2019-04-10 | Stop reason: HOSPADM

## 2019-04-10 RX ADMIN — ONDANSETRON 4 MG: 2 INJECTION INTRAMUSCULAR; INTRAVENOUS at 15:25

## 2019-04-10 RX ADMIN — Medication 2 G: at 09:19

## 2019-04-10 RX ADMIN — SODIUM CHLORIDE: 9 INJECTION, SOLUTION INTRAVENOUS at 11:00

## 2019-04-10 RX ADMIN — Medication 2 G: at 19:35

## 2019-04-10 RX ADMIN — HYDROMORPHONE HYDROCHLORIDE 0.5 MG: 1 INJECTION, SOLUTION INTRAMUSCULAR; INTRAVENOUS; SUBCUTANEOUS at 18:28

## 2019-04-10 RX ADMIN — ATROPINE SULFATE 0.5 MG: 1 INJECTION, SOLUTION INTRAMUSCULAR; INTRAVENOUS; SUBCUTANEOUS at 09:48

## 2019-04-10 RX ADMIN — HYDROMORPHONE HYDROCHLORIDE 0.5 MG: 1 INJECTION, SOLUTION INTRAMUSCULAR; INTRAVENOUS; SUBCUTANEOUS at 15:52

## 2019-04-10 RX ADMIN — METRONIDAZOLE 500 MG: 500 INJECTION, SOLUTION INTRAVENOUS at 08:44

## 2019-04-10 RX ADMIN — POTASSIUM CHLORIDE, DEXTROSE MONOHYDRATE AND SODIUM CHLORIDE: 150; 5; 450 INJECTION, SOLUTION INTRAVENOUS at 19:35

## 2019-04-10 RX ADMIN — DOCUSATE SODIUM 100 MG: 100 CAPSULE, LIQUID FILLED ORAL at 21:30

## 2019-04-10 RX ADMIN — Medication 100 MCG: at 11:00

## 2019-04-10 RX ADMIN — HYDROMORPHONE HYDROCHLORIDE 1 MG: 1 INJECTION, SOLUTION INTRAMUSCULAR; INTRAVENOUS; SUBCUTANEOUS at 21:41

## 2019-04-10 RX ADMIN — VECURONIUM BROMIDE 3 MG: 1 INJECTION, POWDER, LYOPHILIZED, FOR SOLUTION INTRAVENOUS at 10:48

## 2019-04-10 RX ADMIN — FENTANYL CITRATE 50 MCG: 50 INJECTION INTRAMUSCULAR; INTRAVENOUS at 13:38

## 2019-04-10 RX ADMIN — VECURONIUM BROMIDE 2 MG: 1 INJECTION, POWDER, LYOPHILIZED, FOR SOLUTION INTRAVENOUS at 09:59

## 2019-04-10 RX ADMIN — VECURONIUM BROMIDE 2 MG: 1 INJECTION, POWDER, LYOPHILIZED, FOR SOLUTION INTRAVENOUS at 13:17

## 2019-04-10 RX ADMIN — Medication 100 MCG: at 10:17

## 2019-04-10 RX ADMIN — GLYCOPYRROLATE 0.2 MG: 0.2 INJECTION, SOLUTION INTRAMUSCULAR; INTRAVENOUS at 09:47

## 2019-04-10 RX ADMIN — SODIUM CHLORIDE: 9 INJECTION, SOLUTION INTRAVENOUS at 08:44

## 2019-04-10 RX ADMIN — VECURONIUM BROMIDE 2 MG: 1 INJECTION, POWDER, LYOPHILIZED, FOR SOLUTION INTRAVENOUS at 12:28

## 2019-04-10 RX ADMIN — FENTANYL CITRATE 50 MCG: 50 INJECTION INTRAMUSCULAR; INTRAVENOUS at 13:45

## 2019-04-10 RX ADMIN — HYDROMORPHONE HYDROCHLORIDE 0.5 MG: 1 INJECTION, SOLUTION INTRAMUSCULAR; INTRAVENOUS; SUBCUTANEOUS at 12:05

## 2019-04-10 RX ADMIN — FENTANYL CITRATE 100 MCG: 50 INJECTION INTRAMUSCULAR; INTRAVENOUS at 09:15

## 2019-04-10 RX ADMIN — DEXAMETHASONE SODIUM PHOSPHATE 4 MG: 4 INJECTION, SOLUTION INTRAMUSCULAR; INTRAVENOUS at 09:31

## 2019-04-10 RX ADMIN — OXYCODONE HYDROCHLORIDE 10 MG: 10 TABLET ORAL at 19:40

## 2019-04-10 RX ADMIN — VECURONIUM BROMIDE 2 MG: 1 INJECTION, POWDER, LYOPHILIZED, FOR SOLUTION INTRAVENOUS at 11:28

## 2019-04-10 RX ADMIN — METRONIDAZOLE 500 MG: 500 INJECTION, SOLUTION INTRAVENOUS at 18:28

## 2019-04-10 RX ADMIN — Medication 100 MCG: at 10:40

## 2019-04-10 RX ADMIN — HYDROMORPHONE HYDROCHLORIDE 0.5 MG: 1 INJECTION, SOLUTION INTRAMUSCULAR; INTRAVENOUS; SUBCUTANEOUS at 15:01

## 2019-04-10 RX ADMIN — ACETAMINOPHEN 650 MG: 325 TABLET ORAL at 18:28

## 2019-04-10 RX ADMIN — Medication 100 MCG: at 10:26

## 2019-04-10 RX ADMIN — PROPOFOL 120 MG: 10 INJECTION, EMULSION INTRAVENOUS at 09:15

## 2019-04-10 RX ADMIN — Medication 100 MCG: at 10:53

## 2019-04-10 RX ADMIN — LIDOCAINE HYDROCHLORIDE 60 MG: 20 INJECTION, SOLUTION EPIDURAL; INFILTRATION; INTRACAUDAL; PERINEURAL at 09:15

## 2019-04-10 RX ADMIN — Medication 50 MCG: at 11:12

## 2019-04-10 RX ADMIN — SODIUM CHLORIDE: 9 INJECTION, SOLUTION INTRAVENOUS at 14:12

## 2019-04-10 RX ADMIN — Medication 100 MCG: at 10:01

## 2019-04-10 RX ADMIN — SODIUM CHLORIDE, PRESERVATIVE FREE 10 ML: 5 INJECTION INTRAVENOUS at 09:00

## 2019-04-10 RX ADMIN — MIDAZOLAM 2 MG: 1 INJECTION INTRAMUSCULAR; INTRAVENOUS at 09:11

## 2019-04-10 RX ADMIN — Medication 100 MG: at 09:15

## 2019-04-10 RX ADMIN — VECURONIUM BROMIDE 2 MG: 1 INJECTION, POWDER, LYOPHILIZED, FOR SOLUTION INTRAVENOUS at 14:30

## 2019-04-10 RX ADMIN — VECURONIUM BROMIDE 2 MG: 1 INJECTION, POWDER, LYOPHILIZED, FOR SOLUTION INTRAVENOUS at 12:06

## 2019-04-10 RX ADMIN — VECURONIUM BROMIDE 2 MG: 1 INJECTION, POWDER, LYOPHILIZED, FOR SOLUTION INTRAVENOUS at 15:03

## 2019-04-10 RX ADMIN — SUGAMMADEX 200 MG: 100 INJECTION, SOLUTION INTRAVENOUS at 15:53

## 2019-04-10 RX ADMIN — HEPARIN SODIUM 5000 UNITS: 5000 INJECTION INTRAVENOUS; SUBCUTANEOUS at 08:45

## 2019-04-10 RX ADMIN — HYDROMORPHONE HYDROCHLORIDE 0.5 MG: 1 INJECTION, SOLUTION INTRAMUSCULAR; INTRAVENOUS; SUBCUTANEOUS at 12:27

## 2019-04-10 RX ADMIN — VECURONIUM BROMIDE 5 MG: 1 INJECTION, POWDER, LYOPHILIZED, FOR SOLUTION INTRAVENOUS at 09:25

## 2019-04-10 ASSESSMENT — PAIN SCALES - GENERAL
PAINLEVEL_OUTOF10: 0
PAINLEVEL_OUTOF10: 8
PAINLEVEL_OUTOF10: 6
PAINLEVEL_OUTOF10: 10
PAINLEVEL_OUTOF10: 8

## 2019-04-10 ASSESSMENT — PULMONARY FUNCTION TESTS
PIF_VALUE: 38
PIF_VALUE: 31
PIF_VALUE: 27
PIF_VALUE: 21
PIF_VALUE: 28
PIF_VALUE: 27
PIF_VALUE: 31
PIF_VALUE: 27
PIF_VALUE: 27
PIF_VALUE: 32
PIF_VALUE: 28
PIF_VALUE: 21
PIF_VALUE: 39
PIF_VALUE: 27
PIF_VALUE: 29
PIF_VALUE: 27
PIF_VALUE: 38
PIF_VALUE: 28
PIF_VALUE: 38
PIF_VALUE: 20
PIF_VALUE: 27
PIF_VALUE: 33
PIF_VALUE: 31
PIF_VALUE: 36
PIF_VALUE: 30
PIF_VALUE: 30
PIF_VALUE: 27
PIF_VALUE: 33
PIF_VALUE: 29
PIF_VALUE: 38
PIF_VALUE: 31
PIF_VALUE: 29
PIF_VALUE: 33
PIF_VALUE: 28
PIF_VALUE: 31
PIF_VALUE: 30
PIF_VALUE: 28
PIF_VALUE: 30
PIF_VALUE: 33
PIF_VALUE: 29
PIF_VALUE: 38
PIF_VALUE: 38
PIF_VALUE: 37
PIF_VALUE: 25
PIF_VALUE: 30
PIF_VALUE: 27
PIF_VALUE: 31
PIF_VALUE: 38
PIF_VALUE: 29
PIF_VALUE: 32
PIF_VALUE: 27
PIF_VALUE: 33
PIF_VALUE: 27
PIF_VALUE: 36
PIF_VALUE: 27
PIF_VALUE: 31
PIF_VALUE: 27
PIF_VALUE: 30
PIF_VALUE: 39
PIF_VALUE: 33
PIF_VALUE: 19
PIF_VALUE: 31
PIF_VALUE: 20
PIF_VALUE: 20
PIF_VALUE: 31
PIF_VALUE: 21
PIF_VALUE: 20
PIF_VALUE: 32
PIF_VALUE: 27
PIF_VALUE: 38
PIF_VALUE: 35
PIF_VALUE: 21
PIF_VALUE: 31
PIF_VALUE: 27
PIF_VALUE: 38
PIF_VALUE: 38
PIF_VALUE: 37
PIF_VALUE: 29
PIF_VALUE: 26
PIF_VALUE: 27
PIF_VALUE: 35
PIF_VALUE: 38
PIF_VALUE: 20
PIF_VALUE: 36
PIF_VALUE: 31
PIF_VALUE: 28
PIF_VALUE: 38
PIF_VALUE: 38
PIF_VALUE: 28
PIF_VALUE: 35
PIF_VALUE: 20
PIF_VALUE: 27
PIF_VALUE: 27
PIF_VALUE: 32
PIF_VALUE: 27
PIF_VALUE: 29
PIF_VALUE: 35
PIF_VALUE: 27
PIF_VALUE: 4
PIF_VALUE: 27
PIF_VALUE: 38
PIF_VALUE: 24
PIF_VALUE: 38
PIF_VALUE: 34
PIF_VALUE: 30
PIF_VALUE: 27
PIF_VALUE: 34
PIF_VALUE: 29
PIF_VALUE: 27
PIF_VALUE: 21
PIF_VALUE: 21
PIF_VALUE: 34
PIF_VALUE: 29
PIF_VALUE: 31
PIF_VALUE: 27
PIF_VALUE: 31
PIF_VALUE: 23
PIF_VALUE: 35
PIF_VALUE: 29
PIF_VALUE: 27
PIF_VALUE: 31
PIF_VALUE: 38
PIF_VALUE: 24
PIF_VALUE: 20
PIF_VALUE: 31
PIF_VALUE: 28
PIF_VALUE: 33
PIF_VALUE: 34
PIF_VALUE: 38
PIF_VALUE: 24
PIF_VALUE: 28
PIF_VALUE: 30
PIF_VALUE: 27
PIF_VALUE: 31
PIF_VALUE: 31
PIF_VALUE: 23
PIF_VALUE: 24
PIF_VALUE: 21
PIF_VALUE: 21
PIF_VALUE: 23
PIF_VALUE: 28
PIF_VALUE: 28
PIF_VALUE: 38
PIF_VALUE: 9
PIF_VALUE: 38
PIF_VALUE: 38
PIF_VALUE: 33
PIF_VALUE: 31
PIF_VALUE: 29
PIF_VALUE: 33
PIF_VALUE: 38
PIF_VALUE: 28
PIF_VALUE: 29
PIF_VALUE: 23
PIF_VALUE: 34
PIF_VALUE: 35
PIF_VALUE: 31
PIF_VALUE: 31
PIF_VALUE: 38
PIF_VALUE: 27
PIF_VALUE: 37
PIF_VALUE: 21
PIF_VALUE: 24
PIF_VALUE: 31
PIF_VALUE: 38
PIF_VALUE: 38
PIF_VALUE: 31
PIF_VALUE: 24
PIF_VALUE: 33
PIF_VALUE: 37
PIF_VALUE: 32
PIF_VALUE: 31
PIF_VALUE: 38
PIF_VALUE: 27
PIF_VALUE: 30
PIF_VALUE: 27
PIF_VALUE: 25
PIF_VALUE: 29
PIF_VALUE: 38
PIF_VALUE: 31
PIF_VALUE: 26
PIF_VALUE: 39
PIF_VALUE: 29
PIF_VALUE: 28
PIF_VALUE: 38
PIF_VALUE: 27
PIF_VALUE: 34
PIF_VALUE: 31
PIF_VALUE: 31
PIF_VALUE: 38
PIF_VALUE: 1
PIF_VALUE: 33
PIF_VALUE: 27
PIF_VALUE: 38
PIF_VALUE: 38
PIF_VALUE: 27
PIF_VALUE: 27
PIF_VALUE: 34
PIF_VALUE: 38
PIF_VALUE: 32
PIF_VALUE: 30
PIF_VALUE: 0
PIF_VALUE: 32
PIF_VALUE: 28
PIF_VALUE: 27
PIF_VALUE: 37
PIF_VALUE: 33
PIF_VALUE: 27
PIF_VALUE: 28
PIF_VALUE: 28
PIF_VALUE: 27
PIF_VALUE: 29
PIF_VALUE: 27
PIF_VALUE: 36
PIF_VALUE: 20
PIF_VALUE: 39
PIF_VALUE: 30
PIF_VALUE: 24
PIF_VALUE: 21
PIF_VALUE: 31
PIF_VALUE: 24
PIF_VALUE: 34
PIF_VALUE: 31
PIF_VALUE: 27
PIF_VALUE: 27
PIF_VALUE: 30
PIF_VALUE: 34
PIF_VALUE: 0
PIF_VALUE: 33
PIF_VALUE: 28
PIF_VALUE: 29
PIF_VALUE: 34
PIF_VALUE: 29
PIF_VALUE: 21
PIF_VALUE: 29
PIF_VALUE: 34
PIF_VALUE: 33
PIF_VALUE: 22
PIF_VALUE: 21
PIF_VALUE: 27
PIF_VALUE: 34
PIF_VALUE: 20
PIF_VALUE: 32
PIF_VALUE: 37
PIF_VALUE: 32
PIF_VALUE: 27
PIF_VALUE: 38
PIF_VALUE: 0
PIF_VALUE: 31
PIF_VALUE: 29
PIF_VALUE: 31
PIF_VALUE: 39
PIF_VALUE: 27
PIF_VALUE: 32
PIF_VALUE: 24
PIF_VALUE: 29
PIF_VALUE: 33
PIF_VALUE: 27
PIF_VALUE: 38
PIF_VALUE: 28
PIF_VALUE: 38
PIF_VALUE: 29
PIF_VALUE: 32
PIF_VALUE: 27
PIF_VALUE: 28
PIF_VALUE: 27
PIF_VALUE: 27
PIF_VALUE: 29
PIF_VALUE: 38
PIF_VALUE: 31
PIF_VALUE: 35
PIF_VALUE: 39
PIF_VALUE: 27
PIF_VALUE: 35
PIF_VALUE: 38
PIF_VALUE: 33
PIF_VALUE: 0
PIF_VALUE: 31
PIF_VALUE: 35
PIF_VALUE: 29
PIF_VALUE: 29
PIF_VALUE: 20
PIF_VALUE: 29
PIF_VALUE: 34
PIF_VALUE: 28
PIF_VALUE: 27
PIF_VALUE: 20
PIF_VALUE: 30
PIF_VALUE: 27
PIF_VALUE: 31
PIF_VALUE: 26
PIF_VALUE: 37
PIF_VALUE: 27
PIF_VALUE: 39
PIF_VALUE: 32
PIF_VALUE: 21
PIF_VALUE: 27
PIF_VALUE: 21
PIF_VALUE: 30
PIF_VALUE: 28
PIF_VALUE: 30
PIF_VALUE: 27
PIF_VALUE: 30
PIF_VALUE: 38
PIF_VALUE: 31
PIF_VALUE: 20
PIF_VALUE: 31
PIF_VALUE: 30
PIF_VALUE: 38
PIF_VALUE: 37
PIF_VALUE: 33
PIF_VALUE: 33
PIF_VALUE: 27
PIF_VALUE: 38
PIF_VALUE: 38
PIF_VALUE: 29
PIF_VALUE: 28
PIF_VALUE: 33
PIF_VALUE: 38
PIF_VALUE: 17
PIF_VALUE: 27
PIF_VALUE: 0
PIF_VALUE: 27
PIF_VALUE: 38
PIF_VALUE: 31
PIF_VALUE: 27
PIF_VALUE: 32
PIF_VALUE: 33
PIF_VALUE: 27
PIF_VALUE: 1
PIF_VALUE: 31
PIF_VALUE: 32
PIF_VALUE: 29
PIF_VALUE: 38
PIF_VALUE: 27
PIF_VALUE: 29
PIF_VALUE: 31
PIF_VALUE: 35
PIF_VALUE: 39
PIF_VALUE: 39
PIF_VALUE: 25
PIF_VALUE: 24
PIF_VALUE: 35
PIF_VALUE: 22
PIF_VALUE: 30
PIF_VALUE: 28
PIF_VALUE: 33
PIF_VALUE: 29
PIF_VALUE: 12
PIF_VALUE: 26
PIF_VALUE: 23
PIF_VALUE: 33
PIF_VALUE: 38
PIF_VALUE: 31
PIF_VALUE: 25
PIF_VALUE: 33
PIF_VALUE: 30
PIF_VALUE: 28
PIF_VALUE: 27
PIF_VALUE: 39
PIF_VALUE: 24
PIF_VALUE: 31
PIF_VALUE: 34
PIF_VALUE: 29
PIF_VALUE: 31
PIF_VALUE: 38
PIF_VALUE: 29
PIF_VALUE: 30
PIF_VALUE: 31
PIF_VALUE: 33
PIF_VALUE: 38
PIF_VALUE: 32
PIF_VALUE: 32
PIF_VALUE: 29
PIF_VALUE: 28
PIF_VALUE: 4
PIF_VALUE: 31
PIF_VALUE: 29
PIF_VALUE: 28
PIF_VALUE: 30
PIF_VALUE: 27
PIF_VALUE: 38
PIF_VALUE: 31
PIF_VALUE: 27
PIF_VALUE: 24
PIF_VALUE: 31
PIF_VALUE: 32
PIF_VALUE: 31
PIF_VALUE: 27
PIF_VALUE: 29
PIF_VALUE: 27
PIF_VALUE: 31
PIF_VALUE: 24
PIF_VALUE: 21
PIF_VALUE: 24
PIF_VALUE: 31
PIF_VALUE: 28
PIF_VALUE: 20
PIF_VALUE: 31
PIF_VALUE: 20
PIF_VALUE: 23
PIF_VALUE: 39
PIF_VALUE: 31
PIF_VALUE: 27
PIF_VALUE: 34
PIF_VALUE: 38
PIF_VALUE: 33
PIF_VALUE: 32
PIF_VALUE: 31
PIF_VALUE: 33
PIF_VALUE: 27
PIF_VALUE: 27

## 2019-04-10 ASSESSMENT — PAIN - FUNCTIONAL ASSESSMENT
PAIN_FUNCTIONAL_ASSESSMENT: ACTIVITIES ARE NOT PREVENTED
PAIN_FUNCTIONAL_ASSESSMENT: PREVENTS OR INTERFERES WITH MANY ACTIVE NOT PASSIVE ACTIVITIES
PAIN_FUNCTIONAL_ASSESSMENT: 0-10

## 2019-04-10 ASSESSMENT — PAIN DESCRIPTION - LOCATION
LOCATION: ABDOMEN
LOCATION: ABDOMEN

## 2019-04-10 ASSESSMENT — PAIN DESCRIPTION - FREQUENCY
FREQUENCY: CONTINUOUS
FREQUENCY: INTERMITTENT

## 2019-04-10 ASSESSMENT — PAIN DESCRIPTION - ORIENTATION
ORIENTATION: LEFT
ORIENTATION: ANTERIOR

## 2019-04-10 ASSESSMENT — PAIN DESCRIPTION - PROGRESSION
CLINICAL_PROGRESSION: NOT CHANGED

## 2019-04-10 ASSESSMENT — PAIN DESCRIPTION - ONSET
ONSET: ON-GOING
ONSET: ON-GOING

## 2019-04-10 ASSESSMENT — PAIN DESCRIPTION - DESCRIPTORS
DESCRIPTORS: DISCOMFORT
DESCRIPTORS: SHARP;SHOOTING

## 2019-04-10 ASSESSMENT — PAIN DESCRIPTION - PAIN TYPE
TYPE: SURGICAL PAIN
TYPE: SURGICAL PAIN

## 2019-04-10 ASSESSMENT — COPD QUESTIONNAIRES: CAT_SEVERITY: MODERATE

## 2019-04-10 ASSESSMENT — LIFESTYLE VARIABLES: SMOKING_STATUS: 1

## 2019-04-10 NOTE — PROGRESS NOTES
Pt states she does not want fentanyl used under any circumstances.  at bedside to pray with pt before procedure.

## 2019-04-10 NOTE — FLOWSHEET NOTE
Pt expressed concern about making it through surgery. 04/10/19 1035   Encounter Summary   Services provided to: Patient;Significant other   Referral/Consult From: Other    Support System Significant other;Children   Continue Visiting Yes  (visit, dell schreiber 4/10 CL**)   Complexity of Encounter High   Length of Encounter 15 minutes   Spiritual/Mandaeism   Type Spiritual support   Assessment Approachable; Anxious   Intervention Active listening;Explored feelings, thoughts, concerns;Prayer;Discussed death;Discussed relationship with God;Discussed belief system/Rastafarian practices/ronan;Discussed illness/injury and it's impact   Outcome Engaged in conversation;Coping;Less anxious, less agitated   Electronically signed by Kem Clark on 4/10/2019 at 10:40 AM

## 2019-04-10 NOTE — ANESTHESIA POSTPROCEDURE EVALUATION
[facility] Department of Anesthesiology  Post-Anesthesia Note       Name:  Caro Landis                                  Age:  69 y.o. MRN:  [id number]     Last Vitals & Oxygen Saturation: BP (!) 114/50   Pulse 99   Temp 97.7 °F (36.5 °C) (Temporal)   Resp 20   Ht 5' 1\" (1.549 m)   Wt 136 lb 11 oz (62 kg)   SpO2 97%   BMI 25.83 kg/m²   Patient Vitals for the past 4 hrs:   BP Temp Temp src Pulse Resp SpO2   04/10/19 1720 -- -- -- -- -- 97 %   04/10/19 1616 (!) 114/50 -- -- 99 20 99 %   04/10/19 1611 (!) 113/55 -- -- 100 20 98 %   04/10/19 1609 (!) 116/48 -- -- 99 21 98 %   04/10/19 1607 (!) 113/55 97.7 °F (36.5 °C) Temporal 98 20 98 %       Level of consciousness:  Awake, alert to baseline    Respiratory: Respirations easy, no distress. Stable. Cardiovascular: Hemodynamically stable. Hydration: Adequate. PONV: Adequately managed. Post-op pain: Adequately controlled. Post-op assessment: Tolerated anesthetic well without complication. Complications:  None.     Héctor Nguyen MD  April 10, 2019   5:26 PM

## 2019-04-10 NOTE — ANESTHESIA PRE PROCEDURE
Department of Anesthesiology  Preprocedure Note       Name:  Thiago Joseph   Age:  69 y.o.  :  1945                                          MRN:  [id number]         Date:  4/10/2019      Surgeon: Elizaebth Rubio):  Rommie Dakin, MD    Procedure: LAPAROSCOPIC COLOSTOMY REVERSAL, POSSIBLE OPEN (N/A )    Medications prior to admission:   Prior to Admission medications    Medication Sig Start Date End Date Taking?  Authorizing Provider   OXYGEN Inhale 2 L into the lungs as needed    Historical Provider, MD   melatonin 3 MG TABS tablet Take 3 mg by mouth daily    Historical Provider, MD   acetaminophen (TYLENOL) 325 MG tablet Take 650 mg by mouth nightly    Historical Provider, MD   famotidine (PEPCID) 20 MG tablet Take 1 tablet by mouth 2 times daily 19  [facility]   ondansetron (ZOFRAN ODT) 4 MG disintegrating tablet Take 1-2 tablets by mouth every 12 hours as needed for Nausea 19   [facility]   vitamin B-1 100 MG tablet Take 1 tablet by mouth daily 18   Tayla Jung MD   albuterol sulfate HFA (PROAIR HFA) 108 (90 Base) MCG/ACT inhaler Inhale 2 puffs into the lungs every 6 hours as needed for Wheezing    Mily Edwards MD   Umeclidinium Bromide 62.5 MCG/INH AEPB Inhale 1 puff into the lungs daily    Mily Edwards MD   DULoxetine (CYMBALTA) 30 MG extended release capsule Take 1 capsule by mouth daily    Mily Edwards MD   ferrous sulfate 325 (65 Fe) MG tablet Take 325 mg by mouth daily (with breakfast)    Historical Provider, MD   folic acid (FOLVITE) 1 MG tablet Take 1 mg by mouth daily    Historical Provider, MD   vitamin B-6 (PYRIDOXINE) 100 MG tablet Take 100 mg by mouth daily    Historical Provider, MD   vitamin B-12 (CYANOCOBALAMIN) 500 MCG tablet Take 500 mcg by mouth daily    Historical Provider, MD   vitamin D (ERGOCALCIFEROL) 45352 UNIT CAPS capsule Take 50,000 Units by mouth once a week     Historical MD Vijaya       Current medications:    No current facility-administered medications for this visit. No current outpatient medications on file. Facility-Administered Medications Ordered in Other Visits   Medication Dose Route Frequency Provider Last Rate Last Dose    0.9 % sodium chloride infusion   Intravenous Continuous Maximo Queen  mL/hr at 04/10/19 0844      sodium chloride flush 0.9 % injection 10 mL  10 mL Intravenous 2 times per day Maximo Queen MD        sodium chloride flush 0.9 % injection 10 mL  10 mL Intravenous PRN Maximo Queen MD        ceFAZolin (ANCEF) 2 g in dextrose 5 % 100 mL IVPB  2 g Intravenous Q8H Candida Villatoro MD        metronidazole (FLAGYL) 500 mg in NaCl 100 mL IVPB premix  500 mg Intravenous Q8H Candida Villatoro  mL/hr at 04/10/19 0844 500 mg at 04/10/19 0844       Allergies: Allergies   Allergen Reactions    Aspirin Other (See Comments)     Ulcers in stomach    Fentanyl Other (See Comments)     Hallucinations     Morphine      Feels like she will have a heart attack       Problem List:    Patient Active Problem List   Diagnosis Code    COPD with acute exacerbation ([facility] [address] J44.1    Essential hypertension I10    Tobacco abuse Z72.0    Alcohol abuse F10.10    Colostomy care ([facility] [address] Z43.3    Recurrent major depressive disorder, in partial remission ([facility] [address] F33.41    Personal history of nicotine dependence  Z87.891    Colitis K52.9    Colitis due to Clostridium difficile A04.72    Alcohol-induced acute pancreatitis without infection or necrosis K85.20    Gastrointestinal hemorrhage associated with duodenal ulcer K26.4    Partial small bowel obstruction (HCC) K56.600    Ileus (HCC) K56.7    Acute cystitis without hematuria N30.00    Non-intractable cyclical vomiting with nausea G43. A0    Parastomal hernia without obstruction or gangrene K43.5    Abdominal pain R10.9    C. difficile diarrhea A04.72       Past Medical History: Diagnosis Date    Anemia     Clostridium difficile colitis     December 2018    Colostomy in place [facility])     SBO/diverticulosis     COPD (chronic obstructive pulmonary disease) ([facility] [address]     Depression     DANIELLE (generalized anxiety disorder)     Gastric ulcer, unspecified as acute or chronic, without mention of hemorrhage, perforation, or obstruction     H/O ETOH abuse     Hiatal hernia     Hypertension     Insomnia     Intestinal obstruction ([facility] [address]     Parkinson's disease ([facility] [address]     Tobacco abuse     Vitamin D deficiency        Past Surgical History:        Procedure Laterality Date    CHOLECYSTECTOMY      COLONOSCOPY  12/14/2018    COLONOSCOPY N/A 12/14/2018    COLONOSCOPY POLYPECTOMY SNARE/COLD BIOPSY performed by Valeri Castillo MD at [facility]  2018   [facility] GASTROINTESTINAL ENDOSCOPY  12/13/2018    with biopsies    UPPER GASTROINTESTINAL ENDOSCOPY N/A 12/13/2018    EGD BIOPSY performed by Valeri Castillo MD at [facility] History:    Social History     Tobacco Use    Smoking status: Current Every Day Smoker     Packs/day: 1.00     Years: 60.00     Pack years: 60.00     Types: Cigarettes    Smokeless tobacco: Never Used   Substance Use Topics    Alcohol use: Yes     Alcohol/week: 1.2 - 1.8 oz     Types: 2 - 3 Cans of beer per week     Comment: daily                                 Ready to quit: Not Answered  Counseling given: Not Answered      Vital Signs (Current): There were no vitals filed for this visit.                                            BP Readings from Last 3 Encounters:   04/10/19 130/83   03/18/19 136/80   02/09/19 (!) 114/92       NPO Status:                                                                                 BMI:   Wt Readings from Last 3 Encounters:   04/10/19 136 lb 11 oz (62 kg)   03/18/19 128 lb (58.1 kg)   02/09/19 132 lb 0.9 oz (59.9 kg)     There is no height or weight on file to calculate BMI.    CBC:   Lab Results   Component Value Date    WBC 5.1 02/09/2019    RBC 4.56 02/09/2019    HGB 13.7 02/09/2019    HCT 41.3 02/09/2019    MCV 90.7 02/09/2019    RDW 19.4 02/09/2019     02/09/2019       CMP:   Lab Results   Component Value Date     02/09/2019    K 3.4 02/09/2019    K 3.4 01/10/2019    CL 93 02/09/2019    CO2 27 02/09/2019    BUN 10 02/09/2019    CREATININE <0.5 02/09/2019    GFRAA >60 02/09/2019    GFRAA >60 03/31/2011    AGRATIO 1.1 02/09/2019    LABGLOM >60 02/09/2019    GLUCOSE 101 02/09/2019    PROT 6.9 02/09/2019    CALCIUM 9.5 02/09/2019    BILITOT <0.2 02/09/2019    ALKPHOS 114 02/09/2019    AST 70 02/09/2019    ALT 47 02/09/2019       POC Tests: No results for input(s): POCGLU, POCNA, POCK, POCCL, POCBUN, POCHEMO, POCHCT in the last 72 hours. Coags:   Lab Results   Component Value Date    PROTIME 10.3 02/09/2019    INR 0.90 02/09/2019    APTT 30.3 02/09/2019       HCG (If Applicable): No results found for: PREGTESTUR, PREGSERUM, HCG, HCGQUANT     ABGs: No results found for: PHART, PO2ART, GOL4STG, SCM8IOP, BEART, M4WVTNIM     Type & Screen (If Applicable):  No results found for: LABABO, [address]    Anesthesia Evaluation  Patient summary reviewed  Airway: Mallampati: II  TM distance: >3 FB   Neck ROM: full  Mouth opening: > = 3 FB Dental:          Pulmonary:   (+) COPD: moderate,  rhonchi,  current smoker                           Cardiovascular:    (+) hypertension:,         Rhythm: regular  Rate: normal                    Neuro/Psych:   (+) psychiatric history:             ROS comment: PD GI/Hepatic/Renal:   (+) hiatal hernia, PUD,           Endo/Other:        (-) hypothyroidism               Abdominal:           Vascular:     - DVT and PE. Anesthesia Plan      general     ASA 3       Induction: intravenous. MIPS: Postoperative opioids intended and Prophylactic antiemetics administered.   Anesthetic plan and risks

## 2019-04-10 NOTE — PROGRESS NOTES
Pt to PACU from OR. VSS. RR easy on 4 L of O2 nasal canula. Estefani Level. OPA in place. Abdomen soft. Dry dressing and tegaderm to abdomen clean dry and intact. Urinary catheter draining clear yellow urine. Pt appears comfortable. Continue to monitor.

## 2019-04-10 NOTE — BRIEF OP NOTE
Brief Postoperative Note  ______________________________________________________________    Patient: Jayson Bassett  YOB: 1945  MRN: [id number]  Date of Procedure: 4/10/2019    Pre-Op Diagnosis: COLOSTOMY PRESENT    Post-Op Diagnosis: Same       Procedure(s):  LAPAROSCOPIC extensive lysis of adhesions >3 h, laparoscopic converted to open colostomy reversal with colorectal anastomosis, colotomy closure, small bowel resection x 1    Anesthesia: General    Surgeon(s):  Collin Witt MD    Assistant: Ashley Hassan    Estimated Blood Loss (mL): 782 ml    Complications: None    Specimens:   ID Type Source Tests Collected by Time Destination   A : A) Colon and rectal stumps Specimen Abdomen SURGICAL PATHOLOGY Collin Witt MD 4/10/2019 1412    B : B) Parasomal hernia sac Specimen Abdomen SURGICAL PATHOLOGY Collin Witt MD 4/10/2019 1422        Implants:  * No implants in log *      Drains:   Colostomy LLQ (Active)   Peristomal Assessment Clean 4/10/2019  8:49 AM   Stool Appearance Loose 4/10/2019  8:49 AM   Stool Color Brown 4/10/2019  8:49 AM   Stool Amount Small 4/10/2019  8:49 AM       Colostomy LLQ Descending/sigmoid (Active)       Urethral Catheter Non-latex 14 fr (Active)       Findings: significant intraabdominal adhesions    Collin Witt MD  Date: 4/10/2019  Time: 4:01 PM

## 2019-04-11 LAB
ANION GAP SERPL CALCULATED.3IONS-SCNC: 11 MMOL/L (ref 3–16)
BASOPHILS ABSOLUTE: 0 K/UL (ref 0–0.2)
BASOPHILS RELATIVE PERCENT: 0.2 %
BUN BLDV-MCNC: 9 MG/DL (ref 7–20)
CALCIUM SERPL-MCNC: 8.3 MG/DL (ref 8.3–10.6)
CHLORIDE BLD-SCNC: 104 MMOL/L (ref 99–110)
CO2: 25 MMOL/L (ref 21–32)
CREAT SERPL-MCNC: 0.6 MG/DL (ref 0.6–1.2)
EOSINOPHILS ABSOLUTE: 0 K/UL (ref 0–0.6)
EOSINOPHILS RELATIVE PERCENT: 0 %
GFR AFRICAN AMERICAN: >60
GFR NON-AFRICAN AMERICAN: >60
GLUCOSE BLD-MCNC: 175 MG/DL (ref 70–99)
HCT VFR BLD CALC: 27.8 % (ref 36–48)
HCT VFR BLD CALC: 28.4 % (ref 36–48)
HEMOGLOBIN: 9.2 G/DL (ref 12–16)
HEMOGLOBIN: 9.2 G/DL (ref 12–16)
LYMPHOCYTES ABSOLUTE: 0.9 K/UL (ref 1–5.1)
LYMPHOCYTES RELATIVE PERCENT: 6.2 %
MAGNESIUM: 1.7 MG/DL (ref 1.8–2.4)
MCH RBC QN AUTO: 31.3 PG (ref 26–34)
MCHC RBC AUTO-ENTMCNC: 32.5 G/DL (ref 31–36)
MCV RBC AUTO: 96.1 FL (ref 80–100)
MONOCYTES ABSOLUTE: 0.9 K/UL (ref 0–1.3)
MONOCYTES RELATIVE PERCENT: 6.7 %
NEUTROPHILS ABSOLUTE: 12.2 K/UL (ref 1.7–7.7)
NEUTROPHILS RELATIVE PERCENT: 86.9 %
PDW BLD-RTO: 16.5 % (ref 12.4–15.4)
PHOSPHORUS: 2.7 MG/DL (ref 2.5–4.9)
PLATELET # BLD: 332 K/UL (ref 135–450)
PMV BLD AUTO: 7.4 FL (ref 5–10.5)
POTASSIUM SERPL-SCNC: 4.3 MMOL/L (ref 3.5–5.1)
RBC # BLD: 2.96 M/UL (ref 4–5.2)
SODIUM BLD-SCNC: 140 MMOL/L (ref 136–145)
WBC # BLD: 14.1 K/UL (ref 4–11)

## 2019-04-11 PROCEDURE — 2580000003 HC RX 258: Performed by: SURGERY

## 2019-04-11 PROCEDURE — 97116 GAIT TRAINING THERAPY: CPT

## 2019-04-11 PROCEDURE — 36415 COLL VENOUS BLD VENIPUNCTURE: CPT

## 2019-04-11 PROCEDURE — 80048 BASIC METABOLIC PNL TOTAL CA: CPT

## 2019-04-11 PROCEDURE — 94640 AIRWAY INHALATION TREATMENT: CPT

## 2019-04-11 PROCEDURE — C9113 INJ PANTOPRAZOLE SODIUM, VIA: HCPCS | Performed by: NURSE PRACTITIONER

## 2019-04-11 PROCEDURE — 97530 THERAPEUTIC ACTIVITIES: CPT

## 2019-04-11 PROCEDURE — 1200000000 HC SEMI PRIVATE

## 2019-04-11 PROCEDURE — 6360000002 HC RX W HCPCS: Performed by: NURSE PRACTITIONER

## 2019-04-11 PROCEDURE — 2700000000 HC OXYGEN THERAPY PER DAY

## 2019-04-11 PROCEDURE — APPNB15 APP NON BILLABLE TIME 0-15 MINS: Performed by: NURSE PRACTITIONER

## 2019-04-11 PROCEDURE — 2580000003 HC RX 258: Performed by: NURSE PRACTITIONER

## 2019-04-11 PROCEDURE — 85018 HEMOGLOBIN: CPT

## 2019-04-11 PROCEDURE — 84100 ASSAY OF PHOSPHORUS: CPT

## 2019-04-11 PROCEDURE — 94664 DEMO&/EVAL PT USE INHALER: CPT

## 2019-04-11 PROCEDURE — 97162 PT EVAL MOD COMPLEX 30 MIN: CPT

## 2019-04-11 PROCEDURE — 85014 HEMATOCRIT: CPT

## 2019-04-11 PROCEDURE — 83735 ASSAY OF MAGNESIUM: CPT

## 2019-04-11 PROCEDURE — 2500000003 HC RX 250 WO HCPCS: Performed by: SURGERY

## 2019-04-11 PROCEDURE — 6370000000 HC RX 637 (ALT 250 FOR IP): Performed by: SURGERY

## 2019-04-11 PROCEDURE — 85025 COMPLETE CBC W/AUTO DIFF WBC: CPT

## 2019-04-11 PROCEDURE — 94760 N-INVAS EAR/PLS OXIMETRY 1: CPT

## 2019-04-11 PROCEDURE — 6360000002 HC RX W HCPCS: Performed by: SURGERY

## 2019-04-11 PROCEDURE — 99024 POSTOP FOLLOW-UP VISIT: CPT | Performed by: SURGERY

## 2019-04-11 PROCEDURE — 97166 OT EVAL MOD COMPLEX 45 MIN: CPT

## 2019-04-11 PROCEDURE — APPSS15 APP SPLIT SHARED TIME 0-15 MINUTES: Performed by: NURSE PRACTITIONER

## 2019-04-11 RX ORDER — 0.9 % SODIUM CHLORIDE 0.9 %
500 INTRAVENOUS SOLUTION INTRAVENOUS ONCE
Status: COMPLETED | OUTPATIENT
Start: 2019-04-11 | End: 2019-04-11

## 2019-04-11 RX ORDER — PANTOPRAZOLE SODIUM 40 MG/10ML
40 INJECTION, POWDER, LYOPHILIZED, FOR SOLUTION INTRAVENOUS DAILY
Status: DISCONTINUED | OUTPATIENT
Start: 2019-04-11 | End: 2019-04-18 | Stop reason: HOSPADM

## 2019-04-11 RX ORDER — 0.9 % SODIUM CHLORIDE 0.9 %
10 VIAL (ML) INJECTION DAILY
Status: DISCONTINUED | OUTPATIENT
Start: 2019-04-11 | End: 2019-04-18 | Stop reason: HOSPADM

## 2019-04-11 RX ADMIN — METRONIDAZOLE 500 MG: 500 INJECTION, SOLUTION INTRAVENOUS at 00:23

## 2019-04-11 RX ADMIN — SODIUM CHLORIDE 500 ML: 9 INJECTION, SOLUTION INTRAVENOUS at 14:08

## 2019-04-11 RX ADMIN — POTASSIUM CHLORIDE, DEXTROSE MONOHYDRATE AND SODIUM CHLORIDE: 150; 5; 450 INJECTION, SOLUTION INTRAVENOUS at 06:13

## 2019-04-11 RX ADMIN — TIOTROPIUM BROMIDE 18 MCG: 18 CAPSULE ORAL; RESPIRATORY (INHALATION) at 09:10

## 2019-04-11 RX ADMIN — POTASSIUM CHLORIDE, DEXTROSE MONOHYDRATE AND SODIUM CHLORIDE: 150; 5; 450 INJECTION, SOLUTION INTRAVENOUS at 23:56

## 2019-04-11 RX ADMIN — OXYCODONE HYDROCHLORIDE 10 MG: 10 TABLET ORAL at 20:55

## 2019-04-11 RX ADMIN — HYDROMORPHONE HYDROCHLORIDE 1 MG: 1 INJECTION, SOLUTION INTRAMUSCULAR; INTRAVENOUS; SUBCUTANEOUS at 14:07

## 2019-04-11 RX ADMIN — DOCUSATE SODIUM 100 MG: 100 CAPSULE, LIQUID FILLED ORAL at 09:56

## 2019-04-11 RX ADMIN — OXYCODONE HYDROCHLORIDE 10 MG: 10 TABLET ORAL at 16:24

## 2019-04-11 RX ADMIN — HYDROMORPHONE HYDROCHLORIDE 1 MG: 1 INJECTION, SOLUTION INTRAMUSCULAR; INTRAVENOUS; SUBCUTANEOUS at 17:31

## 2019-04-11 RX ADMIN — HYDROMORPHONE HYDROCHLORIDE 1 MG: 1 INJECTION, SOLUTION INTRAMUSCULAR; INTRAVENOUS; SUBCUTANEOUS at 02:04

## 2019-04-11 RX ADMIN — Medication 10 ML: at 09:57

## 2019-04-11 RX ADMIN — POTASSIUM CHLORIDE, DEXTROSE MONOHYDRATE AND SODIUM CHLORIDE: 150; 5; 450 INJECTION, SOLUTION INTRAVENOUS at 14:08

## 2019-04-11 RX ADMIN — HYDROMORPHONE HYDROCHLORIDE 1 MG: 1 INJECTION, SOLUTION INTRAMUSCULAR; INTRAVENOUS; SUBCUTANEOUS at 07:56

## 2019-04-11 RX ADMIN — DOCUSATE SODIUM 100 MG: 100 CAPSULE, LIQUID FILLED ORAL at 20:55

## 2019-04-11 RX ADMIN — ONDANSETRON 4 MG: 2 INJECTION INTRAMUSCULAR; INTRAVENOUS at 07:01

## 2019-04-11 RX ADMIN — ACETAMINOPHEN 650 MG: 325 TABLET ORAL at 18:15

## 2019-04-11 RX ADMIN — ACETAMINOPHEN 650 MG: 325 TABLET ORAL at 11:58

## 2019-04-11 RX ADMIN — Medication 2 G: at 01:34

## 2019-04-11 RX ADMIN — ACETAMINOPHEN 650 MG: 325 TABLET ORAL at 23:55

## 2019-04-11 RX ADMIN — PROMETHAZINE HYDROCHLORIDE 12.5 MG: 25 INJECTION INTRAMUSCULAR; INTRAVENOUS at 09:56

## 2019-04-11 RX ADMIN — ONDANSETRON 4 MG: 2 INJECTION INTRAMUSCULAR; INTRAVENOUS at 02:03

## 2019-04-11 RX ADMIN — HYDROMORPHONE HYDROCHLORIDE 1 MG: 1 INJECTION, SOLUTION INTRAMUSCULAR; INTRAVENOUS; SUBCUTANEOUS at 23:55

## 2019-04-11 RX ADMIN — HYDROMORPHONE HYDROCHLORIDE 1 MG: 1 INJECTION, SOLUTION INTRAMUSCULAR; INTRAVENOUS; SUBCUTANEOUS at 10:59

## 2019-04-11 RX ADMIN — Medication 10 ML: at 20:55

## 2019-04-11 RX ADMIN — ACETAMINOPHEN 650 MG: 325 TABLET ORAL at 00:23

## 2019-04-11 RX ADMIN — ENOXAPARIN SODIUM 40 MG: 40 INJECTION SUBCUTANEOUS at 11:58

## 2019-04-11 RX ADMIN — PANTOPRAZOLE SODIUM 40 MG: 40 INJECTION, POWDER, FOR SOLUTION INTRAVENOUS at 09:56

## 2019-04-11 ASSESSMENT — PAIN DESCRIPTION - DESCRIPTORS
DESCRIPTORS: SHARP;SHOOTING;DISCOMFORT
DESCRIPTORS: DISCOMFORT

## 2019-04-11 ASSESSMENT — PAIN SCALES - GENERAL
PAINLEVEL_OUTOF10: 2
PAINLEVEL_OUTOF10: 9
PAINLEVEL_OUTOF10: 9
PAINLEVEL_OUTOF10: 6
PAINLEVEL_OUTOF10: 8
PAINLEVEL_OUTOF10: 2
PAINLEVEL_OUTOF10: 4
PAINLEVEL_OUTOF10: 8
PAINLEVEL_OUTOF10: 3
PAINLEVEL_OUTOF10: 10
PAINLEVEL_OUTOF10: 4
PAINLEVEL_OUTOF10: 8
PAINLEVEL_OUTOF10: 10
PAINLEVEL_OUTOF10: 8
PAINLEVEL_OUTOF10: 10
PAINLEVEL_OUTOF10: 8

## 2019-04-11 ASSESSMENT — PAIN - FUNCTIONAL ASSESSMENT
PAIN_FUNCTIONAL_ASSESSMENT: ACTIVITIES ARE NOT PREVENTED
PAIN_FUNCTIONAL_ASSESSMENT: PREVENTS OR INTERFERES WITH MANY ACTIVE NOT PASSIVE ACTIVITIES

## 2019-04-11 ASSESSMENT — PAIN DESCRIPTION - LOCATION
LOCATION: ABDOMEN

## 2019-04-11 ASSESSMENT — PAIN DESCRIPTION - PROGRESSION
CLINICAL_PROGRESSION: NOT CHANGED
CLINICAL_PROGRESSION: GRADUALLY WORSENING
CLINICAL_PROGRESSION: NOT CHANGED
CLINICAL_PROGRESSION: NOT CHANGED

## 2019-04-11 ASSESSMENT — PAIN DESCRIPTION - FREQUENCY
FREQUENCY: INTERMITTENT
FREQUENCY: CONTINUOUS

## 2019-04-11 ASSESSMENT — PAIN DESCRIPTION - PAIN TYPE
TYPE: SURGICAL PAIN

## 2019-04-11 ASSESSMENT — PAIN DESCRIPTION - ORIENTATION
ORIENTATION: ANTERIOR
ORIENTATION: LEFT

## 2019-04-11 ASSESSMENT — PAIN DESCRIPTION - ONSET: ONSET: ON-GOING

## 2019-04-11 NOTE — PLAN OF CARE
Problem: SAFETY  Goal: Free from accidental physical injury  Outcome: Ongoing  Goal: Free from intentional harm  Outcome: Ongoing     Problem: PAIN  Goal: Patient's pain/discomfort is manageable  Outcome: Ongoing

## 2019-04-11 NOTE — PROGRESS NOTES
Occupational Therapy   Occupational Therapy Initial Assessment  Date: 2019   Patient Name: Bernard Fournier  MRN: [id number]     : 1945    Date of Service: 2019    Discharge Recommendations:  Patient would benefit from continued therapy after discharge, 3-5 sessions per week  OT Equipment Recommendations  Other: TBD by africa Wahl [address] scored a 16/24 on the AM-PAC ADL Inpatient form. Current research shows that an AM-PAC score of 17 or less is typically not associated with a discharge to the patient's home setting. Based on the patients AM-PAC score and their current ADL deficits, it is recommended that the patient have 3-5 sessions per week of Occupational Therapy at d/c to increase the patients independence. Assessment   Performance deficits / Impairments: Decreased functional mobility ; Decreased ADL status; Decreased strength;Decreased ROM; Decreased safe awareness;Decreased cognition;Decreased endurance;Decreased fine motor control;Decreased coordination;Decreased balance  Assessment: Pt is a 69 y.o. female admitted for colostomy reversal. Pt s/p lap extensive lysis of adhesions > 3 hours, lap converted to open colostomy reversal with colorectal anastomosis, small bowel resection 4/10/19. PTA, pt was undergoing rehab at [facility] rehab facility, reports she required assist for ADLs and completing fxl mobility with walker. Pt currently functioning below baseline, limited by pain, decreased balance/fxl mobility, endurance/fxl activity tolerance, strength s/p surgery. Pt also has cognitive deficits that limit pt's safety and occupational performance. This date, pt CGA fxl transfers, min A fxl mobility with walker limited to short distance, and anticipate pt would require overall mod A for ADLs. Will cont to see on acute to address the above limitations and maximize pt's independence. Pt's AM-PAC score indicates need for ongoing skilled OT at d/c.    Prognosis: Good  Decision Making: Medium Complexity  History: see above  Exam: decreased ADL status, endurance, balance/fxl mobility, coordination/FM control, strength/ROM R UE, cognition  Assistance / Modification: anticipate overall mod A for ADLs  Patient Education: Pt educated on the role of OT, POC, safety/transfers, d/c recommendations  Barriers to Learning: cognition, hearing   REQUIRES OT FOLLOW UP: Yes  Activity Tolerance  Activity Tolerance: Patient limited by pain  Safety Devices  Safety Devices in place: Yes  Type of devices: Patient at risk for falls;Gait belt;Nurse notified; Bed alarm in place; Left in bed(RN Amy notified)           Patient Diagnosis(es): The encounter diagnosis was Colostomy present [facility]). has a past medical history of Anemia, Clostridium difficile colitis, Colostomy in place [facility]), COPD (chronic obstructive pulmonary disease) ([facility] [address] Depression, DANIELLE (generalized anxiety disorder), Gastric ulcer, unspecified as acute or chronic, without mention of hemorrhage, perforation, or obstruction, H/O ETOH abuse, Hiatal hernia, Hypertension, Insomnia, Intestinal obstruction ([facility] [address] Parkinson's disease ([facility] [address] Tobacco abuse, and Vitamin D deficiency. has a past surgical history that includes Hysterectomy; Cholecystectomy; Tubal ligation; colostomy (2018); Upper gastrointestinal endoscopy (12/13/2018); Upper gastrointestinal endoscopy (N/A, 12/13/2018); Colonoscopy (12/14/2018); Colonoscopy (N/A, 12/14/2018); and Small intestine surgery (N/A, 4/10/2019). Restrictions  Restrictions/Precautions  Restrictions/Precautions: Fall Risk  Position Activity Restriction  Other position/activity restrictions: IV, ferrer    Subjective   General  Chart Reviewed: Yes  Additional Pertinent Hx: Pt is a 69 y.o. female admitted for colostomy reversal. Pt s/p lap extensive lysis of adhesions > 3 hours, lap converted to open colostomy reversal with colorectal anastomosis, small bowel resection 4/10/19.    Family / Caregiver Present: No  Referring Practitioner: Dominic Casanova  Subjective  Subjective: Pt met b/s for OT eval/tx with PT. Pt seated in recliner on arrival, agreeable to participate in therapy, requesting to go back to bed. Pt reports 10/10 abd pain. Pt very talkative and distractible, needs verbal cues to redirect. Social/Functional History  Social/Functional History  Lives With: Spouse(and ex m-i-l)  Type of Home: House  Home Layout: One level, Performs ADL's on one level  Home Access: Stairs to enter with rails  Entrance Stairs - Number of Steps: 10  Entrance Stairs - Rails: Right  Bathroom Shower/Tub: Tub/Shower unit, Shower chair with back  Bathroom Toilet: Standard(sink close-by)  Bathroom Equipment: Shower chair  Bathroom Accessibility: Walker accessible  ADL Assistance: Needs assistance(reports ECF assists her with showering and dressing but can manage toileting on her own)  Ambulation Assistance: Independent(uses a walker at the ECF)  Transfer Assistance: Independent  Active : No  Patient's  Info: spouse takes for appointments  Additional Comments: the pt reports a fall ~6 weeks ago; the pt has been at [facility] x 6 weeks       Objective   Vision: Within Functional Limits  Hearing: Within functional limits      Orientation  Overall Orientation Status: Within Functional Limits(answers orientation questions appropriately, but noted to have cognitive deficits during evaluation)     Balance  Sitting Balance: Stand by assistance(seated EOB)  Standing Balance: Contact guard assistance  Standing Balance  Functional Mobility  Functional - Mobility Device: Rolling Walker  Assist Level: Minimal assistance  Functional Mobility Comments: Pt completed fxl mobility ~15 ft with RW and Min A. Pt with ambulates outside frame over walker, needs verbal cues and min A for safe technique.      ADL  Feeding: (to eat ice chips)  LE Dressing: (pt able to don/doff R sock with increased time/effort, anticipate min/mod A for complete

## 2019-04-11 NOTE — PROGRESS NOTES
[facility] General and Vascular Surgery                                     Daily Progress Note                                                         Pt Name: Onelia Zuniga Record Number: [id number]  Date of Birth 1945   Today's Date: 4/11/2019  No chief complaint on file. ASSESSMENT/PLAN  Colostomy in place  -POD #1 (4/10) lap extensive lysis of adhesions > 3 hours, lap converted to open colostomy reversal with colorectal anastomosis, small bowel resection.   -Pain control  -add phenergan for nausea control. May need NG if continues to have nausea. -anticipate ileus d/t extensive LITTLE  -await renal function, add toradol if cr OK. Discharge Planning: awaiting return of GI function    EDUCATION  Patient educated about Disease Process, Medications, Smoking Cessation, Oxygenation, Incentive Spirometry and Deep Breath and Cough, Diabetes, Hyperlipidemia, Smoking Cessation, Nutrition, Exercise and Hypertension    SUBJECTIVE  Roe Apa has improved from yesterday. Pain is well controlled. She has nausea and no vomiting. She has not passed flatus and has not had a bowel movement. She is tolerating thin liquids. Current activity is up with assistance    OBJECTIVE  VITALS:  height is 5' 1\" (1.549 m) and weight is 142 lb 13.7 oz (64.8 kg). Her oral temperature is 98.6 °F (37 °C). Her blood pressure is 114/67 and her pulse is 111. Her respiration is 17 and oxygen saturation is 94%. INTAKE/OUTPUT:    Intake/Output Summary (Last 24 hours) at 4/11/2019 0919  Last data filed at 4/11/2019 7682  Gross per 24 hour   Intake 4703 ml   Output 1600 ml   Net 3103 ml     GENERAL: alert, no distress  LUNGS: clear to ausculation, without wheezes, rales or rhonci  HEART: normal rate and regular rhythm  ABDOMEN: soft, appropriately-tender, non-distended and bowel sounds present in all 4 quadrants.    EXTREMITY: no cyanosis, clubbing or edema  I/O last 3 completed shifts: In: 7364 [P.O.:60; I.V.:4443; IV Piggyback:200]  Out: 1600 [Urine:1400; Blood:200]  No intake/output data recorded. LABS  No results for input(s): WBC, HGB, HCT, PLT, NA, K, CL, CO2, BUN, CREATININE, MG, PHOS, CALCIUM, PTT, INR, AST, ALT, BILITOT, BILIDIR, NITRU, COLORU, BACTERIA in the last 72 hours. Invalid input(s): PT, WBCU, [address], [address] [facility]      Electronically signed by ABIGAIL Ozuna CNP on 4/11/19 at 9:16 AM     [facility]   [phone] Office  [phone] Cell     Agree with above note. The patient was personally seen and examined. Gaye Cunningham is doing OK. Pain present but not well controlled. Had some nausea and emesis overnight. No flatus or BM. Abd soft, minimally distended, appropriately tender, dressings with minimal sanguinous drainage    WBC 14.1  Hgb 9.2  Cr 0.6    A/P: 68 yo female s/p laparoscopic converted to open colostomy takedown, laparoscopic LITTLE, small bowel resection POD #1    If patient continues to have nausea/emesis, will place NG. Nausea meds adjusted. D/c ferrer  Acute blood loss anemia - Hgb 9.2 compared to 13.7 from 2 months ago. Tachycardic into 110s with stable BP.   Will recheck Hgb this afternoon to evaluate for possible ongoing blood loss  PT/OT, ambulate/OOB  IS    Candida Villatoro MD

## 2019-04-11 NOTE — PROGRESS NOTES
4 Eyes Skin Assessment     The patient is being assess for  Admission    I agree that 2 RN's have performed a thorough Head to Toe Skin Assessment on the patient. ALL assessment sites listed below have been assessed. Areas assessed by both nurses: Yes  [x]   Head, Face, and Ears   [x]   Shoulders, Back, and Chest  [x]   Arms, Elbows, and Hands   [x]   Coccyx, Sacrum, and IschIum  [x]   Legs, Feet, and Heels        Does the Patient have Skin Breakdown?   No         Chato Prevention initiated:  No   Wound Care Orders initiated:  No      [facility] nurse consulted for Pressure Injury (Stage 3,4, Unstageable, DTI, NWPT, and Complex wounds), New and Established Ostomies:  No      Nurse 1 eSignature: Electronically signed by Tang Perkins RN on 4/10/19 at 8:17 PM    **SHARE this note so that the co-signing nurse is able to place an eSignature**    Nurse 2 eSignature: Electronically signed by Rhiannon Calvin RN on 4/11/19 at 10:08 AM

## 2019-04-11 NOTE — PROGRESS NOTES
Small blood clot passed through rectum, notified, Dr. Armando Navarrete. Updated MD on recent blood pressure and heart rate. No new orders continue to monitor Closely.

## 2019-04-11 NOTE — PROGRESS NOTES
Physical Therapy    Facility/Department: [address] MED SURG  Initial Assessment  If pt. is D/C'd prior to next visit please refer back to last daily progress note for D/C status. NAME: Misty Palencia  : 1945  MRN: [id number]    Date of Service: 2019    Discharge Recommendations:  Patient would benefit from continued therapy after discharge, 3-5 sessions per week   Misty Palencia scored a 15/24 on the AM-PAC short mobility form. Current research shows that an AM-PAC score of 17 or less is typically not associated with a discharge to the patient's home setting. Based on the patients AM-PAC score and their current functional mobility deficits, it is recommended that the patient have 3-5 sessions per week of Physical Therapy at d/c to increase the patients independence. PT Equipment Recommendations  Other: will moitor; if pt to home she will need a wheeled walker    Assessment   Assessment: The pt is a 68 yo female admitted from her rehab facility for an elective reversal of her colostomy. The pt is oriented but a poor historian. She reports she has been at the rehab facility x 6 weeks and has been receiving therapy. She reports she uses a walker for mobility with asisstance but that she would prefer to go home at this d/c. PMHx: anemia, c-diff, colostomy, COPD, depression, DANIELLE, gasric ulcer, h/o ETOH, hiatal hernia, HTN, Parkinson's     Today, the pt is reporting 10/10 pain in her abd; transfers with CGA and ambulated with a walker 15 feet with CGA/min A. The pt required mod A of 2 for sit > supine as she was unable to follow directions for log rolling. Anticipate that the pt is not functioning at her baseline and would benefit from con't skilled PT services at d/c with the goal of eventual return to home. Will con't to follow.    Treatment Diagnosis: pain, impaired mobility  Prognosis: Fair;Good  Decision Making: Medium Complexity  History: see above  Exam: see above  Clinical Presentation: evolving  Patient Education: role of acute care PT  Barriers to Learning: cog, impulsive  REQUIRES PT FOLLOW UP: Yes  Activity Tolerance  Activity Tolerance: Patient limited by pain; Patient limited by cognitive status       Patient Diagnosis(es): The encounter diagnosis was Colostomy present [facility]). has a past medical history of Anemia, Clostridium difficile colitis, Colostomy in place [facility]), COPD (chronic obstructive pulmonary disease) ([address] Depression, DANIELLE (generalized anxiety disorder), Gastric ulcer, unspecified as acute or chronic, without mention of hemorrhage, perforation, or obstruction, H/O ETOH abuse, Hiatal hernia, Hypertension, Insomnia, Intestinal obstruction ([address] Parkinson's disease ([address] Tobacco abuse, and Vitamin D deficiency. has a past surgical history that includes Hysterectomy; Cholecystectomy; Tubal ligation; colostomy (2018); Upper gastrointestinal endoscopy (12/13/2018); Upper gastrointestinal endoscopy (N/A, 12/13/2018); Colonoscopy (12/14/2018); Colonoscopy (N/A, 12/14/2018); and Small intestine surgery (N/A, 4/10/2019). Restrictions  Restrictions/Precautions  Restrictions/Precautions: Fall Risk  Position Activity Restriction  Other position/activity restrictions: IV, ferrer  Vision/Hearing  Vision: Within Functional Limits  Hearing: Within functional limits     Subjective  General  Chart Reviewed: Yes  Additional Pertinent Hx: The pt admitted for a colostomy reversal completed on 4-10-19. Per Epic notes the pt had the original sx in May, 2018 in [place]; she was an inpt at this hospital in January, 2019 for a SBO.        PMHx: anemia, c-diff, colostomy, COPD, depression, DANIELLE, gasric ulcer, h/o ETOH, hiatal hernia, HTN, Parkinson's  Response To Previous Treatment: Not applicable  Family / Caregiver Present: No  Referring Practitioner: ABIGAIL Ozuna CNP  Referral Date : 04/11/19  Diagnosis: reversal of colostomy  Follows Commands: Impaired  Other (Comment): easily distractable and needs directions repeated often  Subjective  Subjective: the pt found to be up in the chair and requesting to return to bed; the pt c/o of 10/10 abd pain  Pain Screening  Patient Currently in Pain: Yes          Orientation  Orientation  Overall Orientation Status: Impaired  Orientation Level: Oriented to person;Oriented to situation;Oriented to place;Oriented to time(noted to have cog deficits during eval and difficulty staying on task and following directions)  Social/Functional History  Social/Functional History  Lives With: Spouse(and ex m-i-l)  Type of Home: House  Home Layout: One level, Performs ADL's on one level  Home Access: Stairs to enter with rails  Entrance Stairs - Number of Steps: 10  Entrance Stairs - Rails: Right  Bathroom Shower/Tub: Tub/Shower unit, Shower chair with back  Bathroom Toilet: Standard(sink close-by)  Bathroom Equipment: Shower chair  Bathroom Accessibility: Walker accessible  ADL Assistance: Needs assistance(reports ECF assists her with showering and dressing but can manage toileting on her own)  Ambulation Assistance: Independent(uses a walker at the [facility])  Transfer Assistance: Independent  Active : No  Patient's  Info: spouse takes for appointments  Additional Comments: the pt reports a fall ~6 weeks ago; the pt has been at [facility] x 6 weeks  Cognition   Cognition  Overall Cognitive Status: Exceptions  Following Commands: Follows one step commands with repetition  Attention Span: Difficulty attending to directions; Difficulty dividing attention  Safety Judgement: Decreased awareness of need for safety;Decreased awareness of need for assistance  Problem Solving: Decreased awareness of errors;Assistance required to identify errors made;Assistance required to generate solutions;Assistance required to implement solutions;Assistance required to correct errors made  Insights: Decreased awareness of deficits    Objective  AROM RLE (degrees)  RLE AROM: WFL  AROM LLE (degrees)  LLE AROM : WFL  Strength RLE  Comment: functionally good  Strength LLE  Comment: functionally good        Bed mobility  Supine to Sit: Unable to assess  Sit to Supine: Moderate assistance;2 Person assistance(bed flat)  Comment: attempted to instruct in logrolling to assist with pain control but the pt was unable and unwilling to follow directions  Transfers  Sit to Stand: Contact guard assistance  Stand to sit: Contact guard assistance  Ambulation  Ambulation?: Yes  Ambulation 1  Surface: level tile  Device: Rolling Walker  Other Apparatus: (IV pole managed by therapist)  Assistance: Contact guard assistance;Minimal assistance  Quality of Gait: poor walker management but no LOB, shortened steps and c/o of pain thru-out  Distance: 15 feet x 1  Stairs/Curb  Stairs?: No     Balance  Posture: Fair  Sitting - Static: Good  Sitting - Dynamic: Good  Standing - Static: Fair;+  Standing - Dynamic: Fair;+        Plan   Plan  Times per week: 3-5x/week  Current Treatment Recommendations: Functional Mobility Training  Safety Devices  Type of devices: Call light within reach, Bed alarm in place, Left in bed, Patient at risk for falls, Nurse notified(Amy, RN notified)    AM-PAC Score  AM-PAC Inpatient Mobility Raw Score : 15  AM-PAC Inpatient T-Scale Score : 39.45  Mobility Inpatient CMS 0-100% Score: 57.7  Mobility Inpatient CMS G-Code Modifier : CK          Goals  Short term goals  Time Frame for Short term goals: upon d/c  Short term goal 1: Bed mobility via logroll with min A. Short term goal 2: Transfers sit <> stand with SBA. Short term goal 3: Ambulate with walker 50 feet with CGA/SBA.   Patient Goals   Patient goals : to return home       Therapy Time   Individual Concurrent Group Co-treatment   Time In 1120         Time Out 1200         Minutes 40         Timed Code Treatment Minutes: 25 Minutes     Electronically signed by Jyoti Anton, PT [id number] on 4/11/2019 at 12:10 PM

## 2019-04-11 NOTE — CARE COORDINATION
INITIAL CASE MANAGEMENT ASSESSMENT    Reviewed chart, met with patient to assess possible discharge needs. Explained Case Management role/services. Living Situation: admitted from [facility]. Pt reports she has been there since march-     ADLs: needs assist     DME: walker     PT/OT Recs: snf      Active Services: snf - unsure of prior services      Transportation: family/ snf      Medications: no issues     PCP: yes       HD/PD:     PLAN/COMMENTS: will plan return to [address] [place] [address]. Call out to admissions at [facility] to confirm Level of care and need for precert to return     SW/CM provided contact information for patient or family to call with any questions. SW/CM will follow and assist as needed.

## 2019-04-11 NOTE — PROGRESS NOTES
Pt assisted OOB up to chair. Pt tolerated fairly well. Protonix given IVP for heartburn, Phenergan given IVPB for nausea. Call light within reach.

## 2019-04-11 NOTE — PROGRESS NOTES
Admitted patient to room 4106 from the PACU with surgical dressing on abdomen, shadow drainage noted. Patient sleepy but easily aroused. Oriented to room, call light, tv, phone and dietary services. Respirations easy unlabored. Bed in lowest position and locked. Exit alarms in place. Non slip socks on. ID bracelet on and correct per patient verbally reporting name and date of birth. Call light and needed items in reach.

## 2019-04-12 ENCOUNTER — TELEPHONE (OUTPATIENT)
Dept: PRIMARY CARE CLINIC | Age: 74
End: 2019-04-12

## 2019-04-12 LAB
ANION GAP SERPL CALCULATED.3IONS-SCNC: 10 MMOL/L (ref 3–16)
BASOPHILS ABSOLUTE: 0 K/UL (ref 0–0.2)
BASOPHILS RELATIVE PERCENT: 0.4 %
BUN BLDV-MCNC: 5 MG/DL (ref 7–20)
CALCIUM SERPL-MCNC: 8.1 MG/DL (ref 8.3–10.6)
CHLORIDE BLD-SCNC: 105 MMOL/L (ref 99–110)
CO2: 24 MMOL/L (ref 21–32)
CREAT SERPL-MCNC: <0.5 MG/DL (ref 0.6–1.2)
EOSINOPHILS ABSOLUTE: 0.1 K/UL (ref 0–0.6)
EOSINOPHILS RELATIVE PERCENT: 1.1 %
GFR AFRICAN AMERICAN: >60
GFR NON-AFRICAN AMERICAN: >60
GLUCOSE BLD-MCNC: 130 MG/DL (ref 70–99)
HCT VFR BLD CALC: 26 % (ref 36–48)
HEMOGLOBIN: 8.5 G/DL (ref 12–16)
LYMPHOCYTES ABSOLUTE: 1.3 K/UL (ref 1–5.1)
LYMPHOCYTES RELATIVE PERCENT: 10.4 %
MAGNESIUM: 1.6 MG/DL (ref 1.8–2.4)
MCH RBC QN AUTO: 32 PG (ref 26–34)
MCHC RBC AUTO-ENTMCNC: 32.7 G/DL (ref 31–36)
MCV RBC AUTO: 97.8 FL (ref 80–100)
MONOCYTES ABSOLUTE: 1 K/UL (ref 0–1.3)
MONOCYTES RELATIVE PERCENT: 7.8 %
NEUTROPHILS ABSOLUTE: 10.1 K/UL (ref 1.7–7.7)
NEUTROPHILS RELATIVE PERCENT: 80.3 %
PDW BLD-RTO: 16.2 % (ref 12.4–15.4)
PHOSPHORUS: 1.9 MG/DL (ref 2.5–4.9)
PLATELET # BLD: 275 K/UL (ref 135–450)
PMV BLD AUTO: 7.6 FL (ref 5–10.5)
POTASSIUM SERPL-SCNC: 4 MMOL/L (ref 3.5–5.1)
RBC # BLD: 2.65 M/UL (ref 4–5.2)
SODIUM BLD-SCNC: 139 MMOL/L (ref 136–145)
WBC # BLD: 12.5 K/UL (ref 4–11)

## 2019-04-12 PROCEDURE — C9113 INJ PANTOPRAZOLE SODIUM, VIA: HCPCS | Performed by: NURSE PRACTITIONER

## 2019-04-12 PROCEDURE — 6370000000 HC RX 637 (ALT 250 FOR IP): Performed by: SURGERY

## 2019-04-12 PROCEDURE — 94640 AIRWAY INHALATION TREATMENT: CPT

## 2019-04-12 PROCEDURE — 2580000003 HC RX 258: Performed by: NURSE PRACTITIONER

## 2019-04-12 PROCEDURE — 83735 ASSAY OF MAGNESIUM: CPT

## 2019-04-12 PROCEDURE — 2500000003 HC RX 250 WO HCPCS: Performed by: SURGERY

## 2019-04-12 PROCEDURE — APPSS15 APP SPLIT SHARED TIME 0-15 MINUTES: Performed by: NURSE PRACTITIONER

## 2019-04-12 PROCEDURE — 80048 BASIC METABOLIC PNL TOTAL CA: CPT

## 2019-04-12 PROCEDURE — 6360000002 HC RX W HCPCS: Performed by: SURGERY

## 2019-04-12 PROCEDURE — 1200000000 HC SEMI PRIVATE

## 2019-04-12 PROCEDURE — 36415 COLL VENOUS BLD VENIPUNCTURE: CPT

## 2019-04-12 PROCEDURE — 94760 N-INVAS EAR/PLS OXIMETRY 1: CPT

## 2019-04-12 PROCEDURE — 97116 GAIT TRAINING THERAPY: CPT

## 2019-04-12 PROCEDURE — APPNB15 APP NON BILLABLE TIME 0-15 MINS: Performed by: NURSE PRACTITIONER

## 2019-04-12 PROCEDURE — 84100 ASSAY OF PHOSPHORUS: CPT

## 2019-04-12 PROCEDURE — 6360000002 HC RX W HCPCS: Performed by: NURSE PRACTITIONER

## 2019-04-12 PROCEDURE — 99024 POSTOP FOLLOW-UP VISIT: CPT | Performed by: SURGERY

## 2019-04-12 PROCEDURE — 2700000000 HC OXYGEN THERAPY PER DAY

## 2019-04-12 PROCEDURE — 85025 COMPLETE CBC W/AUTO DIFF WBC: CPT

## 2019-04-12 RX ADMIN — ENOXAPARIN SODIUM 40 MG: 40 INJECTION SUBCUTANEOUS at 11:25

## 2019-04-12 RX ADMIN — HYDROMORPHONE HYDROCHLORIDE 0.5 MG: 1 INJECTION, SOLUTION INTRAMUSCULAR; INTRAVENOUS; SUBCUTANEOUS at 21:30

## 2019-04-12 RX ADMIN — OXYCODONE HYDROCHLORIDE 10 MG: 10 TABLET ORAL at 20:02

## 2019-04-12 RX ADMIN — PYRIDOXINE HCL TAB 50 MG 100 MG: 50 TAB at 08:01

## 2019-04-12 RX ADMIN — ACETAMINOPHEN 650 MG: 325 TABLET ORAL at 05:13

## 2019-04-12 RX ADMIN — Medication 500 MCG: at 08:01

## 2019-04-12 RX ADMIN — MAGNESIUM SULFATE HEPTAHYDRATE 1 G: 1 INJECTION, SOLUTION INTRAVENOUS at 13:11

## 2019-04-12 RX ADMIN — ACETAMINOPHEN 650 MG: 325 TABLET ORAL at 23:51

## 2019-04-12 RX ADMIN — HYDROMORPHONE HYDROCHLORIDE 1 MG: 1 INJECTION, SOLUTION INTRAMUSCULAR; INTRAVENOUS; SUBCUTANEOUS at 05:14

## 2019-04-12 RX ADMIN — ACETAMINOPHEN 650 MG: 325 TABLET ORAL at 11:30

## 2019-04-12 RX ADMIN — HYDROMORPHONE HYDROCHLORIDE 1 MG: 1 INJECTION, SOLUTION INTRAMUSCULAR; INTRAVENOUS; SUBCUTANEOUS at 11:25

## 2019-04-12 RX ADMIN — HYDROMORPHONE HYDROCHLORIDE 1 MG: 1 INJECTION, SOLUTION INTRAMUSCULAR; INTRAVENOUS; SUBCUTANEOUS at 18:19

## 2019-04-12 RX ADMIN — POTASSIUM CHLORIDE, DEXTROSE MONOHYDRATE AND SODIUM CHLORIDE: 150; 5; 450 INJECTION, SOLUTION INTRAVENOUS at 08:01

## 2019-04-12 RX ADMIN — PANTOPRAZOLE SODIUM 40 MG: 40 INJECTION, POWDER, FOR SOLUTION INTRAVENOUS at 07:59

## 2019-04-12 RX ADMIN — Medication 10 ML: at 08:03

## 2019-04-12 RX ADMIN — DOCUSATE SODIUM 100 MG: 100 CAPSULE, LIQUID FILLED ORAL at 08:01

## 2019-04-12 RX ADMIN — DOCUSATE SODIUM 100 MG: 100 CAPSULE, LIQUID FILLED ORAL at 20:02

## 2019-04-12 RX ADMIN — MAGNESIUM SULFATE HEPTAHYDRATE 1 G: 1 INJECTION, SOLUTION INTRAVENOUS at 11:00

## 2019-04-12 RX ADMIN — TIOTROPIUM BROMIDE 18 MCG: 18 CAPSULE ORAL; RESPIRATORY (INHALATION) at 08:45

## 2019-04-12 RX ADMIN — FOLIC ACID 1 MG: 1 TABLET ORAL at 08:01

## 2019-04-12 RX ADMIN — HYDROMORPHONE HYDROCHLORIDE 1 MG: 1 INJECTION, SOLUTION INTRAMUSCULAR; INTRAVENOUS; SUBCUTANEOUS at 08:13

## 2019-04-12 RX ADMIN — Medication 100 MG: at 08:01

## 2019-04-12 RX ADMIN — ACETAMINOPHEN 650 MG: 325 TABLET ORAL at 18:19

## 2019-04-12 RX ADMIN — OXYCODONE HYDROCHLORIDE 10 MG: 10 TABLET ORAL at 15:29

## 2019-04-12 RX ADMIN — HYDROMORPHONE HYDROCHLORIDE 1 MG: 1 INJECTION, SOLUTION INTRAMUSCULAR; INTRAVENOUS; SUBCUTANEOUS at 14:27

## 2019-04-12 ASSESSMENT — PAIN SCALES - GENERAL
PAINLEVEL_OUTOF10: 6
PAINLEVEL_OUTOF10: 10
PAINLEVEL_OUTOF10: 6
PAINLEVEL_OUTOF10: 8
PAINLEVEL_OUTOF10: 3
PAINLEVEL_OUTOF10: 3
PAINLEVEL_OUTOF10: 10
PAINLEVEL_OUTOF10: 4
PAINLEVEL_OUTOF10: 8
PAINLEVEL_OUTOF10: 6
PAINLEVEL_OUTOF10: 4
PAINLEVEL_OUTOF10: 8
PAINLEVEL_OUTOF10: 5
PAINLEVEL_OUTOF10: 10
PAINLEVEL_OUTOF10: 6
PAINLEVEL_OUTOF10: 8
PAINLEVEL_OUTOF10: 8
PAINLEVEL_OUTOF10: 10
PAINLEVEL_OUTOF10: 3

## 2019-04-12 ASSESSMENT — PAIN DESCRIPTION - PROGRESSION
CLINICAL_PROGRESSION: NOT CHANGED

## 2019-04-12 ASSESSMENT — PAIN DESCRIPTION - ORIENTATION
ORIENTATION: ANTERIOR
ORIENTATION: ANTERIOR

## 2019-04-12 ASSESSMENT — PAIN DESCRIPTION - LOCATION
LOCATION: ABDOMEN

## 2019-04-12 ASSESSMENT — PAIN - FUNCTIONAL ASSESSMENT
PAIN_FUNCTIONAL_ASSESSMENT: PREVENTS OR INTERFERES SOME ACTIVE ACTIVITIES AND ADLS
PAIN_FUNCTIONAL_ASSESSMENT: PREVENTS OR INTERFERES SOME ACTIVE ACTIVITIES AND ADLS

## 2019-04-12 ASSESSMENT — PAIN DESCRIPTION - ONSET
ONSET: PROGRESSIVE
ONSET: PROGRESSIVE

## 2019-04-12 ASSESSMENT — PAIN DESCRIPTION - DESCRIPTORS: DESCRIPTORS: SHARP

## 2019-04-12 ASSESSMENT — PAIN DESCRIPTION - PAIN TYPE
TYPE: SURGICAL PAIN

## 2019-04-12 ASSESSMENT — PAIN DESCRIPTION - FREQUENCY
FREQUENCY: CONTINUOUS
FREQUENCY: CONTINUOUS

## 2019-04-12 NOTE — TELEPHONE ENCOUNTER
FYI:  Morgan Hodges said pt admitted to [facility] April 11 for colostomy reversal - small bowel resection.        Cristin Bro Transition Care Team:  PHONE:   [phone]

## 2019-04-12 NOTE — PROGRESS NOTES
[facility] General and Vascular Surgery                                     Daily Progress Note                                                         Pt Name: Michael Cloud Record Number: [id number]  Date of Birth 1945   Today's Date: 4/12/2019  No chief complaint on file. ASSESSMENT/PLAN  Colostomy in place  -POD #2 (4/10) lap extensive lysis of adhesions > 3 hours, lap converted to open colostomy reversal with colorectal anastomosis, small bowel resection.   -Pain control better  -Nausea better  -tolerating clears  -OOB, ambulate  -awaiting return of GI function    Acute blood loss anemia  -monitor    Discharge Planning: awaiting return of GI function    EDUCATION  Patient educated about Disease Process, Medications, Smoking Cessation, Oxygenation, Incentive Spirometry and Deep Breath and Cough, Diabetes, Hyperlipidemia, Smoking Cessation, Nutrition, Exercise and Hypertension    SUBJECTIVE  Emmit Poser has improved from yesterday. Pain is well controlled. She has no nausea and no vomiting. She has passed flatus and has not had a bowel movement. She is tolerating thin liquids. Current activity is up with assistance    OBJECTIVE  VITALS:  height is 5' 1\" (1.549 m) and weight is 144 lb 2.9 oz (65.4 kg). Her oral temperature is 97.8 °F (36.6 °C). Her blood pressure is 118/56 (abnormal) and her pulse is 105. Her respiration is 18 and oxygen saturation is 96%. INTAKE/OUTPUT:      Intake/Output Summary (Last 24 hours) at 4/12/2019 1452  Last data filed at 4/12/2019 1336  Gross per 24 hour   Intake 3563 ml   Output 750 ml   Net 2813 ml     GENERAL: alert, no distress  LUNGS: clear to ausculation, without wheezes, rales or rhonci  HEART: normal rate and regular rhythm  ABDOMEN: soft, appropriately-tender, non-distended and bowel sounds present in all 4 quadrants.    EXTREMITY: no cyanosis, clubbing or edema  I/O last 3 completed shifts: In: 3848.9 [P.O.:520; I.V.:3278.9; IV Piggyback:50]  Out: 700 [Urine:700]  I/O this shift: In: 837 [P.O.:240; I.V.:597]  Out: 450 [Urine:450]    LABS  Recent Labs     04/12/19  0526   WBC 12.5*   HGB 8.5*   HCT 26.0*         K 4.0      CO2 24   BUN 5*   CREATININE <0.5*   MG 1.60*   PHOS 1.9*   CALCIUM 8.1*         Electronically signed by ABIGAIL Yadav CNP on 4/11/19 at 9:16 AM     [facility]   [phone] Office  [phone] Cell     Agree with above note. The patient was personally seen and examined. Blanquita Mann is doing well. Pain improving. Denies nausea. + flatus. Abd soft, minimally distended, dressings with some drainage, appropriately tender    WBC 12.5  Hgb 8.5 (9.2)  Cr <0.5    A/P: s/p extensive laparoscopic LITTLE, lap converted to open colostomy takedown, SBR POD #2    Doing better  Decrease IVF  Hgb stable, no signs of bleeding.   HR improving  Ok for clears  Ambulate/OOB    Riley Mohs, MD

## 2019-04-12 NOTE — OP NOTE
[address] [place]                                 OPERATIVE REPORT    PATIENT NAME: Gee Wiggins                    :        1945  MED REC NO:   [id number]                          ROOM:       4106  ACCOUNT NO:   [de-identified]                           ADMIT DATE: 04/10/2019  PROVIDER:     Emerson Gotti MD      DATE OF PROCEDURE:  04/10/2019    PREOPERATIVE DIAGNOSES:  History of colonic stricture, colostomy. POSTOPERATIVE DIAGNOSES:  History of colonic stricture, colostomy. OPERATION PERFORMED:  1. Laparoscopic lysis of adhesions greater than three hours. 2.  Laparoscopic converted to open colostomy reversal.  3.  Repair of colotomy. 4.  Small bowel resection. SURGEON:  Emerson Gotti MD    ANESTHESIA:  General endotracheal.    ASA CLASS:  III. DVT PROPHYLAXIS:  The patient was wearing bilateral SCD's and received  heparin 5000 units subcu preoperatively. ANTIBIOTICS:  The patient received Ancef 2 gm IV and Flagyl 500 mg IV  preoperatively. INDICATIONS:  The patient is a 68-year-old female who presented to my  office with a history of a colostomy that was performed for a colonic  stricture in 2018 in [facility]. She required optimization over  multiple months with smoking and alcohol cessation along with  improvement of her nutrition. She was finally able to stop smoking and  drinking alcohol and her preop nutrition levels were within normal  limits. Therefore, we brought the patient to operating room after the  risks, benefits and alternatives were explained to her and she was  willing to consent for the procedure. OPERATIVE PROCEDURE:  After informed consent was obtained, the patient  was brought to the operating room and placed in the supine position. General anesthesia was induced. The patient was then prepped and draped  in usual sterile fashion.   A timeout was then circumferentially  around the colon to be able to reduce it fully back into the abdomen. It was stretched out through the abdominal wall first to make sure that  all free adhesions were cut and to be able to have enough length to drop  down in the pelvis for anastomosis. A GelPort was then inserted into the left colostomy site and the abdomen  was re-insufflated. Using a hand to assist with manipulation, it was  still very difficult to safely get around the small bowel that was stuck  to the colon staple line. The colotomy was made slightly bigger, which  ended up being approximately 1.5 cm, but at this point, I felt it was  safest to convert to an open incision. A midline incision was then made between her pubic bone and her  umbilicus. Electrocautery was used to divide through the subcutaneous  tissue and fascia to be able to enter into the abdomen. A Bookwalter  was then placed for visualization. We then continued our adhesiolysis  and freed up the small intestine fully from the rectal stump. We did  note that in the process of freeing up the small intestine, the  mesentery had been injured directly underneath that area and wound  require further resection to avoid any ischemia of that site. The rectal stump was then evaluated. Silk sutures were placed in order  to be able to grab and manipulate the stump. We did with a combination  of blunt and sharp dissection, free up some of the rectal stump,  however, there was significant inflammation around it and this was very  challenging. The patient did have a prior hysterectomy and the stump  was kind fused underneath there. At this point, I elected to go down  below and place an EEA sizer into the rectum. We were able to get up  close to the rectal stump and perform an anterior anastomosis with the  rectum. I then scrubbed back in. We then mobilized our rectal stump, which had very dense adhesions around it.   We were able to expose the anterior rectum just proximal to the colotomy site. We then elected to close  the colotomy. This was done with a contour stapler where the sutures  and colotomy were brought through. The staple line was able to close  the colotomy well. We then focused on our descending colon to perform our anastomosis. A pursestring suture device was then used to create a pursestring of the end of the colon. A  34 EEA Anvil was then placed within the descending colon. We then went  below and a 25 followed 29 EEA sizer were then placed into the rectum. We were able to go up to within a few centimeters of the proximal aspect  of the rectal stump, however, due to the tortuosity, we were unable to  fully get to the very end. The EEA stapler was then placed and the  spike was brought out through the anterior rectal wall. We were able to  make a end-to-side functional end-to-end anastomosis at this location. Both rings were intact. Leak test was then performed after placing  saline in the pelvis, and there was no evidence of air leak from our  anastomosis. All free fluid was then suctioned out. At this point, after our anastomosis was performed,  we evaluated the small bowel. There was approximately 5-cm area where  the mesentery had been disrupted. Therefore, we elected to resect this  small bowel. This was approximately 20 or so centimeters from the  ileocecal valve. A KENDALL stapler with blue load was used to come across  both sides of the small bowel. Th small bowel was then aligned using 3-0 silk sutures. Enterotomies  were performed and a side-to-side functional end-to-end anastomosis was  created with a KENDALL stapler. A TL 60 linear stapler was then used to  close the common enterotomy. The corners of the staple line were  imbricated with 3-0 silk sutures. An additional crotch stitch was  placed with 3-0 silk suture.   There was minimal oozing from the  mesentery that was controlled with two separate 3-0 silk sutures. Care  was taken to avoid injuring the blood flow to the small bowel itself  that was remaining. At this point, we irrigated the abdomen. All free fluid was suctioned  out. The colostomy site was closed in two separate layers. An 0-Vicryl  was used to close the posterior fascia and transversalis fascia. The  anterior layer was run with 0 loop PDS suture from medially and  laterally. This brought together the intrenal oblique and external  oblique muscle and fascia well. We then focused on the midline incision, 0 loop PDS sutures were use to  close the abdomen from superiorly and inferiorly. The wounds were then  irrigated with saline and closed loosely with staples to allow multiple  areas of packing in separate locations. The 5-mm trocar sites were then closed with staples and a sterile  dressing was placed over top of the incision. Overall, the patient  tolerated the procedure well and was taken to recovery area in stable  condition. FINDINGS:  Significant intra-abdominal adhesions with the small bowel  being adherent to the rectal stump. The patient also had a parastomal  hernia containing small bowel. ESTIMATED BLOOD LOSS:  150 mL. COMPLICATIONS:  None. SPECIMENS:  1. Parastomal hernia sac. 2.  Colon, rectum, and small bowel for permanent.         Muna Boston MD    D: 04/11/2019 17:13:28       T: 04/11/2019 18:45:22     [email or web address]  Job#: [id number]     Doc#: [id number]    CC:

## 2019-04-12 NOTE — PROGRESS NOTES
HYSTERECTOMY      SMALL INTESTINE SURGERY N/A 4/10/2019    LAPAROSCOPIC LYSISI OF ADHESIONS FOR GREATER THAN 3 HOURS,LAPORSCOPIC CONVERTED TO OPEN COLOSTOMY REVERSAL, SMALL BOWEL RESECTION performed by Delgado Sellers MD at [facility] ENDOSCOPY  12/13/2018    with biopsies    UPPER GASTROINTESTINAL ENDOSCOPY N/A 12/13/2018    [facility] / Functional History  Social/Functional History  Lives With: Spouse(and ex m-i-l)  Type of Home: House  Home Layout: One level, Performs ADL's on one level  Home Access: Stairs to enter with rails  Entrance Stairs - Number of Steps: 10  Entrance Stairs - Rails: Right  Bathroom Shower/Tub: Tub/Shower unit, Shower chair with back  Bathroom Toilet: Standard(sink close-by)  Bathroom Equipment: Shower chair  Bathroom Accessibility: Walker accessible  ADL Assistance: Needs assistance(reports ECF assists her with showering and dressing but can manage toileting on her own)  Ambulation Assistance: Independent(uses a walker at the ECF)  Transfer Assistance: Independent  Active : No  Patient's  Info: spouse takes for appointments  Additional Comments: the pt reports a fall ~6 weeks ago; the pt has been at [facility] x 6 weeks    Restrictions  Restrictions/Precautions: Fall Risk        Other position/activity restrictions: IV, ferrer    SUBJECTIVE: Pt supine in bed. Agreeable to ambulate in hallway. Pt uses course language conversationally and plays at agitation.     Pain: Patient Currently in Pain: Yes      OBJECTIVE:    Bed mobility  Bridging: Contact guard assistance  Rolling to Left: Contact guard assistance  Supine to Sit: Contact guard assistance(max VCs for technique)  Sit to Supine: Minimal assistance(cues for log roll)  Scooting: Contact guard assistance    Transfers  Sit to Stand: Contact guard assistance(cues for hand placement)  Stand term goal 2: Transfers sit <> stand with SBA. Short term goal 3: Ambulate with walker 50 feet with CGA/SBA. Safety devices:   Type of devices: Call light within reach, Bed alarm in place, Left in bed, Patient at risk for falls, Nurse notified, All fall risk precautions in place        PLAN:  Times per week: 3-5x/week;    Current Treatment Recommendations: Functional Mobility Training    If patient is discharged prior to next treatment, this note will serve as the discharge summary.     Therapy Time    Individual Concurrent Group Co-treatment   Time In 1510            Time Out 1530          Minutes 20             Timed Code Treatment Minutes: 20 Minutes      Florecita Severino, PT

## 2019-04-12 NOTE — PLAN OF CARE
Problem: SAFETY  Goal: Free from accidental physical injury  Outcome: Ongoing  Goal: Free from intentional harm  Outcome: Ongoing     Problem: PAIN  Goal: Patient's pain/discomfort is manageable  Outcome: Ongoing     Problem: Pain:  Goal: Pain level will decrease  Description  Pain level will decrease  Outcome: Ongoing  Goal: Control of acute pain  Description  Control of acute pain  Outcome: Ongoing  Goal: Control of chronic pain  Description  Control of chronic pain  Outcome: Ongoing

## 2019-04-13 PROCEDURE — 2580000003 HC RX 258: Performed by: NURSE PRACTITIONER

## 2019-04-13 PROCEDURE — 1200000000 HC SEMI PRIVATE

## 2019-04-13 PROCEDURE — APPSS30 APP SPLIT SHARED TIME 16-30 MINUTES: Performed by: PHYSICIAN ASSISTANT

## 2019-04-13 PROCEDURE — C9113 INJ PANTOPRAZOLE SODIUM, VIA: HCPCS | Performed by: NURSE PRACTITIONER

## 2019-04-13 PROCEDURE — 94640 AIRWAY INHALATION TREATMENT: CPT

## 2019-04-13 PROCEDURE — 94760 N-INVAS EAR/PLS OXIMETRY 1: CPT

## 2019-04-13 PROCEDURE — 6360000002 HC RX W HCPCS: Performed by: NURSE PRACTITIONER

## 2019-04-13 PROCEDURE — 6360000002 HC RX W HCPCS: Performed by: SURGERY

## 2019-04-13 PROCEDURE — 2500000003 HC RX 250 WO HCPCS: Performed by: SURGERY

## 2019-04-13 PROCEDURE — 6370000000 HC RX 637 (ALT 250 FOR IP): Performed by: SURGERY

## 2019-04-13 PROCEDURE — 2580000003 HC RX 258: Performed by: SURGERY

## 2019-04-13 PROCEDURE — 99024 POSTOP FOLLOW-UP VISIT: CPT | Performed by: SURGERY

## 2019-04-13 PROCEDURE — APPNB30 APP NON BILLABLE TIME 0-30 MINS: Performed by: PHYSICIAN ASSISTANT

## 2019-04-13 RX ADMIN — TIOTROPIUM BROMIDE 18 MCG: 18 CAPSULE ORAL; RESPIRATORY (INHALATION) at 08:53

## 2019-04-13 RX ADMIN — OXYCODONE HYDROCHLORIDE 10 MG: 10 TABLET ORAL at 10:52

## 2019-04-13 RX ADMIN — HYDROMORPHONE HYDROCHLORIDE 0.5 MG: 1 INJECTION, SOLUTION INTRAMUSCULAR; INTRAVENOUS; SUBCUTANEOUS at 18:39

## 2019-04-13 RX ADMIN — PYRIDOXINE HCL TAB 50 MG 100 MG: 50 TAB at 10:52

## 2019-04-13 RX ADMIN — Medication 500 MCG: at 10:55

## 2019-04-13 RX ADMIN — DOCUSATE SODIUM 100 MG: 100 CAPSULE, LIQUID FILLED ORAL at 20:23

## 2019-04-13 RX ADMIN — HYDROMORPHONE HYDROCHLORIDE 1 MG: 1 INJECTION, SOLUTION INTRAMUSCULAR; INTRAVENOUS; SUBCUTANEOUS at 21:43

## 2019-04-13 RX ADMIN — PANTOPRAZOLE SODIUM 40 MG: 40 INJECTION, POWDER, FOR SOLUTION INTRAVENOUS at 11:15

## 2019-04-13 RX ADMIN — Medication 10 ML: at 11:00

## 2019-04-13 RX ADMIN — OXYCODONE HYDROCHLORIDE 10 MG: 10 TABLET ORAL at 20:23

## 2019-04-13 RX ADMIN — OXYCODONE HYDROCHLORIDE 10 MG: 10 TABLET ORAL at 15:21

## 2019-04-13 RX ADMIN — ACETAMINOPHEN 650 MG: 325 TABLET ORAL at 13:46

## 2019-04-13 RX ADMIN — ACETAMINOPHEN 650 MG: 325 TABLET ORAL at 18:02

## 2019-04-13 RX ADMIN — ENOXAPARIN SODIUM 40 MG: 40 INJECTION SUBCUTANEOUS at 10:52

## 2019-04-13 RX ADMIN — HYDROMORPHONE HYDROCHLORIDE 0.5 MG: 1 INJECTION, SOLUTION INTRAMUSCULAR; INTRAVENOUS; SUBCUTANEOUS at 05:35

## 2019-04-13 RX ADMIN — Medication 100 MG: at 10:52

## 2019-04-13 RX ADMIN — FOLIC ACID 1 MG: 1 TABLET ORAL at 10:53

## 2019-04-13 RX ADMIN — Medication 10 ML: at 20:24

## 2019-04-13 RX ADMIN — Medication 10 ML: at 11:09

## 2019-04-13 RX ADMIN — ACETAMINOPHEN 650 MG: 325 TABLET ORAL at 05:35

## 2019-04-13 RX ADMIN — DOCUSATE SODIUM 100 MG: 100 CAPSULE, LIQUID FILLED ORAL at 10:52

## 2019-04-13 RX ADMIN — POTASSIUM CHLORIDE, DEXTROSE MONOHYDRATE AND SODIUM CHLORIDE: 150; 5; 450 INJECTION, SOLUTION INTRAVENOUS at 05:35

## 2019-04-13 ASSESSMENT — PAIN DESCRIPTION - LOCATION
LOCATION: ABDOMEN

## 2019-04-13 ASSESSMENT — PAIN DESCRIPTION - DESCRIPTORS
DESCRIPTORS: ACHING;THROBBING
DESCRIPTORS: DISCOMFORT
DESCRIPTORS: ACHING;THROBBING

## 2019-04-13 ASSESSMENT — PAIN DESCRIPTION - ORIENTATION
ORIENTATION: LEFT

## 2019-04-13 ASSESSMENT — PAIN SCALES - GENERAL
PAINLEVEL_OUTOF10: 10
PAINLEVEL_OUTOF10: 4
PAINLEVEL_OUTOF10: 8
PAINLEVEL_OUTOF10: 0
PAINLEVEL_OUTOF10: 8
PAINLEVEL_OUTOF10: 5
PAINLEVEL_OUTOF10: 7
PAINLEVEL_OUTOF10: 8
PAINLEVEL_OUTOF10: 10
PAINLEVEL_OUTOF10: 4
PAINLEVEL_OUTOF10: 8
PAINLEVEL_OUTOF10: 8
PAINLEVEL_OUTOF10: 9
PAINLEVEL_OUTOF10: 0
PAINLEVEL_OUTOF10: 9

## 2019-04-13 ASSESSMENT — PAIN DESCRIPTION - PAIN TYPE
TYPE: SURGICAL PAIN
TYPE: SURGICAL PAIN;ACUTE PAIN
TYPE: SURGICAL PAIN

## 2019-04-13 ASSESSMENT — PAIN DESCRIPTION - PROGRESSION
CLINICAL_PROGRESSION: GRADUALLY IMPROVING
CLINICAL_PROGRESSION: GRADUALLY WORSENING

## 2019-04-13 ASSESSMENT — PAIN - FUNCTIONAL ASSESSMENT
PAIN_FUNCTIONAL_ASSESSMENT: PREVENTS OR INTERFERES SOME ACTIVE ACTIVITIES AND ADLS

## 2019-04-13 ASSESSMENT — PAIN DESCRIPTION - FREQUENCY
FREQUENCY: INTERMITTENT

## 2019-04-13 ASSESSMENT — PAIN DESCRIPTION - ONSET
ONSET: ON-GOING
ONSET: ON-GOING
ONSET: PROGRESSIVE

## 2019-04-13 NOTE — PROGRESS NOTES
[facility]                                     Daily Progress Note                                                         Pt Name: Mabel Marlow Record Number: [id number]  Date of Birth 1945   Today's Date: 4/13/2019  No chief complaint on file. ASSESSMENT/PLAN  Colostomy in place  -POD #3 (4/10) lap extensive lysis of adhesions > 3 hours, lap converted to open colostomy reversal with colorectal anastomosis, small bowel resection.   -Pain control better  -Nausea better  -tolerating clears  -ADAT  -OOB, ambulate  -awaiting return of GI function    Acute blood loss anemia  -monitor    Discharge Planning: awaiting return of GI function    EDUCATION  Patient educated about Disease Process, Medications, Smoking Cessation, Oxygenation, Incentive Spirometry and Deep Breath and Cough, Diabetes, Hyperlipidemia, Smoking Cessation, Nutrition, Exercise and Hypertension    SUBJECTIVE  Demetrio Lis has improved from yesterday. Pain is well controlled. She has no nausea and no vomiting. She has passed flatus and has not had a bowel movement. She is tolerating thin liquids. Current activity is up with assistance    OBJECTIVE  VITALS:  height is 5' 1\" (1.549 m) and weight is 141 lb 12.1 oz (64.3 kg). Her oral temperature is 97.8 °F (36.6 °C). Her blood pressure is 109/63 and her pulse is 94. Her respiration is 16 and oxygen saturation is 92%. INTAKE/OUTPUT:      Intake/Output Summary (Last 24 hours) at 4/13/2019 0845  Last data filed at 4/12/2019 2043  Gross per 24 hour   Intake 1317 ml   Output 650 ml   Net 667 ml     GENERAL: alert, no distress  LUNGS: clear to ausculation, without wheezes, rales or rhonci  HEART: normal rate and regular rhythm  ABDOMEN: soft, appropriately-tender, non-distended and bowel sounds present in all 4 quadrants. EXTREMITY: no cyanosis, clubbing or edema  I/O last 3 completed shifts:   In: 1557 [P.O.:960; I.V.:597]  Out: 650 [Urine:650]  No intake/output data recorded. LABS  Recent Labs     04/12/19  0526   WBC 12.5*   HGB 8.5*   HCT 26.0*         K 4.0      CO2 24   BUN 5*   CREATININE <0.5*   MG 1.60*   PHOS 1.9*   CALCIUM 8.1*         Electronically signed by ABIGAIL Corral CNP on 4/11/19 at 9:16 AM     [facility]   [phone] Office  [phone] Cell     Agree with above note. The patient was personally seen and examined. Misty Palencia is doing well. Pain improving. Denies nausea. + flatus. Abd soft, minimally distended, dressings with some drainage, appropriately tender    WBC 12.5  Hgb 8.5 (9.2)  Cr <0.5    A/P: s/p extensive laparoscopic LITTLE, lap converted to open colostomy takedown, SBR POD #2    Doing better  Decrease IVF  Hgb stable, no signs of bleeding.   HR improving  Ok for clears  Ambulate/OOB    Pop Rivera MD

## 2019-04-13 NOTE — PLAN OF CARE
Problem: SAFETY  Goal: Free from accidental physical injury  Outcome: Ongoing   Instructed to call for assistance prior to getting OOB. Room free from clutter. Non-skid footwear intact. Belongings in reach. Call light in reach.   Problem: PAIN  Goal: Patient's pain/discomfort is manageable  Outcome: Ongoing

## 2019-04-14 PROCEDURE — 2500000003 HC RX 250 WO HCPCS: Performed by: SURGERY

## 2019-04-14 PROCEDURE — 6360000002 HC RX W HCPCS: Performed by: SURGERY

## 2019-04-14 PROCEDURE — 94760 N-INVAS EAR/PLS OXIMETRY 1: CPT

## 2019-04-14 PROCEDURE — 6370000000 HC RX 637 (ALT 250 FOR IP): Performed by: SURGERY

## 2019-04-14 PROCEDURE — 99024 POSTOP FOLLOW-UP VISIT: CPT | Performed by: SURGERY

## 2019-04-14 PROCEDURE — 1200000000 HC SEMI PRIVATE

## 2019-04-14 PROCEDURE — 6360000002 HC RX W HCPCS: Performed by: NURSE PRACTITIONER

## 2019-04-14 PROCEDURE — C9113 INJ PANTOPRAZOLE SODIUM, VIA: HCPCS | Performed by: NURSE PRACTITIONER

## 2019-04-14 PROCEDURE — 2580000003 HC RX 258: Performed by: SURGERY

## 2019-04-14 PROCEDURE — 94640 AIRWAY INHALATION TREATMENT: CPT

## 2019-04-14 PROCEDURE — 2580000003 HC RX 258: Performed by: NURSE PRACTITIONER

## 2019-04-14 RX ADMIN — POTASSIUM CHLORIDE, DEXTROSE MONOHYDRATE AND SODIUM CHLORIDE: 150; 5; 450 INJECTION, SOLUTION INTRAVENOUS at 18:54

## 2019-04-14 RX ADMIN — Medication 100 MG: at 09:39

## 2019-04-14 RX ADMIN — ACETAMINOPHEN 650 MG: 325 TABLET ORAL at 09:54

## 2019-04-14 RX ADMIN — Medication 10 ML: at 09:43

## 2019-04-14 RX ADMIN — ACETAMINOPHEN 650 MG: 325 TABLET ORAL at 13:00

## 2019-04-14 RX ADMIN — ACETAMINOPHEN 650 MG: 325 TABLET ORAL at 23:10

## 2019-04-14 RX ADMIN — ONDANSETRON 4 MG: 2 INJECTION INTRAMUSCULAR; INTRAVENOUS at 00:33

## 2019-04-14 RX ADMIN — OXYCODONE HYDROCHLORIDE 10 MG: 10 TABLET ORAL at 20:33

## 2019-04-14 RX ADMIN — ACETAMINOPHEN 650 MG: 325 TABLET ORAL at 18:52

## 2019-04-14 RX ADMIN — ACETAMINOPHEN 650 MG: 325 TABLET ORAL at 00:35

## 2019-04-14 RX ADMIN — POTASSIUM CHLORIDE, DEXTROSE MONOHYDRATE AND SODIUM CHLORIDE: 150; 5; 450 INJECTION, SOLUTION INTRAVENOUS at 01:37

## 2019-04-14 RX ADMIN — Medication 500 MCG: at 09:43

## 2019-04-14 RX ADMIN — TIOTROPIUM BROMIDE 18 MCG: 18 CAPSULE ORAL; RESPIRATORY (INHALATION) at 08:34

## 2019-04-14 RX ADMIN — DOCUSATE SODIUM 100 MG: 100 CAPSULE, LIQUID FILLED ORAL at 20:33

## 2019-04-14 RX ADMIN — OXYCODONE HYDROCHLORIDE 10 MG: 10 TABLET ORAL at 09:39

## 2019-04-14 RX ADMIN — Medication 10 ML: at 09:41

## 2019-04-14 RX ADMIN — DOCUSATE SODIUM 100 MG: 100 CAPSULE, LIQUID FILLED ORAL at 09:39

## 2019-04-14 RX ADMIN — PANTOPRAZOLE SODIUM 40 MG: 40 INJECTION, POWDER, FOR SOLUTION INTRAVENOUS at 09:41

## 2019-04-14 RX ADMIN — HYDROMORPHONE HYDROCHLORIDE 1 MG: 1 INJECTION, SOLUTION INTRAMUSCULAR; INTRAVENOUS; SUBCUTANEOUS at 11:09

## 2019-04-14 RX ADMIN — OXYCODONE HYDROCHLORIDE 10 MG: 10 TABLET ORAL at 16:21

## 2019-04-14 RX ADMIN — ENOXAPARIN SODIUM 40 MG: 40 INJECTION SUBCUTANEOUS at 09:38

## 2019-04-14 RX ADMIN — PYRIDOXINE HCL TAB 50 MG 100 MG: 50 TAB at 09:38

## 2019-04-14 RX ADMIN — Medication 10 ML: at 20:33

## 2019-04-14 RX ADMIN — FOLIC ACID 1 MG: 1 TABLET ORAL at 09:39

## 2019-04-14 RX ADMIN — HYDROMORPHONE HYDROCHLORIDE 1 MG: 1 INJECTION, SOLUTION INTRAMUSCULAR; INTRAVENOUS; SUBCUTANEOUS at 00:34

## 2019-04-14 ASSESSMENT — PAIN SCALES - GENERAL
PAINLEVEL_OUTOF10: 4
PAINLEVEL_OUTOF10: 8
PAINLEVEL_OUTOF10: 10
PAINLEVEL_OUTOF10: 4
PAINLEVEL_OUTOF10: 3
PAINLEVEL_OUTOF10: 8
PAINLEVEL_OUTOF10: 6
PAINLEVEL_OUTOF10: 10
PAINLEVEL_OUTOF10: 10
PAINLEVEL_OUTOF10: 9
PAINLEVEL_OUTOF10: 8
PAINLEVEL_OUTOF10: 7
PAINLEVEL_OUTOF10: 9

## 2019-04-14 ASSESSMENT — PAIN DESCRIPTION - FREQUENCY
FREQUENCY: INTERMITTENT
FREQUENCY: CONTINUOUS
FREQUENCY: INTERMITTENT

## 2019-04-14 ASSESSMENT — PAIN DESCRIPTION - LOCATION
LOCATION: ABDOMEN

## 2019-04-14 ASSESSMENT — PAIN DESCRIPTION - PAIN TYPE
TYPE: ACUTE PAIN;SURGICAL PAIN
TYPE: SURGICAL PAIN
TYPE: ACUTE PAIN;SURGICAL PAIN
TYPE: ACUTE PAIN;SURGICAL PAIN

## 2019-04-14 ASSESSMENT — PAIN DESCRIPTION - ONSET
ONSET: GRADUAL
ONSET: ON-GOING
ONSET: GRADUAL

## 2019-04-14 ASSESSMENT — PAIN DESCRIPTION - ORIENTATION
ORIENTATION: LEFT;MID
ORIENTATION: MID;LEFT

## 2019-04-14 ASSESSMENT — PAIN DESCRIPTION - DESCRIPTORS
DESCRIPTORS: ACHING
DESCRIPTORS: ACHING
DESCRIPTORS: PATIENT UNABLE TO DESCRIBE
DESCRIPTORS: ACHING;SHARP
DESCRIPTORS: ACHING
DESCRIPTORS: ACHING

## 2019-04-14 ASSESSMENT — PAIN DESCRIPTION - PROGRESSION
CLINICAL_PROGRESSION: GRADUALLY IMPROVING
CLINICAL_PROGRESSION: NOT CHANGED

## 2019-04-14 NOTE — PROGRESS NOTES
[facility]                                     Daily Progress Note                                                         Pt Name: Paul Farias Record Number: [id number]  Date of Birth 1945   Today's Date: 4/14/2019  No chief complaint on file. ASSESSMENT/PLAN  Colostomy in place  -POD #4 (4/10) lap extensive lysis of adhesions > 3 hours, lap converted to open colostomy reversal with colorectal anastomosis, small bowel resection. Dressing changed, repacked, no s/s infection    - Some pain overnight  - Nausea better  - Tolerating some PO  - Likely need 1-2 more days inpt    Acute blood loss anemia  -monitor    Discharge Planning: awaiting return of GI function    EDUCATION  Patient educated about Disease Process, Medications, Smoking Cessation, Oxygenation, Incentive Spirometry and Deep Breath and Cough, Diabetes, Hyperlipidemia, Smoking Cessation, Nutrition, Exercise and Hypertension    SUBJECTIVE  Jannette Limber has improved from yesterday. Pain is well controlled. She has no nausea and no vomiting. She has passed flatus and has not had a bowel movement. She is tolerating thin liquids. Current activity is up with assistance    OBJECTIVE  VITALS:  height is 5' 1\" (1.549 m) and weight is 142 lb 13.7 oz (64.8 kg). Her axillary temperature is 98.4 °F (36.9 °C). Her blood pressure is 146/73 (abnormal) and her pulse is 100. Her respiration is 18 and oxygen saturation is 92%. INTAKE/OUTPUT:      Intake/Output Summary (Last 24 hours) at 4/14/2019 1049  Last data filed at 4/14/2019 0856  Gross per 24 hour   Intake 2633 ml   Output --   Net 2633 ml     GENERAL: alert, no distress  LUNGS: clear to ausculation, without wheezes, rales or rhonci  HEART: normal rate and regular rhythm  ABDOMEN: soft, appropriately-tender, non-distended and bowel sounds present in all 4 quadrants.    EXTREMITY: no cyanosis, clubbing or edema  I/O last 3 completed shifts: In: 2633 [P.O.:840; I.V.:1793]  Out: -   I/O this shift:  In: 240 [P.O.:240]  Out: -     LABS  Recent Labs     04/12/19  0526   WBC 12.5*   HGB 8.5*   HCT 26.0*         K 4.0      CO2 24   BUN 5*   CREATININE <0.5*   MG 1.60*   PHOS 1.9*   CALCIUM 8.1*         Electronically signed by ABIGAIL Louis CNP on 4/11/19 at 9:16 AM     [facility]   [phone] Office  [phone] Cell     Agree with above note. The patient was personally seen and examined. Amadou Leblanc is doing well. Pain improving. Denies nausea. + flatus. Abd soft, minimally distended, dressings with some drainage, appropriately tender    WBC 12.5  Hgb 8.5 (9.2)  Cr <0.5    A/P: s/p extensive laparoscopic LITTLE, lap converted to open colostomy takedown, SBR POD #2    Doing better  Decrease IVF  Hgb stable, no signs of bleeding.   HR improving  Ok for clears  Ambulate/OOB    Princess Brad MD

## 2019-04-14 NOTE — PROGRESS NOTES
Patient is alert and oriented x 4. Patient has been verbally aggressive this morning and treating PCA staff poorly. Encourage patient to attempt to be more respectful however, patient says, Monique Juarez are the scum of the earth. \" Encourage deep breathing techniques to calm patient down when in pain. Relaxation techniques seem to help patient. Respirations now easy and even. Oxy-IR given for pain level 10/10 per patient at left surgical wound site. Patient tolerates well. Bed alarm active. Tolerating solid foods. Call light in reach. Will continue to monitor.

## 2019-04-14 NOTE — PLAN OF CARE
Problem: PAIN  Goal: Patient's pain/discomfort is manageable  Outcome: Ongoing  Encourage to call nursing staff on onset of pain rather then waiting. Encourage repositioning and relaxation techniques implemented. Oral and IV pain medication available and patient updated. Problem: SAFETY  Goal: Free from accidental physical injury  Outcome: Ongoing     Problem: Falls - Risk of:  Goal: Will remain free from falls  Description: Wheels of bed locked x 4. Room free from clutter. Non-skid footwear intact. Bed alram active. Call light in reach. Monitored for BR needs.   Will remain free from falls  4/14/2019 1612 by Nilam Miguel RN  Outcome: Ongoing

## 2019-04-14 NOTE — PLAN OF CARE
Problem: Pain:  Description  Pain management should include both nonpharmacologic and pharmacologic interventions. Pain level assessed. Pt able to rate pain on a scale of 0-10. Administered PRN analgesics per order. Reposition and encourage splinting with a pillow for comfort. Reassessed for effectiveness. Will continue to monitor. Goal: Pain level will decrease  Description  Pain level will decrease      Outcome: Ongoing  Goal: Control of acute pain  Description  Control of acute pain  Outcome: Ongoing  Goal: Control of chronic pain  Description  Control of chronic pain  Outcome: Ongoing    Problem: Falls - Risk of:  Goal: Will remain free from falls  Description  Will remain free from falls. Fall risk assessed. Precautions in place. Bed low and locked with side rails up x2. Nonskid socks on when OOB. Bed/chair alarm utilized. Call light within reach. Frequent checks performed. No falls at this time.    Outcome: Ongoing

## 2019-04-15 LAB
ANION GAP SERPL CALCULATED.3IONS-SCNC: 10 MMOL/L (ref 3–16)
BUN BLDV-MCNC: 4 MG/DL (ref 7–20)
CALCIUM SERPL-MCNC: 8.7 MG/DL (ref 8.3–10.6)
CHLORIDE BLD-SCNC: 99 MMOL/L (ref 99–110)
CO2: 26 MMOL/L (ref 21–32)
CREAT SERPL-MCNC: <0.5 MG/DL (ref 0.6–1.2)
GFR AFRICAN AMERICAN: >60
GFR NON-AFRICAN AMERICAN: >60
GLUCOSE BLD-MCNC: 136 MG/DL (ref 70–99)
MAGNESIUM: 1.7 MG/DL (ref 1.8–2.4)
PHOSPHORUS: 2.9 MG/DL (ref 2.5–4.9)
POTASSIUM SERPL-SCNC: 3.7 MMOL/L (ref 3.5–5.1)
SODIUM BLD-SCNC: 135 MMOL/L (ref 136–145)

## 2019-04-15 PROCEDURE — 99024 POSTOP FOLLOW-UP VISIT: CPT | Performed by: PHYSICIAN ASSISTANT

## 2019-04-15 PROCEDURE — 97116 GAIT TRAINING THERAPY: CPT

## 2019-04-15 PROCEDURE — APPNB30 APP NON BILLABLE TIME 0-30 MINS: Performed by: PHYSICIAN ASSISTANT

## 2019-04-15 PROCEDURE — 97530 THERAPEUTIC ACTIVITIES: CPT

## 2019-04-15 PROCEDURE — 6370000000 HC RX 637 (ALT 250 FOR IP): Performed by: SURGERY

## 2019-04-15 PROCEDURE — 80048 BASIC METABOLIC PNL TOTAL CA: CPT

## 2019-04-15 PROCEDURE — 99024 POSTOP FOLLOW-UP VISIT: CPT | Performed by: SURGERY

## 2019-04-15 PROCEDURE — APPSS30 APP SPLIT SHARED TIME 16-30 MINUTES: Performed by: PHYSICIAN ASSISTANT

## 2019-04-15 PROCEDURE — 2580000003 HC RX 258: Performed by: SURGERY

## 2019-04-15 PROCEDURE — 1200000000 HC SEMI PRIVATE

## 2019-04-15 PROCEDURE — C9113 INJ PANTOPRAZOLE SODIUM, VIA: HCPCS | Performed by: NURSE PRACTITIONER

## 2019-04-15 PROCEDURE — 36415 COLL VENOUS BLD VENIPUNCTURE: CPT

## 2019-04-15 PROCEDURE — 84100 ASSAY OF PHOSPHORUS: CPT

## 2019-04-15 PROCEDURE — 94760 N-INVAS EAR/PLS OXIMETRY 1: CPT

## 2019-04-15 PROCEDURE — 2580000003 HC RX 258: Performed by: NURSE PRACTITIONER

## 2019-04-15 PROCEDURE — 6360000002 HC RX W HCPCS: Performed by: SURGERY

## 2019-04-15 PROCEDURE — 83735 ASSAY OF MAGNESIUM: CPT

## 2019-04-15 PROCEDURE — 97535 SELF CARE MNGMENT TRAINING: CPT

## 2019-04-15 PROCEDURE — 6360000002 HC RX W HCPCS: Performed by: NURSE PRACTITIONER

## 2019-04-15 PROCEDURE — 94640 AIRWAY INHALATION TREATMENT: CPT

## 2019-04-15 RX ORDER — POLYETHYLENE GLYCOL 3350 17 G/17G
17 POWDER, FOR SOLUTION ORAL DAILY
Status: DISCONTINUED | OUTPATIENT
Start: 2019-04-15 | End: 2019-04-18 | Stop reason: HOSPADM

## 2019-04-15 RX ADMIN — HYDROMORPHONE HYDROCHLORIDE 1 MG: 1 INJECTION, SOLUTION INTRAMUSCULAR; INTRAVENOUS; SUBCUTANEOUS at 09:34

## 2019-04-15 RX ADMIN — ACETAMINOPHEN 650 MG: 325 TABLET ORAL at 06:11

## 2019-04-15 RX ADMIN — OXYCODONE HYDROCHLORIDE 10 MG: 10 TABLET ORAL at 17:18

## 2019-04-15 RX ADMIN — Medication 10 ML: at 20:11

## 2019-04-15 RX ADMIN — DOCUSATE SODIUM 100 MG: 100 CAPSULE, LIQUID FILLED ORAL at 20:11

## 2019-04-15 RX ADMIN — POLYETHYLENE GLYCOL 3350 17 G: 17 POWDER, FOR SOLUTION ORAL at 15:14

## 2019-04-15 RX ADMIN — Medication 500 MCG: at 09:37

## 2019-04-15 RX ADMIN — PYRIDOXINE HCL TAB 50 MG 100 MG: 50 TAB at 09:32

## 2019-04-15 RX ADMIN — Medication 100 MG: at 09:33

## 2019-04-15 RX ADMIN — HYDROMORPHONE HYDROCHLORIDE 1 MG: 1 INJECTION, SOLUTION INTRAMUSCULAR; INTRAVENOUS; SUBCUTANEOUS at 22:01

## 2019-04-15 RX ADMIN — OXYCODONE HYDROCHLORIDE 10 MG: 10 TABLET ORAL at 12:32

## 2019-04-15 RX ADMIN — PANTOPRAZOLE SODIUM 40 MG: 40 INJECTION, POWDER, FOR SOLUTION INTRAVENOUS at 09:36

## 2019-04-15 RX ADMIN — TIOTROPIUM BROMIDE 18 MCG: 18 CAPSULE ORAL; RESPIRATORY (INHALATION) at 10:04

## 2019-04-15 RX ADMIN — ACETAMINOPHEN 650 MG: 325 TABLET ORAL at 11:58

## 2019-04-15 RX ADMIN — ACETAMINOPHEN 650 MG: 325 TABLET ORAL at 17:59

## 2019-04-15 RX ADMIN — DOCUSATE SODIUM 100 MG: 100 CAPSULE, LIQUID FILLED ORAL at 09:32

## 2019-04-15 RX ADMIN — OXYCODONE HYDROCHLORIDE 10 MG: 10 TABLET ORAL at 06:11

## 2019-04-15 RX ADMIN — ENOXAPARIN SODIUM 40 MG: 40 INJECTION SUBCUTANEOUS at 09:36

## 2019-04-15 RX ADMIN — Medication 10 ML: at 09:36

## 2019-04-15 RX ADMIN — FOLIC ACID 1 MG: 1 TABLET ORAL at 09:32

## 2019-04-15 ASSESSMENT — PAIN SCALES - GENERAL
PAINLEVEL_OUTOF10: 8
PAINLEVEL_OUTOF10: 6
PAINLEVEL_OUTOF10: 8
PAINLEVEL_OUTOF10: 9
PAINLEVEL_OUTOF10: 0
PAINLEVEL_OUTOF10: 8
PAINLEVEL_OUTOF10: 9
PAINLEVEL_OUTOF10: 0
PAINLEVEL_OUTOF10: 6
PAINLEVEL_OUTOF10: 9
PAINLEVEL_OUTOF10: 8
PAINLEVEL_OUTOF10: 6
PAINLEVEL_OUTOF10: 8

## 2019-04-15 ASSESSMENT — PAIN DESCRIPTION - FREQUENCY
FREQUENCY: INTERMITTENT

## 2019-04-15 ASSESSMENT — PAIN DESCRIPTION - PAIN TYPE
TYPE: ACUTE PAIN

## 2019-04-15 ASSESSMENT — PAIN DESCRIPTION - PROGRESSION
CLINICAL_PROGRESSION: NOT CHANGED

## 2019-04-15 ASSESSMENT — PAIN DESCRIPTION - DESCRIPTORS
DESCRIPTORS: SHARP
DESCRIPTORS: SHARP
DESCRIPTORS: CRAMPING
DESCRIPTORS: SHARP;SORE

## 2019-04-15 ASSESSMENT — PAIN DESCRIPTION - LOCATION
LOCATION: ABDOMEN

## 2019-04-15 ASSESSMENT — PAIN DESCRIPTION - ONSET
ONSET: ON-GOING

## 2019-04-15 ASSESSMENT — PAIN DESCRIPTION - ORIENTATION
ORIENTATION: MID

## 2019-04-15 ASSESSMENT — PAIN - FUNCTIONAL ASSESSMENT
PAIN_FUNCTIONAL_ASSESSMENT: ACTIVITIES ARE NOT PREVENTED

## 2019-04-15 NOTE — PLAN OF CARE
Problem: SAFETY  Goal: Free from accidental physical injury  Outcome: Ongoing  Goal: Free from intentional harm  Outcome: Ongoing     Problem: PAIN  Goal: Patient's pain/discomfort is manageable  Outcome: Ongoing     Problem: Pain:  Goal: Pain level will decrease  Description  Pain level will decrease  Outcome: Ongoing  Goal: Control of acute pain  Description  Control of acute pain  Outcome: Ongoing  Goal: Control of chronic pain  Description  Control of chronic pain  Outcome: Ongoing     Problem: Falls - Risk of:  Goal: Will remain free from falls  Description  Will remain free from falls  Outcome: Ongoing

## 2019-04-15 NOTE — PROGRESS NOTES
[facility]                                     Daily Progress Note                                                         Pt Name: Alexa Parrish Record Number: [id number]  Date of Birth 1945   Today's Date: 4/15/2019  No chief complaint on file. ASSESSMENT/PLAN  Colostomy in place  -POD #5 (4/10) lap extensive lysis of adhesions > 3 hours, lap converted to open colostomy reversal with colorectal anastomosis, small bowel resection. Dressing changed, repacked, no s/s infection    - Pain controlled  -Tolerating diet  -+flatulence, Denies any BM  - Denies any nausea or emesis  -OOB, PT/OT  - Likely need 1-2 more days inpt    Acute blood loss anemia  -monitor    Discharge Planning: awaiting return of GI function    EDUCATION  Patient educated about Disease Process, Medications, Smoking Cessation, Oxygenation, Incentive Spirometry and Deep Breath and Cough, Diabetes, Hyperlipidemia, Smoking Cessation, Nutrition, Exercise and Hypertension    SUBJECTIVE  Jennifer Lugo has improved from yesterday. Pain is well controlled. She has no nausea and no vomiting. She has passed flatus and has not had a bowel movement. She is tolerating general diet   Current activity is up with assistance    OBJECTIVE  VITALS:  height is 5' 1\" (1.549 m) and weight is 142 lb 13.7 oz (64.8 kg). Her oral temperature is 98.2 °F (36.8 °C). Her blood pressure is 135/66 and her pulse is 105. Her respiration is 18 and oxygen saturation is 93%. INTAKE/OUTPUT:      Intake/Output Summary (Last 24 hours) at 4/15/2019 1404  Last data filed at 4/15/2019 1255  Gross per 24 hour   Intake 2360 ml   Output --   Net 2360 ml     GENERAL: alert, no distress  LUNGS: clear to ausculation, without wheezes, rales or rhonci  HEART: normal rate and regular rhythm  ABDOMEN: soft, appropriately-tender, non-distended and bowel sounds present in all 4 quadrants.

## 2019-04-15 NOTE — PROGRESS NOTES
Patient A&O x4. VSS. Bed locked and lowered and call light within reach. Pt denies nausea, vomiting, SOB. Patient complaining of 9/10 surgical pain. Re-assess- pt sleeping with RR greater than 10. Will continue to monitor.

## 2019-04-15 NOTE — PROGRESS NOTES
Physical Therapy    Facility/Department: [address] MED SURG  Daily Note  If pt. is D/C'd prior to next visit please refer back to last daily progress note for D/C status. NAME: Wei Lr  : 1945  MRN: [id number]    Date of Service: 4/15/2019    Discharge Recommendations:  24 hour supervision or assist, Home with Home health PT, S Level 3, Patient would benefit from continued therapy after discharge, 3-5 sessions per week   Wei Lr scored a 17/24 on the AM-PAC short mobility form. Current research shows that an AM-PAC score of 18 or greater is typically associated with a discharge to the patient's home setting. Based on the patients AM-PAC score and their current functional mobility deficits, it is recommended that the patient have 2-3 sessions per week of Physical Therapy at d/c to increase the patients independence. PT Equipment Recommendations  Other: will moitor; if pt to home she will need a wheeled walker    Assessment   Assessment: The pt is a 68 yo female admitted from her rehab facility for an elective reversal of her colostomy. Today, she ambulated 80 feet with a walker and CGA, SBA for transfers and bed mobility (using the rail but NOT using logroll). Anticipate that the pt is not functioning at her baseline and would benefit from con't skilled PT services at d/c but IF she has 24/7 asisst at home she could potentially go home with level 3 home PT; IF 24/7 care is NOT available then she would need to return to her SNF for con't skilled PT with the goal of going home. Will con't to follow. Treatment Diagnosis: pain, impaired mobility  Prognosis: Fair;Good  Patient Education: role of acute care PT  Barriers to Learning: cog, pain, impulsive  REQUIRES PT FOLLOW UP: Yes  Activity Tolerance  Activity Tolerance: Patient limited by pain; Patient limited by cognitive status       Patient Diagnosis(es): The encounter diagnosis was Colostomy present [facility]).      has a past medical history of Anemia, Clostridium difficile colitis, Colostomy in place [facility]), COPD (chronic obstructive pulmonary disease) ([facility] [address] Depression, DANIELLE (generalized anxiety disorder), Gastric ulcer, unspecified as acute or chronic, without mention of hemorrhage, perforation, or obstruction, H/O ETOH abuse, Hiatal hernia, Hypertension, Insomnia, Intestinal obstruction ([facility] [address] Parkinson's disease ([facility] [address] Tobacco abuse, and Vitamin D deficiency. has a past surgical history that includes Hysterectomy; Cholecystectomy; Tubal ligation; colostomy (2018); Upper gastrointestinal endoscopy (12/13/2018); Upper gastrointestinal endoscopy (N/A, 12/13/2018); Colonoscopy (12/14/2018); Colonoscopy (N/A, 12/14/2018); and Small intestine surgery (N/A, 4/10/2019). Restrictions  Restrictions/Precautions  Restrictions/Precautions: Fall Risk  Position Activity Restriction  Other position/activity restrictions: IV    Subjective  General  Chart Reviewed: Yes  Additional Pertinent Hx: The pt admitted for a colostomy reversal completed on 4-10-19. Per Epic notes the pt had the original sx in May, 2018 in [place]; she was an inpt at this hospital in January, 2019 for a SBO. PMHx: anemia, c-diff, colostomy, COPD, depression, DANIELLE, gasric ulcer, h/o ETOH, hiatal hernia, HTN, Parkinson's  Response To Previous Treatment: Patient reporting fatigue but able to participate. Family / Caregiver Present: No  Referring Practitioner: ABIGAIL Ozuna - CNP  Subjective  Subjective: The pt was found to be supine in the bed. She reported she just \"peed\" and that she would like to get up. The pt does rate pain in L lower quadrant at a 9/10 and had to lay back down briefly. The pt needed encouragement to con't to participate, but kept responding \"I can do it.  I'm strong\"  Pain Screening  Patient Currently in Pain: Yes        Social/Functional History  Social/Functional History  Lives With: Spouse(and ex m-i-l)  Type of Home: House  Home Layout: One level,

## 2019-04-15 NOTE — PROGRESS NOTES
Occupational Therapy  Facility/Department: [facility] SURG  Daily Treatment Note  NAME: Jeremiah Bernal  : 1945  MRN: [id number]    Date of Service: 4/15/2019    Discharge Recommendations:  Patient would benefit from continued therapy after discharge, 3-5 sessions per week, 24 hour supervision or assist, S Level 3, Home with Home health OT  OT Equipment Recommendations  Other: TBD    Assessment   Performance deficits / Impairments: Decreased functional mobility ; Decreased ADL status; Decreased strength;Decreased ROM; Decreased safe awareness;Decreased cognition;Decreased endurance;Decreased fine motor control;Decreased coordination;Decreased balance  Assessment: Pt tolerated treatment fairly well, most limited by pain affecting pt's fxl mobility, fxl activity tolerance, and ADL status s/p surgery. Pt also has cognitive deficits that limit pt's insight/safety judgement. This date, pt with improved bed mobility SBA, fxl transfers SBA, fxl mobility in room and hallway with RW and CGA, toileting CGA, dressing min A. Cont per OT POC to address the above limitations and maximize pt's independence. At current status, pt AM-PAC indicates a home bound d/c and anticipate pt could return home if pt had 24 hour assist and home OT (level 3 home care). If 24 hour assist unavailable, would need skilled at d/c. Prognosis: Good  Patient Education: Pt educated on the role of OT, POC, safety/transfers, ADL retraining  Barriers to Learning: cognition, hearing  REQUIRES OT FOLLOW UP: Yes  Activity Tolerance  Activity Tolerance: Patient limited by pain; Patient Tolerated treatment well  Safety Devices  Safety Devices in place: Yes  Type of devices: Chair alarm in place;Call light within reach;Gait belt;Nurse notified; Left in chair;Patient at risk for falls(JOANA Moore notified)         Patient Diagnosis(es): The encounter diagnosis was Colostomy present [facility]).       has a past medical history of Anemia, Clostridium difficile down/up CGA, pericare in seated with SBA)     Balance  Sitting Balance: Stand by assistance(seated EOB)  Standing Balance: Contact guard assistance  Standing Balance  Functional Mobility  Functional - Mobility Device: Rolling Walker  Activity: To/from bathroom  Assist Level: Contact guard assistance  Functional Mobility Comments: Pt completed fxl mobility to/from BR, ~80 ft in hallway with RW and CGA. Bed mobility  Supine to Sit: Stand by assistance(HOB elevated and she used the rail)  Sit to Supine: Stand by assistance(HOB elevated; poor postioning in the bed)     Transfers  Sit to stand: Stand by assistance  Stand to sit: Stand by assistance(+verbal cues for hand placement)   Toilet Transfers  Toilet - Technique: Ambulating  Equipment Used: Grab bars  Toilet Transfer: Stand by assistance    Cognition  Overall Cognitive Status: Exceptions  Following Commands: Follows one step commands with repetition  Attention Span: Difficulty attending to directions; Difficulty dividing attention  Memory: Decreased short term memory  Safety Judgement: Decreased awareness of need for safety;Decreased awareness of need for assistance  Problem Solving: Decreased awareness of errors;Assistance required to identify errors made;Assistance required to generate solutions;Assistance required to implement solutions;Assistance required to correct errors made  Insights: Decreased awareness of deficits     Plan   Plan  Times per week: 3-5  Times per day: Daily  Current Treatment Recommendations: Endurance Training, Balance Training, Strengthening, Functional Mobility Training, ROM, Cognitive/Perceptual Training, Self-Care / ADL, Pain Management, Safety Education & Training  OutComes Score    How much help for putting on and taking off regular lower body clothing?: A Little  How much help for Bathing?: A Lot  How much help for Toileting?: A Little  How much help for putting on and taking off regular upper body clothing?: A Little  How much help for taking care of personal grooming?: A Little  How much help for eating meals?: None  AM-PAC Inpatient Daily Activity Raw Score: 18  AM-PAC Inpatient ADL T-Scale Score : 38.66  ADL Inpatient CMS 0-100% Score: 46.65  ADL Inpatient CMS G-Code Modifier : CK                                               AM-PAC Score        AM-PAC Inpatient Daily Activity Raw Score: 18  AM-PAC Inpatient ADL T-Scale Score : 38.66  ADL Inpatient CMS 0-100% Score: 46.65  ADL Inpatient CMS G-Code Modifier : CK     Simone Hdez scored a 18/24 on the AM-PAC ADL Inpatient form. Current research shows that an AM-PAC score of 18 or greater is typically associated with a discharge to the patient's home setting. Based on the patients AM-PAC score and their current ADL deficits, it is recommended that the patient have 2-3 sessions per week of Occupational Therapy at d/c to increase the patients independence. Goals  Short term goals  Time Frame for Short term goals: Prior to d/c: STATUS GOALS 4/15: ALL GOALS ONGOING  Short term goal 1: Pt will bathe with SBA. Short term goal 2: Pt will dress with SBA. Short term goal 3: Pt will toilet with SBA. Short term goal 4: Pt will complete fxl mobility and fxl transfers to/from ADL surfaces with SBA/supervision using AD. Short term goal 5: Pt will complete bed mobility with min A using logroll technique. -goal met with HOB elevated, need to address with [facility] flat/no rail  Short term goal 6: Pt will increase activity tolerance to achieve the above goals. Long term goals  Time Frame for Long term goals : STG=LTG  Patient Goals   Patient goals : to eventually return home. Therapy Time   Individual Concurrent Group Co-treatment   Time In 5270         Time Out 1115         Minutes 33               This note to serve as OT d/c summary if pt is d/c-ed prior to next therapy session.     Filemon Shane, OTR/L [id number]

## 2019-04-15 NOTE — PROGRESS NOTES
To assume pt care at this time from David Smith, [address] [place] [address].   Electronically signed by Yosvany Lee RN on 4/15/19 at 7:45 PM

## 2019-04-16 LAB
ANION GAP SERPL CALCULATED.3IONS-SCNC: 10 MMOL/L (ref 3–16)
BASOPHILS ABSOLUTE: 0 K/UL (ref 0–0.2)
BASOPHILS RELATIVE PERCENT: 0.5 %
BUN BLDV-MCNC: 5 MG/DL (ref 7–20)
CALCIUM SERPL-MCNC: 8.8 MG/DL (ref 8.3–10.6)
CHLORIDE BLD-SCNC: 99 MMOL/L (ref 99–110)
CO2: 27 MMOL/L (ref 21–32)
CREAT SERPL-MCNC: 0.5 MG/DL (ref 0.6–1.2)
EOSINOPHILS ABSOLUTE: 0.3 K/UL (ref 0–0.6)
EOSINOPHILS RELATIVE PERCENT: 3.2 %
GFR AFRICAN AMERICAN: >60
GFR NON-AFRICAN AMERICAN: >60
GLUCOSE BLD-MCNC: 102 MG/DL (ref 70–99)
HCT VFR BLD CALC: 24.6 % (ref 36–48)
HEMOGLOBIN: 8.3 G/DL (ref 12–16)
LYMPHOCYTES ABSOLUTE: 1.7 K/UL (ref 1–5.1)
LYMPHOCYTES RELATIVE PERCENT: 19.6 %
MAGNESIUM: 1.8 MG/DL (ref 1.8–2.4)
MCH RBC QN AUTO: 31.9 PG (ref 26–34)
MCHC RBC AUTO-ENTMCNC: 33.6 G/DL (ref 31–36)
MCV RBC AUTO: 94.9 FL (ref 80–100)
MONOCYTES ABSOLUTE: 0.9 K/UL (ref 0–1.3)
MONOCYTES RELATIVE PERCENT: 9.7 %
NEUTROPHILS ABSOLUTE: 5.9 K/UL (ref 1.7–7.7)
NEUTROPHILS RELATIVE PERCENT: 67 %
PDW BLD-RTO: 16.1 % (ref 12.4–15.4)
PHOSPHORUS: 3.6 MG/DL (ref 2.5–4.9)
PLATELET # BLD: 448 K/UL (ref 135–450)
PMV BLD AUTO: 7.5 FL (ref 5–10.5)
POTASSIUM SERPL-SCNC: 3.7 MMOL/L (ref 3.5–5.1)
RBC # BLD: 2.59 M/UL (ref 4–5.2)
SODIUM BLD-SCNC: 136 MMOL/L (ref 136–145)
WBC # BLD: 8.7 K/UL (ref 4–11)

## 2019-04-16 PROCEDURE — 85025 COMPLETE CBC W/AUTO DIFF WBC: CPT

## 2019-04-16 PROCEDURE — 6360000002 HC RX W HCPCS: Performed by: SURGERY

## 2019-04-16 PROCEDURE — 84100 ASSAY OF PHOSPHORUS: CPT

## 2019-04-16 PROCEDURE — 80048 BASIC METABOLIC PNL TOTAL CA: CPT

## 2019-04-16 PROCEDURE — 6370000000 HC RX 637 (ALT 250 FOR IP): Performed by: SURGERY

## 2019-04-16 PROCEDURE — 83735 ASSAY OF MAGNESIUM: CPT

## 2019-04-16 PROCEDURE — 94640 AIRWAY INHALATION TREATMENT: CPT

## 2019-04-16 PROCEDURE — 1200000000 HC SEMI PRIVATE

## 2019-04-16 PROCEDURE — 6360000002 HC RX W HCPCS: Performed by: NURSE PRACTITIONER

## 2019-04-16 PROCEDURE — APPNB15 APP NON BILLABLE TIME 0-15 MINS: Performed by: NURSE PRACTITIONER

## 2019-04-16 PROCEDURE — 36415 COLL VENOUS BLD VENIPUNCTURE: CPT

## 2019-04-16 PROCEDURE — APPNB30 APP NON BILLABLE TIME 0-30 MINS: Performed by: PHYSICIAN ASSISTANT

## 2019-04-16 PROCEDURE — APPSS15 APP SPLIT SHARED TIME 0-15 MINUTES: Performed by: NURSE PRACTITIONER

## 2019-04-16 PROCEDURE — 2580000003 HC RX 258: Performed by: NURSE PRACTITIONER

## 2019-04-16 PROCEDURE — 94760 N-INVAS EAR/PLS OXIMETRY 1: CPT

## 2019-04-16 PROCEDURE — C9113 INJ PANTOPRAZOLE SODIUM, VIA: HCPCS | Performed by: NURSE PRACTITIONER

## 2019-04-16 PROCEDURE — 2580000003 HC RX 258: Performed by: SURGERY

## 2019-04-16 PROCEDURE — 99024 POSTOP FOLLOW-UP VISIT: CPT | Performed by: SURGERY

## 2019-04-16 RX ADMIN — ACETAMINOPHEN 650 MG: 325 TABLET ORAL at 00:04

## 2019-04-16 RX ADMIN — DOCUSATE SODIUM 100 MG: 100 CAPSULE, LIQUID FILLED ORAL at 09:58

## 2019-04-16 RX ADMIN — Medication 500 MCG: at 10:16

## 2019-04-16 RX ADMIN — ACETAMINOPHEN 650 MG: 325 TABLET ORAL at 06:01

## 2019-04-16 RX ADMIN — Medication 100 MG: at 09:58

## 2019-04-16 RX ADMIN — POLYETHYLENE GLYCOL 3350 17 G: 17 POWDER, FOR SOLUTION ORAL at 09:59

## 2019-04-16 RX ADMIN — OXYCODONE HYDROCHLORIDE 10 MG: 10 TABLET ORAL at 16:45

## 2019-04-16 RX ADMIN — PYRIDOXINE HCL TAB 50 MG 100 MG: 50 TAB at 09:57

## 2019-04-16 RX ADMIN — ACETAMINOPHEN 650 MG: 325 TABLET ORAL at 17:57

## 2019-04-16 RX ADMIN — PANTOPRAZOLE SODIUM 40 MG: 40 INJECTION, POWDER, FOR SOLUTION INTRAVENOUS at 10:26

## 2019-04-16 RX ADMIN — DOCUSATE SODIUM 100 MG: 100 CAPSULE, LIQUID FILLED ORAL at 20:21

## 2019-04-16 RX ADMIN — ACETAMINOPHEN 650 MG: 325 TABLET ORAL at 13:15

## 2019-04-16 RX ADMIN — ENOXAPARIN SODIUM 40 MG: 40 INJECTION SUBCUTANEOUS at 09:48

## 2019-04-16 RX ADMIN — Medication 10 ML: at 10:27

## 2019-04-16 RX ADMIN — Medication 10 ML: at 20:21

## 2019-04-16 RX ADMIN — FOLIC ACID 1 MG: 1 TABLET ORAL at 09:58

## 2019-04-16 RX ADMIN — TIOTROPIUM BROMIDE 18 MCG: 18 CAPSULE ORAL; RESPIRATORY (INHALATION) at 11:37

## 2019-04-16 ASSESSMENT — PAIN DESCRIPTION - DESCRIPTORS
DESCRIPTORS: DISCOMFORT
DESCRIPTORS: SORE
DESCRIPTORS: SHARP
DESCRIPTORS: SHARP
DESCRIPTORS: DISCOMFORT
DESCRIPTORS: SHARP;SORE

## 2019-04-16 ASSESSMENT — PAIN DESCRIPTION - ORIENTATION
ORIENTATION: LEFT;MID
ORIENTATION: MID
ORIENTATION: MID
ORIENTATION: LEFT;MID
ORIENTATION: MID

## 2019-04-16 ASSESSMENT — PAIN SCALES - GENERAL
PAINLEVEL_OUTOF10: 0
PAINLEVEL_OUTOF10: 0
PAINLEVEL_OUTOF10: 6
PAINLEVEL_OUTOF10: 4
PAINLEVEL_OUTOF10: 7
PAINLEVEL_OUTOF10: 6
PAINLEVEL_OUTOF10: 8
PAINLEVEL_OUTOF10: 8
PAINLEVEL_OUTOF10: 6
PAINLEVEL_OUTOF10: 6
PAINLEVEL_OUTOF10: 2
PAINLEVEL_OUTOF10: 0

## 2019-04-16 ASSESSMENT — PAIN DESCRIPTION - LOCATION
LOCATION: ABDOMEN

## 2019-04-16 ASSESSMENT — PAIN DESCRIPTION - FREQUENCY
FREQUENCY: INTERMITTENT

## 2019-04-16 ASSESSMENT — PAIN DESCRIPTION - ONSET
ONSET: ON-GOING
ONSET: ON-GOING
ONSET: AWAKENED FROM SLEEP
ONSET: ON-GOING
ONSET: AWAKENED FROM SLEEP
ONSET: ON-GOING

## 2019-04-16 ASSESSMENT — PAIN - FUNCTIONAL ASSESSMENT
PAIN_FUNCTIONAL_ASSESSMENT: ACTIVITIES ARE NOT PREVENTED

## 2019-04-16 ASSESSMENT — PAIN DESCRIPTION - PROGRESSION
CLINICAL_PROGRESSION: GRADUALLY IMPROVING
CLINICAL_PROGRESSION: NOT CHANGED
CLINICAL_PROGRESSION: GRADUALLY IMPROVING
CLINICAL_PROGRESSION: NOT CHANGED
CLINICAL_PROGRESSION: GRADUALLY IMPROVING

## 2019-04-16 ASSESSMENT — PAIN DESCRIPTION - PAIN TYPE
TYPE: ACUTE PAIN
TYPE: SURGICAL PAIN
TYPE: ACUTE PAIN

## 2019-04-16 NOTE — PROGRESS NOTES
[facility] General and Vascular Surgery                                     Daily Progress Note                                                         Pt Name: Ellis Martinez Record Number: [id number]  Date of Birth 1945   Today's Date: 4/16/2019  No chief complaint on file. ASSESSMENT/PLAN  Colostomy in place  -POD #6 (4/10) lap extensive lysis of adhesions > 3 hours, lap converted to open colostomy reversal with colorectal anastomosis, small bowel resection.   -Dressing changed, repacked, no s/s infection  - Pain controlled  -Tolerating diet  -+flatulence, Denies any BM  - Denies any nausea or emesis  -OOB, PT/OT  - Likely need 1-2 more days inpt    Acute blood loss anemia  -monitor    Discharge Planning: awaiting return of GI function    EDUCATION  Patient educated about Disease Process, Medications, Smoking Cessation, Oxygenation, Incentive Spirometry and Deep Breath and Cough, Diabetes, Hyperlipidemia, Smoking Cessation, Nutrition, Exercise and Hypertension    SUBJECTIVE  Bisi Govea has improved from yesterday. Pain is well controlled. She has no nausea and no vomiting. She has passed flatus and has not had a bowel movement. She is tolerating general diet   Current activity is up with assistance    OBJECTIVE  VITALS:  height is 5' 1\" (1.549 m) and weight is 144 lb 10 oz (65.6 kg). Her oral temperature is 98.1 °F (36.7 °C). Her blood pressure is 161/74 (abnormal) and her pulse is 89. Her respiration is 16 and oxygen saturation is 97%.  INTAKE/OUTPUT:      Intake/Output Summary (Last 24 hours) at 4/16/2019 0904  Last data filed at 4/15/2019 1427  Gross per 24 hour   Intake 360 ml   Output --   Net 360 ml     GENERAL: alert, no distress  LUNGS: clear to ausculation, without wheezes, rales or rhonci  HEART: normal rate and regular rhythm  ABDOMEN: soft, appropriately-tender, non-distended and bowel sounds present in all 4 quadrants. EXTREMITY: no cyanosis, clubbing or edema  I/O last 3 completed shifts: In: 360 [P.O.:360]  Out: -   No intake/output data recorded. LABS  Recent Labs     04/16/19  0607   WBC 8.7   HGB 8.3*   HCT 24.6*         K 3.7   CL 99   CO2 27   BUN 5*   CREATININE 0.5*   MG 1.80   PHOS 3.6   CALCIUM 8.8     Marily Dominguez, APRN-CNP    Agree with above note. The patient was personally seen and examined. Fanny Smith is doing OK. Tolerating regular diet. Passing flatus. No BM yet. Had emesis immediately after drinking miralax earlier today. Abd soft, ND, appropriately tender, dressing c/d/i    WBC 8.7  Cr 0.5    A/P: s/p colostomy reversal, extensive LITTLE, small bowel resection POD #6    Doing OK  Continue general diet. Continue colace BID.   Hopefully will have BM soon  D/c to [place] [place] over next day or two    Elke Kwan MD

## 2019-04-16 NOTE — PLAN OF CARE
Problem: SAFETY  Goal: Free from accidental physical injury  4/16/2019 1112 by Yuval Coleman RN  Outcome: Ongoing  4/16/2019 0018 by Ely Weeks RN  Outcome: Ongoing  Goal: Free from intentional harm  4/16/2019 1112 by Yuval Coleman RN  Outcome: Ongoing  4/16/2019 0018 by Ely Weeks RN  Outcome: Ongoing     Problem: PAIN  Goal: Patient's pain/discomfort is manageable  4/16/2019 1112 by Yuval Coleman RN  Outcome: Ongoing  4/16/2019 0018 by Ely Weeks RN  Outcome: Ongoing     Problem: Pain:  Goal: Pain level will decrease  Description  Pain level will decrease  4/16/2019 1112 by Yuval Coleman RN  Outcome: Ongoing  4/16/2019 0018 by Ely Weeks RN  Outcome: Ongoing  Goal: Control of acute pain  Description  Control of acute pain  4/16/2019 1112 by Yuval Coleman RN  Outcome: Ongoing  4/16/2019 0018 by Ely Weeks RN  Outcome: Ongoing  Goal: Control of chronic pain  Description  Control of chronic pain  Outcome: Ongoing     Problem: Falls - Risk of:  Goal: Will remain free from falls  Description  Will remain free from falls  4/16/2019 1112 by Yuval Coleman RN  Outcome: Ongoing  4/16/2019 0018 by Ely Weeks RN  Outcome: Ongoing

## 2019-04-16 NOTE — PROGRESS NOTES
Change of shift report given to Vanessa Moraes RN.   Electronically signed by Kena Horner RN on 4/16/19 at 7:20 AM

## 2019-04-16 NOTE — PROGRESS NOTES
Patient alert and oriented x4, VSS, sitting up in bed eating breakfast. Pt denies n/v, diarrhea, SOB, pain at this time. Pt states no BM, but is passing gas. Pt states no needs at this time. Bed in lowest position, bed alarm on, call light within reach. Will continue to monitor pt needs.

## 2019-04-17 PROCEDURE — 6360000002 HC RX W HCPCS: Performed by: SURGERY

## 2019-04-17 PROCEDURE — APPSS15 APP SPLIT SHARED TIME 0-15 MINUTES: Performed by: NURSE PRACTITIONER

## 2019-04-17 PROCEDURE — C9113 INJ PANTOPRAZOLE SODIUM, VIA: HCPCS | Performed by: NURSE PRACTITIONER

## 2019-04-17 PROCEDURE — APPNB15 APP NON BILLABLE TIME 0-15 MINS: Performed by: NURSE PRACTITIONER

## 2019-04-17 PROCEDURE — 2580000003 HC RX 258: Performed by: NURSE PRACTITIONER

## 2019-04-17 PROCEDURE — 99024 POSTOP FOLLOW-UP VISIT: CPT | Performed by: SURGERY

## 2019-04-17 PROCEDURE — 6370000000 HC RX 637 (ALT 250 FOR IP): Performed by: SURGERY

## 2019-04-17 PROCEDURE — 94760 N-INVAS EAR/PLS OXIMETRY 1: CPT

## 2019-04-17 PROCEDURE — 2580000003 HC RX 258: Performed by: SURGERY

## 2019-04-17 PROCEDURE — 6360000002 HC RX W HCPCS: Performed by: NURSE PRACTITIONER

## 2019-04-17 PROCEDURE — 94640 AIRWAY INHALATION TREATMENT: CPT

## 2019-04-17 PROCEDURE — 97530 THERAPEUTIC ACTIVITIES: CPT

## 2019-04-17 PROCEDURE — 1200000000 HC SEMI PRIVATE

## 2019-04-17 RX ORDER — MAGNESIUM CARB/ALUMINUM HYDROX 105-160MG
30 TABLET,CHEWABLE ORAL 2 TIMES DAILY
Status: DISCONTINUED | OUTPATIENT
Start: 2019-04-17 | End: 2019-04-18 | Stop reason: HOSPADM

## 2019-04-17 RX ADMIN — ACETAMINOPHEN 650 MG: 325 TABLET ORAL at 13:06

## 2019-04-17 RX ADMIN — PANTOPRAZOLE SODIUM 40 MG: 40 INJECTION, POWDER, FOR SOLUTION INTRAVENOUS at 08:36

## 2019-04-17 RX ADMIN — OXYCODONE HYDROCHLORIDE 5 MG: 5 TABLET ORAL at 19:18

## 2019-04-17 RX ADMIN — ACETAMINOPHEN 650 MG: 325 TABLET ORAL at 00:07

## 2019-04-17 RX ADMIN — Medication 100 MG: at 08:36

## 2019-04-17 RX ADMIN — ACETAMINOPHEN 650 MG: 325 TABLET ORAL at 17:21

## 2019-04-17 RX ADMIN — PYRIDOXINE HCL TAB 50 MG 100 MG: 50 TAB at 08:36

## 2019-04-17 RX ADMIN — OXYCODONE HYDROCHLORIDE 5 MG: 5 TABLET ORAL at 14:54

## 2019-04-17 RX ADMIN — TIOTROPIUM BROMIDE 18 MCG: 18 CAPSULE ORAL; RESPIRATORY (INHALATION) at 08:04

## 2019-04-17 RX ADMIN — ENOXAPARIN SODIUM 40 MG: 40 INJECTION SUBCUTANEOUS at 08:36

## 2019-04-17 RX ADMIN — Medication 500 MCG: at 08:38

## 2019-04-17 RX ADMIN — FOLIC ACID 1 MG: 1 TABLET ORAL at 08:36

## 2019-04-17 RX ADMIN — DOCUSATE SODIUM 100 MG: 100 CAPSULE, LIQUID FILLED ORAL at 21:10

## 2019-04-17 RX ADMIN — DOCUSATE SODIUM 100 MG: 100 CAPSULE, LIQUID FILLED ORAL at 08:36

## 2019-04-17 RX ADMIN — MINERAL OIL 30 ML: 1000 SOLUTION ORAL at 11:11

## 2019-04-17 RX ADMIN — ACETAMINOPHEN 650 MG: 325 TABLET ORAL at 06:01

## 2019-04-17 RX ADMIN — Medication 10 ML: at 08:36

## 2019-04-17 ASSESSMENT — PAIN DESCRIPTION - DESCRIPTORS
DESCRIPTORS: ACHING;SORE;SHARP
DESCRIPTORS: SORE;DISCOMFORT
DESCRIPTORS: ACHING;SORE
DESCRIPTORS: DISCOMFORT;THROBBING;SORE
DESCRIPTORS: SHARP
DESCRIPTORS: ACHING;SHARP
DESCRIPTORS: DISCOMFORT;SORE
DESCRIPTORS: SORE;TENDER

## 2019-04-17 ASSESSMENT — PAIN - FUNCTIONAL ASSESSMENT
PAIN_FUNCTIONAL_ASSESSMENT: ACTIVITIES ARE NOT PREVENTED

## 2019-04-17 ASSESSMENT — PAIN SCALES - GENERAL
PAINLEVEL_OUTOF10: 0
PAINLEVEL_OUTOF10: 2
PAINLEVEL_OUTOF10: 4
PAINLEVEL_OUTOF10: 3
PAINLEVEL_OUTOF10: 2
PAINLEVEL_OUTOF10: 1
PAINLEVEL_OUTOF10: 1
PAINLEVEL_OUTOF10: 4
PAINLEVEL_OUTOF10: 6
PAINLEVEL_OUTOF10: 4
PAINLEVEL_OUTOF10: 0
PAINLEVEL_OUTOF10: 6
PAINLEVEL_OUTOF10: 3
PAINLEVEL_OUTOF10: 5

## 2019-04-17 ASSESSMENT — PAIN DESCRIPTION - FREQUENCY
FREQUENCY: INTERMITTENT

## 2019-04-17 ASSESSMENT — PAIN DESCRIPTION - ORIENTATION
ORIENTATION: LEFT;MID
ORIENTATION: MID
ORIENTATION: LEFT;MID
ORIENTATION: LEFT

## 2019-04-17 ASSESSMENT — PAIN DESCRIPTION - PROGRESSION
CLINICAL_PROGRESSION: GRADUALLY IMPROVING
CLINICAL_PROGRESSION: NOT CHANGED
CLINICAL_PROGRESSION: GRADUALLY IMPROVING
CLINICAL_PROGRESSION: GRADUALLY WORSENING
CLINICAL_PROGRESSION: GRADUALLY IMPROVING
CLINICAL_PROGRESSION: GRADUALLY IMPROVING

## 2019-04-17 ASSESSMENT — PAIN DESCRIPTION - ONSET
ONSET: ON-GOING
ONSET: GRADUAL
ONSET: ON-GOING
ONSET: SUDDEN

## 2019-04-17 ASSESSMENT — PAIN DESCRIPTION - LOCATION
LOCATION: ELBOW;SHOULDER
LOCATION: ABDOMEN
LOCATION: ABDOMEN
LOCATION: ABDOMEN;ARM
LOCATION: ABDOMEN
LOCATION: ABDOMEN;ARM

## 2019-04-17 ASSESSMENT — PAIN DESCRIPTION - PAIN TYPE
TYPE: CHRONIC PAIN;SURGICAL PAIN
TYPE: SURGICAL PAIN
TYPE: CHRONIC PAIN
TYPE: CHRONIC PAIN;SURGICAL PAIN
TYPE: SURGICAL PAIN
TYPE: SURGICAL PAIN

## 2019-04-17 NOTE — PROGRESS NOTES
VS stable at this time. RR even, unlabored. Incision sites clean, dry, intact. Pt states \"that my pain is much better, I hardly have any\". IV pulled out by accident, new site established.    Electronically signed by Mela Baumgarten, RN on 4/16/19 at 9:20 PM

## 2019-04-17 NOTE — PROGRESS NOTES
Nutrition Assessment (Low Risk)    Type and Reason for Visit: Initial(LOS)    Nutrition Recommendations:   Continue General diet. Nutrition Assessment:   Pt at risk for nutrition compromise d/t s/p SBO and colostomy reversal, parkinson's disease, and nausea. Improved nausea and tolerating diet. No BM at this time. Will continue to monitor.      Malnutrition Assessment:  · Malnutrition Status: No malnutrition    Nutrition Risk Level   Risk Level: Low    Nutrition Diagnosis:   · Problem: No nutrition diagnosis at this time    Nutrition Intervention:  Food and/or Delivery: Continue current diet  Nutrition Education/Counseling/Coordination of Care:  Continued Inpatient Monitoring      Electronically signed by Vinay Yañez RD, LD on 4/17/19 at 1:48 PM    Contact Number: [phone]

## 2019-04-17 NOTE — PROGRESS NOTES
[facility] General and Vascular Surgery                                     Daily Progress Note                                                         Pt Name: Ariela Kasper Record Number: [id number]  Date of Birth 1945   Today's Date: 4/17/2019  No chief complaint on file. ASSESSMENT/PLAN  Colostomy in place  -POD #7 (4/10) lap extensive lysis of adhesions > 3 hours, lap converted to open colostomy reversal with colorectal anastomosis, small bowel resection.   -Dressing changed, repacked, no s/s infection  - Pain controlled  -Tolerating diet  -+flatulence, Denies any BM. Emesis with mineral oil. - Denies any nausea or emesis  -OOB, PT/OT  - Likely need 1-2 more days inpt. [facility]. Acute blood loss anemia  -monitor    Discharge Planning: awaiting return of GI function    EDUCATION  Patient educated about Disease Process, Medications, Smoking Cessation, Oxygenation, Incentive Spirometry and Deep Breath and Cough, Diabetes, Hyperlipidemia, Smoking Cessation, Nutrition, Exercise and Hypertension    SUBJECTIVE  Gwenith Shingles has improved from yesterday. Pain is well controlled. She has no nausea and no vomiting. She has passed flatus and has not had a bowel movement. She is tolerating general diet   Current activity is up with assistance    OBJECTIVE  VITALS:  height is 5' 1\" (1.549 m) and weight is 141 lb 15.6 oz (64.4 kg). Her oral temperature is 98.9 °F (37.2 °C). Her blood pressure is 110/63 and her pulse is 96. Her respiration is 16 and oxygen saturation is 95%.  INTAKE/OUTPUT:      Intake/Output Summary (Last 24 hours) at 4/17/2019 1221  Last data filed at 4/17/2019 0000  Gross per 24 hour   Intake 720 ml   Output --   Net 720 ml     GENERAL: alert, no distress  LUNGS: clear to ausculation, without wheezes, rales or rhonci  HEART: normal rate and regular rhythm  ABDOMEN: soft, appropriately-tender, non-distended

## 2019-04-17 NOTE — PLAN OF CARE
Problem: SAFETY  Goal: Free from accidental physical injury  4/17/2019 0833 by Isidoro Wheeler RN  Outcome: Ongoing  4/17/2019 0125 by Taye Nunez RN  Outcome: Ongoing  Goal: Free from intentional harm  4/17/2019 0833 by Isidoro Wheeler RN  Outcome: Ongoing  4/17/2019 0125 by Taye Nunez RN  Outcome: Ongoing     Problem: PAIN  Goal: Patient's pain/discomfort is manageable  4/17/2019 0833 by Isidoro Wheeler RN  Outcome: Ongoing  4/17/2019 0125 by Taye Nunez RN  Outcome: Ongoing     Problem: Pain:  Goal: Pain level will decrease  Description  Pain level will decrease  4/17/2019 0833 by Isidoro Wheeler RN  Outcome: Ongoing  4/17/2019 0125 by Taye Nunez RN  Outcome: Ongoing  Goal: Control of acute pain  Description  Control of acute pain  4/17/2019 0833 by Isidoro Wheeler RN  Outcome: Ongoing  4/17/2019 0125 by Taye Nunez RN  Outcome: Ongoing  Goal: Control of chronic pain  Description  Control of chronic pain  Outcome: Ongoing     Problem: Falls - Risk of:  Goal: Will remain free from falls  Description  Will remain free from falls  4/17/2019 0833 by Isidoro Wheeler RN  Outcome: Ongoing  4/17/2019 0125 by Taye Nunez RN  Outcome: Ongoing

## 2019-04-17 NOTE — PROGRESS NOTES
Occupational Therapy  Facility/Department: [address] MED SURG  Daily Treatment Note  Let this serve as discharge note if patient is discharged prior to next OT session  NAME: Jacy Henry  : 1945  MRN: [id number]    Date of Service: 2019    Discharge Recommendations:  3-5 sessions per week ([facility]    98 Katrin Cummings scored a 19/ on the -[facility] ADL Inpatient form. Patient reports she is planning on returning to [facility] upon d/c from hospital to continue to work with therapy for strength/endurance and independence with ADL tasks. Assessment   Performance deficits / Impairments: Decreased functional mobility ; Decreased ADL status; Decreased strength;Decreased ROM; Decreased safe awareness;Decreased cognition;Decreased endurance;Decreased fine motor control;Decreased coordination;Decreased balance  Assessment: Patient remains limited by decreased cognition. Patient requires frequent redirection to tasks, patient has multiple c/os regarding hospital stay, and has decreased insight into safety/judgement. Patient completed bed mobility, transfers, and fxl mobility with SBA. Patient refused all ADL tasks this date reporting she only wanted to walk to help with her bowels so she can get discharged. Cont per POC. Prognosis: Good  Patient Education: role of OT, reasoning for bed/chair alarms, transfers/fxl mobility   REQUIRES OT FOLLOW UP: Yes  Activity Tolerance: Patient Tolerated treatment well  Safety Devices in place: Yes  Type of devices: Bed alarm in place; Left in bed;Gait belt;Call light within reach         Patient Diagnosis(es): The encounter diagnosis was Colostomy present [facility]).       has a past medical history of Anemia, Clostridium difficile colitis, Colostomy in place [facility]), COPD (chronic obstructive pulmonary disease) ([facility] [place] [address] Depression, DANIELLE (generalized anxiety disorder), Gastric ulcer, unspecified as acute or chronic, without mention of hemorrhage, perforation, or obstruction, H/O ETOH abuse, Hiatal hernia, Hypertension, Insomnia, Intestinal obstruction ([address] Parkinson's disease ([facility] [place] [address] Tobacco abuse, and Vitamin D deficiency. has a past surgical history that includes Hysterectomy; Cholecystectomy; Tubal ligation; colostomy (2018); Upper gastrointestinal endoscopy (12/13/2018); Upper gastrointestinal endoscopy (N/A, 12/13/2018); Colonoscopy (12/14/2018); Colonoscopy (N/A, 12/14/2018); and Small intestine surgery (N/A, 4/10/2019). Restrictions  Restrictions/Precautions: Fall Risk  Other position/activity restrictions: IV    Subjective   Chart Reviewed: Yes  Additional Pertinent Hx: Pt is a 69 y.o. female admitted for colostomy reversal. Pt s/p lap extensive lysis of adhesions > 3 hours, lap converted to open colostomy reversal with colorectal anastomosis, small bowel resection 4/10/19.    Response to previous treatment: Patient with no complaints from previous session  Family / Caregiver Present: No  Referring Practitioner: Marlise Goodpasture  Subjective: Patient met b/s, lying supine, initially refusing OT at this time, as this writer is exiting, patient states, Johnson Stapleton I will do it now,\" does not report or indicate any pain     Objective    ADL  Additional Comments: refuses all ADLs despite encouragement     Balance  Sitting Balance: Independent  Standing Balance: Stand by assistance    Transfers   Sit to stand: Stand by assistance  Stand to sit: Stand by assistance  Comment: impulisve, quick with transfers, does not always practice safe techniques, yet remains SBA     Functional Mobility  Functional - Mobility Device: Rolling Walker  Activity: Other  Assist Level: Stand by assistance  Functional Mobility Comments: patient completes fxl mobility within room and hallway x 80 feet 4 times with seated rest breaks between each     Bed mobility  Supine to Sit: Supervision  Sit to Supine: Supervision  Scooting: Supervision    Cognition  Overall Cognitive Status: Exceptions  Following Commands: Follows one step commands with repetition  Attention Span: Difficulty attending to directions; Difficulty dividing attention  Memory: Decreased short term memory;Decreased long term memory  Safety Judgement: Decreased awareness of need for safety;Decreased awareness of need for assistance  Problem Solving: Decreased awareness of errors;Assistance required to identify errors made;Assistance required to generate solutions;Assistance required to implement solutions;Assistance required to correct errors made  Insights: Decreased awareness of deficits  Cognition Comment: patient very talkative, requires frequent redirection, patient has multiple c/os regarding hospital stay with patient being apprecitate that this writer listened to those c/os     Plan   Times per week: 3-5  Times per day: Daily  Current Treatment Recommendations: Endurance Training, Balance Training, Strengthening, Functional Mobility Training, ROM, Cognitive/Perceptual Training, Self-Care / ADL, Pain Management, Safety Education & Training    Goals  Short term goals  Time Frame for Short term goals: Prior to d/c: status as of 4/17/19: goals ongoing   Short term goal 1: Pt will bathe with SBA. Short term goal 2: Pt will dress with SBA. Short term goal 3: Pt will toilet with SBA. Short term goal 4: Pt will complete fxl mobility and fxl transfers to/from ADL surfaces with SBA/supervision using AD. Short term goal 5: Pt will complete bed mobility with min A using logroll technique. Short term goal 6: Pt will increase activity tolerance to achieve the above goals. Long term goals  Time Frame for Long term goals : STG=LTG  Patient Goals   Patient goals : to eventually return home.         Therapy Time   Individual Concurrent Group Co-treatment   Time In 6967         Time Out 1113         Minutes [place] [id number]

## 2019-04-17 NOTE — PLAN OF CARE
Problem: SAFETY  Goal: Free from accidental physical injury  Outcome: Ongoing  Goal: Free from intentional harm  Outcome: Ongoing     Problem: PAIN  Goal: Patient's pain/discomfort is manageable  Outcome: Ongoing     Problem: Pain:  Goal: Pain level will decrease  Description  Pain level will decrease  Outcome: Ongoing  Goal: Control of acute pain  Description  Control of acute pain  Outcome: Ongoing     Problem: Falls - Risk of:  Goal: Will remain free from falls  Description  Will remain free from falls  Outcome: Ongoing

## 2019-04-17 NOTE — PROGRESS NOTES
Mineral oil given per order. Patient immediately vomited bright green emesis. Patient cleaned up new gown and sheets put on bed.

## 2019-04-18 VITALS
OXYGEN SATURATION: 93 % | HEIGHT: 61 IN | WEIGHT: 143.3 LBS | TEMPERATURE: 99.1 F | DIASTOLIC BLOOD PRESSURE: 73 MMHG | HEART RATE: 94 BPM | BODY MASS INDEX: 27.06 KG/M2 | RESPIRATION RATE: 16 BRPM | SYSTOLIC BLOOD PRESSURE: 136 MMHG

## 2019-04-18 PROCEDURE — APPNB180 APP NON BILLABLE TIME > 60 MINS: Performed by: NURSE PRACTITIONER

## 2019-04-18 PROCEDURE — 6370000000 HC RX 637 (ALT 250 FOR IP): Performed by: SURGERY

## 2019-04-18 PROCEDURE — 6360000002 HC RX W HCPCS: Performed by: NURSE PRACTITIONER

## 2019-04-18 PROCEDURE — 2580000003 HC RX 258: Performed by: NURSE PRACTITIONER

## 2019-04-18 PROCEDURE — 2580000003 HC RX 258: Performed by: SURGERY

## 2019-04-18 PROCEDURE — APPSS15 APP SPLIT SHARED TIME 0-15 MINUTES: Performed by: NURSE PRACTITIONER

## 2019-04-18 PROCEDURE — 94760 N-INVAS EAR/PLS OXIMETRY 1: CPT

## 2019-04-18 PROCEDURE — C9113 INJ PANTOPRAZOLE SODIUM, VIA: HCPCS | Performed by: NURSE PRACTITIONER

## 2019-04-18 PROCEDURE — 6360000002 HC RX W HCPCS: Performed by: SURGERY

## 2019-04-18 PROCEDURE — 94640 AIRWAY INHALATION TREATMENT: CPT

## 2019-04-18 RX ORDER — PSEUDOEPHEDRINE HCL 30 MG
100 TABLET ORAL 2 TIMES DAILY
Qty: 60 CAPSULE | Refills: 0 | Status: SHIPPED | OUTPATIENT
Start: 2019-04-18 | End: 2019-05-23

## 2019-04-18 RX ORDER — OXYCODONE HYDROCHLORIDE AND ACETAMINOPHEN 5; 325 MG/1; MG/1
1 TABLET ORAL EVERY 6 HOURS PRN
Qty: 20 TABLET | Refills: 0 | Status: ON HOLD | OUTPATIENT
Start: 2019-04-18 | End: 2019-04-25 | Stop reason: HOSPADM

## 2019-04-18 RX ADMIN — PYRIDOXINE HCL TAB 50 MG 100 MG: 50 TAB at 08:21

## 2019-04-18 RX ADMIN — OXYCODONE HYDROCHLORIDE 10 MG: 10 TABLET ORAL at 00:40

## 2019-04-18 RX ADMIN — FOLIC ACID 1 MG: 1 TABLET ORAL at 08:21

## 2019-04-18 RX ADMIN — OXYCODONE HYDROCHLORIDE 10 MG: 10 TABLET ORAL at 11:20

## 2019-04-18 RX ADMIN — Medication 500 MCG: at 08:21

## 2019-04-18 RX ADMIN — ENOXAPARIN SODIUM 40 MG: 40 INJECTION SUBCUTANEOUS at 08:21

## 2019-04-18 RX ADMIN — TIOTROPIUM BROMIDE 18 MCG: 18 CAPSULE ORAL; RESPIRATORY (INHALATION) at 07:43

## 2019-04-18 RX ADMIN — Medication 10 ML: at 08:01

## 2019-04-18 RX ADMIN — ONDANSETRON 4 MG: 2 INJECTION INTRAMUSCULAR; INTRAVENOUS at 08:00

## 2019-04-18 RX ADMIN — DOCUSATE SODIUM 100 MG: 100 CAPSULE, LIQUID FILLED ORAL at 08:20

## 2019-04-18 RX ADMIN — PANTOPRAZOLE SODIUM 40 MG: 40 INJECTION, POWDER, FOR SOLUTION INTRAVENOUS at 08:21

## 2019-04-18 RX ADMIN — ACETAMINOPHEN 650 MG: 325 TABLET ORAL at 04:49

## 2019-04-18 RX ADMIN — Medication 100 MG: at 08:20

## 2019-04-18 ASSESSMENT — PAIN DESCRIPTION - ONSET
ONSET: ON-GOING
ONSET: ON-GOING

## 2019-04-18 ASSESSMENT — PAIN DESCRIPTION - LOCATION
LOCATION: ABDOMEN
LOCATION: ABDOMEN

## 2019-04-18 ASSESSMENT — PAIN DESCRIPTION - PAIN TYPE
TYPE: SURGICAL PAIN
TYPE: SURGICAL PAIN

## 2019-04-18 ASSESSMENT — PAIN DESCRIPTION - DESCRIPTORS
DESCRIPTORS: ACHING;SORE;THROBBING
DESCRIPTORS: ACHING;SORE

## 2019-04-18 ASSESSMENT — PAIN DESCRIPTION - PROGRESSION
CLINICAL_PROGRESSION: GRADUALLY IMPROVING
CLINICAL_PROGRESSION: NOT CHANGED

## 2019-04-18 ASSESSMENT — PAIN SCALES - GENERAL
PAINLEVEL_OUTOF10: 10
PAINLEVEL_OUTOF10: 2
PAINLEVEL_OUTOF10: 10
PAINLEVEL_OUTOF10: 4
PAINLEVEL_OUTOF10: 6
PAINLEVEL_OUTOF10: 2
PAINLEVEL_OUTOF10: 4

## 2019-04-18 ASSESSMENT — PAIN DESCRIPTION - ORIENTATION
ORIENTATION: LEFT;MID
ORIENTATION: LEFT;MID

## 2019-04-18 ASSESSMENT — PAIN DESCRIPTION - FREQUENCY
FREQUENCY: CONTINUOUS
FREQUENCY: INTERMITTENT

## 2019-04-18 ASSESSMENT — PAIN - FUNCTIONAL ASSESSMENT
PAIN_FUNCTIONAL_ASSESSMENT: ACTIVITIES ARE NOT PREVENTED
PAIN_FUNCTIONAL_ASSESSMENT: ACTIVITIES ARE NOT PREVENTED

## 2019-04-18 NOTE — PROGRESS NOTES
Patient's IV removed without complications. Belongings were sent with Marielle Easton.  Patient discharged in ambulance with [facility]

## 2019-04-18 NOTE — DISCHARGE INSTR - COC
[facility]       Immunization History:   Immunization History   Administered Date(s) Administered    Influenza, High Dose (Fluzone 65 yrs and older) 09/10/2018    Pneumococcal 13-valent Conjugate ([id number]) 09/24/2018    Tdap (Boostrix, Adacel) 11/29/2018    Zoster Live (Zostavax) 09/24/2016    Zoster Subunit (Shingrix) 03/24/2018       Active Problems:  Patient Active Problem List   Diagnosis Code    [facility] J44.1    Essential hypertension I10    Tobacco abuse Z72.0    Alcohol abuse F10.10    Colostomy care ([facility] Z43.3    Recurrent major depressive disorder, in partial remission ([facility] F33.41    Personal history of nicotine dependence  Z87.891    Colitis K52.9    Colitis due to Clostridium difficile A04.72    Alcohol-induced acute pancreatitis without infection or necrosis K85.20    Gastrointestinal hemorrhage associated with duodenal ulcer K26.4    Partial small bowel obstruction (HCC) K56.600    Ileus (HCC) K56.7    Acute cystitis without hematuria N30.00    Non-intractable cyclical vomiting with nausea G43. A0    Parastomal hernia without obstruction or gangrene K43.5    Abdominal pain R10.9    C. difficile diarrhea A04.72    Colostomy present (HCC) Z93.3       Isolation/Infection:   Isolation          No Isolation            Nurse Assessment:  Last Vital Signs: /73   Pulse 94   Temp 99.1 °F (37.3 °C) (Oral)   Resp 16   Ht 5' 1\" (1.549 m)   Wt 143 lb 4.8 oz (65 kg)   SpO2 96%   BMI 27.08 kg/m²     Last documented pain score (0-10 scale): Pain Level: 2  Last Weight:   Wt Readings from Last 1 Encounters:   04/18/19 143 lb 4.8 oz (65 kg)     Mental Status:  oriented and alert    IV Access:  - None    Nursing Mobility/ADLs:  Walking   Assisted  Transfer  Assisted  Bathing  Assisted  Dressing  Assisted  Toileting  Assisted  Feeding  Independent  Med Admin  Assisted  Med Delivery   whole    Wound Care Documentation and Therapy: Elimination:  Continence:   · Bowel: Yes  · Bladder: No  Urinary Catheter: None   Colostomy/Ileostomy/Ileal Conduit: reversal, removed 4/10/19  [REMOVED] Colostomy LLQ-Peristomal Assessment: Clean  [REMOVED] Colostomy LLQ-Stool Appearance: Loose  [REMOVED] Colostomy LLQ-Stool Color: Brown  [REMOVED] Colostomy LLQ-Stool Amount: Small    Date of Last BM: 4/17/19    Intake/Output Summary (Last 24 hours) at 4/18/2019 0654  Last data filed at 4/18/2019 0031  Gross per 24 hour   Intake 1080 ml   Output --   Net 1080 ml     I/O last 3 completed shifts: In: 1080 [P.O.:1080]  Out: -     Safety Concerns: At Risk for Falls    Impairments/Disabilities:      None    Nutrition Therapy:  Current Nutrition Therapy:   - Oral Diet:  General    Routes of Feeding: Oral  Liquids: Thin Liquids  Daily Fluid Restriction: no  Last Modified Barium Swallow with Video (Video Swallowing Test): not done    Treatments at the Time of Hospital Discharge:   Respiratory Treatments: n/a  Oxygen Therapy:  is not on home oxygen therapy.   Ventilator:    - No ventilator support    Rehab Therapies: Physical Therapy and Occupational Therapy  Weight Bearing Status/Restrictions: No weight bearing restirctions  Other Medical Equipment (for information only, NOT a DME order):  walker  Other Treatments: n/a    Patient's personal belongings (please select all that are sent with patient):  None    RN SIGNATURE:  Electronically signed by Fatmata Villegas RN on 4/18/19 at 10:16 AM    CASE MANAGEMENT/SOCIAL WORK SECTION    Inpatient Status Date: ***    Readmission Risk Assessment Score:  Readmission Risk              Risk of Unplanned Readmission:        27           Discharging to Facility/ Agency   · Name: [facility]  · [facility] [address]  ·   · Phone:[phone] Fax:    Dialysis Facility (if applicable)   · Name:  · Address:  · Dialysis Schedule:  · Phone:  · Fax:    / signature: Electronically signed by Justus Doss BETO Romero, LOLAW on 4/18/19 at 9:39 AM    PHYSICIAN SECTION    Prognosis: Good    Condition at Discharge: Stable    Rehab Potential (if transferring to Rehab): Good    Recommended Labs or Other Treatments After Discharge:   PT/OT  Saline moistened gauze packing to midline and old ostomy site wounds and cover with dry gauze, change daily. Staples to remain in place until follow up with Dr. Maru Graf in two weeks. Call for appointment. Physician Certification: I certify the above information and transfer of Wei Lr  is necessary for the continuing treatment of the diagnosis listed and that she requires [facility] for greater 30 days.      Update Admission H&P: No change in H&P    PHYSICIAN SIGNATURE:  Electronically signed by ABIGAIL Gant CNP on 4/18/19 at 6:56 AM

## 2019-04-18 NOTE — PROGRESS NOTES
[facility]                                     Daily Progress Note                                                         Pt Name: Meli Ronquillo Record Number: [id number]  Date of Birth 1945   Today's Date: 4/18/2019  No chief complaint on file. ASSESSMENT/PLAN  Colostomy in place  -POD #8 (4/10) lap extensive lysis of adhesions > 3 hours, lap converted to open colostomy reversal with colorectal anastomosis, small bowel resection.   -Dressing changed, repacked, no s/s infection  - Pain controlled  -Tolerating diet  -+flatulence, 2 bms overnight.   - Denies any nausea or emesis  -OOB, PT/OT  [facility] -f/u Dr. Brain Correa two weeks. Acute blood loss anemia  -monitor    Discharge Planning: back to facility today    EDUCATION  Patient educated about Disease Process, Medications, Smoking Cessation, Oxygenation, Incentive Spirometry and Deep Breath and Cough, Diabetes, Hyperlipidemia, Smoking Cessation, Nutrition, Exercise and Hypertension    SUBJECTIVE  Molly Avery has improved from yesterday. Pain is well controlled. She has nausea and no vomiting. She has passed flatus and has not had a bowel movement. She is tolerating general diet   Current activity is up with assistance    OBJECTIVE  VITALS:  height is 5' 1\" (1.549 m) and weight is 143 lb 4.8 oz (65 kg). Her oral temperature is 99.1 °F (37.3 °C). Her blood pressure is 136/73 and her pulse is 94. Her respiration is 16 and oxygen saturation is 93%.  INTAKE/OUTPUT:      Intake/Output Summary (Last 24 hours) at 4/18/2019 1010  Last data filed at 4/18/2019 0031  Gross per 24 hour   Intake 840 ml   Output --   Net 840 ml     GENERAL: alert, no distress  LUNGS: clear to ausculation, without wheezes, rales or rhonci  HEART: normal rate and regular rhythm  ABDOMEN: soft, appropriately-tender, non-distended and bowel sounds present in all 4 quadrants. EXTREMITY: no cyanosis, clubbing or edema  I/O last 3 completed shifts: In: 1080 [P.O.:1080]  Out: -   No intake/output data recorded.     LABS  Recent Labs     04/16/19  0607   WBC 8.7   HGB 8.3*   HCT 24.6*         K 3.7   CL 99   CO2 27   BUN 5*   CREATININE 0.5*   MG 1.80   PHOS 3.6   CALCIUM 8.8     Cecille Sarkar, APRN-CNP

## 2019-04-23 ENCOUNTER — HOSPITAL ENCOUNTER (INPATIENT)
Age: 74
LOS: 2 days | Discharge: NURSING, REHAB OR LONG-TERM CARE | DRG: 856 | End: 2019-04-25
Attending: EMERGENCY MEDICINE | Admitting: SURGERY
Payer: COMMERCIAL

## 2019-04-23 ENCOUNTER — APPOINTMENT (OUTPATIENT)
Dept: CT IMAGING | Age: 74
DRG: 856 | End: 2019-04-23
Payer: COMMERCIAL

## 2019-04-23 ENCOUNTER — OFFICE VISIT (OUTPATIENT)
Dept: SURGERY | Age: 74
End: 2019-04-23

## 2019-04-23 VITALS — DIASTOLIC BLOOD PRESSURE: 77 MMHG | HEART RATE: 105 BPM | SYSTOLIC BLOOD PRESSURE: 133 MMHG

## 2019-04-23 DIAGNOSIS — R10.84 GENERALIZED ABDOMINAL PAIN: Primary | ICD-10-CM

## 2019-04-23 DIAGNOSIS — T81.43XA POSTOPERATIVE INTRA-ABDOMINAL ABSCESS: ICD-10-CM

## 2019-04-23 DIAGNOSIS — K56.609 SMALL BOWEL OBSTRUCTION (HCC): Primary | ICD-10-CM

## 2019-04-23 DIAGNOSIS — Z98.890 S/P COLOSTOMY TAKEDOWN: ICD-10-CM

## 2019-04-23 LAB
A/G RATIO: 0.8 (ref 1.1–2.2)
ALBUMIN SERPL-MCNC: 3.2 G/DL (ref 3.4–5)
ALP BLD-CCNC: 116 U/L (ref 40–129)
ALT SERPL-CCNC: 7 U/L (ref 10–40)
ANION GAP SERPL CALCULATED.3IONS-SCNC: 11 MMOL/L (ref 3–16)
ANISOCYTOSIS: ABNORMAL
AST SERPL-CCNC: 9 U/L (ref 15–37)
BASOPHILS ABSOLUTE: 0.2 K/UL (ref 0–0.2)
BASOPHILS RELATIVE PERCENT: 1 %
BILIRUB SERPL-MCNC: 0.4 MG/DL (ref 0–1)
BUN BLDV-MCNC: 9 MG/DL (ref 7–20)
CALCIUM SERPL-MCNC: 9 MG/DL (ref 8.3–10.6)
CHLORIDE BLD-SCNC: 98 MMOL/L (ref 99–110)
CO2: 25 MMOL/L (ref 21–32)
CREAT SERPL-MCNC: 0.5 MG/DL (ref 0.6–1.2)
EOSINOPHILS ABSOLUTE: 0.1 K/UL (ref 0–0.6)
EOSINOPHILS RELATIVE PERCENT: 0.7 %
GFR AFRICAN AMERICAN: >60
GFR NON-AFRICAN AMERICAN: >60
GLOBULIN: 4.1 G/DL
GLUCOSE BLD-MCNC: 111 MG/DL (ref 70–99)
HCT VFR BLD CALC: 30.5 % (ref 36–48)
HEMATOLOGY PATH CONSULT: YES
HEMOGLOBIN: 10 G/DL (ref 12–16)
INR BLD: 1.1 (ref 0.86–1.14)
LACTIC ACID: 0.9 MMOL/L (ref 0.4–2)
LACTIC ACID: 1 MMOL/L (ref 0.4–2)
LIPASE: 25 U/L (ref 13–60)
LYMPHOCYTES ABSOLUTE: 1.5 K/UL (ref 1–5.1)
LYMPHOCYTES RELATIVE PERCENT: 10 %
MACROCYTES: ABNORMAL
MAGNESIUM: 1.9 MG/DL (ref 1.8–2.4)
MCH RBC QN AUTO: 31.1 PG (ref 26–34)
MCHC RBC AUTO-ENTMCNC: 32.8 G/DL (ref 31–36)
MCV RBC AUTO: 94.6 FL (ref 80–100)
MONOCYTES ABSOLUTE: 0.8 K/UL (ref 0–1.3)
MONOCYTES RELATIVE PERCENT: 5.1 %
NEUTROPHILS ABSOLUTE: 12.9 K/UL (ref 1.7–7.7)
NEUTROPHILS RELATIVE PERCENT: 83.2 %
OCCULT BLOOD DIAGNOSTIC: NORMAL
PDW BLD-RTO: 15.7 % (ref 12.4–15.4)
PLATELET # BLD: 1091 K/UL (ref 135–450)
PLATELET SLIDE REVIEW: ABNORMAL
PMV BLD AUTO: 6.6 FL (ref 5–10.5)
POTASSIUM REFLEX MAGNESIUM: 3.5 MMOL/L (ref 3.5–5.1)
PROTHROMBIN TIME: 12.5 SEC (ref 9.8–13)
RBC # BLD: 3.23 M/UL (ref 4–5.2)
REASON FOR REJECTION: NORMAL
REJECTED TEST: NORMAL
SLIDE REVIEW: ABNORMAL
SODIUM BLD-SCNC: 134 MMOL/L (ref 136–145)
TOTAL PROTEIN: 7.3 G/DL (ref 6.4–8.2)
TROPONIN: <0.01 NG/ML
WBC # BLD: 15.5 K/UL (ref 4–11)

## 2019-04-23 PROCEDURE — 6360000002 HC RX W HCPCS: Performed by: NURSE PRACTITIONER

## 2019-04-23 PROCEDURE — 85610 PROTHROMBIN TIME: CPT

## 2019-04-23 PROCEDURE — C9113 INJ PANTOPRAZOLE SODIUM, VIA: HCPCS | Performed by: NURSE PRACTITIONER

## 2019-04-23 PROCEDURE — 85025 COMPLETE CBC W/AUTO DIFF WBC: CPT

## 2019-04-23 PROCEDURE — 83735 ASSAY OF MAGNESIUM: CPT

## 2019-04-23 PROCEDURE — 83690 ASSAY OF LIPASE: CPT

## 2019-04-23 PROCEDURE — 6370000000 HC RX 637 (ALT 250 FOR IP): Performed by: NURSE PRACTITIONER

## 2019-04-23 PROCEDURE — 99285 EMERGENCY DEPT VISIT HI MDM: CPT

## 2019-04-23 PROCEDURE — 74177 CT ABD & PELVIS W/CONTRAST: CPT

## 2019-04-23 PROCEDURE — 96360 HYDRATION IV INFUSION INIT: CPT

## 2019-04-23 PROCEDURE — 80053 COMPREHEN METABOLIC PANEL: CPT

## 2019-04-23 PROCEDURE — 6370000000 HC RX 637 (ALT 250 FOR IP): Performed by: HOSPITALIST

## 2019-04-23 PROCEDURE — G0328 FECAL BLOOD SCRN IMMUNOASSAY: HCPCS

## 2019-04-23 PROCEDURE — 2580000003 HC RX 258: Performed by: EMERGENCY MEDICINE

## 2019-04-23 PROCEDURE — APPSS180 APP SPLIT SHARED TIME > 60 MINUTES: Performed by: NURSE PRACTITIONER

## 2019-04-23 PROCEDURE — 83605 ASSAY OF LACTIC ACID: CPT

## 2019-04-23 PROCEDURE — 99024 POSTOP FOLLOW-UP VISIT: CPT | Performed by: SURGERY

## 2019-04-23 PROCEDURE — 1200000000 HC SEMI PRIVATE

## 2019-04-23 PROCEDURE — 2580000003 HC RX 258: Performed by: HOSPITALIST

## 2019-04-23 PROCEDURE — 87040 BLOOD CULTURE FOR BACTERIA: CPT

## 2019-04-23 PROCEDURE — 2580000003 HC RX 258: Performed by: NURSE PRACTITIONER

## 2019-04-23 PROCEDURE — 84484 ASSAY OF TROPONIN QUANT: CPT

## 2019-04-23 PROCEDURE — 6360000002 HC RX W HCPCS: Performed by: HOSPITALIST

## 2019-04-23 PROCEDURE — 6360000004 HC RX CONTRAST MEDICATION: Performed by: EMERGENCY MEDICINE

## 2019-04-23 PROCEDURE — 94760 N-INVAS EAR/PLS OXIMETRY 1: CPT

## 2019-04-23 PROCEDURE — 36415 COLL VENOUS BLD VENIPUNCTURE: CPT

## 2019-04-23 RX ORDER — OXYCODONE HYDROCHLORIDE 5 MG/1
5 TABLET ORAL EVERY 4 HOURS PRN
Status: DISCONTINUED | OUTPATIENT
Start: 2019-04-23 | End: 2019-04-25 | Stop reason: HOSPADM

## 2019-04-23 RX ORDER — CIPROFLOXACIN 2 MG/ML
400 INJECTION, SOLUTION INTRAVENOUS EVERY 12 HOURS
Status: DISCONTINUED | OUTPATIENT
Start: 2019-04-23 | End: 2019-04-23

## 2019-04-23 RX ORDER — ACETAMINOPHEN 325 MG/1
650 TABLET ORAL EVERY 4 HOURS PRN
Status: DISCONTINUED | OUTPATIENT
Start: 2019-04-23 | End: 2019-04-25 | Stop reason: HOSPADM

## 2019-04-23 RX ORDER — PANTOPRAZOLE SODIUM 40 MG/10ML
40 INJECTION, POWDER, LYOPHILIZED, FOR SOLUTION INTRAVENOUS DAILY
Status: DISCONTINUED | OUTPATIENT
Start: 2019-04-23 | End: 2019-04-25 | Stop reason: HOSPADM

## 2019-04-23 RX ORDER — SODIUM CHLORIDE 0.9 % (FLUSH) 0.9 %
10 SYRINGE (ML) INJECTION PRN
Status: DISCONTINUED | OUTPATIENT
Start: 2019-04-23 | End: 2019-04-25 | Stop reason: HOSPADM

## 2019-04-23 RX ORDER — ALBUTEROL SULFATE 90 UG/1
2 AEROSOL, METERED RESPIRATORY (INHALATION) EVERY 6 HOURS PRN
Status: DISCONTINUED | OUTPATIENT
Start: 2019-04-23 | End: 2019-04-25 | Stop reason: HOSPADM

## 2019-04-23 RX ORDER — OXYCODONE HYDROCHLORIDE 10 MG/1
10 TABLET ORAL EVERY 4 HOURS PRN
Status: DISCONTINUED | OUTPATIENT
Start: 2019-04-23 | End: 2019-04-25 | Stop reason: HOSPADM

## 2019-04-23 RX ORDER — 0.9 % SODIUM CHLORIDE 0.9 %
10 VIAL (ML) INJECTION DAILY
Status: DISCONTINUED | OUTPATIENT
Start: 2019-04-23 | End: 2019-04-25 | Stop reason: HOSPADM

## 2019-04-23 RX ORDER — DULOXETIN HYDROCHLORIDE 30 MG/1
30 CAPSULE, DELAYED RELEASE ORAL DAILY
Status: DISCONTINUED | OUTPATIENT
Start: 2019-04-24 | End: 2019-04-25 | Stop reason: HOSPADM

## 2019-04-23 RX ORDER — SODIUM CHLORIDE 9 MG/ML
INJECTION, SOLUTION INTRAVENOUS CONTINUOUS
Status: ACTIVE | OUTPATIENT
Start: 2019-04-23 | End: 2019-04-24

## 2019-04-23 RX ORDER — POTASSIUM CHLORIDE 1.5 G/1.77G
40 POWDER, FOR SOLUTION ORAL PRN
Status: DISCONTINUED | OUTPATIENT
Start: 2019-04-23 | End: 2019-04-25 | Stop reason: HOSPADM

## 2019-04-23 RX ORDER — POTASSIUM CHLORIDE 20 MEQ/1
40 TABLET, EXTENDED RELEASE ORAL PRN
Status: DISCONTINUED | OUTPATIENT
Start: 2019-04-23 | End: 2019-04-25 | Stop reason: HOSPADM

## 2019-04-23 RX ORDER — LANOLIN ALCOHOL/MO/W.PET/CERES
3 CREAM (GRAM) TOPICAL NIGHTLY PRN
Status: DISCONTINUED | OUTPATIENT
Start: 2019-04-23 | End: 2019-04-25 | Stop reason: HOSPADM

## 2019-04-23 RX ORDER — SODIUM CHLORIDE 9 MG/ML
INJECTION, SOLUTION INTRAVENOUS CONTINUOUS
Status: DISCONTINUED | OUTPATIENT
Start: 2019-04-23 | End: 2019-04-23

## 2019-04-23 RX ORDER — 0.9 % SODIUM CHLORIDE 0.9 %
500 INTRAVENOUS SOLUTION INTRAVENOUS ONCE
Status: COMPLETED | OUTPATIENT
Start: 2019-04-23 | End: 2019-04-23

## 2019-04-23 RX ORDER — LANOLIN ALCOHOL/MO/W.PET/CERES
6 CREAM (GRAM) TOPICAL NIGHTLY PRN
Status: DISCONTINUED | OUTPATIENT
Start: 2019-04-23 | End: 2019-04-23 | Stop reason: SDUPTHER

## 2019-04-23 RX ORDER — ONDANSETRON 2 MG/ML
4 INJECTION INTRAMUSCULAR; INTRAVENOUS EVERY 6 HOURS PRN
Status: DISCONTINUED | OUTPATIENT
Start: 2019-04-23 | End: 2019-04-25 | Stop reason: HOSPADM

## 2019-04-23 RX ORDER — POTASSIUM CHLORIDE 7.45 MG/ML
10 INJECTION INTRAVENOUS PRN
Status: DISCONTINUED | OUTPATIENT
Start: 2019-04-23 | End: 2019-04-25 | Stop reason: HOSPADM

## 2019-04-23 RX ORDER — SODIUM CHLORIDE 0.9 % (FLUSH) 0.9 %
10 SYRINGE (ML) INJECTION EVERY 12 HOURS SCHEDULED
Status: DISCONTINUED | OUTPATIENT
Start: 2019-04-23 | End: 2019-04-25 | Stop reason: HOSPADM

## 2019-04-23 RX ADMIN — PANTOPRAZOLE SODIUM 40 MG: 40 INJECTION, POWDER, FOR SOLUTION INTRAVENOUS at 18:47

## 2019-04-23 RX ADMIN — PIPERACILLIN SODIUM,TAZOBACTAM SODIUM 3.38 G: 3; .375 INJECTION, POWDER, FOR SOLUTION INTRAVENOUS at 18:39

## 2019-04-23 RX ADMIN — OXYCODONE HYDROCHLORIDE 10 MG: 10 TABLET ORAL at 20:27

## 2019-04-23 RX ADMIN — SODIUM CHLORIDE 500 ML: 9 INJECTION, SOLUTION INTRAVENOUS at 11:07

## 2019-04-23 RX ADMIN — Medication 10 ML: at 18:47

## 2019-04-23 RX ADMIN — Medication 3 MG: at 20:27

## 2019-04-23 RX ADMIN — ACETAMINOPHEN 650 MG: 325 TABLET ORAL at 20:30

## 2019-04-23 RX ADMIN — IOHEXOL 50 ML: 240 INJECTION, SOLUTION INTRATHECAL; INTRAVASCULAR; INTRAVENOUS; ORAL at 12:19

## 2019-04-23 RX ADMIN — IOPAMIDOL 75 ML: 755 INJECTION, SOLUTION INTRAVENOUS at 12:19

## 2019-04-23 RX ADMIN — HYDROMORPHONE HYDROCHLORIDE 0.5 MG: 1 INJECTION, SOLUTION INTRAMUSCULAR; INTRAVENOUS; SUBCUTANEOUS at 19:00

## 2019-04-23 RX ADMIN — SODIUM CHLORIDE: 9 INJECTION, SOLUTION INTRAVENOUS at 18:34

## 2019-04-23 ASSESSMENT — ENCOUNTER SYMPTOMS
APNEA: 0
DIARRHEA: 1
FACIAL SWELLING: 0
VOMITING: 0
SORE THROAT: 0
EYE DISCHARGE: 0
SHORTNESS OF BREATH: 0
NAUSEA: 0
ABDOMINAL PAIN: 1
CHOKING: 0
BACK PAIN: 0
EYE REDNESS: 0

## 2019-04-23 ASSESSMENT — PAIN SCALES - GENERAL
PAINLEVEL_OUTOF10: 7
PAINLEVEL_OUTOF10: 5
PAINLEVEL_OUTOF10: 6
PAINLEVEL_OUTOF10: 4
PAINLEVEL_OUTOF10: 1
PAINLEVEL_OUTOF10: 10
PAINLEVEL_OUTOF10: 8
PAINLEVEL_OUTOF10: 8
PAINLEVEL_OUTOF10: 7

## 2019-04-23 ASSESSMENT — PAIN DESCRIPTION - FREQUENCY
FREQUENCY: CONTINUOUS

## 2019-04-23 ASSESSMENT — PAIN DESCRIPTION - ORIENTATION
ORIENTATION: LOWER
ORIENTATION: LOWER
ORIENTATION: UPPER
ORIENTATION: UPPER

## 2019-04-23 ASSESSMENT — PAIN DESCRIPTION - PROGRESSION
CLINICAL_PROGRESSION: NOT CHANGED

## 2019-04-23 ASSESSMENT — PAIN DESCRIPTION - DESCRIPTORS
DESCRIPTORS: STABBING
DESCRIPTORS: SHARP

## 2019-04-23 ASSESSMENT — PAIN DESCRIPTION - LOCATION
LOCATION: ABDOMEN

## 2019-04-23 ASSESSMENT — PAIN DESCRIPTION - ONSET
ONSET: ON-GOING
ONSET: ON-GOING

## 2019-04-23 ASSESSMENT — PAIN DESCRIPTION - PAIN TYPE
TYPE: ACUTE PAIN

## 2019-04-23 ASSESSMENT — PAIN - FUNCTIONAL ASSESSMENT: PAIN_FUNCTIONAL_ASSESSMENT: ACTIVITIES ARE NOT PREVENTED

## 2019-04-23 NOTE — PROGRESS NOTES
Post Operative Visit    [facility]  [place] and Vascular Surgery   Mattie Lim MD    [address], [address]  [place]  Phone: [phone]  Fax: [phone]    Payam Burgess   YOB: 1945    Date of Visit:  4/23/2019    No ref. provider found  Chantale Long MD    Subjective:     Payam Burgess presents for a two week follow-up s/p laparoscopic extensive LITTLE, converted to open colostomy reversal. Overall she has been doing poorly since being discharged. She has generalized abdominal pain. She has been eating/drinking without difficulty but having diarrhea. There has been worsening of her pain. She has subjective fevers and chills. Pain Score:   8    Allergies   Allergen Reactions    Aspirin Other (See Comments)     Ulcers in stomach    Fentanyl Other (See Comments)     Hallucinations     Morphine      Feels like she will have a heart attack     Outpatient Medications Marked as Taking for the 4/23/19 encounter (Office Visit) with Bozena Tam MD   Medication Sig Dispense Refill    docusate sodium (COLACE, DULCOLAX) 100 MG CAPS Take 100 mg by mouth 2 times daily Hold if she has more than two loose stools in a day. Soft stool is encouraged until follow up with surgeon. 60 capsule 0    oxyCODONE-acetaminophen (PERCOCET) 5-325 MG per tablet Take 1 tablet by mouth every 6 hours as needed for Pain for up to 5 days. Intended supply: 5 days. Take lowest dose possible to manage pain. Do not take more than 4000mg in a 24 hour period.  20 tablet 0    OXYGEN Inhale 2 L into the lungs as needed      melatonin 3 MG TABS tablet Take 3 mg by mouth daily      acetaminophen (TYLENOL) 325 MG tablet Take 650 mg by mouth nightly      ondansetron (ZOFRAN ODT) 4 MG disintegrating tablet Take 1-2 tablets by mouth every 12 hours as needed for Nausea 12 tablet 0    vitamin B-1 100 MG tablet Take 1 tablet by mouth daily 30 tablet 3    albuterol sulfate HFA (PROAIR HFA) 108 (90 Base) MCG/ACT inhaler Inhale 2 puffs into the lungs every 6 hours as needed for Wheezing 1 Inhaler 3    Umeclidinium Bromide 62.5 MCG/INH AEPB Inhale 1 puff into the lungs daily 30 each 5    DULoxetine (CYMBALTA) 30 MG extended release capsule Take 1 capsule by mouth daily 30 capsule 3    ferrous sulfate 325 (65 Fe) MG tablet Take 325 mg by mouth daily (with breakfast)      folic acid (FOLVITE) 1 MG tablet Take 1 mg by mouth daily      vitamin B-6 (PYRIDOXINE) 100 MG tablet Take 100 mg by mouth daily      vitamin B-12 (CYANOCOBALAMIN) 500 MCG tablet Take 500 mcg by mouth daily      vitamin D (ERGOCALCIFEROL) 47270 UNIT CAPS capsule Take 50,000 Units by mouth once a week          Vitals:    04/23/19 0941   BP: 133/77   Pulse: 105     There is no height or weight on file to calculate BMI. Wt Readings from Last 3 Encounters:   04/18/19 143 lb 4.8 oz (65 kg)   03/18/19 128 lb (58.1 kg)   02/09/19 132 lb 0.9 oz (59.9 kg)     BP Readings from Last 3 Encounters:   04/23/19 133/77   04/18/19 136/73   04/10/19 (!) 114/59          Objective:    CONSTITUTIONAL:  awake, alert, no apparent distress    LUNGS:  Resp easy and unlabored  ABDOMEN:  Midline incision and colostomy incision healing well, no erythema or drainage, minimal packing at ostomy site, soft, non-distended, minimal diffuse tenderness no peritonitis, voluntary guarding absent,  and hernia absent  MUSCULOSKELETAL: No edema  NEUROLOGIC:  Mental Status Exam:  Level of Alertness:   awake  Orientation:   person, place, time  SKIN: no erythema or induration      Pathology:  FINAL DIAGNOSIS:       A. \"Colon and rectal stumps\", excision:       - Portions of small intestine, large intestine and skin with focal         erosion, serosal acute inflammation and adhesion; compatible with         ostomy/ stump.       B.  Parastomal hernia sac, excision:       - Benign fibroadipose tissue.     JINMI/JINMI    ASSESSMENT: Diagnosis Orders   1. Generalized abdominal pain     2. S/P colostomy takedown         PLAN:    Tierra Harkins is not doing well. I am concerned with her abdominal pain along with diarrhea. I sent her to the ED for further evaluation for labwork and CT imaging. Will plan on having her follow up soon, with timing determined by ED workup.       Electronically signed by Delgado Sellers MD on 4/23/2019 at 10:11 AM

## 2019-04-23 NOTE — H&P
[address] [facility] V[facility]  [phone]        Surgery  History and Physical      Pt Name: Chester Burris  MRN: [id number]  YOB: 1945  Date of evaluation: 4/23/2019  Primary Care Physician: No primary care provider on file. Chief Complaint   Patient presents with    Abdominal Pain     Colosomy reverse on April 10, Patient c/o severe pain,dry mouth, and diarrhea. Her physician sent her in for IV fluids and CT with oral and IV contrast.           Assessment/Plan   Abdominal abscess following colostomy reversal  -clear liquids today, NPO after midnight  -IR drainage of pelvic abscess 4/24  -hold blood thinners  -antibiotics cipro/flagyl  -diarrhea-check C diff with hx of c diff. SUBJECTIVE:   History of Chief Complaint:    Chester Burris is a 69 y.o. female PMH Parkinson's disease, hypertension, hiatal hernia, depression, COPD, C. diff colitis December 2018, diverticulitis with status post colostomy. Colostomy takedown 4/10, discharged back to nursing home 4/18 after a stay complicated by post op ileus. Presents to ER after seeing Dr. Deangelo Diana in the office for elevated white blood cell count and abdominal pain. Patient had a loose bowel movement that was dark in color on the way to the ER. CT abdomen pelvis reveals a pelvic fluid collection 5 x 3 cm in the right side of the pelvis the level of the pelvic inlet. White blood cell count 15.5. Renal function normal aside from slight hyponatremia at 134. Normal liver function. Occult blood checked in the ED was negative. We will check for C. diff as she has a history of such.     Past Medical History   has a past medical history of Anemia, Clostridium difficile colitis, Colostomy in place [facility]), COPD (chronic obstructive pulmonary disease) ([facility] [address] Depression, DANIELLE (generalized anxiety disorder), Gastric ulcer, unspecified as acute or chronic, without mention of hemorrhage, perforation, or obstruction, H/O ETOH abuse, Hiatal hernia, Hypertension, Insomnia, Intestinal obstruction ([facility] [address] Parkinson's disease ([facility] [address] Tobacco abuse, and Vitamin D deficiency. Past Surgical History   has a past surgical history that includes Hysterectomy; Cholecystectomy; Tubal ligation; colostomy (2018); Upper gastrointestinal endoscopy (12/13/2018); Upper gastrointestinal endoscopy (N/A, 12/13/2018); Colonoscopy (12/14/2018); Colonoscopy (N/A, 12/14/2018); and Small intestine surgery (N/A, 4/10/2019). Medications  Prior to Admission medications    Medication Sig Start Date End Date Taking? Authorizing Provider   docusate sodium (COLACE, DULCOLAX) 100 MG CAPS Take 100 mg by mouth 2 times daily Hold if she has more than two loose stools in a day. Soft stool is encouraged until follow up with surgeon. 4/18/19  Yes ABIGAIL Richmond - CNP   oxyCODONE-acetaminophen (PERCOCET) 5-325 MG per tablet Take 1 tablet by mouth every 6 hours as needed for Pain for up to 5 days. Intended supply: 5 days. Take lowest dose possible to manage pain. Do not take more than 4000mg in a 24 hour period.  4/18/19 4/23/19 Yes ABIGAIL Richmond - CNP   OXYGEN Inhale 2 L into the lungs as needed   Yes Historical Provider, MD   melatonin 3 MG TABS tablet Take 3 mg by mouth nightly as needed    Yes Historical Provider, MD   acetaminophen (TYLENOL) 325 MG tablet Take 650 mg by mouth nightly   Yes Historical Provider, MD   famotidine (PEPCID) 20 MG tablet Take 1 tablet by mouth 2 times daily 1/30/19 4/23/19 Yes Sharona Howard PA-C   ondansetron (ZOFRAN ODT) 4 MG disintegrating tablet Take 1-2 tablets by mouth every 12 hours as needed for Nausea 1/30/19  Yes Sharona Howard PA-C   vitamin B-1 100 MG tablet Take 1 tablet by mouth daily 12/17/18  Yes Tayla Jung MD   albuterol sulfate HFA (PROAIR HFA) 108 (90 Base) MCG/ACT inhaler Inhale 2 puffs into the lungs every 6 hours as needed for Wheezing 99/3/97  Yes Aixa Alegre MD   Umeclidinium Bromide 62.5 MCG/INH AEPB Inhale 1 puff into the lungs daily 82/65/10  Yes Chato Mejia MD   DULoxetine (CYMBALTA) 30 MG extended release capsule Take 1 capsule by mouth daily 80/62/74  Yes Chato Mejia MD   ferrous sulfate 325 (65 Fe) MG tablet Take 325 mg by mouth daily (with breakfast)   Yes Historical Provider, MD   folic acid (FOLVITE) 1 MG tablet Take 1 mg by mouth daily   Yes Historical Provider, MD   vitamin B-6 (PYRIDOXINE) 100 MG tablet Take 100 mg by mouth daily   Yes Historical Provider, MD   vitamin B-12 (CYANOCOBALAMIN) 500 MCG tablet Take 500 mcg by mouth daily   Yes Historical Provider, MD   vitamin D (ERGOCALCIFEROL) 90157 UNIT CAPS capsule Take 50,000 Units by mouth once a week    Yes Historical Provider, MD    Scheduled Meds:   nicotine  1 patch Transdermal Daily     Continuous Infusions:  PRN Meds:.melatonin    Allergies  is allergic to aspirin; fentanyl; and morphine. Family History  Reviewed, non contribtory  family history is not on file. Social History  Reviewed, non contributory   reports that she has been smoking cigarettes. She has a 30.00 pack-year smoking history. She has never used smokeless tobacco. She reports that she drinks about 1.2 - 1.8 oz of alcohol per week. She reports that she does not use drugs. Review of Systems:  General Denies any fever or chills  HEENT Denies any diplopia, tinnitus or vertigo  Resp Denies any shortness of breath, cough or wheezing  Cardiac Denies any chest pain, palpitations, claudication or edema  GI Denies any melena, hematochezia, hematemesis or pyrosis   Denies any frequency, urgency, hesitancy or incontinence  Heme Denies bruising or bleeding easily  Endocrine Denies any history of diabetes or thyroid disease  Neuro Denies any focal motor or sensory deficits    OBJECTIVE:   VITALS:  height is 5' 2\" (1.575 m) and weight is 131 lb 9.8 oz (59.7 kg). Her oral temperature is 98.2 °F (36.8 °C). Her blood pressure is 134/81 and her pulse is 88.  Her respiration is 22 and oxygen saturation is 94%. CONSTITUTIONAL: Alert and oriented times 3, no acute distress and cooperative to examination with proper mood and affect. SKIN: Skin color, texture, turgor normal. No rashes or lesions. LYMPH: no cervical nodes, no inguinal nodes  HEENT: Head is normocephalic, atraumatic. EOMI, PERRLA. NECK: Supple, symmetrical, trachea midline, no adenopathy, thyroid symmetric, not enlarged and no tenderness, skin normal.  CHEST/LUNGS: chest symmetric with normal A/P diameter, normal respiratory rate and rhythm, lungs clear to auscultation without wheezes, rales or rhonchi. No accessory muscle use. CARDIOVASCULAR: Heart sounds are normal.  Regular rate and rhythm without murmur, gallop or rub. Carotid and femoral pulses 2+/4 and equal bilaterally. ABDOMEN: Soft, midline incision with staples present. Left-sided old ostomy site, staples intact. Minimal serosanguineous drainage, no evidence of infection. No other hernias or masses. RECTAL: deferred, not clinically indicated  NEUROLOGIC: There are no focalizing motor or sensory deficits. CN II-XII are grossly intact. Federico Motto EXTREMITIES: no cyanosis, no clubbing and no edema. LABS:     Recent Labs     04/23/19  1036 04/23/19  1037   WBC  --  15.5*   HGB  --  10.0*   HCT  --  30.5*   PLT  --  1,091*   *  --    K 3.5  --    CL 98*  --    CO2 25  --    BUN 9  --    CREATININE 0.5*  --    MG 1.90  --    CALCIUM 9.0  --    AST 9*  --    ALT 7*  --    BILITOT 0.4  --      Recent Labs     04/23/19  1036   ALKPHOS 116   ALT 7*   AST 9*   BILITOT 0.4   LABALBU 3.2*   LIPASE 25.0         RADIOLOGY:   I have personally reviewed the following films:  CT ABDOMEN PELVIS W IV CONTRAST Additional Contrast? Oral   Final Result   1. 5 x 3.5 cm pelvic abscess   2. Small-bowel obstruction likely due to adhesion              Thank you for the interesting evaluation. Further recommendations to follow.       Electronically signed by Jennfier Li ABIGAIL Land - CNP on 4/23/2019 at 3:15 PM

## 2019-04-23 NOTE — ED PROVIDER NOTES
**EVALUATED BY ADVANCED PRACTICE PROVIDER**        [facility] MED SURG  eMERGENCY dEPARTMENT eNCOUnter      Pt Name: Rg Ly  JZB:[id number]  Ulisses 1945  Date of evaluation: 2019  Provider: Jackson Goldberg, PA-C      Chief Complaint:    Chief Complaint   Patient presents with    Abdominal Pain     Colosomy reverse on April 10, Patient c/o severe pain,dry mouth, and diarrhea. Her physician sent her in for IV fluids and CT with oral and IV contrast.         Nursing Notes, Past Medical Hx, Past Surgical Hx, Social Hx, Allergies, and Family Hx were all reviewed and agreed with or any disagreements were addressed in the HPI.    HPI:  (Location, Duration, Timing, Severity,Quality, Assoc Sx, Context, Modifying factors)  This is a  69 y.o. female who was sent from her surgeon office to be evaluated. Patient states she had a reversal of her colostomy on April 10. Couple days later she started experiencing abdominal pain and black tarry stool. Stools been more diarrhea and all black. Denies fever. No coughing up blood or coffee grind material. Complain of some weakness. She was seen by Dr. Shu Goncalves. Patient denies chest pain, does state that her abdomen feels slightly distended. No nausea vomiting. No other complaints. She is alert and oriented ×4.       PastMedical/Surgical History:      Diagnosis Date    Anemia     Clostridium difficile colitis     2018    Colostomy in place [facility])     SBO/diverticulosis     COPD (chronic obstructive pulmonary disease) ([facility] [address]     Depression     DANIELLE (generalized anxiety disorder)     Gastric ulcer, unspecified as acute or chronic, without mention of hemorrhage, perforation, or obstruction     H/O ETOH abuse     Hiatal hernia     Hypertension     Insomnia     Intestinal obstruction ([address]     Parkinson's disease ([address]     Tobacco abuse     Vitamin D deficiency          Procedure Laterality Date    CHOLECYSTECTOMY      COLONOSCOPY  2018    COLONOSCOPY N/A 12/14/2018    COLONOSCOPY POLYPECTOMY SNARE/COLD BIOPSY performed by Emanuel Villanueva MD at [facility]  2018   Rafi Perez 89 N/A 4/10/2019    LAPAROSCOPIC LYSISI OF ADHESIONS FOR GREATER THAN 3 HOURS,LAPORSCOPIC CONVERTED TO OPEN COLOSTOMY REVERSAL, SMALL BOWEL RESECTION performed by Percy Solorio MD at [facility] ENDOSCOPY  12/13/2018    with biopsies    UPPER GASTROINTESTINAL ENDOSCOPY N/A 12/13/2018    EGD BIOPSY performed by Emanuel Villanueva MD at [facility]       Medications:  Discharge Medication List as of 4/25/2019 11:50 AM      CONTINUE these medications which have NOT CHANGED    Details   docusate sodium (COLACE, DULCOLAX) 100 MG CAPS Take 100 mg by mouth 2 times daily Hold if she has more than two loose stools in a day. Soft stool is encouraged until follow up with surgeon. , Disp-60 capsule, R-0Print      OXYGEN Inhale 2 L into the lungs as neededHistorical Med      melatonin 3 MG TABS tablet Take 3 mg by mouth nightly as needed Historical Med      acetaminophen (TYLENOL) 325 MG tablet Take 650 mg by mouth nightlyHistorical Med      famotidine (PEPCID) 20 MG tablet Take 1 tablet by mouth 2 times daily, Disp-60 tablet, R-0Print      ondansetron (ZOFRAN ODT) 4 MG disintegrating tablet Take 1-2 tablets by mouth every 12 hours as needed for Nausea, Disp-12 tablet, R-0Print      vitamin B-1 100 MG tablet Take 1 tablet by mouth daily, Disp-30 tablet, R-3Normal      albuterol sulfate HFA (PROAIR HFA) 108 (90 Base) MCG/ACT inhaler Inhale 2 puffs into the lungs every 6 hours as needed for Wheezing, Disp-1 Inhaler, R-3Normal      Umeclidinium Bromide 62.5 MCG/INH AEPB Inhale 1 puff into the lungs daily, Disp-30 each, R-5Normal      DULoxetine (CYMBALTA) 30 MG extended release capsule Take 1 capsule by mouth daily, Disp-30 capsule, R-3Normal      ferrous sulfate 325 (65 Fe) MG tablet Take 325 mg by mouth daily (with breakfast)Historical Med      folic acid (FOLVITE) 1 MG tablet Take 1 mg by mouth dailyHistorical Med      vitamin B-6 (PYRIDOXINE) 100 MG tablet Take 100 mg by mouth dailyHistorical Med      vitamin B-12 (CYANOCOBALAMIN) 500 MCG tablet Take 500 mcg by mouth dailyHistorical Med      vitamin D (ERGOCALCIFEROL) 91642 UNIT CAPS capsule Take 50,000 Units by mouth once a week Historical Med               Review of Systems:  Review of Systems   Constitutional: Negative for chills and fever. HENT: Negative for congestion, facial swelling and sore throat. Eyes: Negative for discharge and redness. Respiratory: Negative for apnea, choking and shortness of breath. Cardiovascular: Negative for chest pain. Gastrointestinal: Positive for abdominal pain and diarrhea. Negative for nausea and vomiting. Genitourinary: Negative for dysuria. Musculoskeletal: Negative for back pain, neck pain and neck stiffness. Neurological: Negative for dizziness, tremors, seizures, weakness and headaches. All other systems reviewed and are negative. Positives and Pertinent negatives as per HPI. Except as noted above in the ROS, problem specific ROS was completed and is negative. Physical Exam:  Physical Exam   Constitutional: She is oriented to person, place, and time. She appears well-developed and well-nourished. HENT:   Head: Normocephalic and atraumatic. Nose: Nose normal.   Eyes: Right eye exhibits no discharge. Left eye exhibits no discharge. Neck: Normal range of motion. Neck supple. Cardiovascular: Normal rate, regular rhythm and normal heart sounds. Exam reveals no gallop and no friction rub. No murmur heard. Pulmonary/Chest: Effort normal and breath sounds normal. No respiratory distress. She has no wheezes. She has no rales. She exhibits no tenderness. Abdominal: Soft. Bowel sounds are normal. She exhibits distension. She exhibits no mass. There is tenderness.  There is no rebound and no guarding. No hernia. Musculoskeletal: Normal range of motion. Neurological: She is alert and oriented to person, place, and time. Skin: Skin is warm and dry. She is not diaphoretic. Psychiatric: She has a normal mood and affect. Her behavior is normal.   Nursing note and vitals reviewed.       MEDICAL DECISION MAKING    Vitals:    Vitals:    04/25/19 0034 04/25/19 0445 04/25/19 0926 04/25/19 1028   BP: (!) 104/58 122/69  124/64   Pulse: 84 91  92   Resp: 14 14  18   Temp: 98.2 °F (36.8 °C) 99 °F (37.2 °C)  98.3 °F (36.8 °C)   TempSrc: Oral Oral  Oral   SpO2: 93% 94% 92% 95%   Weight:  133 lb 6.1 oz (60.5 kg)     Height:           LABS:  Labs Reviewed   ANAEROBIC AND AEROBIC CULTURE - Abnormal; Notable for the following components:       Result Value    Gram Stain Result   (*)     Value: 4+ WBC's (Polymorphonuclear)  1+ Yeast with pseudohyphae  3+ Gram negative rods  1+ Gram positive cocci  No Epithelial Cells seen      Organism Klebsiella oxytoca (*)     Organism Enterococcus faecalis (*)     All other components within normal limits    Narrative:     ORDER#: [id number]                          ORDERED BY: Giovana Travis  SOURCE: Abdomen                            COLLECTED:  04/24/19 12:10  ANTIBIOTICS AT [place].:                      RECEIVED :  04/24/19 12:55  Performed at:  [address] [address]   Phone [phone]   CBC WITH AUTO DIFFERENTIAL - Abnormal; Notable for the following components:    WBC 15.5 (*)     RBC 3.23 (*)     Hemoglobin 10.0 (*)     Hematocrit 30.5 (*)     RDW 15.7 (*)     Platelets 0,914 (*)     Neutrophils # 12.9 (*)     Anisocytosis 1+ (*)     Macrocytes 1+ (*)     All other components within normal limits    Narrative:     Fernanda Norton tel. [phone],  Hematology results called to and read back by Rc Gunn, 04/23/2019  11:19, by Claudia Noble  Performed at:  [facility]  [address] [place], [place] [address]   Phone [phone]   COMPREHENSIVE METABOLIC PANEL W/ REFLEX TO MG FOR LOW K - Abnormal; Notable for the following components:    Sodium 134 (*)     Chloride 98 (*)     Glucose 111 (*)     CREATININE 0.5 (*)     Alb 3.2 (*)     Albumin/Globulin Ratio 0.8 (*)     ALT 7 (*)     AST 9 (*)     All other components within normal limits    Narrative:     Performed at:  [facility]  [address] [place], [place] [address]   Phone [phone]   BASIC METABOLIC PANEL W/ REFLEX TO MG FOR LOW K - Abnormal; Notable for the following components:    Glucose 100 (*)     BUN 6 (*)     CREATININE <0.5 (*)     Calcium 8.0 (*)     All other components within normal limits    Narrative:     Collection has been rescheduled by Sheila Hicks at 04/24/2019 06:36. Reason:   Patient refuse venipuncture  Performed at:  [facility]  [address] [place], [place] [address]   Phone [phone]   CBC WITH AUTO DIFFERENTIAL - Abnormal; Notable for the following components:    WBC 16.1 (*)     RBC 2.77 (*)     Hemoglobin 8.6 (*)     Hematocrit 26.2 (*)     RDW 15.5 (*)     Platelets 068 (*)     Neutrophils # 14.1 (*)     Lymphocytes # 0.8 (*)     All other components within normal limits    Narrative:     Collection has been rescheduled by Sheila Hicks at 04/24/2019 06:36. Reason:   Patient refuse venipuncture  Performed at:  [facility]  [address] [place], [place] [address]   Phone [phone]   IRON AND TIBC - Abnormal; Notable for the following components:    Iron 35 (*)     TIBC 222 (*)     All other components within normal limits    Narrative:     Collection has been rescheduled by Sheila Hikcs at 04/24/2019 06:36.  Reason:   Patient refuse venipuncture  Performed at:  [facility]  [address] [place], [place] [address]   Phone [phone]   BASIC METABOLIC PANEL W/ REFLEX TO MG FOR LOW K - Abnormal; Notable for the following components:    Glucose 107 (*)     BUN 5 (*)     CREATININE <0.5 (*)     Calcium 8.0 (*)     All other components within normal limits    Narrative:     Performed at:  [facility]  [address] [place], [place] [address]   Phone [phone]   CBC WITH AUTO DIFFERENTIAL - Abnormal; Notable for the following components:    WBC 14.3 (*)     RBC 2.79 (*)     Hemoglobin 8.5 (*)     Hematocrit 26.2 (*)     RDW 15.7 (*)     Platelets 926 (*)     Neutrophils # 12.0 (*)     All other components within normal limits    Narrative:     Performed at:  [facility]  [address] [facility] [address]   Phone [phone]   CULTURE BLOOD #1    Narrative:     ORDER#: [id number]                          ORDERED BY: Yanet Desir  SOURCE: Blood Hand, Right                  COLLECTED:  04/23/19 18:02  ANTIBIOTICS AT [place].:                      RECEIVED :  04/23/19 18:07  If child <=2 yrs old please draw pediatric bottle. ~Blood Culture #1  Performed at:  [facility]  [address] [facility] [address]   Phone [phone]   CULTURE BLOOD #2    Narrative:     ORDER#: [id number]                          ORDERED BY: Yanet Desir  SOURCE: Blood Hand, Left                   COLLECTED:  04/23/19 18:02  ANTIBIOTICS AT [place].:                      RECEIVED :  04/23/19 18:07  If child <=2 yrs old please draw pediatric bottle. ~Blood Culture #2  Performed at:  [facility]  [address] [place], [place] [address]   Phone [phone]   LIPASE    Narrative:     Performed at:  [address] [place] [address]   Phone [phone]   TROPONIN    Narrative:     Performed at:  [address] [place] [address] [phone]   BLOOD SMEAR REVIEW    Narrative:     Collection has been rescheduled by Theresa Mccurdy at 04/24/2019 17:39. Reason:   Patient refuse venipuncture  Performed at:  [facility]  [address] [place] [address]   Phone [phone]   PERIPHERAL BLOOD SMEAR, [place] REVIEW    Narrative:     Performed at:  [facility]  [address] [place], [place] [address]   Phone [phone]   URINE RT REFLEX TO CULTURE        Remainder of labs reviewed and werenegative at this time or not returned at the time of this note. RADIOLOGY:   Non-plain film images such as CT, Ultrasound and MRI are read by the radiologist. Aldo Flores PA-C have directly visualized the radiologic plain film image(s) with the below findings:        Interpretation per the Radiologist below, if available at the time of thisnote:    CT GUIDED NEEDLE PLACEMENT   Final Result      CT GUIDED FNA   Final Result   Successful CT-guided aspiration of the deep pelvic fluid collection. The collection was too small for drainage catheter placement. CT ABDOMEN PELVIS W IV CONTRAST Additional Contrast? Oral   Final Result   1. 5 x 3.5 cm pelvic abscess   2. Small-bowel obstruction likely due to adhesion         CT ABDOMEN PELVIS W IV CONTRAST Additional Contrast? Oral    (Results Pending)        No results found.      MEDICAL DECISION MAKING / ED COURSE:      PROCEDURES:   Procedures    None    Patient was given:     Medications   0.9 % sodium chloride infusion ( Intravenous New Bag 4/24/19 0416)   0.9 % sodium chloride bolus (0 mLs Intravenous Stopped 4/23/19 1207)   iopamidol (ISOVUE-370) 76 % injection 75 mL (75 mLs Intravenous Given 4/23/19 1219)   iohexol (OMNIPAQUE 240) injection 50 mL (50 mLs Oral Given 4/23/19 1219)   midazolam (VERSED) injection (2 mg Intravenous Given 4/24/19 1202)   diphenhydrAMINE (BENADRYL) injection (25 mg Intravenous Given 4/24/19 1203) [address]:  Discharge Medication List as of 4/25/2019 11:50 AM      START taking these medications    Details   amoxicillin-clavulanate (AUGMENTIN) 875-125 MG per tablet Take 1 tablet by mouth 2 times daily for 10 days, Disp-20 tablet, R-0NO PRINT             DISCONTINUED MEDICATIONS:  Discharge Medication List as of 4/25/2019 11:50 AM      STOP taking these medications       oxyCODONE-acetaminophen (PERCOCET) 5-325 MG per tablet Comments:   Reason for Stopping:                      (Please note the MDM and HPI sections of this note were completed with a voice recognition program.  Efforts weremade to edit the dictations but occasionally words are mis-transcribed.)    Electronically signed, Cheri De León PA-C,          Cheri De León PA-C  04/29/19 8455

## 2019-04-23 NOTE — CONSULTS
[address]       PCP: No primary care provider on file. Date of Admission: 4/23/2019    Date of Service: Pt seen/examined on 4/23         Reason of consult:    Medical management      History Of Present Illness: The patient is a 69 y.o. female who presents to [facility] with abdominal pain. Patient with history of diverticulitis with status post colectomy, she underwent colostomy takedown in April and discharged back to her nursing home on April 18. Patient reports some abdominal pain, diarrhea, and vomiting. She was seen her surgeon today when she was advised to come to the ED. In the ED she had a CAT scan which showed fluid collection in the right side of the pelvis. Patient denies any fever, chills, chest pain, shortness of breath, or cough. She has history of C. diff colitis in the past.  She reports some anorexia and low oral intake.      Past Medical History:        Diagnosis Date    Anemia     Clostridium difficile colitis     December 2018    Colostomy in place [facility])     SBO/diverticulosis     COPD (chronic obstructive pulmonary disease) ([address]     Depression     DANIELLE (generalized anxiety disorder)     Gastric ulcer, unspecified as acute or chronic, without mention of hemorrhage, perforation, or obstruction     H/O ETOH abuse     Hiatal hernia     Hypertension     Insomnia     Intestinal obstruction ([address]     Parkinson's disease ([address]     Tobacco abuse     Vitamin D deficiency        Past Surgical History:        Procedure Laterality Date    CHOLECYSTECTOMY      COLONOSCOPY  12/14/2018    COLONOSCOPY N/A 12/14/2018    COLONOSCOPY POLYPECTOMY SNARE/COLD BIOPSY performed by Percy Ku MD at [address] N/A 4/10/2019    LAPAROSCOPIC LYSISI OF ADHESIONS FOR GREATER distress appears stated age and cooperative. HEENT Normal cephalic, atraumatic without obvious deformity. Pupils equal, round, and reactive to light. Extra ocular muscles intact. Conjunctivae/corneas clear. Neck: Supple, No jugular venous distention/bruits. Trachea midline without thyromegaly or adenopathy with full range of motion. Lungs: Clear to auscultation, bilaterally without Rales/Wheezes/Rhonchi with good respiratory effort. Heart: Regular rate and rhythm with Normal S1/S2 without murmurs, rubs or gallops, point of maximum impulse non-displaced  Abdomen: Soft, no rigidity or rebound, tender   Extremities: No clubbing, cyanosis, or edema bilaterally. Full range of motion without deformity and normal gait intact. Skin: Skin color, texture, turgor normal.  No rashes or lesions. Neurologic: Alert and oriented X 3, neurovascularly intact with sensory/motor intact upper extremities/lower extremities, bilaterally. Cranial nerves: II-XII intact, grossly non-focal.  Mental status: Alert, oriented, thought content appropriate. Capillary Refill: Acceptable  < 3 seconds  Peripheral Pulses: +3 Easily felt, not easily obliterated with pressure        CBC   Recent Labs     04/23/19  1037   WBC 15.5*   HGB 10.0*   HCT 30.5*   PLT 1,091*      RENAL  Recent Labs     04/23/19  1036   *   K 3.5   CL 98*   CO2 25   BUN 9   CREATININE 0.5*     LFT'S  Recent Labs     04/23/19  1036   AST 9*   ALT 7*   BILITOT 0.4   ALKPHOS 116     COAG  No results for input(s): INR in the last 72 hours.   CARDIAC ENZYMES  Recent Labs     04/23/19  1036   TROPONINI <0.01       U/A:    Lab Results   Component Value Date    COLORU YELLOW 01/30/2019    WBCUA 38 01/30/2019    RBCUA 4 01/30/2019    BACTERIA 3+ 01/30/2019    CLARITYU TURBID 01/30/2019    SPECGRAV 1.014 01/30/2019    LEUKOCYTESUR SMALL 01/30/2019    BLOODU SMALL 01/30/2019    GLUCOSEU Negative 01/30/2019       ABG  No results found for: [id number], BEART, M3MBURZQ, PHART, Prashant [place]        Active Hospital Problems    Diagnosis Date Noted    Postoperative intra-abdominal abscess [T81.49XA] 04/23/2019    Small bowel obstruction ([address] [K56.609] 01/08/2019             ASSESSMENT/PLAN:    Abdominal abscess following colostomy reversal:  Patient had some leukocytosis. Send blood culture  I will recommend Zosyn instead of Cipro and Flagyl secondary to the recent hospitalization, and the recent intra-abdominal surgery. IR for abscess draining. Small bowel obstruction on CAT scan:  Management per surgical team     History of C. Diff  Check C. Diff    Depression  Continue Cymbalta    Thrombocytosis:   monitor closely  Check iron studies    Hyponatremia:   patient has been having some vomiting and diarrhea. Most likely secondary to GI loss  IV fluid    Dispo -   Pending clinical improvement. Aditya Duncan MD    Thank you Maryuriy Holstein* for the opportunity to be involved in this patient's care.

## 2019-04-23 NOTE — DISCHARGE SUMMARY
Physician Discharge Summary     Patient ID:  Faraz Corona  [id number]  69 y.o.  1945    Admit date: 4/10/2019    Discharge date and time: 4/18/2019  2:36 PM     Admitting Physician: Kalyan Lui MD     Discharge Physician: same    Admission Diagnoses: Colostomy present [facility]) [Z93.3]    Discharge Diagnoses: same, s/p colostomy takedown, extensive lysis of adhesions, SBR    Admission Condition: fair    Discharged Condition: good    Indication for Admission: colostomy in place, colostomy takedown    Hospital Course:   Colostomy in place  -POD #8 (4/10) lap extensive lysis of adhesions > 3 hours, lap converted to open colostomy reversal with colorectal anastomosis, small bowel resection.   -Dressing changed, repacked, no s/s infection  - Pain controlled  -Tolerating diet  -+flatulence, 2 bms overnight.   - Denies any nausea or emesis  -OOB, PT/OT  -DC back to [facility]  -f/u Dr. Don Mays two weeks.      Acute blood loss anemia  -monitor        Consults: none    Significant Diagnostic Studies:   No orders to display        Disposition: home    In process/preliminary results:  Outstanding Order Results     No orders found from 3/12/2019 to 4/11/2019. Patient Instructions: Activity: activity as tolerated, no driving while on analgesics and no heavy lifting for 6  weeks  Diet: regular diet  Wound Care: keep wound clean and dry and as directed. Daily packing changes as listed on [facility]    Follow-up with Dr. Don Mays in 2 weeks.     [facility] Hospitalcar Hunt APRN-CNP 12:14 PM 4/23/2019   [facility]  Office: [phone]   4/23/2019  12:09 PM

## 2019-04-23 NOTE — PROGRESS NOTES
Medication Reconciliation    List of medications patient is currently taking is complete.      Sheron Barboza PharmD, BCPS  4/23/2019 12:55 PM

## 2019-04-23 NOTE — ED NOTES
Pt currently lives at [facility] where she is receiving rehab s/p abd surgery.      Address and phone number:     Jai Sharma, [address] [facility]   [phone]       Olga Kennedy RN  04/23/19 8022

## 2019-04-24 ENCOUNTER — APPOINTMENT (OUTPATIENT)
Dept: CT IMAGING | Age: 74
DRG: 856 | End: 2019-04-24
Payer: COMMERCIAL

## 2019-04-24 LAB
ANION GAP SERPL CALCULATED.3IONS-SCNC: 10 MMOL/L (ref 3–16)
BASOPHILS ABSOLUTE: 0.1 K/UL (ref 0–0.2)
BASOPHILS RELATIVE PERCENT: 0.5 %
BLOOD SMEAR REVIEW: NORMAL
BUN BLDV-MCNC: 6 MG/DL (ref 7–20)
CALCIUM SERPL-MCNC: 8 MG/DL (ref 8.3–10.6)
CHLORIDE BLD-SCNC: 102 MMOL/L (ref 99–110)
CO2: 25 MMOL/L (ref 21–32)
CREAT SERPL-MCNC: <0.5 MG/DL (ref 0.6–1.2)
EOSINOPHILS ABSOLUTE: 0.1 K/UL (ref 0–0.6)
EOSINOPHILS RELATIVE PERCENT: 0.7 %
FERRITIN: 69.7 NG/ML (ref 15–150)
GFR AFRICAN AMERICAN: >60
GFR NON-AFRICAN AMERICAN: >60
GLUCOSE BLD-MCNC: 100 MG/DL (ref 70–99)
HCT VFR BLD CALC: 26.2 % (ref 36–48)
HEMATOLOGY PATH CONSULT: NORMAL
HEMOGLOBIN: 8.6 G/DL (ref 12–16)
IRON SATURATION: 16 % (ref 15–50)
IRON: 35 UG/DL (ref 37–145)
LYMPHOCYTES ABSOLUTE: 0.8 K/UL (ref 1–5.1)
LYMPHOCYTES RELATIVE PERCENT: 4.8 %
MCH RBC QN AUTO: 30.9 PG (ref 26–34)
MCHC RBC AUTO-ENTMCNC: 32.7 G/DL (ref 31–36)
MCV RBC AUTO: 94.6 FL (ref 80–100)
MONOCYTES ABSOLUTE: 1 K/UL (ref 0–1.3)
MONOCYTES RELATIVE PERCENT: 6.4 %
NEUTROPHILS ABSOLUTE: 14.1 K/UL (ref 1.7–7.7)
NEUTROPHILS RELATIVE PERCENT: 87.6 %
PDW BLD-RTO: 15.5 % (ref 12.4–15.4)
PLATELET # BLD: 905 K/UL (ref 135–450)
PMV BLD AUTO: 6.4 FL (ref 5–10.5)
POTASSIUM REFLEX MAGNESIUM: 3.7 MMOL/L (ref 3.5–5.1)
RBC # BLD: 2.77 M/UL (ref 4–5.2)
SODIUM BLD-SCNC: 137 MMOL/L (ref 136–145)
TOTAL IRON BINDING CAPACITY: 222 UG/DL (ref 260–445)
WBC # BLD: 16.1 K/UL (ref 4–11)

## 2019-04-24 PROCEDURE — 87075 CULTR BACTERIA EXCEPT BLOOD: CPT

## 2019-04-24 PROCEDURE — C9113 INJ PANTOPRAZOLE SODIUM, VIA: HCPCS | Performed by: NURSE PRACTITIONER

## 2019-04-24 PROCEDURE — 2580000003 HC RX 258: Performed by: NURSE PRACTITIONER

## 2019-04-24 PROCEDURE — 83540 ASSAY OF IRON: CPT

## 2019-04-24 PROCEDURE — APPSS30 APP SPLIT SHARED TIME 16-30 MINUTES: Performed by: PHYSICIAN ASSISTANT

## 2019-04-24 PROCEDURE — 0W9J3ZX DRAINAGE OF PELVIC CAVITY, PERCUTANEOUS APPROACH, DIAGNOSTIC: ICD-10-PCS | Performed by: RADIOLOGY

## 2019-04-24 PROCEDURE — 83550 IRON BINDING TEST: CPT

## 2019-04-24 PROCEDURE — 6360000002 HC RX W HCPCS: Performed by: NURSE PRACTITIONER

## 2019-04-24 PROCEDURE — 99024 POSTOP FOLLOW-UP VISIT: CPT | Performed by: SURGERY

## 2019-04-24 PROCEDURE — 99024 POSTOP FOLLOW-UP VISIT: CPT | Performed by: PHYSICIAN ASSISTANT

## 2019-04-24 PROCEDURE — 1200000000 HC SEMI PRIVATE

## 2019-04-24 PROCEDURE — 51798 US URINE CAPACITY MEASURE: CPT

## 2019-04-24 PROCEDURE — 87205 SMEAR GRAM STAIN: CPT

## 2019-04-24 PROCEDURE — 94760 N-INVAS EAR/PLS OXIMETRY 1: CPT

## 2019-04-24 PROCEDURE — 6370000000 HC RX 637 (ALT 250 FOR IP): Performed by: NURSE PRACTITIONER

## 2019-04-24 PROCEDURE — 87186 SC STD MICRODIL/AGAR DIL: CPT

## 2019-04-24 PROCEDURE — 87070 CULTURE OTHR SPECIMN AEROBIC: CPT

## 2019-04-24 PROCEDURE — 82728 ASSAY OF FERRITIN: CPT

## 2019-04-24 PROCEDURE — APPNB30 APP NON BILLABLE TIME 0-30 MINS: Performed by: PHYSICIAN ASSISTANT

## 2019-04-24 PROCEDURE — 6360000002 HC RX W HCPCS: Performed by: HOSPITALIST

## 2019-04-24 PROCEDURE — 6360000002 HC RX W HCPCS: Performed by: RADIOLOGY

## 2019-04-24 PROCEDURE — 36415 COLL VENOUS BLD VENIPUNCTURE: CPT

## 2019-04-24 PROCEDURE — 6370000000 HC RX 637 (ALT 250 FOR IP): Performed by: SURGERY

## 2019-04-24 PROCEDURE — 87077 CULTURE AEROBIC IDENTIFY: CPT

## 2019-04-24 PROCEDURE — 80048 BASIC METABOLIC PNL TOTAL CA: CPT

## 2019-04-24 PROCEDURE — 6370000000 HC RX 637 (ALT 250 FOR IP): Performed by: HOSPITALIST

## 2019-04-24 PROCEDURE — 77012 CT SCAN FOR NEEDLE BIOPSY: CPT

## 2019-04-24 PROCEDURE — 85025 COMPLETE CBC W/AUTO DIFF WBC: CPT

## 2019-04-24 PROCEDURE — 2709999900 CT GUIDED FNA

## 2019-04-24 PROCEDURE — 2580000003 HC RX 258: Performed by: HOSPITALIST

## 2019-04-24 RX ORDER — MIDAZOLAM HYDROCHLORIDE 1 MG/ML
INJECTION INTRAMUSCULAR; INTRAVENOUS DAILY PRN
Status: COMPLETED | OUTPATIENT
Start: 2019-04-24 | End: 2019-04-24

## 2019-04-24 RX ORDER — DIPHENHYDRAMINE HYDROCHLORIDE 50 MG/ML
INJECTION INTRAMUSCULAR; INTRAVENOUS DAILY PRN
Status: COMPLETED | OUTPATIENT
Start: 2019-04-24 | End: 2019-04-24

## 2019-04-24 RX ORDER — AMOXICILLIN AND CLAVULANATE POTASSIUM 875; 125 MG/1; MG/1
1 TABLET, FILM COATED ORAL EVERY 12 HOURS SCHEDULED
Status: DISCONTINUED | OUTPATIENT
Start: 2019-04-24 | End: 2019-04-25 | Stop reason: HOSPADM

## 2019-04-24 RX ADMIN — OXYCODONE HYDROCHLORIDE 10 MG: 10 TABLET ORAL at 21:50

## 2019-04-24 RX ADMIN — PIPERACILLIN SODIUM,TAZOBACTAM SODIUM 3.38 G: 3; .375 INJECTION, POWDER, FOR SOLUTION INTRAVENOUS at 00:22

## 2019-04-24 RX ADMIN — MIDAZOLAM 2 MG: 1 INJECTION INTRAMUSCULAR; INTRAVENOUS at 12:02

## 2019-04-24 RX ADMIN — OXYCODONE HYDROCHLORIDE 10 MG: 10 TABLET ORAL at 04:16

## 2019-04-24 RX ADMIN — SODIUM CHLORIDE: 9 INJECTION, SOLUTION INTRAVENOUS at 04:16

## 2019-04-24 RX ADMIN — PIPERACILLIN SODIUM,TAZOBACTAM SODIUM 3.38 G: 3; .375 INJECTION, POWDER, FOR SOLUTION INTRAVENOUS at 10:33

## 2019-04-24 RX ADMIN — Medication 10 ML: at 20:59

## 2019-04-24 RX ADMIN — Medication 10 ML: at 10:35

## 2019-04-24 RX ADMIN — HYDROMORPHONE HYDROCHLORIDE 0.5 MG: 1 INJECTION, SOLUTION INTRAMUSCULAR; INTRAVENOUS; SUBCUTANEOUS at 10:31

## 2019-04-24 RX ADMIN — Medication 10 ML: at 10:34

## 2019-04-24 RX ADMIN — AMOXICILLIN AND CLAVULANATE POTASSIUM 1 TABLET: 875; 125 TABLET, FILM COATED ORAL at 20:58

## 2019-04-24 RX ADMIN — DIPHENHYDRAMINE HYDROCHLORIDE 25 MG: 50 INJECTION, SOLUTION INTRAMUSCULAR; INTRAVENOUS at 12:03

## 2019-04-24 RX ADMIN — DULOXETINE HYDROCHLORIDE 30 MG: 30 CAPSULE, DELAYED RELEASE ORAL at 10:33

## 2019-04-24 RX ADMIN — OXYCODONE HYDROCHLORIDE 10 MG: 10 TABLET ORAL at 17:28

## 2019-04-24 RX ADMIN — PANTOPRAZOLE SODIUM 40 MG: 40 INJECTION, POWDER, FOR SOLUTION INTRAVENOUS at 10:33

## 2019-04-24 RX ADMIN — OXYCODONE HYDROCHLORIDE 10 MG: 10 TABLET ORAL at 00:24

## 2019-04-24 ASSESSMENT — PAIN DESCRIPTION - FREQUENCY
FREQUENCY: CONTINUOUS

## 2019-04-24 ASSESSMENT — PAIN SCALES - GENERAL
PAINLEVEL_OUTOF10: 0
PAINLEVEL_OUTOF10: 6
PAINLEVEL_OUTOF10: 8
PAINLEVEL_OUTOF10: 0
PAINLEVEL_OUTOF10: 8
PAINLEVEL_OUTOF10: 0
PAINLEVEL_OUTOF10: 8
PAINLEVEL_OUTOF10: 6
PAINLEVEL_OUTOF10: 9
PAINLEVEL_OUTOF10: 8

## 2019-04-24 ASSESSMENT — PAIN DESCRIPTION - PAIN TYPE
TYPE: ACUTE PAIN
TYPE: SURGICAL PAIN
TYPE: ACUTE PAIN

## 2019-04-24 ASSESSMENT — PAIN DESCRIPTION - LOCATION
LOCATION: ABDOMEN

## 2019-04-24 ASSESSMENT — PAIN DESCRIPTION - DESCRIPTORS
DESCRIPTORS: SHARP

## 2019-04-24 ASSESSMENT — PAIN DESCRIPTION - PROGRESSION
CLINICAL_PROGRESSION: GRADUALLY IMPROVING
CLINICAL_PROGRESSION: NOT CHANGED
CLINICAL_PROGRESSION: GRADUALLY IMPROVING
CLINICAL_PROGRESSION: NOT CHANGED

## 2019-04-24 ASSESSMENT — PAIN DESCRIPTION - ONSET
ONSET: GRADUAL
ONSET: GRADUAL
ONSET: ON-GOING
ONSET: ON-GOING

## 2019-04-24 NOTE — PROGRESS NOTES
Hospitalist Progress Note      PCP: No primary care provider on file. Date of Admission: 4/23/2019    Chief Complaint: Abdominal pain    Hospital Course: Patient was admitted from nursing home after having abdominal pain. Patient had CT scan done in the emergency department which showed a abdominal abscess. Patient recently had colostomy reversal on April 10. Patient was started on broad-spectrum antibiotics and IR was consulted for drainage of abdominal abscess. Abscess was drained but catheter could not be placed. We will await cultures and sensitivities. Subjective: Patient seen and examined. Patient is asking for a new primary care physician. I was trying to help the patient find a primary care doctor when she asked that I stop doing that and leave the room. Patient did not have any other complaints.   Patient also asked if she could go home as we are not doing anything for her and people continue to bother her throughout her stay      Medications:  Reviewed    Infusion Medications   Scheduled Medications    DULoxetine  30 mg Oral Daily    sodium chloride flush  10 mL Intravenous 2 times per day    pantoprazole  40 mg Intravenous Daily    And    sodium chloride (PF)  10 mL Intravenous Daily    nicotine  1 patch Transdermal Daily    piperacillin-tazobactam  3.375 g Intravenous Q8H     PRN Meds: albuterol sulfate HFA, melatonin, sodium chloride flush, potassium chloride **OR** potassium alternative oral replacement **OR** potassium chloride, acetaminophen, ondansetron, oxyCODONE **OR** oxyCODONE, HYDROmorphone **OR** HYDROmorphone      Intake/Output Summary (Last 24 hours) at 4/24/2019 1714  Last data filed at 4/24/2019 1433  Gross per 24 hour   Intake 100 ml   Output --   Net 100 ml       Physical Exam Performed:    /62   Pulse 94   Temp 98.8 °F (37.1 °C) (Oral)   Resp 16   Ht 5' 2\" (1.575 m)   Wt 133 lb 9.6 oz (60.6 kg)   SpO2 96%   BMI 24.44 kg/m²     General appearance: No apparent distress, appears stated age and cooperative. HEENT: Pupils equal, round, and reactive to light. Conjunctivae/corneas clear. Neck: Supple, with full range of motion. No jugular venous distention. Trachea midline. Respiratory:  Normal respiratory effort. Clear to auscultation, bilaterally without Rales/Wheezes/Rhonchi. Cardiovascular: Regular rate and rhythm with normal S1/S2 without murmurs, rubs or gallops. Abdomen: Soft, staples covering multiple incision sites, wounds are clean dry and intact  Musculoskeletal: No clubbing, cyanosis or edema bilaterally. Full range of motion without deformity. Skin: Skin color, texture, turgor normal.  No rashes or lesions. Neurologic:  Neurovascularly intact without any focal sensory/motor deficits. Cranial nerves: II-XII intact, grossly non-focal.  Psychiatric: Alert and oriented, thought content appropriate, poor insight  Capillary Refill: Brisk,< 3 seconds   Peripheral Pulses: +2 palpable, equal bilaterally       Labs:   Recent Labs     04/23/19  1037 04/24/19  0650   WBC 15.5* 16.1*   HGB 10.0* 8.6*   HCT 30.5* 26.2*   PLT 1,091* 905*     Recent Labs     04/23/19  1036 04/24/19  0650   * 137   K 3.5 3.7   CL 98* 102   CO2 25 25   BUN 9 6*   CREATININE 0.5* <0.5*   CALCIUM 9.0 8.0*     Recent Labs     04/23/19  1036   AST 9*   ALT 7*   BILITOT 0.4   ALKPHOS 116     Recent Labs     04/23/19  1802   INR 1.10     Recent Labs     04/23/19  1036   TROPONINI <0.01       Urinalysis:      Lab Results   Component Value Date    NITRU Negative 01/30/2019    WBCUA 38 01/30/2019    BACTERIA 3+ 01/30/2019    RBCUA 4 01/30/2019    BLOODU SMALL 01/30/2019    SPECGRAV 1.014 01/30/2019    GLUCOSEU Negative 01/30/2019       Radiology:  CT GUIDED NEEDLE PLACEMENT   Final Result      CT GUIDED FNA   Final Result   Successful CT-guided aspiration of the deep pelvic fluid collection. The collection was too small for drainage catheter placement.          CT ABDOMEN PELVIS W IV CONTRAST Additional Contrast? Oral   Final Result   1. 5 x 3.5 cm pelvic abscess   2. Small-bowel obstruction likely due to adhesion                 Assessment/Plan:    Abdominal abscess following colostomy reversal:  Blood culture  abcess culture  IR for abscess draining performed 4/24     Small bowel obstruction on CAT scan:  Management per surgical team      Depression  Continue Cymbalta     Acute Thrombocytosis:  Markedly elevated from previous admissions  -iron def? Infection related? Malignancy?   PBS ordered     Hyponatremia:  Resolved with IVF    Anemia  - monitor  - likely from blood loss, surgical        DVT Prophylaxis: lovenox  Diet: DIET GENERAL;  Code Status: Full Code    Dispo per primary    Merlin Decant, MD

## 2019-04-24 NOTE — PRE SEDATION
Sedation Pre-Procedure Note    Patient Name: Alcira Moore   YOB: 1945  Room/Bed: E9F-8729/4202-42  Medical Record Number: [id number]  Date: 4/24/2019   Time: 11:45 AM       Indication:  Deep pelvic fluid collection concerning for abscess    Consent: I have discussed with the patient and/or the patient representative the indication, alternatives, and the possible risks and/or complications of the planned procedure and the anesthesia methods. The patient and/or patient representative appear to understand and agree to proceed. Vital Signs:   Vitals:    04/24/19 1016   BP: 138/71   Pulse: 91   Resp: 14   Temp: 98.1 °F (36.7 °C)   SpO2: 92%       Past Medical History:   has a past medical history of Anemia, Clostridium difficile colitis, Colostomy in place [facility]), COPD (chronic obstructive pulmonary disease) ([facility] [address] Depression, DANIELLE (generalized anxiety disorder), Gastric ulcer, unspecified as acute or chronic, without mention of hemorrhage, perforation, or obstruction, H/O ETOH abuse, Hiatal hernia, Hypertension, Insomnia, Intestinal obstruction ([facility] [address] Parkinson's disease ([facility] [address] Tobacco abuse, and Vitamin D deficiency. Past Surgical History:   has a past surgical history that includes Hysterectomy; Cholecystectomy; Tubal ligation; colostomy (2018); Upper gastrointestinal endoscopy (12/13/2018); Upper gastrointestinal endoscopy (N/A, 12/13/2018); Colonoscopy (12/14/2018); Colonoscopy (N/A, 12/14/2018); and Small intestine surgery (N/A, 4/10/2019).     Medications:   Scheduled Meds:    DULoxetine  30 mg Oral Daily    sodium chloride flush  10 mL Intravenous 2 times per day    pantoprazole  40 mg Intravenous Daily    And    sodium chloride (PF)  10 mL Intravenous Daily    nicotine  1 patch Transdermal Daily    piperacillin-tazobactam  3.375 g Intravenous Q8H     Continuous Infusions:   PRN Meds: albuterol sulfate HFA, melatonin, sodium chloride flush, potassium chloride **OR** potassium alternative oral replacement **OR** potassium chloride, acetaminophen, ondansetron, oxyCODONE **OR** oxyCODONE, HYDROmorphone **OR** HYDROmorphone  Home Meds:   Prior to Admission medications    Medication Sig Start Date End Date Taking? Authorizing Provider   docusate sodium (COLACE, DULCOLAX) 100 MG CAPS Take 100 mg by mouth 2 times daily Hold if she has more than two loose stools in a day. Soft stool is encouraged until follow up with surgeon.  4/18/19  Yes ABIGAIL Saunders CNP   OXYGEN Inhale 2 L into the lungs as needed   Yes Historical Provider, MD   melatonin 3 MG TABS tablet Take 3 mg by mouth nightly as needed    Yes Historical Provider, MD   acetaminophen (TYLENOL) 325 MG tablet Take 650 mg by mouth nightly   Yes Historical Provider, MD   famotidine (PEPCID) 20 MG tablet Take 1 tablet by mouth 2 times daily 1/30/19 4/23/19 Yes Fatou Cisneros PA-C   ondansetron (ZOFRAN ODT) 4 MG disintegrating tablet Take 1-2 tablets by mouth every 12 hours as needed for Nausea 1/30/19  Yes Fatou Cisneros PA-C   vitamin B-1 100 MG tablet Take 1 tablet by mouth daily 12/17/18  Yes Tayla Jung MD   albuterol sulfate HFA (PROAIR HFA) 108 (90 Base) MCG/ACT inhaler Inhale 2 puffs into the lungs every 6 hours as needed for Wheezing 59/4/26  Yes Jimena Cruz MD   Umeclidinium Bromide 62.5 MCG/INH AEPB Inhale 1 puff into the lungs daily 43/85/90  Yes Jimena Cruz MD   DULoxetine (CYMBALTA) 30 MG extended release capsule Take 1 capsule by mouth daily 75/85/62  Yes Jimena Cruz MD   ferrous sulfate 325 (65 Fe) MG tablet Take 325 mg by mouth daily (with breakfast)   Yes Historical Provider, MD   folic acid (FOLVITE) 1 MG tablet Take 1 mg by mouth daily   Yes Historical Provider, MD   vitamin B-6 (PYRIDOXINE) 100 MG tablet Take 100 mg by mouth daily   Yes Historical Provider, MD   vitamin B-12 (CYANOCOBALAMIN) 500 MCG tablet Take 500 mcg by mouth daily   Yes Historical Provider, MD   vitamin D (ERGOCALCIFEROL) 27166 UNIT CAPS capsule Take 50,000 Units by mouth once a week    Yes Historical Provider, MD     Coumadin Use Last 7 Days:  no  Antiplatelet drug therapy use last 7 days: no  Other anticoagulant use last 7 days: no  Additional Medication Information:        Pre-Sedation Documentation and Exam:   I have reviewed the patient's history and review of systems.     Mallampati Airway Assessment:  normal neck range of motion    Prior History of Anesthesia Complications:   none    ASA Classification:  Class 2 - A normal healthy patient with mild systemic disease    Sedation/ Anesthesia Plan:   intravenous sedation    Medications Planned:   midazolam (Versed) intravenously and fentanyl intravenously    Patient is an appropriate candidate for plan of sedation: yes    Electronically signed by Leandra Ziegler MD on 4/24/2019 at 11:45 AM

## 2019-04-24 NOTE — PROGRESS NOTES
--   --   --    BILITOT 0.4  --   --   --     < > = values in this interval not displayed. CBC:   Lab Results   Component Value Date    WBC 16.1 04/24/2019    RBC 2.77 04/24/2019    HGB 8.6 04/24/2019    HCT 26.2 04/24/2019    MCV 94.6 04/24/2019    MCH 30.9 04/24/2019    MCHC 32.7 04/24/2019    RDW 15.5 04/24/2019     04/24/2019    MPV 6.4 04/24/2019     CMP:    Lab Results   Component Value Date     04/24/2019    K 3.7 04/24/2019     04/24/2019    CO2 25 04/24/2019    BUN 6 04/24/2019    CREATININE <0.5 04/24/2019    GFRAA >60 04/24/2019    GFRAA >60 03/31/2011    AGRATIO 0.8 04/23/2019    LABGLOM >60 04/24/2019    GLUCOSE 100 04/24/2019    PROT 7.3 04/23/2019    LABALBU 3.2 04/23/2019    CALCIUM 8.0 04/24/2019    BILITOT 0.4 04/23/2019    ALKPHOS 116 04/23/2019    AST 9 04/23/2019    ALT 7 04/23/2019         John Milks  Electronically signed 4/24/2019 at 9:08 AM    Agree with above note. The patient was personally seen and examined. Bernard Fournier is doing OK. Had perc drain placed today. Pain improved. Denies nausea.     Abd soft, ND, appropriately tender, incision without drainage    WBC 16.1    A/P:73 yo female with pelvic abscess after colostomy reversal POD #14    Continue IV abx  Advance to low fiber diet  Hopefully home over next day or two when leukocytosis improves    Jacky Chen MD

## 2019-04-24 NOTE — PLAN OF CARE
Patient is alert and oriented, call light within reach. Fall precautions in place. AM meds complete, patient tolerated well. VSS and WDL. No s/s of distress, no further needs noted at this time.  Returned from [facility] at 1230pm. Electronically signed by Chase Fay RN on 4/24/2019 at 12:54 PM

## 2019-04-24 NOTE — OP NOTE
Brief Postoperative Note    Curt Cleary  YOB: 1945  [id number]    Pre-operative Diagnosis: deep pelvic fluid collection    Post-operative Diagnosis: Same    Procedure: CT guided aspiration of deep pelvic fluid collection    Anesthesia: Moderate Sedation    Surgeons/Assistants: Dr. Maame Luis    Estimated Blood Loss: less than 1ml     Complications: None    Specimens: Was Obtained: 14ml bloody brown fluid    Findings: the collection was too small for catheter placement. Collection near completely aspirated.     Electronically signed by Chad Deras MD on 4/24/2019 at 12:13 PM

## 2019-04-24 NOTE — PROGRESS NOTES
Pt arrived to room 4124 from ED. A&O. Reports pain is only 1/10 at this time but does go up and down. Aware of NPO status. Asking for ice chips. Per NP note, okay for clears and NPO at midnight. MD paged. Verified with hospitalist that only zosyn ordered for abx coverage. Tele box placed and showing normal sinus rhythm. Son at bedside. Both aware of plans for IR drain placement tomorrow. Bed alarm active. Will continue to monitor.

## 2019-04-24 NOTE — PROGRESS NOTES
Spoke with XIOMARA Winter regarding the patients diet as well as urine sample needed (patient is incontinent). Advised to start on a general diet and bladder scan patient and if greater than 300ml advised to straight cath, otherwise do not straight cath the patient for a urine sample.  Electronically signed by Onel Arreola RN on 4/24/2019 at 1:00 PM

## 2019-04-25 VITALS
SYSTOLIC BLOOD PRESSURE: 124 MMHG | HEART RATE: 92 BPM | HEIGHT: 62 IN | WEIGHT: 133.38 LBS | TEMPERATURE: 98.3 F | DIASTOLIC BLOOD PRESSURE: 64 MMHG | RESPIRATION RATE: 18 BRPM | BODY MASS INDEX: 24.54 KG/M2 | OXYGEN SATURATION: 95 %

## 2019-04-25 LAB
ANION GAP SERPL CALCULATED.3IONS-SCNC: 11 MMOL/L (ref 3–16)
BASOPHILS ABSOLUTE: 0.1 K/UL (ref 0–0.2)
BASOPHILS RELATIVE PERCENT: 0.4 %
BUN BLDV-MCNC: 5 MG/DL (ref 7–20)
CALCIUM SERPL-MCNC: 8 MG/DL (ref 8.3–10.6)
CHLORIDE BLD-SCNC: 101 MMOL/L (ref 99–110)
CO2: 26 MMOL/L (ref 21–32)
CREAT SERPL-MCNC: <0.5 MG/DL (ref 0.6–1.2)
EOSINOPHILS ABSOLUTE: 0.1 K/UL (ref 0–0.6)
EOSINOPHILS RELATIVE PERCENT: 0.8 %
GFR AFRICAN AMERICAN: >60
GFR NON-AFRICAN AMERICAN: >60
GLUCOSE BLD-MCNC: 107 MG/DL (ref 70–99)
HCT VFR BLD CALC: 26.2 % (ref 36–48)
HEMATOLOGY PATH CONSULT: NORMAL
HEMOGLOBIN: 8.5 G/DL (ref 12–16)
LYMPHOCYTES ABSOLUTE: 1.2 K/UL (ref 1–5.1)
LYMPHOCYTES RELATIVE PERCENT: 8.1 %
MCH RBC QN AUTO: 30.6 PG (ref 26–34)
MCHC RBC AUTO-ENTMCNC: 32.6 G/DL (ref 31–36)
MCV RBC AUTO: 93.8 FL (ref 80–100)
MONOCYTES ABSOLUTE: 1 K/UL (ref 0–1.3)
MONOCYTES RELATIVE PERCENT: 6.9 %
NEUTROPHILS ABSOLUTE: 12 K/UL (ref 1.7–7.7)
NEUTROPHILS RELATIVE PERCENT: 83.8 %
PDW BLD-RTO: 15.7 % (ref 12.4–15.4)
PLATELET # BLD: 974 K/UL (ref 135–450)
PMV BLD AUTO: 6.3 FL (ref 5–10.5)
POTASSIUM REFLEX MAGNESIUM: 3.7 MMOL/L (ref 3.5–5.1)
RBC # BLD: 2.79 M/UL (ref 4–5.2)
SODIUM BLD-SCNC: 138 MMOL/L (ref 136–145)
WBC # BLD: 14.3 K/UL (ref 4–11)

## 2019-04-25 PROCEDURE — APPSS15 APP SPLIT SHARED TIME 0-15 MINUTES: Performed by: NURSE PRACTITIONER

## 2019-04-25 PROCEDURE — 85025 COMPLETE CBC W/AUTO DIFF WBC: CPT

## 2019-04-25 PROCEDURE — 6370000000 HC RX 637 (ALT 250 FOR IP): Performed by: SURGERY

## 2019-04-25 PROCEDURE — 94760 N-INVAS EAR/PLS OXIMETRY 1: CPT

## 2019-04-25 PROCEDURE — 99024 POSTOP FOLLOW-UP VISIT: CPT | Performed by: SURGERY

## 2019-04-25 PROCEDURE — 6370000000 HC RX 637 (ALT 250 FOR IP): Performed by: HOSPITALIST

## 2019-04-25 PROCEDURE — APPNB30 APP NON BILLABLE TIME 0-30 MINS: Performed by: NURSE PRACTITIONER

## 2019-04-25 PROCEDURE — 6370000000 HC RX 637 (ALT 250 FOR IP): Performed by: NURSE PRACTITIONER

## 2019-04-25 PROCEDURE — 80048 BASIC METABOLIC PNL TOTAL CA: CPT

## 2019-04-25 PROCEDURE — 36415 COLL VENOUS BLD VENIPUNCTURE: CPT

## 2019-04-25 RX ORDER — AMOXICILLIN AND CLAVULANATE POTASSIUM 875; 125 MG/1; MG/1
1 TABLET, FILM COATED ORAL 2 TIMES DAILY
Qty: 20 TABLET | Refills: 0
Start: 2019-04-25 | End: 2019-05-01 | Stop reason: SDUPTHER

## 2019-04-25 RX ADMIN — DULOXETINE HYDROCHLORIDE 30 MG: 30 CAPSULE, DELAYED RELEASE ORAL at 09:18

## 2019-04-25 RX ADMIN — AMOXICILLIN AND CLAVULANATE POTASSIUM 1 TABLET: 875; 125 TABLET, FILM COATED ORAL at 09:18

## 2019-04-25 NOTE — PLAN OF CARE
Problem: Falls - Risk of:  Goal: Will remain free from falls  Description  Will remain free from falls  Outcome: Ongoing     Problem: Pain:  Goal: Pain level will decrease  Description  Pain level will decrease  Outcome: Ongoing     Problem: Risk for Impaired Skin Integrity  Goal: Tissue integrity - skin and mucous membranes  Description  Structural intactness and normal physiological function of skin and  mucous membranes.   Outcome: Ongoing

## 2019-04-25 NOTE — PROGRESS NOTES
Hospitalist Progress Note      PCP: No primary care provider on file. Date of Admission: 4/23/2019    Chief Complaint: Abdominal pain    Hospital Course: Patient was admitted from nursing home after having abdominal pain. Patient had CT scan done in the emergency department which showed a abdominal abscess. Patient recently had colostomy reversal on April 10. Patient was started on broad-spectrum antibiotics and IR was consulted for drainage of abdominal abscess. Abscess was drained but catheter could not be placed. We will await cultures and sensitivities. Subjective: Patient seen and examined. Wanting to leave right now. SO at bedside stating he will drive her back to NH. Patient stable and tolerating po. Ok to DC with oral abx. Medications:  Reviewed    Infusion Medications   Scheduled Medications    amoxicillin-clavulanate  1 tablet Oral 2 times per day    DULoxetine  30 mg Oral Daily    sodium chloride flush  10 mL Intravenous 2 times per day    pantoprazole  40 mg Intravenous Daily    And    sodium chloride (PF)  10 mL Intravenous Daily    nicotine  1 patch Transdermal Daily     PRN Meds: albuterol sulfate HFA, melatonin, sodium chloride flush, potassium chloride **OR** potassium alternative oral replacement **OR** potassium chloride, acetaminophen, ondansetron, oxyCODONE **OR** oxyCODONE      Intake/Output Summary (Last 24 hours) at 4/25/2019 0838  Last data filed at 4/24/2019 1433  Gross per 24 hour   Intake 340 ml   Output --   Net 340 ml       Physical Exam Performed:    /69   Pulse 91   Temp 99 °F (37.2 °C) (Oral)   Resp 14   Ht 5' 2\" (1.575 m)   Wt 133 lb 6.1 oz (60.5 kg)   SpO2 94%   BMI 24.40 kg/m²     General appearance: No apparent distress, appears stated age and cooperative. HEENT: Pupils equal, round, and reactive to light. Conjunctivae/corneas clear. Neck: Supple, with full range of motion. No jugular venous distention. Trachea midline.   Respiratory: Normal respiratory effort. Clear to auscultation, bilaterally without Rales/Wheezes/Rhonchi. Cardiovascular: Regular rate and rhythm with normal S1/S2 without murmurs, rubs or gallops. Abdomen: Soft, staples covering multiple incision sites, wounds are clean dry and intact  Musculoskeletal: No clubbing, cyanosis or edema bilaterally. Full range of motion without deformity. Skin: Skin color, texture, turgor normal.  No rashes or lesions. Neurologic:  Neurovascularly intact without any focal sensory/motor deficits. Cranial nerves: II-XII intact, grossly non-focal.  Psychiatric: Alert and oriented, thought content appropriate, poor insight  Capillary Refill: Brisk,< 3 seconds   Peripheral Pulses: +2 palpable, equal bilaterally       Labs:   Recent Labs     04/23/19  1037 04/24/19  0650 04/25/19  0736   WBC 15.5* 16.1* 14.3*   HGB 10.0* 8.6* 8.5*   HCT 30.5* 26.2* 26.2*   PLT 1,091* 905* 974*     Recent Labs     04/23/19  1036 04/24/19  0650 04/25/19  0736   * 137 138   K 3.5 3.7 3.7   CL 98* 102 101   CO2 25 25 26   BUN 9 6* 5*   CREATININE 0.5* <0.5* <0.5*   CALCIUM 9.0 8.0* 8.0*     Recent Labs     04/23/19  1036   AST 9*   ALT 7*   BILITOT 0.4   ALKPHOS 116     Recent Labs     04/23/19  1802   INR 1.10     Recent Labs     04/23/19  1036   TROPONINI <0.01       Urinalysis:      Lab Results   Component Value Date    NITRU Negative 01/30/2019    WBCUA 38 01/30/2019    BACTERIA 3+ 01/30/2019    RBCUA 4 01/30/2019    BLOODU SMALL 01/30/2019    SPECGRAV 1.014 01/30/2019    GLUCOSEU Negative 01/30/2019       Radiology:  CT GUIDED NEEDLE PLACEMENT   Final Result      CT GUIDED FNA   Final Result   Successful CT-guided aspiration of the deep pelvic fluid collection. The collection was too small for drainage catheter placement. CT ABDOMEN PELVIS W IV CONTRAST Additional Contrast? Oral   Final Result   1. 5 x 3.5 cm pelvic abscess   2.  Small-bowel obstruction likely due to adhesion Assessment/Plan:    Abdominal abscess following colostomy reversal:  Blood culture  abcess culture: GNR, await cult and sens  IR for abscess draining performed 4/24  DC on oral abx     Small bowel obstruction on CAT scan:  Management per surgical team      Depression  Continue Cymbalta     Acute Thrombocytosis:  Markedly elevated from previous admissions  -iron def? Infection related? Malignancy?   PBS ordered: pending, patient had refused first attempt     Hyponatremia:  Resolved with IVF    Anemia  - monitor  - likely from blood loss, surgical        DVT Prophylaxis: lovenox  Diet: DIET GENERAL;  Code Status: Full Code    Dispo per primary    Branden Covington MD

## 2019-04-25 NOTE — PROGRESS NOTES
General and Vascular Surgery                                                           Daily Progress Note                                                             Reginaldo Blum MD    Pt Name: Nayeli Harry  Medical Record Number: [id number]  Date of Birth 1945   Today's Date: 4/25/2019    Chief Complaint: abdominal pain    ASSESSMENT  1. Abdominal abscess following colostomy reversal  -s/p IR aspiration  -WBC down to 14  -feels better, tolerating low fiber diet  -OK for discharge home. Follow up in 1 week. Will need repeat CT in 1 week along with repeat labs prior to visit. Bryce Solis has improved from yesterday. Pain is well controlled. She has no nausea and no vomiting. OBJECTIVE  VITALS:  height is 5' 2\" (1.575 m) and weight is 133 lb 6.1 oz (60.5 kg). Her oral temperature is 99 °F (37.2 °C). Her blood pressure is 122/69 and her pulse is 91. Her respiration is 14 and oxygen saturation is 92%. VITALS:  /69   Pulse 91   Temp 99 °F (37.2 °C) (Oral)   Resp 14   Ht 5' 2\" (1.575 m)   Wt 133 lb 6.1 oz (60.5 kg)   SpO2 92%   BMI 24.40 kg/m²   GENERAL: alert, cooperative, no distress  ABDOMEN: non-distended and tenderness present- incisional,  without rebound and guarding  I/O last 3 completed shifts: In: 340 [P.O.:240; IV Piggyback:100]  Out: -   No intake/output data recorded.     LABS  Recent Labs     04/23/19  1036  04/23/19  1802  04/25/19  0736   WBC  --    < >  --    < > 14.3*   HGB  --    < >  --    < > 8.5*   HCT  --    < >  --    < > 26.2*   PLT  --    < >  --    < > 974*   *  --   --    < > 138   K 3.5  --   --    < > 3.7   CL 98*  --   --    < > 101   CO2 25  --   --    < > 26   BUN 9  --   --    < > 5*   CREATININE 0.5*  --   --    < > <0.5*   MG 1.90  --   --   --   --    CALCIUM 9.0  --   --    < > 8.0*   INR  --   --  1.10  --   --    AST 9*  --   --   --   --    ALT 7*  --   --   --   --

## 2019-04-25 NOTE — PROGRESS NOTES
Call placed to SW, informed Maricruz Oswaldo that pts boyfriend is at bedside and willing to transport pt to NH. SW states she will call facility to verify if they are able to arrive earlier than scheduled. Pt starts yelling at writer stating she is not waiting, she doesn't care what SW has to say and she is leaving now. Pt starts walking down hallway.

## 2019-04-25 NOTE — CARE COORDINATION
Discharge Plan: return to long term care -spoke with admissions, aware and agreeable to Pt return. Patient discharging to: [facility]   prescriptions should be faxed to:  [phone]  RN can call report to: [phone] [facility] will arrive at: 2pm    Thank You. Electronically signed by BETO Augustine, CHAS on 4/25/2019 at 10:29 AM   Phone: [phone]     Pt has family to take her via private car to the [facility]   RN to call report. Message left for the ECF   Will cancel transport. Pt d/c with family. Thank You.    Electronically signed by BETO Augustine, CHAS on 4/25/2019 at 11:22 AM

## 2019-04-25 NOTE — DISCHARGE INSTR - COC
Continuity of Care Form    Patient Name: Nayeli Harry   :  1945  MRN:  [id number]    Admit date:  2019  Discharge date:  ***    Code Status Order: Full Code   Advance Directives:   Advance Care Flowsheet Documentation     Date/Time Healthcare Directive Type of Healthcare Directive Copy in [address] Agent's Name Healthcare Agent's Phone Number    19 1307  Yes, patient has an advance directive for healthcare treatment --  Yes, copy in chart  --  --  --          Admitting Physician:  Reginaldo Blum MD  PCP: No primary care provider on file. Discharging Nurse: [facility] Unit/Room#: [id number]/[id number]  Discharging Unit Phone Number: ***    Emergency Contact:   Extended Emergency Contact Information  Primary Emergency Contact:  [address] Phone: [phone]  Relation: Child    Past Surgical History:  Past Surgical History:   Procedure Laterality Date    CHOLECYSTECTOMY      COLONOSCOPY  2018    COLONOSCOPY N/A 2018    COLONOSCOPY POLYPECTOMY SNARE/COLD BIOPSY performed by Valeri Castillo MD at [address] [address] N/A 4/10/2019    LAPAROSCOPIC LYSISI OF ADHESIONS FOR GREATER THAN 3 HOURS,LAPORSCOPIC CONVERTED TO OPEN COLOSTOMY REVERSAL, SMALL BOWEL RESECTION performed by Reginaldo Blum MD at [facility] ENDOSCOPY  2018    with biopsies    UPPER GASTROINTESTINAL ENDOSCOPY N/A 2018    EGD BIOPSY performed by Valeri Castillo MD at [facility]       Immunization History:   Immunization History   Administered Date(s) Administered    Influenza, High Dose (Fluzone 65 yrs and older) 09/10/2018    Pneumococcal 13-valent Conjugate ([id number]) 2018    Tdap (Boostrix, Adacel) 2018    Zoster Live (Zostavax) 2016    Zoster Subunit (Shingrix) 03/24/2018       Active Problems:  Patient Active Problem List   Diagnosis Code    COPD with acute exacerbation ([facility] J44.1    Essential hypertension I10    Tobacco abuse Z72.0    Alcohol abuse F10.10    Colostomy care ([facility] Z43.3    Recurrent major depressive disorder, in partial remission ([facility] F33.41    Personal history of nicotine dependence  Z87.891    Colitis K52.9    Colitis due to Clostridium difficile A04.72    Alcohol-induced acute pancreatitis without infection or necrosis K85.20    Gastrointestinal hemorrhage associated with duodenal ulcer K26.4    Small bowel obstruction (HCC) K56.609    Ileus ([facility]) K56.7    Acute cystitis without hematuria N30.00    Non-intractable cyclical vomiting with nausea G43. A0    Parastomal hernia without obstruction or gangrene K43.5    Abdominal pain R10.9    C. difficile diarrhea A04.72    Colostomy present ([facility]) Z93.3    Postoperative intra-abdominal abscess T81.49XA       Isolation/Infection:   Isolation          No Isolation            Nurse Assessment:  Last Vital Signs: /69   Pulse 91   Temp 99 °F (37.2 °C) (Oral)   Resp 14   Ht 5' 2\" (1.575 m)   Wt 133 lb 6.1 oz (60.5 kg)   SpO2 92%   BMI 24.40 kg/m²     Last documented pain score (0-10 scale): Pain Level: 0  Last Weight:   Wt Readings from Last 1 Encounters:   04/25/19 133 lb 6.1 oz (60.5 kg)     Mental Status:  {IP PT MENTAL STATUS:48220}    IV Access:  { BREANN IV ACCESS:814758565}    Nursing Mobility/ADLs:  Walking   {CHP DME UGOF:824439349}  Transfer  {CHP DME TVBU:707475376}  Bathing  {CHP DME GMRT:201203753}  Dressing  {CHP DME DLFH:397537028}  Toileting  {CHP DME KGXQ:725594693}  Feeding  {CHP DME NANCY:544637011}  Med Admin  {CHP DME PPTP:224032529}  Med Delivery   { BREANN MED Delivery:975202347}    Wound Care Documentation and Therapy:        Elimination:  Continence:   · Bowel: {YES / HF:52991}  · Bladder: {YES / XD:08738}  Urinary Catheter: {Urinary Catheter:530910076} Colostomy/Ileostomy/Ileal Conduit: {YES / MN:36033}       Date of Last BM: ***    Intake/Output Summary (Last 24 hours) at 2019 1010  Last data filed at 2019 1433  Gross per 24 hour   Intake 340 ml   Output --   Net 340 ml     I/O last 3 completed shifts:   In: 340 [P.O.:240; IV Piggyback:100]  Out: -     Safety Concerns:     { BREANN Safety Concerns:982008618}    Impairments/Disabilities:      [address] Impairments/Disabilities:536340533}    Nutrition Therapy:  Current Nutrition Therapy:   [address] Diet List:204491980}    Routes of Feeding: {[facility] DME Other Feedings:325373869}  Liquids: {Slp liquid thickness:86104}  Daily Fluid Restriction: {CHP DME Yes amt example:423123478}  Last Modified Barium Swallow with Video (Video Swallowing Test): {Done Not Done FYRR:006812513}    Treatments at the Time of Hospital Discharge:   Respiratory Treatments: ***  Oxygen Therapy:  {Therapy; copd oxygen:62350}  Ventilator:    {[facility] Vent UGXZ:163925455}    Rehab Therapies: {THERAPEUTIC INTERVENTION:2543937199}  Weight Bearing Status/Restrictions: {[facility] Weight Bearin}  Other Medical Equipment (for information only, NOT a DME order):  {EQUIPMENT:646014657}  Other Treatments: ***    Patient's personal belongings (please select all that are sent with patient):  {[facility] DME Belongings:931134231}    RN SIGNATURE:  {Esignature:811705711}    CASE MANAGEMENT/SOCIAL WORK SECTION    Inpatient Status Date: ***    Readmission Risk Assessment Score:  Readmission Risk              Risk of Unplanned Readmission:        27           Discharging to Facility/ Agency   · [facility]   · Address:    · Phone: [phone]  · GDY:[phone]    Dialysis Facility (if applicable)   · Name:  · Address:  · Dialysis Schedule:  · Phone:  · Fax:    / signature: Electronically signed by BETO Odom LSW on 19 at 10:37 AM    PHYSICIAN SECTION    Prognosis: Good    Condition at Discharge: Stable    Rehab Potential (if transferring to Rehab): Good    Recommended Labs or Other Treatments After Discharge:   Schedule CT at [facility] for one week. Schedule follow up with Dr. Mary Joseph for 1-2 days after CT obtained. Complete entire course of antibiotics  Daily gauze packing changes to old ostomy site. Cover with dry gauze. Check CBC with diff, BMP in one week     Physician Certification: I certify the above information and transfer of Rg Ly  is necessary for the continuing treatment of the diagnosis listed and that she requires [facility] for less 30 days.      Update Admission H&P: No change in H&P    PHYSICIAN SIGNATURE:  Electronically signed by ABIGAIL Ortiz CNP on 19 at 10:11 AM

## 2019-04-28 LAB
ANAEROBIC CULTURE: ABNORMAL
BLOOD CULTURE, ROUTINE: NORMAL
CULTURE, BLOOD 2: NORMAL
GRAM STAIN RESULT: ABNORMAL
ORGANISM: ABNORMAL
ORGANISM: ABNORMAL
WOUND/ABSCESS: ABNORMAL
WOUND/ABSCESS: ABNORMAL

## 2019-04-29 ENCOUNTER — TELEPHONE (OUTPATIENT)
Dept: SURGERY | Age: 74
End: 2019-04-29

## 2019-05-01 ENCOUNTER — TELEPHONE (OUTPATIENT)
Dept: SURGERY | Age: 74
End: 2019-05-01

## 2019-05-01 RX ORDER — AMOXICILLIN AND CLAVULANATE POTASSIUM 875; 125 MG/1; MG/1
1 TABLET, FILM COATED ORAL 2 TIMES DAILY
Qty: 20 TABLET | Refills: 0 | Status: SHIPPED | OUTPATIENT
Start: 2019-05-01 | End: 2019-05-11

## 2019-05-01 NOTE — DISCHARGE SUMMARY
Physician Discharge Summary     Patient ID:  Tram   [id number]  87 y.o.  1945    Admit date: 4/23/2019    Discharge date and time: 4/25/2019 11:50 AM     Admitting Physician: Amira Olguin MD     Discharge Physician: same    Admission Diagnoses: Postoperative intra-abdominal abscess [T81.49XA]    Discharge Diagnoses: same    Admission Condition: fair    Discharged Condition: good    Indication for Admission: abdominal pain    Hospital Course:   1. Abdominal abscess following colostomy reversal  -4/24 IR aspiration  -WBC down to 14  -feels better, tolerating low fiber diet  -OK for discharge home. Follow up in 1 week. Will need repeat CT in 1 week along with repeat labs prior to visit. Aspiration cultures: Klebsiella oxytoca ,Enterococcus faecalis. DCed home on augmentin. Consults: none    Significant Diagnostic Studies:   CT GUIDED NEEDLE PLACEMENT   Final Result      CT GUIDED FNA   Final Result   Successful CT-guided aspiration of the deep pelvic fluid collection. The collection was too small for drainage catheter placement. CT ABDOMEN PELVIS W IV CONTRAST Additional Contrast? Oral   Final Result   1. 5 x 3.5 cm pelvic abscess   2. Small-bowel obstruction likely due to adhesion         CT ABDOMEN PELVIS W IV CONTRAST Additional Contrast? Oral    (Results Pending)      Disposition: SNF    In process/preliminary results:  Outstanding Order Results     No orders found from 3/25/2019 to 4/24/2019. Patient Instructions:   Discharge Medication List as of 4/25/2019 11:50 AM      START taking these medications    Details   amoxicillin-clavulanate (AUGMENTIN) 875-125 MG per tablet Take 1 tablet by mouth 2 times daily for 10 days, Disp-20 tablet, R-0NO PRINT         CONTINUE these medications which have NOT CHANGED    Details   docusate sodium (COLACE, DULCOLAX) 100 MG CAPS Take 100 mg by mouth 2 times daily Hold if she has more than two loose stools in a day.  Soft stool is encouraged until follow up with surgeon. , Disp-60 capsule, R-0Print      OXYGEN Inhale 2 L into the lungs as neededHistorical Med      melatonin 3 MG TABS tablet Take 3 mg by mouth nightly as needed Historical Med      acetaminophen (TYLENOL) 325 MG tablet Take 650 mg by mouth nightlyHistorical Med      famotidine (PEPCID) 20 MG tablet Take 1 tablet by mouth 2 times daily, Disp-60 tablet, R-0Print      ondansetron (ZOFRAN ODT) 4 MG disintegrating tablet Take 1-2 tablets by mouth every 12 hours as needed for Nausea, Disp-12 tablet, R-0Print      vitamin B-1 100 MG tablet Take 1 tablet by mouth daily, Disp-30 tablet, R-3Normal      albuterol sulfate HFA (PROAIR HFA) 108 (90 Base) MCG/ACT inhaler Inhale 2 puffs into the lungs every 6 hours as needed for Wheezing, Disp-1 Inhaler, R-3Normal      Umeclidinium Bromide 62.5 MCG/INH AEPB Inhale 1 puff into the lungs daily, Disp-30 each, R-5Normal      DULoxetine (CYMBALTA) 30 MG extended release capsule Take 1 capsule by mouth daily, Disp-30 capsule, R-3Normal      ferrous sulfate 325 (65 Fe) MG tablet Take 325 mg by mouth daily (with breakfast)Historical Med      folic acid (FOLVITE) 1 MG tablet Take 1 mg by mouth dailyHistorical Med      vitamin B-6 (PYRIDOXINE) 100 MG tablet Take 100 mg by mouth dailyHistorical Med      vitamin B-12 (CYANOCOBALAMIN) 500 MCG tablet Take 500 mcg by mouth dailyHistorical Med      vitamin D (ERGOCALCIFEROL) 33679 UNIT CAPS capsule Take 50,000 Units by mouth once a week Historical Med         STOP taking these medications       oxyCODONE-acetaminophen (PERCOCET) 5-325 MG per tablet Comments:   Reason for Stopping:             Activity: activity as tolerated, no driving while on analgesics and no heavy lifting for 6 weeks  Diet: low fiber. Wound Care: keep wound clean and dry and as directed    Follow-up with Dr. Vishal Ahmadi in 1 week.     Heidi Lyn APRN-CNP 7:24 AM 5/1/2019   [facility] and Vascular Surgery  Office: [phone]   5/1/2019  7:22 AM

## 2019-05-01 NOTE — TELEPHONE ENCOUNTER
PT called and stated that the medication that was called into [facility] she is allergic too. She would like to have a call regarding this issue.

## 2019-05-01 NOTE — TELEPHONE ENCOUNTER
PT states that she has called several times to have a prescription filled at [place]. She call the pharmacy and they told her that someone from our office needs to call them to have the prescription filled. PT would like to have a call after this has been done.

## 2019-05-03 DIAGNOSIS — T81.43XA POSTOPERATIVE INTRA-ABDOMINAL ABSCESS: ICD-10-CM

## 2019-05-03 LAB
ANION GAP SERPL CALCULATED.3IONS-SCNC: 14 MMOL/L (ref 3–16)
BASOPHILS ABSOLUTE: 0.1 K/UL (ref 0–0.2)
BASOPHILS RELATIVE PERCENT: 0.8 %
BUN BLDV-MCNC: 6 MG/DL (ref 7–20)
CALCIUM SERPL-MCNC: 9.3 MG/DL (ref 8.3–10.6)
CHLORIDE BLD-SCNC: 101 MMOL/L (ref 99–110)
CO2: 27 MMOL/L (ref 21–32)
CREAT SERPL-MCNC: 0.5 MG/DL (ref 0.6–1.2)
EOSINOPHILS ABSOLUTE: 0.3 K/UL (ref 0–0.6)
EOSINOPHILS RELATIVE PERCENT: 3.7 %
GFR AFRICAN AMERICAN: >60
GFR NON-AFRICAN AMERICAN: >60
GLUCOSE BLD-MCNC: 118 MG/DL (ref 70–99)
HCT VFR BLD CALC: 33.6 % (ref 36–48)
HEMOGLOBIN: 10.5 G/DL (ref 12–16)
LYMPHOCYTES ABSOLUTE: 2.2 K/UL (ref 1–5.1)
LYMPHOCYTES RELATIVE PERCENT: 26.2 %
MCH RBC QN AUTO: 29.8 PG (ref 26–34)
MCHC RBC AUTO-ENTMCNC: 31.3 G/DL (ref 31–36)
MCV RBC AUTO: 95.5 FL (ref 80–100)
MONOCYTES ABSOLUTE: 0.7 K/UL (ref 0–1.3)
MONOCYTES RELATIVE PERCENT: 8.3 %
NEUTROPHILS ABSOLUTE: 5.2 K/UL (ref 1.7–7.7)
NEUTROPHILS RELATIVE PERCENT: 61 %
PDW BLD-RTO: 16.2 % (ref 12.4–15.4)
PLATELET # BLD: 657 K/UL (ref 135–450)
PMV BLD AUTO: 7.3 FL (ref 5–10.5)
POTASSIUM SERPL-SCNC: 3.6 MMOL/L (ref 3.5–5.1)
RBC # BLD: 3.52 M/UL (ref 4–5.2)
SODIUM BLD-SCNC: 142 MMOL/L (ref 136–145)
WBC # BLD: 8.5 K/UL (ref 4–11)

## 2019-05-06 ENCOUNTER — OFFICE VISIT (OUTPATIENT)
Dept: SURGERY | Age: 74
End: 2019-05-06

## 2019-05-06 VITALS — SYSTOLIC BLOOD PRESSURE: 136 MMHG | DIASTOLIC BLOOD PRESSURE: 82 MMHG | HEART RATE: 114 BPM

## 2019-05-06 DIAGNOSIS — N73.9 PELVIC ABSCESS IN FEMALE: ICD-10-CM

## 2019-05-06 DIAGNOSIS — R19.7 DIARRHEA, UNSPECIFIED TYPE: ICD-10-CM

## 2019-05-06 DIAGNOSIS — Z98.890 S/P COLOSTOMY TAKEDOWN: Primary | ICD-10-CM

## 2019-05-06 DIAGNOSIS — Z98.890 S/P COLOSTOMY TAKEDOWN: ICD-10-CM

## 2019-05-06 LAB
BASOPHILS ABSOLUTE: 0.2 K/UL (ref 0–0.2)
BASOPHILS RELATIVE PERCENT: 1.4 %
EOSINOPHILS ABSOLUTE: 0.2 K/UL (ref 0–0.6)
EOSINOPHILS RELATIVE PERCENT: 1.7 %
HCT VFR BLD CALC: 34.4 % (ref 36–48)
HEMOGLOBIN: 11.1 G/DL (ref 12–16)
LYMPHOCYTES ABSOLUTE: 2.6 K/UL (ref 1–5.1)
LYMPHOCYTES RELATIVE PERCENT: 23.7 %
MCH RBC QN AUTO: 30.3 PG (ref 26–34)
MCHC RBC AUTO-ENTMCNC: 32.3 G/DL (ref 31–36)
MCV RBC AUTO: 93.8 FL (ref 80–100)
MONOCYTES ABSOLUTE: 0.7 K/UL (ref 0–1.3)
MONOCYTES RELATIVE PERCENT: 6.6 %
NEUTROPHILS ABSOLUTE: 7.4 K/UL (ref 1.7–7.7)
NEUTROPHILS RELATIVE PERCENT: 66.6 %
PDW BLD-RTO: 16.4 % (ref 12.4–15.4)
PLATELET # BLD: 598 K/UL (ref 135–450)
PMV BLD AUTO: 7.8 FL (ref 5–10.5)
RBC # BLD: 3.67 M/UL (ref 4–5.2)
WBC # BLD: 11.1 K/UL (ref 4–11)

## 2019-05-06 PROCEDURE — 99024 POSTOP FOLLOW-UP VISIT: CPT | Performed by: SURGERY

## 2019-05-06 RX ORDER — LOPERAMIDE HYDROCHLORIDE 2 MG/1
2 CAPSULE ORAL 4 TIMES DAILY PRN
Status: ON HOLD | COMMUNITY
End: 2019-10-27

## 2019-05-06 NOTE — PROGRESS NOTES
Post Operative Visit    [facility] and Vascular Surgery   Duane Cfiuentes MD    [address] [address]  Phone: [phone]  Fax: [phone]    Faraz Corona   YOB: 1945    Date of Visit:  5/6/2019    No ref. provider found  No primary care provider on file. Subjective:     Faraz Corona presents for a one month follow-up s/p laparoscopic extensive LITTLE, converted to open colostomy reversal which was performed on 4/10/19. Overall she has been slightly better since being discharged after her IR perc aspiration of pelvic abscess. She only took two days of the augmentin due to nausea and vomiting. She denies fevers or chills. She has been having loose stool since surgery. Allergies   Allergen Reactions    Aspirin Other (See Comments)     Ulcers in stomach    Fentanyl Other (See Comments)     Hallucinations     Morphine      Feels like she will have a heart attack     Outpatient Medications Marked as Taking for the 5/6/19 encounter (Office Visit) with Kalyan Lui MD   Medication Sig Dispense Refill    loperamide (IMODIUM) 2 MG capsule Take 2 mg by mouth 4 times daily as needed for Diarrhea      amoxicillin-clavulanate (AUGMENTIN) 875-125 MG per tablet Take 1 tablet by mouth 2 times daily for 10 days 20 tablet 0    docusate sodium (COLACE, DULCOLAX) 100 MG CAPS Take 100 mg by mouth 2 times daily Hold if she has more than two loose stools in a day. Soft stool is encouraged until follow up with surgeon.  60 capsule 0    OXYGEN Inhale 2 L into the lungs as needed      melatonin 3 MG TABS tablet Take 3 mg by mouth nightly as needed       acetaminophen (TYLENOL) 325 MG tablet Take 650 mg by mouth nightly      ondansetron (ZOFRAN ODT) 4 MG disintegrating tablet Take 1-2 tablets by mouth every 12 hours as needed for Nausea 12 tablet 0    vitamin B-1 100 MG tablet Take 1 tablet by mouth daily 30 tablet W IV CONTRAST Additional Contrast? None    CBC WITH AUTO DIFFERENTIAL   2. Diarrhea, unspecified type  C DIFF TOXIN/ANTIGEN   3. Pelvic abscess in female  CT ABDOMEN PELVIS W IV CONTRAST Additional Contrast? None    CBC WITH AUTO DIFFERENTIAL       PLAN:    Jonah Reyes is doing OK. Will obtain a CBC and c diff toxin today. Will also obtain a CT abd/pelvis to re-evaluate for pelvic abscess considering she did not take her antibiotics as directed. She is also back smoking again. Will plan on having her follow up in 2 weeks.       Electronically signed by Lisa Card MD on 5/6/2019 at 2:37 PM

## 2019-05-13 ENCOUNTER — APPOINTMENT (OUTPATIENT)
Dept: CT IMAGING | Age: 74
DRG: 189 | End: 2019-05-13
Payer: COMMERCIAL

## 2019-05-13 ENCOUNTER — HOSPITAL ENCOUNTER (INPATIENT)
Age: 74
LOS: 2 days | Discharge: HOME | DRG: 189 | End: 2019-05-16
Attending: INTERNAL MEDICINE | Admitting: INTERNAL MEDICINE
Payer: COMMERCIAL

## 2019-05-13 DIAGNOSIS — R10.30 ABDOMINAL PAIN, LOWER: ICD-10-CM

## 2019-05-13 DIAGNOSIS — J44.1 COPD WITH ACUTE EXACERBATION (HCC): Primary | ICD-10-CM

## 2019-05-13 DIAGNOSIS — I48.91 NEW ONSET A-FIB (HCC): ICD-10-CM

## 2019-05-13 DIAGNOSIS — R09.02 HYPOXIA: ICD-10-CM

## 2019-05-13 DIAGNOSIS — R91.1 PULMONARY NODULE: ICD-10-CM

## 2019-05-13 LAB
A/G RATIO: 0.9 (ref 1.1–2.2)
ALBUMIN SERPL-MCNC: 3.8 G/DL (ref 3.4–5)
ALP BLD-CCNC: 142 U/L (ref 40–129)
ALT SERPL-CCNC: 12 U/L (ref 10–40)
ANION GAP SERPL CALCULATED.3IONS-SCNC: 14 MMOL/L (ref 3–16)
AST SERPL-CCNC: 23 U/L (ref 15–37)
BASOPHILS ABSOLUTE: 0.1 K/UL (ref 0–0.2)
BASOPHILS RELATIVE PERCENT: 1.1 %
BILIRUB SERPL-MCNC: 0.4 MG/DL (ref 0–1)
BILIRUBIN URINE: NEGATIVE
BLOOD, URINE: ABNORMAL
BUN BLDV-MCNC: 7 MG/DL (ref 7–20)
C DIFFICILE TOXIN, EIA: NORMAL
CALCIUM SERPL-MCNC: 9.4 MG/DL (ref 8.3–10.6)
CHLORIDE BLD-SCNC: 99 MMOL/L (ref 99–110)
CLARITY: CLEAR
CO2: 29 MMOL/L (ref 21–32)
COLOR: YELLOW
CREAT SERPL-MCNC: <0.5 MG/DL (ref 0.6–1.2)
EOSINOPHILS ABSOLUTE: 0.1 K/UL (ref 0–0.6)
EOSINOPHILS RELATIVE PERCENT: 1.1 %
EPITHELIAL CELLS, UA: 23 /HPF (ref 0–5)
ETHANOL: NORMAL MG/DL (ref 0–0.08)
GFR AFRICAN AMERICAN: >60
GFR NON-AFRICAN AMERICAN: >60
GLOBULIN: 4.2 G/DL
GLUCOSE BLD-MCNC: 119 MG/DL (ref 70–99)
GLUCOSE URINE: NEGATIVE MG/DL
HCT VFR BLD CALC: 35.7 % (ref 36–48)
HEMOGLOBIN: 11.6 G/DL (ref 12–16)
HYALINE CASTS: 7 /LPF (ref 0–8)
KETONES, URINE: NEGATIVE MG/DL
LACTIC ACID: 1.5 MMOL/L (ref 0.4–2)
LEUKOCYTE ESTERASE, URINE: ABNORMAL
LIPASE: 38 U/L (ref 13–60)
LYMPHOCYTES ABSOLUTE: 2.2 K/UL (ref 1–5.1)
LYMPHOCYTES RELATIVE PERCENT: 20.6 %
MCH RBC QN AUTO: 29.8 PG (ref 26–34)
MCHC RBC AUTO-ENTMCNC: 32.4 G/DL (ref 31–36)
MCV RBC AUTO: 91.8 FL (ref 80–100)
MICROSCOPIC EXAMINATION: YES
MONOCYTES ABSOLUTE: 0.8 K/UL (ref 0–1.3)
MONOCYTES RELATIVE PERCENT: 7.2 %
NEUTROPHILS ABSOLUTE: 7.6 K/UL (ref 1.7–7.7)
NEUTROPHILS RELATIVE PERCENT: 70 %
NITRITE, URINE: NEGATIVE
PDW BLD-RTO: 15.7 % (ref 12.4–15.4)
PH UA: 7 (ref 5–8)
PLATELET # BLD: 438 K/UL (ref 135–450)
PMV BLD AUTO: 7.5 FL (ref 5–10.5)
POTASSIUM SERPL-SCNC: 3.3 MMOL/L (ref 3.5–5.1)
PRO-BNP: 498 PG/ML (ref 0–124)
PROTEIN UA: NEGATIVE MG/DL
RBC # BLD: 3.89 M/UL (ref 4–5.2)
RBC UA: 13 /HPF (ref 0–4)
SODIUM BLD-SCNC: 142 MMOL/L (ref 136–145)
SPECIFIC GRAVITY UA: >1.03 (ref 1–1.03)
TOTAL PROTEIN: 8 G/DL (ref 6.4–8.2)
TROPONIN: <0.01 NG/ML
URINE REFLEX TO CULTURE: YES
URINE TYPE: ABNORMAL
UROBILINOGEN, URINE: 1 E.U./DL
WBC # BLD: 10.9 K/UL (ref 4–11)
WBC UA: 13 /HPF (ref 0–5)

## 2019-05-13 PROCEDURE — 83605 ASSAY OF LACTIC ACID: CPT

## 2019-05-13 PROCEDURE — 94760 N-INVAS EAR/PLS OXIMETRY 1: CPT

## 2019-05-13 PROCEDURE — 6370000000 HC RX 637 (ALT 250 FOR IP): Performed by: PHYSICIAN ASSISTANT

## 2019-05-13 PROCEDURE — 83880 ASSAY OF NATRIURETIC PEPTIDE: CPT

## 2019-05-13 PROCEDURE — 99285 EMERGENCY DEPT VISIT HI MDM: CPT

## 2019-05-13 PROCEDURE — 87077 CULTURE AEROBIC IDENTIFY: CPT

## 2019-05-13 PROCEDURE — 6360000002 HC RX W HCPCS: Performed by: PHYSICIAN ASSISTANT

## 2019-05-13 PROCEDURE — 87449 NOS EACH ORGANISM AG IA: CPT

## 2019-05-13 PROCEDURE — 87324 CLOSTRIDIUM AG IA: CPT

## 2019-05-13 PROCEDURE — 87040 BLOOD CULTURE FOR BACTERIA: CPT

## 2019-05-13 PROCEDURE — 85025 COMPLETE CBC W/AUTO DIFF WBC: CPT

## 2019-05-13 PROCEDURE — 71260 CT THORAX DX C+: CPT

## 2019-05-13 PROCEDURE — 6370000000 HC RX 637 (ALT 250 FOR IP): Performed by: NURSE PRACTITIONER

## 2019-05-13 PROCEDURE — 87086 URINE CULTURE/COLONY COUNT: CPT

## 2019-05-13 PROCEDURE — 96375 TX/PRO/DX INJ NEW DRUG ADDON: CPT

## 2019-05-13 PROCEDURE — 87186 SC STD MICRODIL/AGAR DIL: CPT

## 2019-05-13 PROCEDURE — G0378 HOSPITAL OBSERVATION PER HR: HCPCS

## 2019-05-13 PROCEDURE — 96374 THER/PROPH/DIAG INJ IV PUSH: CPT

## 2019-05-13 PROCEDURE — G0480 DRUG TEST DEF 1-7 CLASSES: HCPCS

## 2019-05-13 PROCEDURE — 93005 ELECTROCARDIOGRAM TRACING: CPT | Performed by: EMERGENCY MEDICINE

## 2019-05-13 PROCEDURE — 84484 ASSAY OF TROPONIN QUANT: CPT

## 2019-05-13 PROCEDURE — 83690 ASSAY OF LIPASE: CPT

## 2019-05-13 PROCEDURE — 2580000003 HC RX 258: Performed by: NURSE PRACTITIONER

## 2019-05-13 PROCEDURE — 81001 URINALYSIS AUTO W/SCOPE: CPT

## 2019-05-13 PROCEDURE — 74177 CT ABD & PELVIS W/CONTRAST: CPT

## 2019-05-13 PROCEDURE — 80053 COMPREHEN METABOLIC PANEL: CPT

## 2019-05-13 PROCEDURE — 94640 AIRWAY INHALATION TREATMENT: CPT

## 2019-05-13 PROCEDURE — 6360000004 HC RX CONTRAST MEDICATION: Performed by: PHYSICIAN ASSISTANT

## 2019-05-13 RX ORDER — LANOLIN ALCOHOL/MO/W.PET/CERES
3 CREAM (GRAM) TOPICAL NIGHTLY PRN
Status: DISCONTINUED | OUTPATIENT
Start: 2019-05-13 | End: 2019-05-16 | Stop reason: HOSPADM

## 2019-05-13 RX ORDER — ACETAMINOPHEN 325 MG/1
650 TABLET ORAL EVERY 4 HOURS PRN
Status: DISCONTINUED | OUTPATIENT
Start: 2019-05-13 | End: 2019-05-16 | Stop reason: HOSPADM

## 2019-05-13 RX ORDER — DOCUSATE SODIUM 100 MG/1
100 CAPSULE, LIQUID FILLED ORAL 2 TIMES DAILY
Status: DISCONTINUED | OUTPATIENT
Start: 2019-05-13 | End: 2019-05-14

## 2019-05-13 RX ORDER — FERROUS SULFATE TAB EC 324 MG (65 MG FE EQUIVALENT) 324 (65 FE) MG
324 TABLET DELAYED RESPONSE ORAL
Status: DISCONTINUED | OUTPATIENT
Start: 2019-05-14 | End: 2019-05-16 | Stop reason: HOSPADM

## 2019-05-13 RX ORDER — FOLIC ACID 1 MG/1
1 TABLET ORAL DAILY
Status: DISCONTINUED | OUTPATIENT
Start: 2019-05-14 | End: 2019-05-16 | Stop reason: HOSPADM

## 2019-05-13 RX ORDER — ALBUTEROL SULFATE 2.5 MG/3ML
2.5 SOLUTION RESPIRATORY (INHALATION)
Status: DISCONTINUED | OUTPATIENT
Start: 2019-05-13 | End: 2019-05-16 | Stop reason: HOSPADM

## 2019-05-13 RX ORDER — THIAMINE MONONITRATE (VIT B1) 100 MG
100 TABLET ORAL DAILY
Status: DISCONTINUED | OUTPATIENT
Start: 2019-05-14 | End: 2019-05-16 | Stop reason: HOSPADM

## 2019-05-13 RX ORDER — IPRATROPIUM BROMIDE AND ALBUTEROL SULFATE 2.5; .5 MG/3ML; MG/3ML
1 SOLUTION RESPIRATORY (INHALATION) ONCE
Status: COMPLETED | OUTPATIENT
Start: 2019-05-13 | End: 2019-05-13

## 2019-05-13 RX ORDER — IPRATROPIUM BROMIDE AND ALBUTEROL SULFATE 2.5; .5 MG/3ML; MG/3ML
1 SOLUTION RESPIRATORY (INHALATION)
Status: DISCONTINUED | OUTPATIENT
Start: 2019-05-14 | End: 2019-05-16 | Stop reason: HOSPADM

## 2019-05-13 RX ORDER — FAMOTIDINE 20 MG/1
20 TABLET, FILM COATED ORAL 2 TIMES DAILY
Status: DISCONTINUED | OUTPATIENT
Start: 2019-05-13 | End: 2019-05-16 | Stop reason: HOSPADM

## 2019-05-13 RX ORDER — LANOLIN ALCOHOL/MO/W.PET/CERES
100 CREAM (GRAM) TOPICAL DAILY
Status: DISCONTINUED | OUTPATIENT
Start: 2019-05-14 | End: 2019-05-16 | Stop reason: HOSPADM

## 2019-05-13 RX ORDER — ONDANSETRON 2 MG/ML
4 INJECTION INTRAMUSCULAR; INTRAVENOUS EVERY 6 HOURS PRN
Status: DISCONTINUED | OUTPATIENT
Start: 2019-05-13 | End: 2019-05-16 | Stop reason: HOSPADM

## 2019-05-13 RX ORDER — CHOLECALCIFEROL (VITAMIN D3) 125 MCG
500 CAPSULE ORAL DAILY
Status: DISCONTINUED | OUTPATIENT
Start: 2019-05-14 | End: 2019-05-16 | Stop reason: HOSPADM

## 2019-05-13 RX ORDER — ONDANSETRON 2 MG/ML
4 INJECTION INTRAMUSCULAR; INTRAVENOUS ONCE
Status: COMPLETED | OUTPATIENT
Start: 2019-05-13 | End: 2019-05-13

## 2019-05-13 RX ORDER — DULOXETIN HYDROCHLORIDE 30 MG/1
30 CAPSULE, DELAYED RELEASE ORAL DAILY
Status: DISCONTINUED | OUTPATIENT
Start: 2019-05-14 | End: 2019-05-16 | Stop reason: HOSPADM

## 2019-05-13 RX ORDER — SODIUM CHLORIDE 0.9 % (FLUSH) 0.9 %
10 SYRINGE (ML) INJECTION EVERY 12 HOURS SCHEDULED
Status: DISCONTINUED | OUTPATIENT
Start: 2019-05-13 | End: 2019-05-16 | Stop reason: HOSPADM

## 2019-05-13 RX ORDER — PREDNISONE 20 MG/1
40 TABLET ORAL DAILY
Status: DISCONTINUED | OUTPATIENT
Start: 2019-05-16 | End: 2019-05-15

## 2019-05-13 RX ORDER — SODIUM CHLORIDE 0.9 % (FLUSH) 0.9 %
10 SYRINGE (ML) INJECTION PRN
Status: DISCONTINUED | OUTPATIENT
Start: 2019-05-13 | End: 2019-05-16 | Stop reason: HOSPADM

## 2019-05-13 RX ORDER — NICOTINE 21 MG/24HR
1 PATCH, TRANSDERMAL 24 HOURS TRANSDERMAL DAILY
Status: DISCONTINUED | OUTPATIENT
Start: 2019-05-13 | End: 2019-05-15

## 2019-05-13 RX ORDER — METHYLPREDNISOLONE SODIUM SUCCINATE 125 MG/2ML
125 INJECTION, POWDER, LYOPHILIZED, FOR SOLUTION INTRAMUSCULAR; INTRAVENOUS ONCE
Status: COMPLETED | OUTPATIENT
Start: 2019-05-13 | End: 2019-05-13

## 2019-05-13 RX ORDER — LOPERAMIDE HYDROCHLORIDE 2 MG/1
2 CAPSULE ORAL 4 TIMES DAILY PRN
Status: DISCONTINUED | OUTPATIENT
Start: 2019-05-13 | End: 2019-05-16 | Stop reason: HOSPADM

## 2019-05-13 RX ORDER — METHYLPREDNISOLONE SODIUM SUCCINATE 40 MG/ML
40 INJECTION, POWDER, LYOPHILIZED, FOR SOLUTION INTRAMUSCULAR; INTRAVENOUS EVERY 6 HOURS
Status: DISCONTINUED | OUTPATIENT
Start: 2019-05-14 | End: 2019-05-15

## 2019-05-13 RX ORDER — ERGOCALCIFEROL 1.25 MG/1
50000 CAPSULE ORAL WEEKLY
Status: DISCONTINUED | OUTPATIENT
Start: 2019-05-14 | End: 2019-05-16 | Stop reason: HOSPADM

## 2019-05-13 RX ADMIN — Medication 3 MG: at 23:16

## 2019-05-13 RX ADMIN — IPRATROPIUM BROMIDE AND ALBUTEROL SULFATE 1 AMPULE: .5; 3 SOLUTION RESPIRATORY (INHALATION) at 17:55

## 2019-05-13 RX ADMIN — IOHEXOL 50 ML: 240 INJECTION, SOLUTION INTRATHECAL; INTRAVASCULAR; INTRAVENOUS; ORAL at 19:24

## 2019-05-13 RX ADMIN — IOPAMIDOL 75 ML: 755 INJECTION, SOLUTION INTRAVENOUS at 19:25

## 2019-05-13 RX ADMIN — METHYLPREDNISOLONE SODIUM SUCCINATE 125 MG: 125 INJECTION, POWDER, FOR SOLUTION INTRAMUSCULAR; INTRAVENOUS at 20:30

## 2019-05-13 RX ADMIN — IPRATROPIUM BROMIDE AND ALBUTEROL SULFATE 1 AMPULE: .5; 3 SOLUTION RESPIRATORY (INHALATION) at 20:23

## 2019-05-13 RX ADMIN — ONDANSETRON 4 MG: 2 INJECTION INTRAMUSCULAR; INTRAVENOUS at 18:44

## 2019-05-13 RX ADMIN — FAMOTIDINE 20 MG: 20 TABLET ORAL at 23:16

## 2019-05-13 RX ADMIN — Medication 10 ML: at 23:17

## 2019-05-13 ASSESSMENT — ENCOUNTER SYMPTOMS
COUGH: 1
VOMITING: 1
NAUSEA: 1
SHORTNESS OF BREATH: 1
COLOR CHANGE: 0
DIARRHEA: 0
WHEEZING: 1
ABDOMINAL PAIN: 1
CONSTIPATION: 0
BACK PAIN: 0

## 2019-05-13 ASSESSMENT — PAIN SCALES - GENERAL
PAINLEVEL_OUTOF10: 8
PAINLEVEL_OUTOF10: 8
PAINLEVEL_OUTOF10: 0

## 2019-05-13 ASSESSMENT — PAIN DESCRIPTION - LOCATION
LOCATION: GENERALIZED
LOCATION: GENERALIZED

## 2019-05-13 ASSESSMENT — PAIN DESCRIPTION - DESCRIPTORS: DESCRIPTORS: ACHING

## 2019-05-13 NOTE — ED PROVIDER NOTES
[facility]  eMERGENCY dEPARTMENT eNCOUnter      Pt Name: Radha Marie  MRN: [id number]  Armscristophergfkarime 1945  Date of evaluation: 5/13/2019  Provider: Lynne Jones, [facility]       Chief Complaint   Patient presents with    Cough    Nasal Congestion    Abdominal Pain       CRITICAL CARE TIME   Total Critical Care time was 15 minutes, excluding separately reportable procedures. There was a high probability of clinically significant/life threatening deterioration in the patient's condition which required my urgent intervention. HISTORY OF PRESENT ILLNESS  (Location/Symptom, Timing/Onset, Context/Setting, Quality, Duration, Modifying Factors, Severity.)   Radha Marie is a 69 y.o. female who presents to the emergency department complaining of shortness of breath and abdominal pain. In April patient had colostomy takedown and a pelvic abscess. She was supposed to take Augmentin but admittedly only took 2 doses due to [place]. \"  She states that her abdominal pain was feeling improved until about 2 or 3 days ago when she started having more pain in her left lower abdomen. She states it feels similar to prior \"diverticulitis. \"  She had one episode of vomiting today. She has continued to smoke cigarettes and has history of COPD. For the past couple of days she's been coughing but has been nonproductive. No fevers. She had a small bowel movement this a.m. which was otherwise normal and she states she has not had diarrhea a couple days. She saw her surgeon on the sixth of this month and he had advised she should have a stool study as well as repeat CT scan due to her noncompliance with antibiotic to ensure there was no more infection or pelvic abscess. She states \"she forgot to have this done. \"  She rates her abdominal pain at 8 out of 10. Does admit to drinking wine this evening. Nursing Notes were reviewed and I agree.     REVIEW OF SYSTEMS (2-9 systems for level 4, 10 or more for level 5)     Review of Systems   Constitutional: Negative for fever. Respiratory: Positive for cough, shortness of breath and wheezing. Cardiovascular: Negative for chest pain. Gastrointestinal: Positive for abdominal pain, nausea and vomiting (x1). Negative for constipation (sm bm today) and diarrhea. Musculoskeletal: Negative for back pain. Skin: Negative for color change and wound. Neurological: Negative for weakness. Psychiatric/Behavioral: Negative for agitation, behavioral problems and confusion. Except as noted above the remainder of the review of systems was reviewed and negative.        PAST MEDICAL HISTORY         Diagnosis Date    Anemia     Clostridium difficile colitis     December 2018    Colostomy in place [facility])     SBO/diverticulosis     COPD [facility] obstructive [facility]) [facility] [address]     Depression     DANIELLE (generalized anxiety disorder)     Gastric ulcer, unspecified as acute or chronic, without mention of hemorrhage, perforation, or obstruction     H/O ETOH abuse     Hiatal hernia     Hypertension     Insomnia     Intestinal obstruction ([facility] [address]     Parkinson's disease ([facility] [address]     Tobacco abuse     Vitamin D deficiency        SURGICAL HISTORY           Procedure Laterality Date    CHOLECYSTECTOMY      COLONOSCOPY  12/14/2018    COLONOSCOPY N/A 12/14/2018    COLONOSCOPY POLYPECTOMY SNARE/COLD BIOPSY performed by Elmer Valdez MD at [address] [facility]  2018   [facility] N/A 4/10/2019    LAPAROSCOPIC LYSISI OF ADHESIONS FOR GREATER THAN 3 HOURS,LAPORSCOPIC CONVERTED TO OPEN COLOSTOMY REVERSAL, SMALL BOWEL RESECTION performed by Kalyan Lui MD at [address] [facility] ENDOSCOPY  12/13/2018    with biopsies    UPPER GASTROINTESTINAL ENDOSCOPY N/A 12/13/2018    EGD BIOPSY performed by Elmer Valdez MD at [address] [place] [address] CURRENT MEDICATIONS       Previous Medications    ACETAMINOPHEN (TYLENOL) 325 MG TABLET    Take 650 mg by mouth nightly    ALBUTEROL SULFATE HFA (PROAIR HFA) 108 (90 BASE) MCG/ACT INHALER    Inhale 2 puffs into the lungs every 6 hours as needed for Wheezing    DOCUSATE SODIUM (COLACE, DULCOLAX) 100 MG CAPS    Take 100 mg by mouth 2 times daily Hold if she has more than two loose stools in a day. Soft stool is encouraged until follow up with surgeon. DULOXETINE (CYMBALTA) 30 MG EXTENDED RELEASE CAPSULE    Take 1 capsule by mouth daily    FAMOTIDINE (PEPCID) 20 MG TABLET    Take 1 tablet by mouth 2 times daily    FERROUS SULFATE 325 (65 FE) MG TABLET    Take 325 mg by mouth daily (with breakfast)    FOLIC ACID (FOLVITE) 1 MG TABLET    Take 1 mg by mouth daily    LOPERAMIDE (IMODIUM) 2 MG CAPSULE    Take 2 mg by mouth 4 times daily as needed for Diarrhea    MELATONIN 3 MG TABS TABLET    Take 3 mg by mouth nightly as needed     ONDANSETRON (ZOFRAN ODT) 4 MG DISINTEGRATING TABLET    Take 1-2 tablets by mouth every 12 hours as needed for Nausea    OXYGEN    Inhale 2 L into the lungs as needed    UMECLIDINIUM BROMIDE 62.5 MCG/INH AEPB    Inhale 1 puff into the lungs daily    VITAMIN B-1 100 MG TABLET    Take 1 tablet by mouth daily    VITAMIN B-12 (CYANOCOBALAMIN) 500 MCG TABLET    Take 500 mcg by mouth daily    VITAMIN B-6 (PYRIDOXINE) 100 MG TABLET    Take 100 mg by mouth daily    VITAMIN D (ERGOCALCIFEROL) 92035 UNIT CAPS CAPSULE    Take 50,000 Units by mouth once a week        ALLERGIES     Aspirin; Fentanyl; and Morphine    FAMILY HISTORY     History reviewed. No pertinent family history. No family status information on file. SOCIAL HISTORY      reports that she has been smoking cigarettes. She has a 30.00 pack-year smoking history. She has never used smokeless tobacco. She reports that she drinks about 1.2 - 1.8 oz of alcohol per week. She reports that she does not use drugs.     PHYSICAL EXAM    (up COMPREHENSIVE METABOLIC PANEL - Abnormal; Notable for the following components:    Potassium 3.3 (*)     Glucose 119 (*)     CREATININE <0.5 (*)     Albumin/Globulin Ratio 0.9 (*)     Alkaline Phosphatase 142 (*)     All other components within normal limits    Narrative:     Performed at:  [facility]  [address] [facility] [place]   Phone [phone]   BRAIN NATRIURETIC PEPTIDE - Abnormal; Notable for the following components:    Pro- (*)     All other components within normal limits    Narrative:     Performed at:  [facility]  [address] [place]   Phone [phone]   URINE RT REFLEX TO CULTURE - Abnormal; Notable for the following components:    Blood, Urine SMALL (*)     Leukocyte Esterase, Urine MODERATE (*)     All other components within normal limits    Narrative:     Performed at:  [facility]  [address] [facility] [place] [place]   Phone [phone]   MICROSCOPIC URINALYSIS - Abnormal; Notable for the following components:    WBC, UA 13 (*)     RBC, UA 13 (*)     Epi Cells 23 (*)     All other components within normal limits    Narrative:     Performed at:  [facility]  [address]   Phone [phone]   CULTURE BLOOD #1   CULTURE BLOOD #2   URINE CULTURE   C DIFF TOXIN/ANTIGEN   C DIFF TOXIN/ANTIGEN   LIPASE    Narrative:     Performed at:  [facility]  [address] [place]   Phone [phone]   ETHANOL    Narrative:     Performed at:  [facility]  [address] [facility] [place] [place]   Phone [phone]   TROPONIN    Narrative:     Performed at:  [facility]  [address] [facility] [place] [place]   Phone [phone]   LACTIC ACID, PLASMA PA  05/13/19 2126       Gerhard [place], [place]  05/13/19 4231

## 2019-05-13 NOTE — ED TRIAGE NOTES
Pt to ER via [facility] from home c/o cough since she left the nursing home. Pt reports she did not smoke then but now she does. Pt also c/o nasal congestion. Pt states she had generalized pain but increased in abd pain.  Pt states she has had a recent colostomy reversal.

## 2019-05-13 NOTE — ED NOTES
Pt refuse to straight cath and state she needs to drink more to go on bed pan.  Pt is drinking contrast.     Tim Jon, JOANA  05/13/19 2778

## 2019-05-14 ENCOUNTER — APPOINTMENT (OUTPATIENT)
Dept: GENERAL RADIOLOGY | Age: 74
DRG: 189 | End: 2019-05-14
Payer: COMMERCIAL

## 2019-05-14 PROBLEM — I48.91 ATRIAL FIBRILLATION (HCC): Status: ACTIVE | Noted: 2019-05-14

## 2019-05-14 LAB
ANION GAP SERPL CALCULATED.3IONS-SCNC: 18 MMOL/L (ref 3–16)
BUN BLDV-MCNC: 9 MG/DL (ref 7–20)
CALCIUM SERPL-MCNC: 9.2 MG/DL (ref 8.3–10.6)
CHLORIDE BLD-SCNC: 96 MMOL/L (ref 99–110)
CO2: 23 MMOL/L (ref 21–32)
CREAT SERPL-MCNC: 0.7 MG/DL (ref 0.6–1.2)
GFR AFRICAN AMERICAN: >60
GFR NON-AFRICAN AMERICAN: >60
GLUCOSE BLD-MCNC: 290 MG/DL (ref 70–99)
HCT VFR BLD CALC: 34.9 % (ref 36–48)
HEMOGLOBIN: 11 G/DL (ref 12–16)
MCH RBC QN AUTO: 29.2 PG (ref 26–34)
MCHC RBC AUTO-ENTMCNC: 31.4 G/DL (ref 31–36)
MCV RBC AUTO: 92.8 FL (ref 80–100)
PDW BLD-RTO: 15.8 % (ref 12.4–15.4)
PLATELET # BLD: 432 K/UL (ref 135–450)
PMV BLD AUTO: 7.7 FL (ref 5–10.5)
POTASSIUM SERPL-SCNC: 3.8 MMOL/L (ref 3.5–5.1)
RBC # BLD: 3.76 M/UL (ref 4–5.2)
SODIUM BLD-SCNC: 137 MMOL/L (ref 136–145)
TSH SERPL DL<=0.05 MIU/L-ACNC: 0.44 UIU/ML (ref 0.27–4.2)
WBC # BLD: 6.1 K/UL (ref 4–11)

## 2019-05-14 PROCEDURE — 6370000000 HC RX 637 (ALT 250 FOR IP): Performed by: FAMILY MEDICINE

## 2019-05-14 PROCEDURE — 6370000000 HC RX 637 (ALT 250 FOR IP): Performed by: INTERNAL MEDICINE

## 2019-05-14 PROCEDURE — 6360000002 HC RX W HCPCS: Performed by: NURSE PRACTITIONER

## 2019-05-14 PROCEDURE — 99223 1ST HOSP IP/OBS HIGH 75: CPT | Performed by: INTERNAL MEDICINE

## 2019-05-14 PROCEDURE — 97166 OT EVAL MOD COMPLEX 45 MIN: CPT

## 2019-05-14 PROCEDURE — 97162 PT EVAL MOD COMPLEX 30 MIN: CPT

## 2019-05-14 PROCEDURE — 2580000003 HC RX 258: Performed by: FAMILY MEDICINE

## 2019-05-14 PROCEDURE — 6370000000 HC RX 637 (ALT 250 FOR IP): Performed by: NURSE PRACTITIONER

## 2019-05-14 PROCEDURE — 6360000002 HC RX W HCPCS: Performed by: FAMILY MEDICINE

## 2019-05-14 PROCEDURE — G0378 HOSPITAL OBSERVATION PER HR: HCPCS

## 2019-05-14 PROCEDURE — 96376 TX/PRO/DX INJ SAME DRUG ADON: CPT

## 2019-05-14 PROCEDURE — 94761 N-INVAS EAR/PLS OXIMETRY MLT: CPT

## 2019-05-14 PROCEDURE — 85027 COMPLETE CBC AUTOMATED: CPT

## 2019-05-14 PROCEDURE — 84443 ASSAY THYROID STIM HORMONE: CPT

## 2019-05-14 PROCEDURE — 80048 BASIC METABOLIC PNL TOTAL CA: CPT

## 2019-05-14 PROCEDURE — 97530 THERAPEUTIC ACTIVITIES: CPT

## 2019-05-14 PROCEDURE — 93010 ELECTROCARDIOGRAM REPORT: CPT | Performed by: INTERNAL MEDICINE

## 2019-05-14 PROCEDURE — 2580000003 HC RX 258: Performed by: NURSE PRACTITIONER

## 2019-05-14 PROCEDURE — 96372 THER/PROPH/DIAG INJ SC/IM: CPT

## 2019-05-14 PROCEDURE — 36415 COLL VENOUS BLD VENIPUNCTURE: CPT

## 2019-05-14 PROCEDURE — 1200000000 HC SEMI PRIVATE

## 2019-05-14 PROCEDURE — 94640 AIRWAY INHALATION TREATMENT: CPT

## 2019-05-14 PROCEDURE — 97116 GAIT TRAINING THERAPY: CPT

## 2019-05-14 PROCEDURE — 71046 X-RAY EXAM CHEST 2 VIEWS: CPT

## 2019-05-14 RX ORDER — POTASSIUM CHLORIDE 20 MEQ/1
40 TABLET, EXTENDED RELEASE ORAL ONCE
Status: COMPLETED | OUTPATIENT
Start: 2019-05-14 | End: 2019-05-14

## 2019-05-14 RX ORDER — BUSPIRONE HYDROCHLORIDE 5 MG/1
5 TABLET ORAL 2 TIMES DAILY
Status: DISCONTINUED | OUTPATIENT
Start: 2019-05-14 | End: 2019-05-16 | Stop reason: HOSPADM

## 2019-05-14 RX ORDER — DOCUSATE SODIUM 100 MG/1
100 CAPSULE, LIQUID FILLED ORAL 2 TIMES DAILY PRN
Status: DISCONTINUED | OUTPATIENT
Start: 2019-05-14 | End: 2019-05-16 | Stop reason: HOSPADM

## 2019-05-14 RX ORDER — METOPROLOL SUCCINATE 25 MG/1
25 TABLET, EXTENDED RELEASE ORAL DAILY
Status: DISCONTINUED | OUTPATIENT
Start: 2019-05-14 | End: 2019-05-16 | Stop reason: HOSPADM

## 2019-05-14 RX ORDER — CHLORDIAZEPOXIDE HYDROCHLORIDE 5 MG/1
5 CAPSULE, GELATIN COATED ORAL EVERY 6 HOURS PRN
Status: DISCONTINUED | OUTPATIENT
Start: 2019-05-14 | End: 2019-05-16 | Stop reason: HOSPADM

## 2019-05-14 RX ADMIN — IPRATROPIUM BROMIDE AND ALBUTEROL SULFATE 1 AMPULE: .5; 3 SOLUTION RESPIRATORY (INHALATION) at 20:43

## 2019-05-14 RX ADMIN — ERGOCALCIFEROL 50000 UNITS: 1.25 CAPSULE ORAL at 08:14

## 2019-05-14 RX ADMIN — FERROUS SULFATE TAB EC 324 MG (65 MG FE EQUIVALENT) 324 MG: 324 (65 FE) TABLET DELAYED RESPONSE at 08:14

## 2019-05-14 RX ADMIN — POTASSIUM CHLORIDE 40 MEQ: 20 TABLET, EXTENDED RELEASE ORAL at 10:34

## 2019-05-14 RX ADMIN — Medication 500 MCG: at 08:14

## 2019-05-14 RX ADMIN — CEFTRIAXONE 1 G: 1 INJECTION, POWDER, FOR SOLUTION INTRAMUSCULAR; INTRAVENOUS at 15:33

## 2019-05-14 RX ADMIN — ENOXAPARIN SODIUM 40 MG: 40 INJECTION SUBCUTANEOUS at 08:15

## 2019-05-14 RX ADMIN — FOLIC ACID 1 MG: 1 TABLET ORAL at 08:15

## 2019-05-14 RX ADMIN — Medication 100 MG: at 08:15

## 2019-05-14 RX ADMIN — IPRATROPIUM BROMIDE AND ALBUTEROL SULFATE 1 AMPULE: .5; 3 SOLUTION RESPIRATORY (INHALATION) at 08:43

## 2019-05-14 RX ADMIN — BUSPIRONE HYDROCHLORIDE 5 MG: 5 TABLET ORAL at 21:32

## 2019-05-14 RX ADMIN — DULOXETINE HYDROCHLORIDE 30 MG: 30 CAPSULE, DELAYED RELEASE ORAL at 08:14

## 2019-05-14 RX ADMIN — METHYLPREDNISOLONE SODIUM SUCCINATE 40 MG: 40 INJECTION, POWDER, FOR SOLUTION INTRAMUSCULAR; INTRAVENOUS at 04:10

## 2019-05-14 RX ADMIN — Medication 10 ML: at 21:33

## 2019-05-14 RX ADMIN — DOCUSATE SODIUM 100 MG: 100 CAPSULE, LIQUID FILLED ORAL at 08:14

## 2019-05-14 RX ADMIN — IPRATROPIUM BROMIDE AND ALBUTEROL SULFATE 1 AMPULE: .5; 3 SOLUTION RESPIRATORY (INHALATION) at 15:47

## 2019-05-14 RX ADMIN — METOPROLOL SUCCINATE 25 MG: 25 TABLET, EXTENDED RELEASE ORAL at 10:34

## 2019-05-14 RX ADMIN — ACETAMINOPHEN 650 MG: 325 TABLET ORAL at 22:15

## 2019-05-14 RX ADMIN — CHLORDIAZEPOXIDE HYDROCHLORIDE 5 MG: 5 CAPSULE ORAL at 15:20

## 2019-05-14 RX ADMIN — FAMOTIDINE 20 MG: 20 TABLET ORAL at 08:14

## 2019-05-14 RX ADMIN — Medication 10 ML: at 08:14

## 2019-05-14 RX ADMIN — PYRIDOXINE HCL TAB 50 MG 100 MG: 50 TAB at 08:14

## 2019-05-14 RX ADMIN — METHYLPREDNISOLONE SODIUM SUCCINATE 40 MG: 40 INJECTION, POWDER, FOR SOLUTION INTRAMUSCULAR; INTRAVENOUS at 08:14

## 2019-05-14 RX ADMIN — FAMOTIDINE 20 MG: 20 TABLET ORAL at 21:32

## 2019-05-14 RX ADMIN — Medication 3 MG: at 21:32

## 2019-05-14 RX ADMIN — IPRATROPIUM BROMIDE AND ALBUTEROL SULFATE 1 AMPULE: .5; 3 SOLUTION RESPIRATORY (INHALATION) at 12:17

## 2019-05-14 ASSESSMENT — PAIN - FUNCTIONAL ASSESSMENT: PAIN_FUNCTIONAL_ASSESSMENT: ACTIVITIES ARE NOT PREVENTED

## 2019-05-14 ASSESSMENT — PAIN DESCRIPTION - LOCATION: LOCATION: HEAD

## 2019-05-14 ASSESSMENT — PAIN SCALES - GENERAL
PAINLEVEL_OUTOF10: 0
PAINLEVEL_OUTOF10: 0
PAINLEVEL_OUTOF10: 2

## 2019-05-14 ASSESSMENT — PAIN DESCRIPTION - PAIN TYPE: TYPE: ACUTE PAIN

## 2019-05-14 ASSESSMENT — PAIN DESCRIPTION - FREQUENCY: FREQUENCY: INTERMITTENT

## 2019-05-14 ASSESSMENT — PAIN DESCRIPTION - ONSET: ONSET: ON-GOING

## 2019-05-14 NOTE — ED NOTES
This RN ambulated pt around nurse station. Oxygen sat 84%RA. Pt took multiple deep breaths. Oxygen increased. Pt returned to bed.       John Paul Mccord RN  05/13/19 6405

## 2019-05-14 NOTE — PROGRESS NOTES
Hospitalist Progress Note    CC: COPD exacerbation ([facility] [address]      Admit date: 5/13/2019  Days in hospital:  0    Subjective: Pt S/E. Pt c/o feeling nervous. Still wheezing, but denies cough. states she has had a tremor since she was young. ROS:   Pertinent items are noted in HPI. Objective:    BP (!) 151/72   Pulse 89   Temp 98.3 °F (36.8 °C) (Oral)   Resp 18   Ht 5' 2\" (1.575 m)   Wt 126 lb 15.8 oz (57.6 kg)   SpO2 95%   BMI 23.23 kg/m²     Gen: alert, NAD  HEENT: NC/AT, moist mucous membranes, no oropharyngeal erythema or exudate  Neck: supple, trachea midline, no anterior cervical or SC LAD  Heart: Normal s1/s2, RRR, no murmurs, gallops, or rubs. Lungs: diffuse wheezing, no rales, no rhonchi, no use of accessory muscles  Abd: bowel sounds present, soft, nontender, nondistended, no masses  Extrem: No clubbing, cyanosis, no edema  Skin: no rashes or lesions  Psych: A & O x3, affect appropriate  Neuro: grossly intact, moves all four extremities spontaneously.  BL UE tremor   Cap refill: +2 sec    Medications:  Scheduled Meds:   docusate sodium  100 mg Oral BID    DULoxetine  30 mg Oral Daily    famotidine  20 mg Oral BID    ferrous sulfate  324 mg Oral Daily with breakfast    folic acid  1 mg Oral Daily    thiamine  100 mg Oral Daily    vitamin B-12  500 mcg Oral Daily    vitamin B-6  100 mg Oral Daily    vitamin D  50,000 Units Oral Weekly    sodium chloride flush  10 mL Intravenous 2 times per day    enoxaparin  40 mg Subcutaneous Daily    ipratropium-albuterol  1 ampule Inhalation Q4H WA    methylPREDNISolone  40 mg Intravenous Q6H    Followed by   Kiana El ON 5/16/2019] predniSONE  40 mg Oral Daily    nicotine  1 patch Transdermal Daily       PRN Meds:  loperamide, melatonin, sodium chloride flush, magnesium hydroxide, ondansetron, albuterol, acetaminophen    IV:        Intake/Output Summary (Last 24 hours) at 5/14/2019 4573  Last data filed at 5/14/2019 3856  Gross ordered  - cardiology consulted    Chronic alcoholism  - BAL was negative, no signs of withdrawal  - cont MVI, B vitamins  - add librium prn only    Code status:  full  DVT prophylaxis: [x] Lovenox  [] SQ Heparin  [] SCDs because of  [] warfarin/oral direct thrombin inhibitor [] Encourage ambulation      Disposition:  [] Home [] Rehab [] Psych [] SNF  [] LTAC  [] Transfer to ICU  [] Transfer to PCU [] Other: in pt      Electronically signed by Carisa Kaufman DO on 5/14/2019 at 8:27 AM

## 2019-05-14 NOTE — PROGRESS NOTES
Occupational Therapy   Occupational Therapy Initial Assessment and Tentative D/C    This note to serve as d/c summary should pt d/c prior to next session. Date: 2019   Patient Name: Lou Dumont  MRN: [id number]     : 1945    Date of Service: 2019    Discharge Recommendations: Lou Dumont scored a 21/24 on the AM-PAC ADL Inpatient form. At this time, no further OT is recommended upon discharge due to anticipate pt safe to return home at time of D/C. Recommend patient returns to prior setting with prior services. Home with assist PRN  OT Equipment Recommendations  Equipment Needed: No    Assessment   Performance deficits / Impairments: Decreased functional mobility ; Decreased safe awareness;Decreased endurance;Decreased balance;Decreased high-level IADLs;Decreased ADL status; Decreased strength  Assessment: A 68 yo fem lae admitted with a COPD exacerbation. She still smokes and ran out of her inhaled steroids. She is not on O2 at home. She also had new onset afib on her ECG. PTA pt reports needing Min A from significant other to complete mobility and ADLs. Pt currently functioning below baseline needing increased assist. Pt will benefit from skilled OT services at this time to increase overall strength and endurance to return to OF. Anticipate pt safe to return home at time of D/C. Prognosis: Good  Decision Making: Medium Complexity  Exam: see above  Assistance / Modification: RW  Patient Education: role of OT; safety with transfers  REQUIRES OT FOLLOW UP: Yes  Activity Tolerance  Activity Tolerance: Patient Tolerated treatment well  Safety Devices  Safety Devices in place: Yes  Type of devices: Left in bed;Call light within reach;Nurse notified; Bed alarm in place           Patient Diagnosis(es): The primary encounter diagnosis was COPD with acute exacerbation ([facility] [address]  Diagnoses of Hypoxia, Abdominal pain, lower, Pulmonary nodule, and New onset a-fib [facility]) were also pertinent to this visit. has a past medical history of Anemia, Arthritis, Clostridium difficile colitis, Colostomy in place [facility]), COPD (chronic obstructive pulmonary disease) ([facility] [address] Depression, DANIELLE (generalized anxiety disorder), Gastric ulcer, unspecified as acute or chronic, without mention of hemorrhage, perforation, or obstruction, H/O ETOH abuse, Hiatal hernia, History of blood transfusion, Hyperlipidemia, Hypertension, Insomnia, Intestinal obstruction ([place] [address] Parkinson's disease ([facility] [address] Tobacco abuse, and Vitamin D deficiency. has a past surgical history that includes Hysterectomy; Cholecystectomy; Tubal ligation; colostomy (2018); Upper gastrointestinal endoscopy (12/13/2018); Upper gastrointestinal endoscopy (N/A, 12/13/2018); Colonoscopy (12/14/2018); Colonoscopy (N/A, 12/14/2018); Small intestine surgery (N/A, 4/10/2019); Endoscopy, colon, diagnostic; and Abdomen surgery. Restrictions  Restrictions/Precautions  Restrictions/Precautions: Fall Risk    Subjective   General  Chart Reviewed: Yes  Patient assessed for rehabilitation services?: Yes  Additional Pertinent Hx: 68 yo fem lae admitted with a COPD exacerbation. She still smokes and ran out of her inhaled steroids. She is not on O2 at home. She also had new onset afib on her ECG. Family / Caregiver Present: No  Referring Practitioner: Nain Baires  Subjective  Subjective: Pt supine in bed and agreeable to OT services. Pt reports minimal pain in abdomen with no number stated. General Comment  Comments: okay for therapy per RN.    Oxygen Therapy  SpO2: 94 %  O2 Device: None (Room air)  Social/Functional History  Social/Functional History  Lives With: Spouse(ex mother-in-law)  Type of Home: House(Pt has been home from SNF ~1-2 weeks)  Home Layout: One level  Home Access: Stairs to enter with rails  Entrance Stairs - Number of Steps: 10  Entrance Stairs - Rails: Right  Bathroom Shower/Tub: Tub/Shower unit, Shower chair with back  [employer]: Standard(sink close by)  [employer]: Commode  Bathroom Accessibility: Not accessible  Home Equipment: Rolling walker  ADL Assistance: Needs assistance(PRN assist for toileting. Pt gets into shower infrequently. Pt able to do lower dressing, assist for upper body PRN (due to right shld pain). )  Homemaking Responsibilities: No(Pt's spouse assisting pt with meals.)  Ambulation Assistance: Needs assistance(SBA with walker)  Transfer Assistance: Needs assistance(SBA)  Additional Comments: Pt reports \"cat poop all over the shower chair so I never use it\" and \"my  is always drunk and only helps me after I bitch at him\". Objective   Vision: Within Functional Limits  Hearing: Within functional limits    Orientation  Overall Orientation Status: Within Functional Limits     Balance  Sitting Balance: Independent  Standing Balance: Supervision(RW)  Functional Mobility  Functional - Mobility Device: Rolling Walker  Activity: To/from bathroom; Other(household distances)  Assist Level: Stand by assistance  Functional Mobility Comments: no LOB; minimally SOB with ambulation  Toilet Transfers  Toilet - Technique: Ambulating  Equipment Used: Standard toilet  Toilet Transfer: Stand by assistance  ADL  Grooming: Supervision(in stance at sink)  LE Dressing: Supervision(supine in bed; able to don/doff bilateral socks)  Additional Comments: anticipate pt needing SBA/supervision for all ADLs for standing ADLs.    Coordination  Coordination and Movement description: Tremors     Bed mobility  Supine to Sit: Stand by assistance(HOB up)  Sit to Supine: Stand by assistance(HOB up)  Transfers  Sit to stand: Stand by assistance  Stand to sit: Stand by assistance  Transfer Comments: RW; cues for hand placement     Cognition  Overall Cognitive Status: Exceptions  Memory: Decreased short term memory  Safety Judgement: Decreased awareness of need for safety(Impulsive)        Sensation  Overall Sensation Status: WFL        LUE AROM (degrees)  LUE AROM : WFL  RUE AROM (degrees)  RUE AROM : WFL(reports difficulty with R shoulder; )  LUE Strength  Gross LUE Strength: WFL  L Hand Grasp: 4/5  RUE Strength  Gross RUE Strength: WFL  R Hand Grasp: 4/5                   Plan   Plan  Times per week: 3-5x  Current Treatment Recommendations: Strengthening, Balance Training, Endurance Training, Gait Training, Safety Education & Training, Self-Care / ADL      AM-PAC Score        AM-PAC Inpatient Daily Activity Raw Score: 21  AM-PAC Inpatient ADL T-Scale Score : 44.27  ADL Inpatient CMS 0-100% Score: 32.79  ADL Inpatient CMS G-Code Modifier : CJ    Goals  Short term goals  Time Frame for Short term goals: prior to D/C  Short term goal 1: complete functional mobility and transfers with Mod Ind  Short term goal 2: complete toileting Mod Ind  Short term goal 3: complete LB dressing Ind  Short term goal 4: tolerate B UE exercsies in order to increase overall strength and endurance with ADLs.    Long term goals  Time Frame for Long term goals : STG=LTG  Patient Goals   Patient goals : no stated goals       Therapy Time   Individual Concurrent Group Co-treatment   Time In 1435         Time Out 1510         Minutes 35         Timed Code Treatment Minutes: 25 Minutes(10 minute eval)       RUBY Alfredo/CARI

## 2019-05-14 NOTE — CONSULTS
Chart reviewed. Full note to follow.
Measures:  · Ht: 5' 2\" (157.5 cm)   · Current Body Wt: 126 lb (57.2 kg)  · % Weight Change:  ,  Unable to state  · Ideal Body Wt: 110 lb (49.9 kg),   · BMI Classification: BMI 18.5 - 24.9 Normal Weight    Nutrition Interventions:   Continue current diet, Start ONS  Continued Inpatient Monitoring    Nutrition Evaluation:   · Evaluation: Goals set   · Goals:  Tolerate diet and consume greater than 50% of meals and supplements this admission     · Monitoring: Meal Intake, Supplement Intake, Diet Tolerance, Weight      Electronically signed by Randy Strong RD, LD on 5/14/19 at 3:22 PM    Contact Number: [phone]
saturation 95%, temperature is 97.9. CONSTITUTIONAL:  She is alert, oriented, has evidence of tremors. HEENT:  Neck is supple. No JVD. No thyromegaly. No carotid bruits are  heard. CARDIAC:  Irregularly irregular heart rhythm. LUNGS:  Diminished breath sounds bilaterally. ABDOMEN:  Soft, nontender. No organomegaly. EXTREMITIES:  No edema. NEUROLOGICAL:  Alert, oriented. Cranial nerves II through XII intact. No focal deficit. SKIN:  No rashes. LABORATORY DATA:  Sodium is 140, potassium 3.3, chloride 99, bicarb 29,  BUN 7, creatinine _____. BNP is 496. White count is 6.1, hemoglobin  11, hematocrit is 34.9, platelet count is 854. The CT scan of the chest reveals multiple small pulmonary nodules and  cardiomegaly. EKG shows atrial fibrillation with ST and T-wave abnormalities. IMPRESSION:  1. This is a 74-year-old female who has presented with worsening  shortness of breath, productive cough, and has evidence of atrial  fibrillation with rapid ventricular rate. The etiology of her atrial  fibrillation remains unclear at the present time. It could be due to  hypertensive heart disease, EtOH usage, and/or ischemic heart disease. 2.  Hypertension. Blood pressure currently slightly elevated but  stable. 3.  Hyperlipidemia. 4.  Pulmonary nodules. RECOMMENDATION:  1. We will order an echocardiogram to assess LV function. 2.  I will continue her on beta-blocker therapy for rate control. 3.  The patient will require chronic anticoagulation but that will be  decided after she undergoes echocardiogram.  4.  She will undergo chemical stress test if her echocardiogram shows  wall motion abnormalities to rule out coronary artery disease. I appreciate the opportunity to participate in the care of this pleasant  female.     With warm regards,        Karin Lambert MD    D: 05/14/2019 11:00:45       T: 05/14/2019 13:44:37     [email or web address]  Job#: [id number]     Doc#: [id number]    CC:

## 2019-05-14 NOTE — PROGRESS NOTES
Patient arrived to unit A&O X4. Denies pain at this time . VSS. Lung sounds diminished. Bowel sounds active in all quadrants with soft abdomen. Oriented to room and call light. Bed alarm activated. Will monitor.

## 2019-05-14 NOTE — PLAN OF CARE
Problem: Pain:  Goal: Pain level will decrease  Description  Pain level will decrease  5/14/2019 1114 by Yuval Coleman RN  Outcome: Ongoing  5/14/2019 0602 by Asya Car RN  Outcome: Ongoing  Goal: Control of acute pain  Description  Control of acute pain  5/14/2019 1114 by Yuval Coleman RN  Outcome: Ongoing  5/14/2019 0602 by Asya Car RN  Outcome: Ongoing  Goal: Control of chronic pain  Description  Control of chronic pain  5/14/2019 1114 by Yuval Coleman RN  Outcome: Ongoing  5/14/2019 0602 by Asya Car RN  Outcome: Ongoing     Problem: Falls - Risk of:  Goal: Will remain free from falls  Description  Will remain free from falls  5/14/2019 1114 by Yuval Coleman RN  Outcome: Ongoing  5/14/2019 0602 by Asya Car RN  Outcome: Ongoing  Goal: Absence of physical injury  Description  Absence of physical injury  5/14/2019 1114 by Yuval Coleman RN  Outcome: Ongoing  5/14/2019 0602 by Asya Car RN  Outcome: Ongoing     Problem: Risk for Impaired Skin Integrity  Goal: Tissue integrity - skin and mucous membranes  Description  Structural intactness and normal physiological function of skin and  mucous membranes.   5/14/2019 1114 by Yuval Coleman RN  Outcome: Ongoing  5/14/2019 0602 by Asya Car RN  Outcome: Ongoing

## 2019-05-14 NOTE — PROGRESS NOTES
Physical Therapy  Co-Eval with OT   Initial Assessment / Treatment Note    Room Number: F4W-7954/4275-01  NAME: Radha MAXWELL: Medical Record Number: [id number]  MRN: [id number]    ASSESSMENT: Julia Lerner is a 69 y.o. F admit 19 with shortness of breath, cough and abdominal pain -- patient had colostomy takedown and a pelvic abscess in 2019. Prior to admit pt was living at home with her s.o. and ex-spouse; she reports ambulating with SBA with a walker. Today she required SBA to transfer and ambulate 100' in hallway (SBA due to impulsive movement). She appears to be at her baseline function. I do not anticipate any skilled PT needs at discharge. Recommend that patient have PRN assist at discharge. Discharge Recommendations: ECF without PT, Home with assist PRN  Equipment Needs: Equipment Needed: No    Date of Service: 19       Patient Diagnosis(es): The primary encounter diagnosis was COPD with acute exacerbation ([address] Diagnoses of Hypoxia, Abdominal pain, lower, Pulmonary nodule, and New onset a-fib ([address] were also pertinent to this visit. Past Medical History:  has a past medical history of Anemia, Arthritis, Clostridium difficile colitis, Colostomy in place [facility]), COPD (chronic obstructive pulmonary disease) ([address] Depression, DANIELLE (generalized anxiety disorder), Gastric ulcer, unspecified as acute or chronic, without mention of hemorrhage, perforation, or obstruction, H/O ETOH abuse, Hiatal hernia, History of blood transfusion, Hyperlipidemia, Hypertension, Insomnia, Intestinal obstruction ([address] Parkinson's disease ([address] Tobacco abuse, and Vitamin D deficiency. Past Surgical History:  has a past surgical history that includes Hysterectomy; Cholecystectomy; Tubal ligation; colostomy (); Upper gastrointestinal endoscopy (2018); Upper gastrointestinal endoscopy (N/A, 2018); Colonoscopy (2018); Colonoscopy (N/A, 2018);  Small intestine surgery (N/A, 4/10/2019); Endoscopy, colon, diagnostic; and Abdomen surgery. Restrictions  Restrictions/Precautions: Fall Risk       Vision/Hearing  Vision: Within Functional Limits  Hearing: Within functional limits    SUBJECTIVE:  Chart Reviewed: Yes  Patient assessed for rehabilitation services?: Yes  Additional Pertinent Hx: Tong Valiente is a 69 y.o. F admit 5/14/19 with shortness of breath, cough and abdominal pain -- patient had colostomy takedown and a pelvic abscess in April 2019. Referring Practitioner: Yesenia Musa DO  Diagnosis: Shortness of breath, abdominal pain     Subjective: Pt supine in bed restless; c/o shaking being worse with steroids. Minor abdominal pain. Patient Currently in Pain: Yes  Pain Assessment: 0-10  Pain Level: 0  Patient's Stated Pain Goal: No pain  Pain Location: Generalized  Pain Descriptors: Aching     Orientation  Overall Orientation Status: Within Functional Limits  Orientation Level: Oriented to place, Oriented to time, Oriented to situation, Oriented to person  Cognition  Exceptions; decreased STM and decreased safety insight (impulsive)    Social/Functional History  Social/Functional History  Lives With: Spouse(ex mother-in-law)  Type of Home: House(Pt has been home from SNF ~1-2 weeks)  Home Layout: One level  Home Access: Stairs to enter with rails  Entrance Stairs - Number of Steps: 10  Entrance Stairs - Rails: Right  Bathroom Shower/Tub: Tub/Shower unit, Shower chair with back  Bathroom Toilet: Standard(sink close by)  [employer]: Commode  Bathroom Accessibility: Not accessible  Home Equipment: Rolling walker  ADL Assistance: Needs assistance(PRN assist for toileting. Pt gets into shower infrequently. Pt able to do lower dressing, assist for upper body PRN (due to right shld pain). )  Homemaking Responsibilities: No(Pt's spouse assisting pt with meals.)  Ambulation Assistance: Needs assistance(SBA with walker)  Transfer Assistance: Needs assistance(SBA)  Additional Comments: Pt reports \"cat poop all over the shower chair so I never use it\" and \"my  is always drunk and only helps me after I bitch at him\". OBJECTIVE:  ROM  RLE PROM: WFL     LLE PROM: WFL       STRENGTH  Strength RLE: WFL  Comment: Not formally examined but appears functional with sit<>stand and ambulation     Strength LLE: WFL  Comment: Not formally examined but appears functional with sit<>stand and ambulation       Bed mobility  Supine to Sit: Stand by assistance(HOB up)  Sit to Supine: Stand by assistance(HOB up)    Transfers  Sit to Stand: Stand by assistance  Stand to sit: Stand by assistance    Ambulation  Ambulation  Ambulation?: Yes  Ambulation 1  Device: Rolling Walker  Assistance: Stand by assistance  Quality of Gait: brisk pace, steady, struck right foot on walker leg one time  Distance: ~100'  Comments: reports fatigue after ambulating    Stairs/Curb  Stairs/Curb  Stairs?: No     Balance  Balance  Sitting - Static: Good  Standing - Static: Good, -(SBA ambulating with walker, impulsive)        Treatment included transfer training, gait training, and patient education. This note also serves as a D/C Summary in the event that this patient is discharged prior to the next therapy session. Please refer to last PT note for goal status, discharge recommendations and functional status. ASSESSMENT:   Assessment: Zenia Walsh is a 69 y.o. F admit 5/14/19 with shortness of breath, cough and abdominal pain -- patient had colostomy takedown and a pelvic abscess in April 2019. Prior to admit pt was living at home with her s.o. and ex-spouse; she reports ambulating with SBA with a walker. Today she required SBA to transfer and ambulate 100' in hallway (SBA due to impulsive movement). She appears to be at her baseline function. I do not anticipate any skilled PT needs at discharge. Recommend that patient have PRN assist at discharge.   Treatment Diagnosis: Decreased activity and from a bed to a chair?: A Little  How much help from another person needed to walk in hospital room?: A Little  How much help from another person for climbing 3-5 steps with a railing?: A Little  AM-PAC Inpatient Mobility Raw Score : 21  AM-PAC Inpatient T-Scale Score : 50.25  Mobility Inpatient CMS 0-100% Score: 28.97  Mobility Inpatient CMS G-Code Modifier : CJ       Goals  Patient Goals   Patient goals : to go to a nursing home  Time Frame for Short term goals: upon d/c  Short term goal 1: sup<>sit Sup   Short term goal 2: sit<>stand Sup   Short term goal 3: amb 150' with walker and Sup  Short term goal 4: up/down 4 steps with hand rails and CGA           Therapy Time    Individual Concurrent Group Co-treatment   Time In       1438   Time Out       1510    Minutes         32     Timed Code Treatment Minutes: 15 Minutes      Norberto Severino, PT

## 2019-05-14 NOTE — ED NOTES
Report called to [facility]. All questions answered. Transport request placed.      Jackie Frazier RN  05/13/19 1579

## 2019-05-14 NOTE — PROGRESS NOTES
Patient alert and oriented, VSS, sitting up in bed. Pt deneis n/v, pain, states is having some diarrhea, and SOB with exertion. Pt denies further needs at this time. Bed in lowest position, bed alarm on, call light within reach. Will continue to monitor pt needs.

## 2019-05-14 NOTE — H&P
LIGATION      UPPER GASTROINTESTINAL ENDOSCOPY  12/13/2018    with biopsies    UPPER GASTROINTESTINAL ENDOSCOPY N/A 12/13/2018    EGD BIOPSY performed by Twyla Clark MD at [address] [facility]       Medications Prior to Admission:      Prior to Admission medications    Medication Sig Start Date End Date Taking? Authorizing Provider   loperamide (IMODIUM) 2 MG capsule Take 2 mg by mouth 4 times daily as needed for Diarrhea   Yes Historical Provider, MD   melatonin 3 MG TABS tablet Take 3 mg by mouth nightly as needed    Yes Historical Provider, MD   acetaminophen (TYLENOL) 325 MG tablet Take 650 mg by mouth every 6 hours as needed    Yes Historical Provider, MD   albuterol sulfate HFA (PROAIR HFA) 108 (90 Base) MCG/ACT inhaler Inhale 2 puffs into the lungs every 6 hours as needed for Wheezing 26/0/17  Yes Aixa Alegre MD   docusate sodium (COLACE, DULCOLAX) 100 MG CAPS Take 100 mg by mouth 2 times daily Hold if she has more than two loose stools in a day. Soft stool is encouraged until follow up with surgeon.  4/18/19   ABIGAIL Richmond CNP   OXYGEN Inhale 2 L into the lungs as needed    Historical Provider, MD   famotidine (PEPCID) 20 MG tablet Take 1 tablet by mouth 2 times daily  Patient taking differently: Take 20 mg by mouth 2 times daily as needed  1/30/19 5/13/19  Sharona Howard PA-C   ondansetron (ZOFRAN ODT) 4 MG disintegrating tablet Take 1-2 tablets by mouth every 12 hours as needed for Nausea 1/30/19   Sharona Howard PA-C   vitamin B-1 100 MG tablet Take 1 tablet by mouth daily 12/17/18   Tayla Jung MD   Umeclidinium Bromide 62.5 MCG/INH AEPB Inhale 1 puff into the lungs daily 46/62/14   Aixa Alegre MD   DULoxetine (CYMBALTA) 30 MG extended release capsule Take 1 capsule by mouth daily 85/50/07   Aixa Alegre MD   ferrous sulfate 325 (65 Fe) MG tablet Take 325 mg by mouth daily (with breakfast)    Historical Provider, MD   folic acid (FOLVITE) 1 MG tablet Take 1 mg by mouth daily    Historical Provider, MD   vitamin B-6 (PYRIDOXINE) 100 MG tablet Take 100 mg by mouth daily    Historical Provider, MD   vitamin B-12 (CYANOCOBALAMIN) 500 MCG tablet Take 500 mcg by mouth daily    Historical Provider, MD   vitamin D (ERGOCALCIFEROL) 64871 UNIT CAPS capsule Take 50,000 Units by mouth once a week     Historical Provider, MD       Allergies:  Aspirin; Fentanyl; and Morphine    Social History:      The patient currently lives home    TOBACCO:   reports that she has been smoking cigarettes. She has a 30.00 pack-year smoking history. She has never used smokeless tobacco.  ETOH:   reports that she drinks about 1.2 - 1.8 oz of alcohol per week. Family History:      Reviewed in detail positive as follows:        Problem Relation Age of Onset    Cancer Mother     Diabetes Mother     Heart Disease Father        REVIEW OF SYSTEMS:   Pertinent positives as noted in the HPI. All other systems reviewed and negative. PHYSICAL EXAM PERFORMED:    /73   Pulse 91   Temp 98.8 °F (37.1 °C) (Oral)   Resp 16   Ht 5' 2\" (1.575 m)   Wt 126 lb 15.8 oz (57.6 kg)   SpO2 92%   BMI 23.23 kg/m²     General appearance:  No apparent distress, appears stated age and cooperative. HEENT:  Normal cephalic, atraumatic without obvious deformity. Pupils equal, round, and reactive to light. Extra ocular muscles intact. Conjunctivae/corneas clear. Neck: Supple, with full range of motion. No jugular venous distention. Trachea midline. Respiratory:  Normal respiratory effort. Diminished with late exp wheezing. Cardiovascular:  Tachycardia with irregular rhythm without murmurs, rubs or gallops. Abdomen: Soft, non-tender, non-distended, without rebound or guarding. Normal bowel sounds. Musculoskeletal:  No clubbing, cyanosis or edema bilaterally. Full range of motion without deformity. Skin: Skin color, texture, turgor normal.  No rashes or lesions.   Neurologic:  Neurovascularly intact without any focal sensory/motor deficits. Cranial nerves: II-XII intact, grossly non-focal.  Psychiatric:  Alert and oriented, thought content appropriate, normal insight  Capillary Refill: Brisk,< 3 seconds   Peripheral Pulses: +2 palpable, equal bilaterally       Labs:     Recent Labs     05/13/19 1811   WBC 10.9   HGB 11.6*   HCT 35.7*        Recent Labs     05/13/19 1811      K 3.3*   CL 99   CO2 29   BUN 7   CREATININE <0.5*   CALCIUM 9.4     Recent Labs     05/13/19 1811   AST 23   ALT 12   BILITOT 0.4   ALKPHOS 142*     No results for input(s): INR in the last 72 hours. Recent Labs     05/13/19 1811   TROPONINI <0.01       Urinalysis:      Lab Results   Component Value Date    NITRU Negative 05/13/2019    WBCUA 13 05/13/2019    BACTERIA 3+ 01/30/2019    RBCUA 13 05/13/2019    BLOODU SMALL 05/13/2019    SPECGRAV >1.030 05/13/2019    GLUCOSEU Negative 05/13/2019       Radiology:     CXR: I have reviewed the CXR with the following interpretation: see below  EKG:  I have reviewed the EKG with the following interpretation: Atrial fibrillation, rate 120, QRS 82.     CT CHEST PULMONARY EMBOLISM W CONTRAST   Final Result   Negative for pulmonary embolism. Minimal lingular atelectasis versus   scarring. Mild cardiomegaly. Scattered pulmonary nodules left lung up to 7 mm in size. Fleischner Society guidelines for follow-up and management of incidentally   detected pulmonary nodules:      Single Solid Nodule:      Nodule size less than 6 mm   In a low-risk patient, no routine follow-up. In a high-risk patient, optional CT at 12 months. Nodule size equals 6-8 mm   In a low-risk patient, CT at 6-12 months, then consider CT at 18-24 months. In a high-risk patient, CT at 6-12 months, then CT at 18-24 months. Nodule size greater than 8 mm         In a low-risk patient, consider CT at 3 months, PET/CT, or tissue sampling.       In a high-risk patient, consider CT at 3 months, PET/CT, or tissue sampling. Multiple Solid Nodules:      Nodule size less than 6 mm   In a low-risk patient, no routine follow-up. In a high-risk patient, optional CT at 12 months. Nodule size equals 6-8 mm   In a low-risk patient, CT at 3-6 months, then consider CT at 18-24 months. In a high-risk patient, CT at 3-6 months, then CT at 18-24 months. Nodule size greater than 8 mm   In a low-risk patient, CT at 3-6 months, then consider CT at 18-24 months. In a high-risk patient, CT at 3-6 months, then CT at 18-24 months. - Low risk patients include individuals with minimal or absent history of   smoking and other known risk factors. - High risk patients include individuals with a history or smoking or known   risk factors. Radiology 2017 http://pubs. rsna.org/doi/full/10.1148/radiol. 1092797717         CT ABDOMEN PELVIS W IV CONTRAST Additional Contrast? Oral   Final Result   Fatty liver. Improvement inflammation surrounding the rectosigmoid junction and resolution   of previously noted fluid collections. ASSESSMENT:    Active Hospital Problems    Diagnosis Date Noted    Atrial fibrillation ([facility] [I48.91] 05/14/2019    COPD exacerbation ([facility] [J44.1] 05/13/2019    Tobacco abuse [Z72.0] 10/15/2018    Essential hypertension [I10] 10/15/2018         PLAN:    COPD (acute exacerbation) - Patient has been started on Nebulizer treatments to be given on a scheduled basis every 4 hours, and on an as-needed basis every 2 hours based upon needs identified through close monitoring. In addition, Solumedrol has been prescribed. The Solumedrol will be tapered as the patient improves. Patient will be monitored closely, and deep breathing and coughing will be encouraged while awake. Acute respiratory failure/Hypoxia/Shortness of Breath - due to above; provide supplemental oxygen as necessary to keep SaO2 92% or greater.     Atrial Fibrillation (New Onset) - currently

## 2019-05-14 NOTE — PROGRESS NOTES
4 Eyes Skin Assessment     The patient is being assess for  Admission    I agree that 2 RN's have performed a thorough Head to Toe Skin Assessment on the patient. ALL assessment sites listed below have been assessed. Areas assessed by both nurses:   [x]   Head, Face, and Ears   [x]   Shoulders, Back, and Chest  [x]   Arms, Elbows, and Hands   [x]   Coccyx, Sacrum, and IschIum  [x]   Legs, Feet, and Heels        Does the Patient have Skin Breakdown?   No         Chato Prevention initiated:  No   Wound Care Orders initiated:  NA      [facility] nurse consulted for Pressure Injury (Stage 3,4, Unstageable, DTI, NWPT, and Complex wounds), New and Established Ostomies:  NA      Nurse 1 eSignature:Electronically signed by Wallace Oseguera RN on 5/14/19 at 5:54 AM      **SHARE this note so that the co-signing nurse is able to place an eSignature**    Nurse 2 eSignature: Electronically signed by Guzman Berry RN on 5/15/19 at 8:34 AM

## 2019-05-14 NOTE — PROGRESS NOTES
Pharmacy Medication Reconciliation Note     List of medications patient is currently taking is complete. Source of information:   1. Patient   2. EMR    Notes regarding home medications:   1. Patient did not receive any home medication doses before presenting to the ER. Patient has not had any of her prescription medications in about a month. States she can't get them because she doesn't have a PCP. Updated pt's list with what she had been taking.       [id number] [facility] Maggie, Pharmacy Intern  5/13/2019 10:12 PM

## 2019-05-14 NOTE — PLAN OF CARE
Problem: Nutrition  Goal: Optimal nutrition therapy  Outcome: Ongoing     Nutrition Problem: Inadequate oral intake  Intervention: Food and/or Nutrient Delivery: Continue current diet, Start ONS  Nutritional Goals:  Tolerate diet and consume greater than 50% of meals and supplements this admission

## 2019-05-15 LAB
GLUCOSE BLD-MCNC: 141 MG/DL (ref 70–99)
GLUCOSE BLD-MCNC: 300 MG/DL (ref 70–99)
LV EF: 63 %
LVEF MODALITY: NORMAL
ORGANISM: ABNORMAL
PERFORMED ON: ABNORMAL
PERFORMED ON: ABNORMAL
URINE CULTURE, ROUTINE: ABNORMAL
URINE CULTURE, ROUTINE: ABNORMAL

## 2019-05-15 PROCEDURE — 94761 N-INVAS EAR/PLS OXIMETRY MLT: CPT

## 2019-05-15 PROCEDURE — 93306 TTE W/DOPPLER COMPLETE: CPT

## 2019-05-15 PROCEDURE — 2580000003 HC RX 258: Performed by: FAMILY MEDICINE

## 2019-05-15 PROCEDURE — 6370000000 HC RX 637 (ALT 250 FOR IP): Performed by: NURSE PRACTITIONER

## 2019-05-15 PROCEDURE — 94640 AIRWAY INHALATION TREATMENT: CPT

## 2019-05-15 PROCEDURE — 6370000000 HC RX 637 (ALT 250 FOR IP): Performed by: FAMILY MEDICINE

## 2019-05-15 PROCEDURE — 6360000002 HC RX W HCPCS: Performed by: NURSE PRACTITIONER

## 2019-05-15 PROCEDURE — 2580000003 HC RX 258: Performed by: NURSE PRACTITIONER

## 2019-05-15 PROCEDURE — 94760 N-INVAS EAR/PLS OXIMETRY 1: CPT

## 2019-05-15 PROCEDURE — 6360000002 HC RX W HCPCS: Performed by: FAMILY MEDICINE

## 2019-05-15 PROCEDURE — 1200000000 HC SEMI PRIVATE

## 2019-05-15 PROCEDURE — 99233 SBSQ HOSP IP/OBS HIGH 50: CPT | Performed by: INTERNAL MEDICINE

## 2019-05-15 RX ORDER — NICOTINE POLACRILEX 4 MG
15 LOZENGE BUCCAL PRN
Status: DISCONTINUED | OUTPATIENT
Start: 2019-05-15 | End: 2019-05-16 | Stop reason: HOSPADM

## 2019-05-15 RX ORDER — METHYLPREDNISOLONE SODIUM SUCCINATE 40 MG/ML
40 INJECTION, POWDER, LYOPHILIZED, FOR SOLUTION INTRAMUSCULAR; INTRAVENOUS EVERY 8 HOURS
Status: DISCONTINUED | OUTPATIENT
Start: 2019-05-15 | End: 2019-05-16 | Stop reason: HOSPADM

## 2019-05-15 RX ORDER — GUAIFENESIN 600 MG/1
600 TABLET, EXTENDED RELEASE ORAL 2 TIMES DAILY
Status: DISCONTINUED | OUTPATIENT
Start: 2019-05-15 | End: 2019-05-16 | Stop reason: HOSPADM

## 2019-05-15 RX ORDER — LORAZEPAM 0.5 MG/1
0.5 TABLET ORAL 2 TIMES DAILY PRN
Status: DISCONTINUED | OUTPATIENT
Start: 2019-05-15 | End: 2019-05-16 | Stop reason: HOSPADM

## 2019-05-15 RX ORDER — DEXTROSE MONOHYDRATE 25 G/50ML
12.5 INJECTION, SOLUTION INTRAVENOUS PRN
Status: DISCONTINUED | OUTPATIENT
Start: 2019-05-15 | End: 2019-05-16 | Stop reason: HOSPADM

## 2019-05-15 RX ORDER — FORMOTEROL FUMARATE 20 UG/2ML
20 SOLUTION RESPIRATORY (INHALATION) 2 TIMES DAILY
Status: DISCONTINUED | OUTPATIENT
Start: 2019-05-15 | End: 2019-05-16 | Stop reason: HOSPADM

## 2019-05-15 RX ORDER — DEXTROSE MONOHYDRATE 50 MG/ML
100 INJECTION, SOLUTION INTRAVENOUS PRN
Status: DISCONTINUED | OUTPATIENT
Start: 2019-05-15 | End: 2019-05-16 | Stop reason: HOSPADM

## 2019-05-15 RX ORDER — BUDESONIDE 0.5 MG/2ML
0.5 INHALANT ORAL 2 TIMES DAILY
Status: DISCONTINUED | OUTPATIENT
Start: 2019-05-15 | End: 2019-05-16 | Stop reason: HOSPADM

## 2019-05-15 RX ORDER — NICOTINE 21 MG/24HR
1 PATCH, TRANSDERMAL 24 HOURS TRANSDERMAL DAILY
Status: DISCONTINUED | OUTPATIENT
Start: 2019-05-15 | End: 2019-05-16 | Stop reason: HOSPADM

## 2019-05-15 RX ADMIN — Medication 500 MCG: at 08:05

## 2019-05-15 RX ADMIN — BUDESONIDE 500 MCG: 0.5 SUSPENSION RESPIRATORY (INHALATION) at 13:08

## 2019-05-15 RX ADMIN — BUSPIRONE HYDROCHLORIDE 5 MG: 5 TABLET ORAL at 20:50

## 2019-05-15 RX ADMIN — BUDESONIDE 500 MCG: 0.5 SUSPENSION RESPIRATORY (INHALATION) at 19:29

## 2019-05-15 RX ADMIN — GUAIFENESIN 600 MG: 600 TABLET ORAL at 13:52

## 2019-05-15 RX ADMIN — FERROUS SULFATE TAB EC 324 MG (65 MG FE EQUIVALENT) 324 MG: 324 (65 FE) TABLET DELAYED RESPONSE at 08:06

## 2019-05-15 RX ADMIN — IPRATROPIUM BROMIDE AND ALBUTEROL SULFATE 1 AMPULE: .5; 3 SOLUTION RESPIRATORY (INHALATION) at 16:58

## 2019-05-15 RX ADMIN — Medication 10 ML: at 20:59

## 2019-05-15 RX ADMIN — METOPROLOL SUCCINATE 25 MG: 25 TABLET, EXTENDED RELEASE ORAL at 08:05

## 2019-05-15 RX ADMIN — ACETAMINOPHEN 650 MG: 325 TABLET ORAL at 20:49

## 2019-05-15 RX ADMIN — Medication 3 MG: at 20:50

## 2019-05-15 RX ADMIN — METHYLPREDNISOLONE SODIUM SUCCINATE 40 MG: 40 INJECTION, POWDER, FOR SOLUTION INTRAMUSCULAR; INTRAVENOUS at 13:55

## 2019-05-15 RX ADMIN — FOLIC ACID 1 MG: 1 TABLET ORAL at 08:06

## 2019-05-15 RX ADMIN — FORMOTEROL FUMARATE DIHYDRATE 20 MCG: 20 SOLUTION RESPIRATORY (INHALATION) at 13:08

## 2019-05-15 RX ADMIN — INSULIN LISPRO 4 UNITS: 100 INJECTION, SOLUTION INTRAVENOUS; SUBCUTANEOUS at 20:50

## 2019-05-15 RX ADMIN — PYRIDOXINE HCL TAB 50 MG 100 MG: 50 TAB at 08:05

## 2019-05-15 RX ADMIN — CEFTRIAXONE 1 G: 1 INJECTION, POWDER, FOR SOLUTION INTRAMUSCULAR; INTRAVENOUS at 16:12

## 2019-05-15 RX ADMIN — Medication 10 ML: at 08:06

## 2019-05-15 RX ADMIN — FORMOTEROL FUMARATE DIHYDRATE 20 MCG: 20 SOLUTION RESPIRATORY (INHALATION) at 19:29

## 2019-05-15 RX ADMIN — Medication 100 MG: at 08:05

## 2019-05-15 RX ADMIN — BUSPIRONE HYDROCHLORIDE 5 MG: 5 TABLET ORAL at 08:05

## 2019-05-15 RX ADMIN — CHLORDIAZEPOXIDE HYDROCHLORIDE 5 MG: 5 CAPSULE ORAL at 20:50

## 2019-05-15 RX ADMIN — IPRATROPIUM BROMIDE AND ALBUTEROL SULFATE 1 AMPULE: .5; 3 SOLUTION RESPIRATORY (INHALATION) at 19:29

## 2019-05-15 RX ADMIN — FAMOTIDINE 20 MG: 20 TABLET ORAL at 08:05

## 2019-05-15 RX ADMIN — INSULIN LISPRO 2 UNITS: 100 INJECTION, SOLUTION INTRAVENOUS; SUBCUTANEOUS at 13:50

## 2019-05-15 RX ADMIN — GUAIFENESIN 600 MG: 600 TABLET ORAL at 20:50

## 2019-05-15 RX ADMIN — FAMOTIDINE 20 MG: 20 TABLET ORAL at 20:49

## 2019-05-15 RX ADMIN — DULOXETINE HYDROCHLORIDE 30 MG: 30 CAPSULE, DELAYED RELEASE ORAL at 08:05

## 2019-05-15 RX ADMIN — ENOXAPARIN SODIUM 40 MG: 40 INJECTION SUBCUTANEOUS at 08:06

## 2019-05-15 RX ADMIN — IPRATROPIUM BROMIDE AND ALBUTEROL SULFATE 1 AMPULE: .5; 3 SOLUTION RESPIRATORY (INHALATION) at 13:08

## 2019-05-15 RX ADMIN — METHYLPREDNISOLONE SODIUM SUCCINATE 40 MG: 40 INJECTION, POWDER, FOR SOLUTION INTRAMUSCULAR; INTRAVENOUS at 20:50

## 2019-05-15 ASSESSMENT — PAIN SCALES - GENERAL
PAINLEVEL_OUTOF10: 2
PAINLEVEL_OUTOF10: 0

## 2019-05-15 ASSESSMENT — PAIN - FUNCTIONAL ASSESSMENT: PAIN_FUNCTIONAL_ASSESSMENT: PREVENTS OR INTERFERES SOME ACTIVE ACTIVITIES AND ADLS

## 2019-05-15 ASSESSMENT — PAIN DESCRIPTION - DESCRIPTORS: DESCRIPTORS: ACHING

## 2019-05-15 ASSESSMENT — PAIN DESCRIPTION - ONSET: ONSET: ON-GOING

## 2019-05-15 ASSESSMENT — PAIN DESCRIPTION - LOCATION: LOCATION: GENERALIZED

## 2019-05-15 ASSESSMENT — PAIN DESCRIPTION - FREQUENCY: FREQUENCY: CONTINUOUS

## 2019-05-15 ASSESSMENT — PAIN DESCRIPTION - PAIN TYPE: TYPE: ACUTE PAIN

## 2019-05-15 NOTE — PROGRESS NOTES
Patient alert and oriented. Some confusion noted. Assessment completed. Patient has no complaints at this present time. She states that she does not need to keep getting her finger pricked. She has molasses at bedside and states that she only uses it for her oatmeal. Patient education completed. Viviane Montgomery MD notified of concerns. Monitor patient progress.

## 2019-05-15 NOTE — PROGRESS NOTES
[facility] [address]   Daily Progress Note      Admit Date:  5/13/2019    CC: \"Palpitation\"  This is a 73-year-old female with a history  of COPD, moderate EtOH usage, hypertension, smoking abuse, essential  tremors, who presented to the hospital with cough and some shortness of  breath. Her symptoms progressed and she decided to come to the  emergency room. In the emergency room, she was found to be in rapid  atrial fibrillation and she was admitted for further evaluation. She  had no fever, chills, or rigors. She had some chest discomfort. No  diaphoresis. No swelling in her feet or lower extremities.         Subjective:  Pt with no acute overnight events. Denies chest pain, palpitations, and dyspnea. She has converted to sinus rhythm    Objective:   BP (!) 153/79   Pulse 90   Temp 98.5 °F (36.9 °C) (Oral)   Resp 16   Ht 5' 2\" (1.575 m)   Wt 126 lb 1.7 oz (57.2 kg)   SpO2 93%   BMI 23.06 kg/m²       Intake/Output Summary (Last 24 hours) at 5/15/2019 1147  Last data filed at 5/15/2019 0800  Gross per 24 hour   Intake 720 ml   Output --   Net 720 ml     Wt Readings from Last 3 Encounters:   05/15/19 126 lb 1.7 oz (57.2 kg)   04/25/19 133 lb 6.1 oz (60.5 kg)   04/18/19 143 lb 4.8 oz (65 kg)     Telemetry:NSR    Physical Exam:  General:  NAD, Awake, alert and oriented X4  Skin:  Warm and dry  Neck:  Supple, no JVP appreciated, no bruit  Chest:  Clear to auscultation, no wheezes/rhonchi/rales  Cardiovascular:  Regular rate.  S1S2  Abdomen:  Soft, nontender, +bowel sounds  Extremities:  No LE edema    Cardiac Diagnosis:  hypertension and hyperlipidemia    Medications:    budesonide  0.5 mg Nebulization BID    formoterol  20 mcg Nebulization BID    guaiFENesin  600 mg Oral BID    metoprolol succinate  25 mg Oral Daily    cefTRIAXone (ROCEPHIN) IV  1 g Intravenous Q24H    busPIRone  5 mg Oral BID    DULoxetine  30 mg Oral Daily    famotidine  20 mg Oral BID    ferrous sulfate  324 mg Oral Daily with breakfast    folic acid  1 mg Oral Daily    thiamine  100 mg Oral Daily    vitamin B-12  500 mcg Oral Daily    vitamin B-6  100 mg Oral Daily    vitamin D  50,000 Units Oral Weekly    sodium chloride flush  10 mL Intravenous 2 times per day    enoxaparin  40 mg Subcutaneous Daily    ipratropium-albuterol  1 ampule Inhalation Q4H WA    nicotine  1 patch Transdermal Daily       docusate sodium, chlordiazePOXIDE, loperamide, melatonin, sodium chloride flush, magnesium hydroxide, ondansetron, albuterol, acetaminophen    Lab Data:  CBC:   Recent Labs     05/13/19  1811 05/14/19  1010   WBC 10.9 6.1   HGB 11.6* 11.0*    432     BMP:    Recent Labs     05/13/19  1811 05/14/19  1010    137   K 3.3* 3.8   CO2 29 23   BUN 7 9   CREATININE <0.5* 0.7     LIVR:   Recent Labs     05/13/19  1811   AST 23   ALT 12     INR:  No results for input(s): INR in the last 72 hours. APTT: No results for input(s): APTT in the last 72 hours. BNP:  No results for input(s): BNP in the last 72 hours. Imaging:Left ventricular cavity size is normal.   There is asymmetric hypertrophy of the septum.   Ejection fraction is visually estimated to be 60-65%.   No regional wall motion abnormalities are noted.   Diastolic filling parameters suggest grade II diastolic dysfunction.   Mitral valve leaflets appear mildly to moderately thickened.   Mild to moderate mitral regurgitation is present.   The left atrium is mildly dilated.   Tricuspid valve is structurally normal.   Mild tricuspid regurgitation.  IVC size is dilated (>2.1 cm) but collapses > 50% with respiration   consistent with elevated RA pressure (8 mmHg).    Estimated pulmonary artery systolic pressure is mildly elevated at 33 mmHg   assuming a right atrial pressure of 8 mmHg.       AssessmentPlan  1) PAF  -She  has converted to sinus rhythm this morning.  -Echo shows mildly dilated left atrium with normal left finger systolic function and mild to moderate mitral regurgitation  -We will start her on xarelto since her chads vasc score is 3-4    2) hypertension  -Blood pressure is mildly elevated today  :-We will increase her Toprol to 50 mg daily      Patient is okay for discharge from cardiac standpoint. See her back in the office in 4-6 weeks.       Electronically signed by Johnell Fothergill, MD on 5/15/2019 at 11:47 AM

## 2019-05-15 NOTE — CARE COORDINATION
Pt states she no longer wants to go to SNF. Discussed HHC and she will need SN services for med mgmt per MD. Pt only interested in [facility] for MOW's.  Called and made referral to [facility]  Electronically signed by Maura Tran RN on 5/15/2019 at 4:33 PM

## 2019-05-15 NOTE — PROGRESS NOTES
Occupational Therapy Attempt/Discharge Note  [facility]  [id number]  5/15/2019      Patient met b/s, sitting in recliner. Patient adamantly refuses therapy services. Patient provided with gentle encouragement and education, yet continues to refuse. Patient reports she does not want therapy to follow her while she is in the hospital and she \"will never, ever work with therapy while she is here. \" As this Rudie Prost continues to encourage, patient states, [place], thank you, you can leave now. \" Will discharge per patient's request.    Joseph Ellis, [address] [place] [address] 140

## 2019-05-15 NOTE — PROGRESS NOTES
acetaminophen    IV:        Intake/Output Summary (Last 24 hours) at 5/15/2019 0844  Last data filed at 5/14/2019 2045  Gross per 24 hour   Intake 480 ml   Output --   Net 480 ml       Results:  CBC:   Recent Labs     05/13/19 1811 05/14/19  1010   WBC 10.9 6.1   HGB 11.6* 11.0*   HCT 35.7* 34.9*   MCV 91.8 92.8    432     BMP:   Recent Labs     05/13/19  1811 05/14/19  1010    137   K 3.3* 3.8   CL 99 96*   CO2 29 23   BUN 7 9   CREATININE <0.5* 0.7     Mag: No results for input(s): MAG in the last 72 hours. Phos:   Lab Results   Component Value Date    PHOS 3.6 04/16/2019     No components found for: GLU    LIVER PROFILE:   Recent Labs     05/13/19 1811   AST 23   ALT 12   LIPASE 38.0   BILITOT 0.4   ALKPHOS 142*     PT/INR: No results for input(s): PROTIME, INR in the last 72 hours. APTT: No results for input(s): APTT in the last 72 hours. UA:  Recent Labs     05/13/19  2018   COLORU YELLOW   PHUR 7.0   WBCUA 13*   RBCUA 13*   CLARITYU Clear   SPECGRAV >1.030   LEUKOCYTESUR MODERATE*   UROBILINOGEN 1.0   BILIRUBINUR Negative   BLOODU SMALL*   GLUCOSEU Negative       Invalid input(s): ABG  Lab Results   Component Value Date    CALCIUM 9.2 05/14/2019    PHOS 3.6 04/16/2019       Assessment:    Principal Problem:    COPD exacerbation ([address]  Active Problems:    Essential hypertension    Tobacco abuse    Atrial fibrillation (HCC)  Resolved Problems:    * No resolved hospital problems. [facility] course: A 68 yo fem lae admitted with a COPD exacerbation. She still smokes and ran out of her inhaled steroids. She is not on O2 at home. She also had new onset afib on her ECG.       Plan:  COPD exacerbation  - nebs  - pt agrees to steroids now, restart solumedrol today and hopefully change to prednisone tomorrow    Acute hypoxic respiratory failure, POA  - with criteria: < 90% on RA, accessory muscle use, RR> 18, due to AECOPD  - supplemental oxygen as necessary to keep SaO2 > 90%, not on O2 at home  -

## 2019-05-16 VITALS
BODY MASS INDEX: 23.12 KG/M2 | SYSTOLIC BLOOD PRESSURE: 115 MMHG | TEMPERATURE: 98 F | WEIGHT: 125.66 LBS | HEART RATE: 78 BPM | RESPIRATION RATE: 14 BRPM | DIASTOLIC BLOOD PRESSURE: 67 MMHG | OXYGEN SATURATION: 95 % | HEIGHT: 62 IN

## 2019-05-16 LAB
EKG ATRIAL RATE: 120 BPM
EKG DIAGNOSIS: NORMAL
EKG Q-T INTERVAL: 348 MS
EKG QRS DURATION: 82 MS
EKG QTC CALCULATION (BAZETT): 491 MS
EKG R AXIS: 65 DEGREES
EKG T AXIS: 40 DEGREES
EKG VENTRICULAR RATE: 120 BPM
GLUCOSE BLD-MCNC: 148 MG/DL (ref 70–99)
GLUCOSE BLD-MCNC: 148 MG/DL (ref 70–99)
PERFORMED ON: ABNORMAL
PERFORMED ON: ABNORMAL

## 2019-05-16 PROCEDURE — 2580000003 HC RX 258: Performed by: NURSE PRACTITIONER

## 2019-05-16 PROCEDURE — 6370000000 HC RX 637 (ALT 250 FOR IP): Performed by: NURSE PRACTITIONER

## 2019-05-16 PROCEDURE — 6360000002 HC RX W HCPCS: Performed by: NURSE PRACTITIONER

## 2019-05-16 PROCEDURE — 6360000002 HC RX W HCPCS: Performed by: FAMILY MEDICINE

## 2019-05-16 PROCEDURE — 6370000000 HC RX 637 (ALT 250 FOR IP): Performed by: FAMILY MEDICINE

## 2019-05-16 PROCEDURE — 94640 AIRWAY INHALATION TREATMENT: CPT

## 2019-05-16 PROCEDURE — 94760 N-INVAS EAR/PLS OXIMETRY 1: CPT

## 2019-05-16 RX ORDER — GUAIFENESIN 600 MG/1
600 TABLET, EXTENDED RELEASE ORAL 2 TIMES DAILY
Qty: 14 TABLET | Refills: 0 | Status: SHIPPED | OUTPATIENT
Start: 2019-05-16 | End: 2019-05-23

## 2019-05-16 RX ORDER — PREDNISONE 10 MG/1
TABLET ORAL
Qty: 10 TABLET | Refills: 0 | Status: SHIPPED | OUTPATIENT
Start: 2019-05-16 | End: 2019-05-23 | Stop reason: ALTCHOICE

## 2019-05-16 RX ORDER — NICOTINE 21 MG/24HR
1 PATCH, TRANSDERMAL 24 HOURS TRANSDERMAL DAILY
Qty: 10 PATCH | Refills: 0 | Status: SHIPPED | OUTPATIENT
Start: 2019-05-17 | End: 2019-05-23 | Stop reason: ALTCHOICE

## 2019-05-16 RX ORDER — METOPROLOL SUCCINATE 25 MG/1
25 TABLET, EXTENDED RELEASE ORAL DAILY
Qty: 30 TABLET | Refills: 3 | Status: ON HOLD | OUTPATIENT
Start: 2019-05-17 | End: 2021-08-31 | Stop reason: HOSPADM

## 2019-05-16 RX ORDER — BUSPIRONE HYDROCHLORIDE 5 MG/1
10 TABLET ORAL 2 TIMES DAILY
Qty: 60 TABLET | Refills: 1 | Status: ON HOLD | OUTPATIENT
Start: 2019-05-16 | End: 2019-10-27

## 2019-05-16 RX ADMIN — GUAIFENESIN 600 MG: 600 TABLET ORAL at 09:04

## 2019-05-16 RX ADMIN — Medication 100 MG: at 09:04

## 2019-05-16 RX ADMIN — IPRATROPIUM BROMIDE AND ALBUTEROL SULFATE 1 AMPULE: .5; 3 SOLUTION RESPIRATORY (INHALATION) at 11:49

## 2019-05-16 RX ADMIN — Medication 500 MCG: at 09:04

## 2019-05-16 RX ADMIN — ENOXAPARIN SODIUM 40 MG: 40 INJECTION SUBCUTANEOUS at 09:04

## 2019-05-16 RX ADMIN — METOPROLOL SUCCINATE 25 MG: 25 TABLET, EXTENDED RELEASE ORAL at 09:04

## 2019-05-16 RX ADMIN — BUDESONIDE 500 MCG: 0.5 SUSPENSION RESPIRATORY (INHALATION) at 08:18

## 2019-05-16 RX ADMIN — PYRIDOXINE HCL TAB 50 MG 100 MG: 50 TAB at 09:04

## 2019-05-16 RX ADMIN — METHYLPREDNISOLONE SODIUM SUCCINATE 40 MG: 40 INJECTION, POWDER, FOR SOLUTION INTRAMUSCULAR; INTRAVENOUS at 06:20

## 2019-05-16 RX ADMIN — FERROUS SULFATE TAB EC 324 MG (65 MG FE EQUIVALENT) 324 MG: 324 (65 FE) TABLET DELAYED RESPONSE at 09:04

## 2019-05-16 RX ADMIN — INSULIN LISPRO 2 UNITS: 100 INJECTION, SOLUTION INTRAVENOUS; SUBCUTANEOUS at 09:05

## 2019-05-16 RX ADMIN — FAMOTIDINE 20 MG: 20 TABLET ORAL at 09:11

## 2019-05-16 RX ADMIN — DULOXETINE HYDROCHLORIDE 30 MG: 30 CAPSULE, DELAYED RELEASE ORAL at 09:04

## 2019-05-16 RX ADMIN — INSULIN LISPRO 2 UNITS: 100 INJECTION, SOLUTION INTRAVENOUS; SUBCUTANEOUS at 12:19

## 2019-05-16 RX ADMIN — IPRATROPIUM BROMIDE AND ALBUTEROL SULFATE 1 AMPULE: .5; 3 SOLUTION RESPIRATORY (INHALATION) at 08:17

## 2019-05-16 RX ADMIN — FOLIC ACID 1 MG: 1 TABLET ORAL at 09:04

## 2019-05-16 RX ADMIN — FORMOTEROL FUMARATE DIHYDRATE 20 MCG: 20 SOLUTION RESPIRATORY (INHALATION) at 08:18

## 2019-05-16 RX ADMIN — Medication 10 ML: at 09:10

## 2019-05-16 RX ADMIN — BUSPIRONE HYDROCHLORIDE 5 MG: 5 TABLET ORAL at 09:04

## 2019-05-16 ASSESSMENT — PAIN SCALES - GENERAL: PAINLEVEL_OUTOF10: 0

## 2019-05-16 NOTE — PROGRESS NOTES
Patient alert and oriented. No complaints at this present time. Assessment completed. Medications administered per MD orders. Patient assisted with a shower. Patient discharge education completed. Discharge information completed.

## 2019-05-16 NOTE — DISCHARGE INSTR - COC
Continuity of Care Form    Patient Name: Alcira Moore   :  1945  MRN:  [id number]    Admit date:  2019  Discharge date: 2019    Code Status Order: Full Code   Advance Directives:   [facility] Documentation     Date/Time Healthcare Directive Type of Healthcare Directive Copy in [address] Agent's Name Healthcare Agent's Phone Number    [phone]  Yes, patient has an advance directive for healthcare treatment  Durable power of  for health care  No, copy requested from clinic  --  --  --          Admitting Physician:  Gordon Carvajal MD  PCP: No primary care provider on file. Discharging Nurse: Leanna Celeste RN, MSN  [address] Unit/Room#: D7F-5145/0970-35  Discharging Unit Phone Number: [phone]    Emergency Contact:   Extended Emergency Contact Information  Primary Emergency Contact:  [address] [place] [address] Phone: [phone]  Relation: Child    Past Surgical History:  Past Surgical History:   Procedure Laterality Date    ABDOMEN SURGERY      CHOLECYSTECTOMY      COLONOSCOPY  2018    COLONOSCOPY N/A 2018    COLONOSCOPY POLYPECTOMY SNARE/COLD BIOPSY performed by Liliya Allen MD at [facility]  2018    ENDOSCOPY, COLON, DIAGNOSTIC      HYSTERECTOMY      SMALL INTESTINE SURGERY N/A 4/10/2019    LAPAROSCOPIC LYSISI OF ADHESIONS FOR GREATER THAN 3 HOURS,LAPORSCOPIC CONVERTED TO OPEN COLOSTOMY REVERSAL, SMALL BOWEL RESECTION performed by Raf Reyes MD at [address] [place] [address] ENDOSCOPY  2018    with biopsies    UPPER GASTROINTESTINAL ENDOSCOPY N/A 2018    EGD BIOPSY performed by Liliya Allen MD at [address] [place] [address]       Immunization History:   Immunization History   Administered Date(s) Administered    Influenza, High Dose (Fluzone 65 yrs and older) 09/10/2018    Pneumococcal 13-valent Conjugate Tiffanie Elliott) 09/24/2018    Tdap (Boostrix, Adacel) 11/29/2018    Zoster Live (Zostavax) 09/24/2016    Zoster Subunit (Shingrix) 03/24/2018       Active Problems:  Patient Active Problem List   Diagnosis Code    COPD with acute exacerbation ([facility] J44.1    Essential hypertension I10    Tobacco abuse Z72.0    Alcohol abuse F10.10    Colostomy care ([facility] Z43.3    Recurrent major depressive disorder, in partial remission ([facility] F33.41    Personal history of nicotine dependence  Z87.891    Colitis K52.9    Colitis due to Clostridium difficile A04.72    Alcohol-induced acute pancreatitis without infection or necrosis K85.20    Gastrointestinal hemorrhage associated with duodenal ulcer K26.4    Small bowel obstruction ([facility]) K56.609    Ileus ([facility]) K56.7    Acute cystitis without hematuria N30.00    Non-intractable cyclical vomiting with nausea G43. A0    Parastomal hernia without obstruction or gangrene K43.5    Abdominal pain R10.9    C. difficile diarrhea A04.72    Colostomy present ([facility]) Z93.3    Postoperative intra-abdominal abscess T81.49XA    COPD exacerbation ([facility]) J44.1    Atrial fibrillation ([facility]) I48.91       Isolation/Infection:   Isolation          No Isolation            Nurse Assessment:  Last Vital Signs: /67   Pulse 78   Temp 98 °F (36.7 °C) (Oral)   Resp 14   Ht 5' 2\" (1.575 m)   Wt 125 lb 10.6 oz (57 kg)   SpO2 95%   BMI 22.98 kg/m²     Last documented pain score (0-10 scale): Pain Level: 0  Last Weight:   Wt Readings from Last 1 Encounters:   05/16/19 125 lb 10.6 oz (57 kg)     Mental Status:  oriented and alert    IV Access:  - None    Nursing Mobility/ADLs:  Walking   Assisted  Transfer  Assisted  Bathing  Assisted  Dressing  Assisted  Toileting  Assisted  Feeding  Assisted  Med Admin  Assisted  Med Delivery   whole    Wound Care Documentation and Therapy:        Elimination:  Continence:   · Bowel:  Yes  · Bladder: Yes  Urinary Catheter: None   Colostomy/Ileostomy/Ileal Conduit: No       Date of Last BM: 05/16/2019    Intake/Output Summary (Last 24 hours) at 5/16/2019 1048  Last data filed at 5/16/2019 1018  Gross per 24 hour   Intake 1240 ml   Output --   Net 1240 ml     I/O last 3 completed shifts: In: 1180 [P.O.:1180]  Out: -     Safety Concerns: At Risk for Falls    Impairments/Disabilities:      Vision and Hearing    Nutrition Therapy:  Current Nutrition Therapy:   - Oral Diet:  Cardiac    Routes of Feeding: Oral  Liquids: Thin Liquids  Daily Fluid Restriction: no  Last Modified Barium Swallow with Video (Video Swallowing Test): not done    Treatments at the Time of Hospital Discharge:   Respiratory Treatments: oxygen  Oxygen Therapy:  is on oxygen at 1 L/min per nasal cannula.   Ventilator:     - No ventilator support    Rehab Therapies: Nurse, Physical Therapy and Occupational Therapy  Weight Bearing Status/Restrictions: No weight bearing restirctions  Other Medical Equipment (for information only, NOT a DME order):  walker  Other Treatments: none    Patient's personal belongings (please select all that are sent with patient):  Kavita Villarreal RN SIGNATURE:  Electronically signed by Jan Ambriz RN on 5/16/19 at 10:59 AM    CASE MANAGEMENT/SOCIAL WORK SECTION    Inpatient Status Date: 5/13/2019    Discharging to Facility/ Agency   Name: Discharging to Facility/ Agency   Name:  [facility] care    Address: [address] [place] [address], [address] [place] [address]  Phone: [phone]  Fax: [phone]    / signature: Electronically signed by Rd Lion RN on 5/16/19 at 11:41 AM    PHYSICIAN SECTION    Prognosis: Good    Condition at Discharge: Stable    Rehab Potential (if transferring to Rehab): Good    Recommended Labs or Other Treatments After Discharge: pt/ot/rn    Physician Certification: I certify the above information and transfer of Selwyn Mendoza  is necessary for the continuing treatment of the diagnosis listed and that she requires Home Care for greater 30 days.      Update Admission H&P: No change in H&P    PHYSICIAN SIGNATURE:  Electronically signed by Yanet Campos DO on 5/16/19 at 12:04 PM

## 2019-05-16 NOTE — PROGRESS NOTES
Hospitalist Progress Note    CC: COPD exacerbation ([facility]      Admit date: 5/13/2019  Days in hospital:  2    Subjective: Pt S/E. Pt states she would rather take the steroids if it will help decreased her recovery time. She is asking for something to ease her nervousness with the steroids. On RA 93%    ROS:   Pertinent items are noted in HPI. Objective:    BP (!) 145/61   Pulse 90   Temp 98.6 °F (37 °C) (Oral)   Resp 18   Ht 5' 2\" (1.575 m)   Wt 125 lb 10.6 oz (57 kg)   SpO2 95%   BMI 22.98 kg/m²     Gen: alert, NAD  HEENT: NC/AT, moist mucous membranes  Neck: supple, trachea midline  Heart: Normal s1/s2, RRR, no murmurs, gallops, or rubs. Lungs: diffuse wheezing, no rales, no rhonchi, no use of accessory muscles  Abd: bowel sounds present, soft, nontender, nondistended, no masses  Extrem: No clubbing, cyanosis, no edema  Skin: no rashes or lesions  Psych: A & O x3, affect appropriate  Neuro: grossly intact, moves all four extremities spontaneously.  BL UE intention tremor   Cap refill: +2 sec    Medications:  Scheduled Meds:   budesonide  0.5 mg Nebulization BID    formoterol  20 mcg Nebulization BID    guaiFENesin  600 mg Oral BID    nicotine  1 patch Transdermal Daily    methylPREDNISolone  40 mg Intravenous Q8H    insulin lispro  0-12 Units Subcutaneous TID WC    insulin lispro  0-6 Units Subcutaneous Nightly    metoprolol succinate  25 mg Oral Daily    cefTRIAXone (ROCEPHIN) IV  1 g Intravenous Q24H    busPIRone  5 mg Oral BID    DULoxetine  30 mg Oral Daily    famotidine  20 mg Oral BID    ferrous sulfate  324 mg Oral Daily with breakfast    folic acid  1 mg Oral Daily    thiamine  100 mg Oral Daily    vitamin B-12  500 mcg Oral Daily    vitamin B-6  100 mg Oral Daily    vitamin D  50,000 Units Oral Weekly    sodium chloride flush  10 mL Intravenous 2 times per day    enoxaparin  40 mg Subcutaneous Daily    ipratropium-albuterol  1 ampule Inhalation Q4H [place] PRN Meds:  LORazepam, glucose, dextrose, glucagon (rDNA), dextrose, docusate sodium, chlordiazePOXIDE, loperamide, melatonin, sodium chloride flush, magnesium hydroxide, ondansetron, albuterol, acetaminophen    IV:   dextrose           Intake/Output Summary (Last 24 hours) at 5/16/2019 0922  Last data filed at 5/15/2019 2030  Gross per 24 hour   Intake 940 ml   Output --   Net 940 ml       Results:  CBC:   Recent Labs     05/13/19  1811 05/14/19  1010   WBC 10.9 6.1   HGB 11.6* 11.0*   HCT 35.7* 34.9*   MCV 91.8 92.8    432     BMP:   Recent Labs     05/13/19 1811 05/14/19  1010    137   K 3.3* 3.8   CL 99 96*   CO2 29 23   BUN 7 9   CREATININE <0.5* 0.7     Mag: No results for input(s): MAG in the last 72 hours. Phos:   Lab Results   Component Value Date    PHOS 3.6 04/16/2019     No components found for: GLU    LIVER PROFILE:   Recent Labs     05/13/19 1811   AST 23   ALT 12   LIPASE 38.0   BILITOT 0.4   ALKPHOS 142*     PT/INR: No results for input(s): PROTIME, INR in the last 72 hours. APTT: No results for input(s): APTT in the last 72 hours. UA:  Recent Labs     05/13/19  2018   COLORU YELLOW   PHUR 7.0   WBCUA 13*   RBCUA 13*   CLARITYU Clear   SPECGRAV >1.030   LEUKOCYTESUR MODERATE*   UROBILINOGEN 1.0   BILIRUBINUR Negative   BLOODU SMALL*   GLUCOSEU Negative       Invalid input(s): ABG  Lab Results   Component Value Date    CALCIUM 9.2 05/14/2019    PHOS 3.6 04/16/2019       Assessment:    Principal Problem:    COPD exacerbation ([address]  Active Problems:    Essential hypertension    Tobacco abuse    Atrial fibrillation (HCC)  Resolved Problems:    * No resolved hospital problems. [facility] course: A 70 yo fem lae admitted with a COPD exacerbation. She still smokes and ran out of her inhaled steroids. She is not on O2 at home. She also had new onset afib on her ECG. Plan:  COPD exacerbation  - nebs  - will change solumedrol to prednisone.  Sugars have been elevated but she is refusing acuchecks     Acute hypoxic respiratory failure, POA  - with criteria: < 90% on RA, accessory muscle use, RR> 18, due to AECOPD  - supplemental oxygen as necessary to keep SaO2 > 90%, not on O2 at home  - CT chest with pulmonary nodules, will need repeat CT in 6 months    Atrial Fibrillation, new onset   - currently in NSR  - TSH .44  - increased toprol  - xarelto started  - ECHO with LVEF 60-65%, DD2, mild-mod MR, mild dilated LA  - cardiology consulted    Chronic alcoholism  - BAL was negative, no signs of withdrawal  - cont MVI, B vitamins  - add librium prn only    UTI  - UCx with e coli  - rocephin day 3/3    Anxiety  - added buspar  - will ad ativan 0.5 mg BID PRN    Code status:  full  DVT prophylaxis: [x] Lovenox  [] SQ Heparin  [] SCDs because of  [] warfarin/oral direct thrombin inhibitor [] Encourage ambulation      Disposition:  [] Home [] Rehab [] Psych [] SNF  [] LTAC  [] Transfer to ICU  [] Transfer to PCU [] Other: in pt      Electronically signed by Erasmo Weeks DO on 5/16/2019 at 9:22 AM

## 2019-05-16 NOTE — PROGRESS NOTES
Physical Therapy  Discharge Note    Patient Name: Emma Sorenson   Patient : 1945  MRN: [id number]   Room Number: A7M-9866/4329-50      I went to patient's bedside to discuss patient's desire to discontinue therapy (per [facility] note 5/15). Pt pleasantly re-affirms her desire to discontinue physical therapy while in the hospital. Pt states she is \"so afraid of falling\" but no longer feels the need for physical therapy. Will sign off at this time per patient's wishes. Please re-order skilled PT if patient's willingness to participate changes. This note also serves as a D/C Summary. Please refer to last PT note for goal status, discharge recommendations and functional status.           Jagdish Severino, PT

## 2019-05-16 NOTE — CARE COORDINATION
Met w/ pt, agreeable to Anatoliy Ochoa RN, discussed provider list and she selected [facility], made referral to Rosaura cadena MD appt w/ Sanders Scheuermann for 5/21 at 10:30a    Electronically signed by Jose Carlos Ibanez RN on 5/16/2019 at 11:43 AM

## 2019-05-16 NOTE — CARE COORDINATION
[facility]  Received referral from case management [address] with patent regarding [facility] services, She has orders for SN-PT-OT but she is declining Therapy. She also may decline home care D/T her MIL living conditions. She is trying to find another place to live but will probably go to her  MIL's again. She wants the nursing and knows se needs it.   Faxed orders to [address] care to see patient by   5/18/19  Selvin Corbett LPN    [facility] CTN Cell [phone], Fax [phone]

## 2019-05-16 NOTE — DISCHARGE SUMMARY
apparent distress, appears stated age and cooperative. HEENT:  Normal cephalic, atraumatic without obvious deformity. Pupils equal, round, and reactive to light. Extra ocular muscles intact. Conjunctivae/corneas clear. Neck: Supple, with full range of motion. No jugular venous distention. Trachea midline. Respiratory:  Normal respiratory effort. Clear to auscultation, bilaterally without Rales/Wheezes/Rhonchi. Cardiovascular:  Regular rate and rhythm with normal S1/S2 without murmurs, rubs or gallops. Abdomen: Soft, non-tender, non-distended with normal bowel sounds. Musculoskeletal:  No clubbing, cyanosis or edema bilaterally. Full range of motion without deformity. Skin: Skin color, texture, turgor normal.  No rashes or lesions. Neurologic:  Neurovascularly intact without any focal sensory/motor deficits. Cranial nerves: II-XII intact, grossly non-focal.  Psychiatric:  Alert and oriented, thought content appropriate, normal insight    Consults:     IP CONSULT TO HOSPITALIST  IP CONSULT TO DIETITIAN  IP CONSULT TO CARDIOLOGY  IP CONSULT TO HOME CARE NEEDS    Labs:  For convenience and continuity at follow-up the following most recent labs are provided:    Lab Results   Component Value Date    WBC 6.1 05/14/2019    HGB 11.0 05/14/2019    HCT 34.9 05/14/2019    MCV 92.8 05/14/2019     05/14/2019     05/14/2019    K 3.8 05/14/2019    K 3.7 04/25/2019    CL 96 05/14/2019    CO2 23 05/14/2019    BUN 9 05/14/2019    CREATININE 0.7 05/14/2019    CALCIUM 9.2 05/14/2019    PHOS 3.6 04/16/2019    ALKPHOS 142 05/13/2019    ALT 12 05/13/2019    AST 23 05/13/2019    BILITOT 0.4 05/13/2019    BILIDIR <0.2 01/08/2019    LABALBU 3.8 05/13/2019    LDLCALC 53 12/13/2018     Lab Results   Component Value Date    INR 1.10 04/23/2019    INR 0.90 02/09/2019    INR 0.95 10/13/2018       Radiology:  XR CHEST STANDARD (2 VW)   Final Result   No acute findings in the chest.         CT CHEST PULMONARY EMBOLISM W CONTRAST   Final Result   Negative for pulmonary embolism. Minimal lingular atelectasis versus   scarring. Mild cardiomegaly. Scattered pulmonary nodules left lung up to 7 mm in size. Fleischner Society guidelines for follow-up and management of incidentally   detected pulmonary nodules:      Single Solid Nodule:      Nodule size less than 6 mm   In a low-risk patient, no routine follow-up. In a high-risk patient, optional CT at 12 months. Nodule size equals 6-8 mm   In a low-risk patient, CT at 6-12 months, then consider CT at 18-24 months. In a high-risk patient, CT at 6-12 months, then CT at 18-24 months. Nodule size greater than 8 mm         In a low-risk patient, consider CT at 3 months, PET/CT, or tissue sampling. In a high-risk patient, consider CT at 3 months, PET/CT, or tissue sampling. Multiple Solid Nodules:      Nodule size less than 6 mm   In a low-risk patient, no routine follow-up. In a high-risk patient, optional CT at 12 months. Nodule size equals 6-8 mm   In a low-risk patient, CT at 3-6 months, then consider CT at 18-24 months. In a high-risk patient, CT at 3-6 months, then CT at 18-24 months. Nodule size greater than 8 mm   In a low-risk patient, CT at 3-6 months, then consider CT at 18-24 months. In a high-risk patient, CT at 3-6 months, then CT at 18-24 months. - Low risk patients include individuals with minimal or absent history of   smoking and other known risk factors. - High risk patients include individuals with a history or smoking or known   risk factors. Radiology 2017 http://pubs. rsna.org/doi/full/10.1148/radiol. 3520784634         CT ABDOMEN PELVIS W IV CONTRAST Additional Contrast? Oral   Final Result   Fatty liver. Improvement inflammation surrounding the rectosigmoid junction and resolution   of previously noted fluid collections.              EKG     Rhythm: atrial fibrillation - controlled  Rate: normal  Clinical Impression: no acute changes    Discharge Medications:   Current Discharge Medication List      START taking these medications    Details   busPIRone (BUSPAR) 5 MG tablet Take 2 tablets by mouth 2 times daily  Qty: 60 tablet, Refills: 1      metoprolol succinate (TOPROL XL) 25 MG extended release tablet Take 1 tablet by mouth daily  Qty: 30 tablet, Refills: 3      guaiFENesin (MUCINEX) 600 MG extended release tablet Take 1 tablet by mouth 2 times daily for 7 days  Qty: 14 tablet, Refills: 0      nicotine (NICODERM CQ) 21 MG/24HR Place 1 patch onto the skin daily for 10 days  Qty: 10 patch, Refills: 0      predniSONE (DELTASONE) 10 MG tablet Take 3 tablets daily for 2 days, then take 1 tablet daily for 2 days  Qty: 10 tablet, Refills: 0           Current Discharge Medication List        Current Discharge Medication List      CONTINUE these medications which have NOT CHANGED    Details   loperamide (IMODIUM) 2 MG capsule Take 2 mg by mouth 4 times daily as needed for Diarrhea      melatonin 3 MG TABS tablet Take 3 mg by mouth nightly as needed       acetaminophen (TYLENOL) 325 MG tablet Take 650 mg by mouth every 6 hours as needed       albuterol sulfate HFA (PROAIR HFA) 108 (90 Base) MCG/ACT inhaler Inhale 2 puffs into the lungs every 6 hours as needed for Wheezing  Qty: 1 Inhaler, Refills: 3      docusate sodium (COLACE, DULCOLAX) 100 MG CAPS Take 100 mg by mouth 2 times daily Hold if she has more than two loose stools in a day. Soft stool is encouraged until follow up with surgeon.   Qty: 60 capsule, Refills: 0      OXYGEN Inhale 2 L into the lungs as needed      famotidine (PEPCID) 20 MG tablet Take 1 tablet by mouth 2 times daily  Qty: 60 tablet, Refills: 0      ondansetron (ZOFRAN ODT) 4 MG disintegrating tablet Take 1-2 tablets by mouth every 12 hours as needed for Nausea  Qty: 12 tablet, Refills: 0      vitamin B-1 100 MG tablet Take 1 tablet by mouth daily  Qty: 30 tablet, Refills: 3 Umeclidinium Bromide 62.5 MCG/INH AEPB Inhale 1 puff into the lungs daily  Qty: 30 each, Refills: 5    Associated Diagnoses: Chronic obstructive pulmonary disease, unspecified COPD type (HCC)      DULoxetine (CYMBALTA) 30 MG extended release capsule Take 1 capsule by mouth daily  Qty: 30 capsule, Refills: 3    Associated Diagnoses: Recurrent major depressive disorder, in partial remission (HCC)      ferrous sulfate 325 (65 Fe) MG tablet Take 325 mg by mouth daily (with breakfast)      folic acid (FOLVITE) 1 MG tablet Take 1 mg by mouth daily      vitamin B-6 (PYRIDOXINE) 100 MG tablet Take 100 mg by mouth daily      vitamin B-12 (CYANOCOBALAMIN) 500 MCG tablet Take 500 mcg by mouth daily      vitamin D (ERGOCALCIFEROL) 92160 UNIT CAPS capsule Take 50,000 Units by mouth once a week            Current Discharge Medication List          Follow-up appointments:  one week    Provider Follow-up:    pcp    Condition at Discharge:  Stable    The patient was seen and examined on day of discharge and this discharge summary is in conjunction with any daily progress note from day of discharge. Time Spent on discharge is 45 minutes  in the examination, evaluation, counseling and review of medications and discharge plan. Signed:    Husam Oviedo DO   5/16/2019      Thank you No primary care provider on file. for the opportunity to be involved in this patient's care. If you have any questions or concerns please feel free to contact me at [phone].

## 2019-05-16 NOTE — PROGRESS NOTES
Pt alert and oriented, confused at times, laying in bed. Denies any pain or discomforts at this time. VSS. Meds given per MAR. Call light within reach. Bed alarm on.  Will continue to monitor

## 2019-05-17 NOTE — CARE COORDINATION
Call from [facility] cm requesting rx call to ju[facility] [phone]  For albuterol inhaler and umeclidinum bromide as pt is out and has new PCP appt next Tuesday.  [id number] per Dr Erick Shoemaker and these were called in for 1 inhaler, no refills  Electronically signed by Hellen Mosher RN on 5/17/2019 at 4:13 PM

## 2019-05-18 LAB
BLOOD CULTURE, ROUTINE: NORMAL
CULTURE, BLOOD 2: NORMAL

## 2019-05-23 ENCOUNTER — OFFICE VISIT (OUTPATIENT)
Dept: INTERNAL MEDICINE CLINIC | Age: 74
End: 2019-05-23
Payer: COMMERCIAL

## 2019-05-23 ENCOUNTER — OFFICE VISIT (OUTPATIENT)
Dept: SURGERY | Age: 74
End: 2019-05-23

## 2019-05-23 VITALS
HEART RATE: 72 BPM | DIASTOLIC BLOOD PRESSURE: 72 MMHG | WEIGHT: 125.4 LBS | BODY MASS INDEX: 23.08 KG/M2 | SYSTOLIC BLOOD PRESSURE: 102 MMHG | OXYGEN SATURATION: 100 % | HEIGHT: 62 IN

## 2019-05-23 VITALS
BODY MASS INDEX: 22.57 KG/M2 | HEART RATE: 98 BPM | SYSTOLIC BLOOD PRESSURE: 132 MMHG | WEIGHT: 125 LBS | DIASTOLIC BLOOD PRESSURE: 77 MMHG

## 2019-05-23 DIAGNOSIS — F33.41 RECURRENT MAJOR DEPRESSIVE DISORDER, IN PARTIAL REMISSION (HCC): ICD-10-CM

## 2019-05-23 DIAGNOSIS — I48.91 ATRIAL FIBRILLATION, UNSPECIFIED TYPE (HCC): ICD-10-CM

## 2019-05-23 DIAGNOSIS — J44.1 COPD WITH ACUTE EXACERBATION (HCC): Primary | ICD-10-CM

## 2019-05-23 DIAGNOSIS — Z98.890 S/P COLOSTOMY TAKEDOWN: Primary | ICD-10-CM

## 2019-05-23 DIAGNOSIS — K25.7 CHRONIC GASTRIC ULCER, UNSPECIFIED WHETHER GASTRIC ULCER HEMORRHAGE OR PERFORATION PRESENT: ICD-10-CM

## 2019-05-23 DIAGNOSIS — N73.9 PELVIC ABSCESS IN FEMALE: ICD-10-CM

## 2019-05-23 DIAGNOSIS — Z72.0 TOBACCO USE: ICD-10-CM

## 2019-05-23 DIAGNOSIS — I10 ESSENTIAL HYPERTENSION: Chronic | ICD-10-CM

## 2019-05-23 PROCEDURE — 4004F PT TOBACCO SCREEN RCVD TLK: CPT | Performed by: NURSE PRACTITIONER

## 2019-05-23 PROCEDURE — 3017F COLORECTAL CA SCREEN DOC REV: CPT | Performed by: NURSE PRACTITIONER

## 2019-05-23 PROCEDURE — 1123F ACP DISCUSS/DSCN MKR DOCD: CPT | Performed by: NURSE PRACTITIONER

## 2019-05-23 PROCEDURE — 4040F PNEUMOC VAC/ADMIN/RCVD: CPT | Performed by: NURSE PRACTITIONER

## 2019-05-23 PROCEDURE — G8400 PT W/DXA NO RESULTS DOC: HCPCS | Performed by: NURSE PRACTITIONER

## 2019-05-23 PROCEDURE — G8427 DOCREV CUR MEDS BY ELIG CLIN: HCPCS | Performed by: NURSE PRACTITIONER

## 2019-05-23 PROCEDURE — 1111F DSCHRG MED/CURRENT MED MERGE: CPT | Performed by: NURSE PRACTITIONER

## 2019-05-23 PROCEDURE — G8420 CALC BMI NORM PARAMETERS: HCPCS | Performed by: NURSE PRACTITIONER

## 2019-05-23 PROCEDURE — G8926 SPIRO NO PERF OR DOC: HCPCS | Performed by: NURSE PRACTITIONER

## 2019-05-23 PROCEDURE — 1090F PRES/ABSN URINE INCON ASSESS: CPT | Performed by: NURSE PRACTITIONER

## 2019-05-23 PROCEDURE — 99024 POSTOP FOLLOW-UP VISIT: CPT | Performed by: SURGERY

## 2019-05-23 PROCEDURE — 99214 OFFICE O/P EST MOD 30 MIN: CPT | Performed by: NURSE PRACTITIONER

## 2019-05-23 PROCEDURE — 3023F SPIROM DOC REV: CPT | Performed by: NURSE PRACTITIONER

## 2019-05-23 RX ORDER — DEXLANSOPRAZOLE 60 MG/1
60 CAPSULE, DELAYED RELEASE ORAL DAILY
Qty: 30 CAPSULE | Refills: 1 | Status: SHIPPED | OUTPATIENT
Start: 2019-05-23 | End: 2019-12-19 | Stop reason: SDUPTHER

## 2019-05-23 RX ORDER — DULOXETIN HYDROCHLORIDE 30 MG/1
CAPSULE, DELAYED RELEASE ORAL
Qty: 90 CAPSULE | Refills: 1 | Status: SHIPPED | OUTPATIENT
Start: 2019-05-23 | End: 2021-06-14

## 2019-05-23 RX ORDER — DULOXETIN HYDROCHLORIDE 30 MG/1
30 CAPSULE, DELAYED RELEASE ORAL DAILY
Qty: 30 CAPSULE | Refills: 3 | Status: SHIPPED | OUTPATIENT
Start: 2019-05-23 | End: 2019-05-23 | Stop reason: SDUPTHER

## 2019-05-23 RX ORDER — NICOTINE 21 MG/24HR
1 PATCH, TRANSDERMAL 24 HOURS TRANSDERMAL EVERY 24 HOURS
Qty: 30 PATCH | Refills: 3 | Status: SHIPPED | OUTPATIENT
Start: 2019-05-23

## 2019-05-23 SDOH — SOCIAL STABILITY: SOCIAL INSECURITY
WITHIN THE LAST YEAR, HAVE YOU BEEN KICKED, HIT, SLAPPED, OR OTHERWISE PHYSICALLY HURT BY YOUR PARTNER OR EX-PARTNER?: NO

## 2019-05-23 SDOH — SOCIAL STABILITY: SOCIAL INSECURITY: WITHIN THE LAST YEAR, HAVE YOU BEEN HUMILIATED OR EMOTIONALLY ABUSED IN OTHER WAYS BY YOUR PARTNER OR EX-PARTNER?: NO

## 2019-05-23 SDOH — SOCIAL STABILITY: SOCIAL INSECURITY
WITHIN THE LAST YEAR, HAVE TO BEEN RAPED OR FORCED TO HAVE ANY KIND OF SEXUAL ACTIVITY BY YOUR PARTNER OR EX-PARTNER?: NO

## 2019-05-23 SDOH — SOCIAL STABILITY: SOCIAL INSECURITY: WITHIN THE LAST YEAR, HAVE YOU BEEN AFRAID OF YOUR PARTNER OR EX-PARTNER?: NO

## 2019-05-23 SDOH — SOCIAL STABILITY: SOCIAL NETWORK: ARE YOU MARRIED, WIDOWED, DIVORCED, SEPARATED, NEVER MARRIED, OR LIVING WITH A PARTNER?: DIVORCED

## 2019-05-23 ASSESSMENT — ENCOUNTER SYMPTOMS
CONSTIPATION: 0
DIARRHEA: 0
SHORTNESS OF BREATH: 0
ABDOMINAL PAIN: 0
CHEST TIGHTNESS: 0

## 2019-05-23 NOTE — Clinical Note
Rigoberto Trujillo,I just saw Ms. Mendiola Gaastra in the office for follow up from colostomy reversal.  She is doing well and incisions are healing appropriately. She is going to follow up with me as needed. Thank you for allowing me to take care of Ms. Flavia Weaver with you. Ben Skinner

## 2019-05-23 NOTE — PROGRESS NOTES
Post Operative Visit    [address] [facility]   Giovanna Betts MD    [address], [address]  Megan Wise  Phone: [phone]  Fax: [phone]    Trevor Strong   YOB: 1945    Date of Visit:  5/23/2019    No ref. provider found  No primary care provider on file. Subjective:     Trevor Strong presents for a six week follow-up s/p laparoscopic lysis of adhesions, laparoscopic converted to open colostomy reversal, repair of colotomy, and small bowel resection. Overall she has been doing well since her last hospital stay. Interval CT showed resolution of her pelvic abscess/fluid collection. She has been eating/drinking without difficulty. She has not had any nausea and vomiting. Bowels are working normally. There has been complete resolution of her pain. She denies any fevers or chills.     Pain Score:   0 - No pain    Allergies   Allergen Reactions    Aspirin Other (See Comments)     Ulcers in stomach    Fentanyl Other (See Comments)     Hallucinations     Morphine      Feels like she will have a heart attack     Outpatient Medications Marked as Taking for the 5/23/19 encounter (Office Visit) with Nasrin Cruz MD   Medication Sig Dispense Refill    DULoxetine (CYMBALTA) 30 MG extended release capsule Take 1 capsule by mouth daily 30 capsule 3    nicotine (NICODERM CQ) 21 MG/24HR Place 1 patch onto the skin every 24 hours 30 patch 3    dexlansoprazole (DEXILANT) 60 MG CPDR delayed release capsule Take 1 capsule by mouth daily 30 capsule 1    busPIRone (BUSPAR) 5 MG tablet Take 2 tablets by mouth 2 times daily 60 tablet 1    metoprolol succinate (TOPROL XL) 25 MG extended release tablet Take 1 tablet by mouth daily 30 tablet 3    guaiFENesin (MUCINEX) 600 MG extended release tablet Take 1 tablet by mouth 2 times daily for 7 days 14 tablet 0    rivaroxaban (XARELTO) 20 MG TABS tablet Take 1 tablet by mouth daily 30 tablet 3    loperamide (IMODIUM) 2 MG capsule Take 2 mg by mouth 4 times daily as needed for Diarrhea      acetaminophen (TYLENOL) 325 MG tablet Take 650 mg by mouth every 6 hours as needed       vitamin B-1 100 MG tablet Take 1 tablet by mouth daily 30 tablet 3    albuterol sulfate HFA (PROAIR HFA) 108 (90 Base) MCG/ACT inhaler Inhale 2 puffs into the lungs every 6 hours as needed for Wheezing 1 Inhaler 3    Umeclidinium Bromide 62.5 MCG/INH AEPB Inhale 1 puff into the lungs daily 30 each 5       Vitals:    05/23/19 1309   BP: 132/77   Pulse: 98   Weight: 125 lb (56.7 kg)     Body mass index is 22.57 kg/m². Wt Readings from Last 3 Encounters:   05/23/19 125 lb (56.7 kg)   05/23/19 125 lb 6.4 oz (56.9 kg)   05/16/19 125 lb 10.6 oz (57 kg)     BP Readings from Last 3 Encounters:   05/23/19 132/77   05/23/19 102/72   05/16/19 115/67          Objective:    CONSTITUTIONAL:  awake, alert, no apparent distress    LUNGS:  Resp easy and unlabored  ABDOMEN:  Incisions well healed, no erythema or induration, no drainage, no hernia appreciated, soft, non-distended, non-tender  MUSCULOSKELETAL: No edema  NEUROLOGIC:  Mental Status Exam:  Level of Alertness:   awake  Orientation:   person, place, time  SKIN: as above      Pathology:  FINAL DIAGNOSIS:       A. \"Colon and rectal stumps\", excision:       - Portions of small intestine, large intestine and skin with focal         erosion, serosal acute inflammation and adhesion; compatible with         ostomy/ stump.       B. Parastomal hernia sac, excision:       - Benign fibroadipose tissue.     JINMI/JINMI    ASSESSMENT:     Diagnosis Orders   1. S/P colostomy takedown     2. Pelvic abscess in female         PLAN:    Caro Landis is doing well. Incisions are healing appropriately. She has started smoking again, which I told her to stop again as it increased her chance of hernia, wound complications, and anastomotic leak.   OK to resume regular/high fiber diet.  Will plan on having her follow up prn.      Electronically signed by Michelle Hanson MD on 5/23/2019 at 1:38 PM

## 2019-05-23 NOTE — PROGRESS NOTES
[address]  [address]  [address] [facility] [address]  Dept: [phone]       2019    Berhane Kirkpatrick (:  1945) lanette 69 y.o. female, here for evaluation of the following medical concerns: This is a new patient being seen following hospitalization  for COPD. She reports a medical history of: HTN, HLD, COPD, gastric ulcer, Parkinson's, Intestinal obstruction with colostomy and recent reversal,  Newly diagnosed Atrial fibrillation, depression, and anxiety. She is present with a friend. While hospitalized, newly diagnosed A-fib and started on coagulant. HPI  Tobacco Use  She reports smoking cigarettes since . She reports she has not smoked since hospitalization. She is requesting nicotine patches. Treatment Adherence:   Medication compliance:  compliant most of the time  Diet compliance:  compliant most of the time  Weight trend: stable  Current exercise: no regular exercise  Barriers: impairment:  physical: shortness of breath due to COPD    Hypertension:  Home blood pressure monitoring: No. Patient denies headache, blurred vision and dry cough. Antihypertensive medication side effects: no medication side effects noted. Use of agents associated with hypertension: none. Sodium (mmol/L)   Date Value   2019 137    BUN (mg/dL)   Date Value   2019 9    Glucose (mg/dL)   Date Value   2019 290 (H)      Potassium (mmol/L)   Date Value   2019 3.8     Potassium reflex Magnesium (mmol/L)   Date Value   2019 3.7    CREATININE (mg/dL)   Date Value   2019 0.7         Hyperlipidemia:  No medications.      Lab Results   Component Value Date    HDL 95 (H) 2018    LDLCALC 53 2018     Lab Results   Component Value Date    ALT 12 2019    AST 23 2019        Lab Results   Component Value Date    HDL 95 (H) 2018     Lab Results   Component Value Date LDLCALC 53 12/13/2018     Lab Results   Component Value Date    LABVLDL 11 12/13/2018     Lab Results   Component Value Date    WBC 6.1 05/14/2019    HGB 11.0 (L) 05/14/2019    HCT 34.9 (L) 05/14/2019    MCV 92.8 05/14/2019     05/14/2019     Lab Results   Component Value Date     05/14/2019    K 3.8 05/14/2019    CL 96 (L) 05/14/2019    CO2 23 05/14/2019    BUN 9 05/14/2019    CREATININE 0.7 05/14/2019    GLUCOSE 290 (H) 05/14/2019    CALCIUM 9.2 05/14/2019    PROT 8.0 05/13/2019    LABALBU 3.8 05/13/2019    BILITOT 0.4 05/13/2019    ALKPHOS 142 (H) 05/13/2019    AST 23 05/13/2019    ALT 12 05/13/2019    LABGLOM >60 05/14/2019    GFRAA >60 05/14/2019    AGRATIO 0.9 (L) 05/13/2019    GLOB 4.2 05/13/2019       Review of Systems   Constitutional: Negative for activity change and fever. HENT: Negative for congestion. Eyes: Negative for visual disturbance. Respiratory: Negative for chest tightness and shortness of breath. Cardiovascular: Negative for chest pain, palpitations and leg swelling. Gastrointestinal: Negative for abdominal pain, constipation and diarrhea. Endocrine: Negative for polyuria. Genitourinary: Negative for dysuria. Musculoskeletal: Negative for arthralgias and myalgias. Skin: Negative for rash. Neurological: Negative for dizziness, light-headedness and headaches. Psychiatric/Behavioral: Negative for agitation, decreased concentration and sleep disturbance. The patient is not nervous/anxious. Prior to Visit Medications    Medication Sig Taking?  Authorizing Provider   nicotine (NICODERM CQ) 21 MG/24HR Place 1 patch onto the skin every 24 hours Yes ABIGAIL Roberson CNP   dexlansoprazole (DEXILANT) 60 MG CPDR delayed release capsule Take 1 capsule by mouth daily Yes ABIGAIL Roberson CNP   busPIRone (BUSPAR) 5 MG tablet Take 2 tablets by mouth 2 times daily Yes Cristin Yanez,    metoprolol succinate (TOPROL XL) 25 MG extended release tablet Take 1 tablet by mouth daily Yes Cristin Yanez DO   guaiFENesin (MUCINEX) 600 MG extended release tablet Take 1 tablet by mouth 2 times daily for 7 days Yes Cristin Yanez DO   rivaroxaban (XARELTO) 20 MG TABS tablet Take 1 tablet by mouth daily Yes Cristin Yanez, DO   loperamide (IMODIUM) 2 MG capsule Take 2 mg by mouth 4 times daily as needed for Diarrhea Yes Historical Provider, MD   acetaminophen (TYLENOL) 325 MG tablet Take 650 mg by mouth every 6 hours as needed  Yes Historical Provider, MD   vitamin B-1 100 MG tablet Take 1 tablet by mouth daily Yes Tayla Jung MD   albuterol sulfate HFA (PROAIR HFA) 108 (90 Base) MCG/ACT inhaler Inhale 2 puffs into the lungs every 6 hours as needed for Wheezing Yes Jonathan Amaro MD   Umeclidinium Bromide 62.5 MCG/INH AEPB Inhale 1 puff into the lungs daily Yes Jonathan Amaro MD   DULoxetine (CYMBALTA) 30 MG extended release capsule TAKE ONE CAPSULE BY MOUTH DAILY  Danitza Comer, APRN - CNP       Allergies   Allergen Reactions    Aspirin Other (See Comments)     Ulcers in stomach    Fentanyl Other (See Comments)     Hallucinations     Morphine      Feels like she will have a heart attack       Past Medical History:   Diagnosis Date    Anemia     Arthritis     Clostridium difficile colitis     December 2018    Col[facility] in place [facility])     SBO/diverticulosis     COPD (chronic obstructive [facility] disease) [facility]     Depression     DANIELLE (generalized anxiety disorder)     Gastric ulcer, unspecified as acute or chronic, without mention of hemorrhage, perforation, or obstruction     H/O ETOH abuse     Hiatal hernia     History of blood transfusion     Hyperlipidemia     Hypertension     Insomnia     Intestinal obstruction ([address]     Parkinson's disease ([facility]     Tobacco abuse     Vitamin D deficiency        Past Surgical History:   Procedure Laterality Date    ABDOMEN SURGERY      CHOLECYSTECTOMY      COLONOSCOPY  12/14/2018    COLONOSCOPY N/A 12/14/2018    COLONOSCOPY POLYPECTOMY SNARE/COLD BIOPSY performed by Valeri Castillo MD at [facility]  2018    ENDOSCOPY, COLON, DIAGNOSTIC      HYSTERECTOMY      SMALL INTESTINE SURGERY N/A 4/10/2019    LAPAROSCOPIC LYSISI OF ADHESIONS FOR GREATER THAN 3 HOURS,LAPORSCOPIC CONVERTED TO OPEN COLOSTOMY REVERSAL, SMALL BOWEL RESECTION performed by Reginaldo Blum MD at [facility] ENDOSCOPY  12/13/2018    with biopsies    UPPER GASTROINTESTINAL ENDOSCOPY N/A 12/13/2018    EGD BIOPSY performed by Valeri Castillo MD at [facility] History     Socioeconomic History    Marital status:      Spouse name: Not on file    Number of children: 3    Years of education: Not on file    Highest education level: Not on file   Occupational History    Not on file   Social Needs    Financial resource strain: Not on file    Food insecurity:     Worry: Not on file     Inability: Not on file    Transportation needs:     Medical: Not on file     Non-medical: Not on file   Tobacco Use    Smoking status: Current Every Day Smoker     Packs/day: 0.50     Years: 60.00     Pack years: 30.00     Types: Cigarettes     Start date: 1/10/1960    Smokeless tobacco: Never Used   Substance and Sexual Activity    Alcohol use:  Yes     Alcohol/week: 1.2 - 1.8 oz     Types: 2 - 3 Cans of beer per week     Comment: daily, stated stop after hospitalization    Drug use: No    Sexual activity: Never   Lifestyle    Physical activity:     Days per week: Not on file     Minutes per session: Not on file    Stress: Not on file   Relationships    Social connections:     Talks on phone: Not on file     Gets together: Not on file     Attends [employer] service: Not on file     Active member of club or organization: Not on file     Attends meetings of clubs or organizations: Not on file     Relationship status:     Intimate medications. - dexlansoprazole (DEXILANT) 60 MG CPDR delayed release capsule; Take 1 capsule by mouth daily  Dispense: 30 capsule; Refill: 1  - AFL - Idalia Mcgee MD, Gastroenterology, [facility]    3. COPD with acute exacerbation ([facility]  -Continue current medications. -Make appointment with Pulmonary Specialist    4. Essential hypertension  -Controlled  -Continue current medications. 5. Atrial fibrillation, unspecified type ([facility]  -Continue current medications. 6. Tobacco use  - nicotine (NICODERM CQ) 21 MG/24HR; Place 1 patch onto the skin every 24 hours  Dispense: 30 patch; Refill: 3      Return in about 3 weeks (around 6/13/2019).       --ABIGAIL Thomas - CNP on 5/23/2019 at 5:56 PM

## 2019-06-13 ENCOUNTER — OFFICE VISIT (OUTPATIENT)
Dept: INTERNAL MEDICINE CLINIC | Age: 74
End: 2019-06-13
Payer: COMMERCIAL

## 2019-06-13 VITALS
HEIGHT: 62 IN | BODY MASS INDEX: 23.74 KG/M2 | OXYGEN SATURATION: 94 % | SYSTOLIC BLOOD PRESSURE: 130 MMHG | WEIGHT: 129 LBS | HEART RATE: 111 BPM | DIASTOLIC BLOOD PRESSURE: 78 MMHG

## 2019-06-13 DIAGNOSIS — I48.91 ATRIAL FIBRILLATION, UNSPECIFIED TYPE (HCC): ICD-10-CM

## 2019-06-13 DIAGNOSIS — J44.1 COPD WITH ACUTE EXACERBATION (HCC): ICD-10-CM

## 2019-06-13 DIAGNOSIS — I10 ESSENTIAL HYPERTENSION: Chronic | ICD-10-CM

## 2019-06-13 DIAGNOSIS — F33.41 RECURRENT MAJOR DEPRESSIVE DISORDER, IN PARTIAL REMISSION (HCC): ICD-10-CM

## 2019-06-13 DIAGNOSIS — G25.2 CONTINUOUS TREMOR: Primary | ICD-10-CM

## 2019-06-13 DIAGNOSIS — Z00.00 HEALTHCARE MAINTENANCE: ICD-10-CM

## 2019-06-13 LAB
A/G RATIO: 1.5 (ref 1.1–2.2)
ALBUMIN SERPL-MCNC: 4.4 G/DL (ref 3.4–5)
ALP BLD-CCNC: 128 U/L (ref 40–129)
ALT SERPL-CCNC: 14 U/L (ref 10–40)
ANION GAP SERPL CALCULATED.3IONS-SCNC: 15 MMOL/L (ref 3–16)
AST SERPL-CCNC: 22 U/L (ref 15–37)
BASOPHILS ABSOLUTE: 0.1 K/UL (ref 0–0.2)
BASOPHILS RELATIVE PERCENT: 0.8 %
BILIRUB SERPL-MCNC: <0.2 MG/DL (ref 0–1)
BUN BLDV-MCNC: 10 MG/DL (ref 7–20)
CALCIUM SERPL-MCNC: 9.1 MG/DL (ref 8.3–10.6)
CHLORIDE BLD-SCNC: 98 MMOL/L (ref 99–110)
CHOLESTEROL, TOTAL: 230 MG/DL (ref 0–199)
CO2: 26 MMOL/L (ref 21–32)
CREAT SERPL-MCNC: 0.6 MG/DL (ref 0.6–1.2)
EOSINOPHILS ABSOLUTE: 0.2 K/UL (ref 0–0.6)
EOSINOPHILS RELATIVE PERCENT: 2.3 %
GFR AFRICAN AMERICAN: >60
GFR NON-AFRICAN AMERICAN: >60
GLOBULIN: 3 G/DL
GLUCOSE BLD-MCNC: 107 MG/DL (ref 70–99)
HCT VFR BLD CALC: 31.5 % (ref 36–48)
HDLC SERPL-MCNC: 74 MG/DL (ref 40–60)
HEMOGLOBIN: 10 G/DL (ref 12–16)
LDL CHOLESTEROL CALCULATED: 127 MG/DL
LYMPHOCYTES ABSOLUTE: 1.6 K/UL (ref 1–5.1)
LYMPHOCYTES RELATIVE PERCENT: 22.7 %
MCH RBC QN AUTO: 27.5 PG (ref 26–34)
MCHC RBC AUTO-ENTMCNC: 31.8 G/DL (ref 31–36)
MCV RBC AUTO: 86.3 FL (ref 80–100)
MONOCYTES ABSOLUTE: 0.3 K/UL (ref 0–1.3)
MONOCYTES RELATIVE PERCENT: 4.9 %
NEUTROPHILS ABSOLUTE: 4.8 K/UL (ref 1.7–7.7)
NEUTROPHILS RELATIVE PERCENT: 69.3 %
PDW BLD-RTO: 16 % (ref 12.4–15.4)
PLATELET # BLD: 414 K/UL (ref 135–450)
PMV BLD AUTO: 7.4 FL (ref 5–10.5)
POTASSIUM SERPL-SCNC: 4.1 MMOL/L (ref 3.5–5.1)
RBC # BLD: 3.65 M/UL (ref 4–5.2)
SODIUM BLD-SCNC: 139 MMOL/L (ref 136–145)
T4 FREE: 0.9 NG/DL (ref 0.9–1.8)
TOTAL PROTEIN: 7.4 G/DL (ref 6.4–8.2)
TRIGL SERPL-MCNC: 145 MG/DL (ref 0–150)
TSH REFLEX: 0.95 UIU/ML (ref 0.27–4.2)
VLDLC SERPL CALC-MCNC: 29 MG/DL
WBC # BLD: 6.9 K/UL (ref 4–11)

## 2019-06-13 PROCEDURE — 4040F PNEUMOC VAC/ADMIN/RCVD: CPT | Performed by: NURSE PRACTITIONER

## 2019-06-13 PROCEDURE — 4004F PT TOBACCO SCREEN RCVD TLK: CPT | Performed by: NURSE PRACTITIONER

## 2019-06-13 PROCEDURE — 1111F DSCHRG MED/CURRENT MED MERGE: CPT | Performed by: NURSE PRACTITIONER

## 2019-06-13 PROCEDURE — 3023F SPIROM DOC REV: CPT | Performed by: NURSE PRACTITIONER

## 2019-06-13 PROCEDURE — 3017F COLORECTAL CA SCREEN DOC REV: CPT | Performed by: NURSE PRACTITIONER

## 2019-06-13 PROCEDURE — G8420 CALC BMI NORM PARAMETERS: HCPCS | Performed by: NURSE PRACTITIONER

## 2019-06-13 PROCEDURE — G8926 SPIRO NO PERF OR DOC: HCPCS | Performed by: NURSE PRACTITIONER

## 2019-06-13 PROCEDURE — 36415 COLL VENOUS BLD VENIPUNCTURE: CPT | Performed by: NURSE PRACTITIONER

## 2019-06-13 PROCEDURE — G8427 DOCREV CUR MEDS BY ELIG CLIN: HCPCS | Performed by: NURSE PRACTITIONER

## 2019-06-13 PROCEDURE — 99214 OFFICE O/P EST MOD 30 MIN: CPT | Performed by: NURSE PRACTITIONER

## 2019-06-13 PROCEDURE — 1123F ACP DISCUSS/DSCN MKR DOCD: CPT | Performed by: NURSE PRACTITIONER

## 2019-06-13 PROCEDURE — G8400 PT W/DXA NO RESULTS DOC: HCPCS | Performed by: NURSE PRACTITIONER

## 2019-06-13 PROCEDURE — 1090F PRES/ABSN URINE INCON ASSESS: CPT | Performed by: NURSE PRACTITIONER

## 2019-06-13 ASSESSMENT — ENCOUNTER SYMPTOMS
CHEST TIGHTNESS: 0
BLOOD IN STOOL: 0
VOMITING: 0
ABDOMINAL PAIN: 0
CONSTIPATION: 0
ANAL BLEEDING: 0
SHORTNESS OF BREATH: 0
DIARRHEA: 0

## 2019-06-13 NOTE — PROGRESS NOTES
[address] [facility]  [address] [address]  Dept: [phone]       2019     Aide Mccracken (:  1945)is a 69 y.o. female, here for evaluation of the following medical concerns:    HPI  Depression  She reports feelings of increased depression over the last several months. She denies suicidal/homicial ideations. States she was taking Cymbalta 60 mg and felt it was effective. Unsure why the dosage was changed. Tremors  She reports having tremors since childhood. She reports her father and nephew had tremors. She stated it has started bothering her over the last month, difficulty feeding herself. Hypertension:  Home blood pressure monitoring: No.  She is not adherent to a low sodium diet. Patient denies headache, lightheadedness, peripheral edema and dry cough. Antihypertensive medication side effects: no medication side effects noted. Use of agents associated with hypertension: none. Healthcare Maintenance  She has not made appointment with Gastroenterology, referral made at last appointment. She has a history of ulcers. No recent bleeding noted.                                Sodium (mmol/L)   Date Value   2019 137    BUN (mg/dL)   Date Value   2019 9    Glucose (mg/dL)   Date Value   2019 290 (H)      Potassium (mmol/L)   Date Value   2019 3.8     Potassium reflex Magnesium (mmol/L)   Date Value   2019 3.7    CREATININE (mg/dL)   Date Value   2019 0.7         Lab Results   Component Value Date    HDL 95 (H) 2018     Lab Results   Component Value Date    LDLCALC 53 2018     Lab Results   Component Value Date    LABVLDL 11 2018     No results found for: [facility]  Lab Results   Component Value Date    WBC 6.1 2019    HGB 11.0 (L) 2019    HCT 34.9 (L) 2019    MCV 92.8 2019     2019     Lab Results   Component Value Date     05/14/2019    K 3.8 05/14/2019    CL 96 (L) 05/14/2019    CO2 23 05/14/2019    BUN 9 05/14/2019    CREATININE 0.7 05/14/2019    GLUCOSE 290 (H) 05/14/2019    CALCIUM 9.2 05/14/2019    PROT 8.0 05/13/2019    LABALBU 3.8 05/13/2019    BILITOT 0.4 05/13/2019    ALKPHOS 142 (H) 05/13/2019    AST 23 05/13/2019    ALT 12 05/13/2019    LABGLOM >60 05/14/2019    GFRAA >60 05/14/2019    AGRATIO 0.9 (L) 05/13/2019    GLOB 4.2 05/13/2019       Review of Systems   Constitutional: Negative for activity change and fever. HENT: Negative for congestion. Eyes: Negative for visual disturbance. Respiratory: Negative for chest tightness and shortness of breath. Cardiovascular: Negative for chest pain, palpitations and leg swelling. Gastrointestinal: Negative for abdominal pain, anal bleeding, blood in stool, constipation, diarrhea and vomiting. Endocrine: Negative for polyuria. Genitourinary: Negative for dysuria. Musculoskeletal: Negative for arthralgias and myalgias. Skin: Negative for rash. Neurological: Positive for tremors. Negative for dizziness, light-headedness and headaches. Psychiatric/Behavioral: Positive for dysphoric mood. Negative for agitation, decreased concentration and sleep disturbance. The patient is not nervous/anxious. Prior to Visit Medications    Medication Sig Taking?  Authorizing Provider   nicotine (NICODERM CQ) 21 MG/24HR Place 1 patch onto the skin every 24 hours Yes ABIGAIL Mancilla CNP   dexlansoprazole (DEXILANT) 60 MG CPDR delayed release capsule Take 1 capsule by mouth daily Yes ABIGAIL Mancilla CNP   DULoxetine (CYMBALTA) 30 MG extended release capsule TAKE ONE CAPSULE BY MOUTH DAILY Yes ABIGAIL Mancilla CNP   busPIRone (BUSPAR) 5 MG tablet Take 2 tablets by mouth 2 times daily Yes Cristin Yanez, DO   metoprolol succinate (TOPROL XL) 25 MG extended release tablet Take 1 tablet by mouth daily Yes Cristin Yanez, DO   rivaroxaban (XARELTO) 20 MG TABS tablet Take 1 tablet by mouth daily Yes Cristin Yanez DO   loperamide (IMODIUM) 2 MG capsule Take 2 mg by mouth 4 times daily as needed for Diarrhea Yes Historical Provider, MD   acetaminophen (TYLENOL) 325 MG tablet Take 650 mg by mouth every 6 hours as needed  Yes Historical Provider, MD   vitamin B-1 100 MG tablet Take 1 tablet by mouth daily Yes Tayla Jung MD   albuterol sulfate HFA (PROAIR HFA) 108 (90 Base) MCG/ACT inhaler Inhale 2 puffs into the lungs every 6 hours as needed for Wheezing Yes Chantale Long MD   Umeclidinium Bromide 62.5 MCG/INH AEPB Inhale 1 puff into the lungs daily Yes Chantale Long MD        Social History     Tobacco Use    Smoking status: Current Every Day Smoker     Packs/day: 0.50     Years: 60.00     Pack years: 30.00     Types: Cigarettes     Start date: 1/10/1960    Smokeless tobacco: Never Used   Substance Use Topics    Alcohol use: Yes     Alcohol/week: 1.2 - 1.8 oz     Types: 2 - 3 Cans of beer per week     Comment: daily, stated stop after hospitalization        Vitals:    06/13/19 1000   BP: 130/78   Site: Right Upper Arm   Position: Sitting   Cuff Size: Medium Adult   Pulse: 111   SpO2: 94%   Weight: 129 lb (58.5 kg)   Height: 5' 2\" (1.575 m)     Estimated body mass index is 23.59 kg/m² as calculated from the following:    Height as of this encounter: 5' 2\" (1.575 m). Weight as of this encounter: 129 lb (58.5 kg). Physical Exam   Constitutional: She is oriented to person, place, and time. Vital signs are normal. She is cooperative. HENT:   Head: Normocephalic. Cardiovascular: Normal rate, regular rhythm, S1 normal, S2 normal and normal heart sounds. Pulmonary/Chest: Effort normal. She has wheezes in the right upper field, the right middle field, the left upper field and the left middle field. Neurological: She is alert and oriented to person, place, and time. She displays tremor (hands, arms and mouth). GCS eye subscore is 4.  GCS verbal

## 2019-06-13 NOTE — PATIENT INSTRUCTIONS
Make appointment with Gastroenterology and Neurology  Continue to take Calcium and vitamin supplements

## 2019-06-14 LAB
ESTIMATED AVERAGE GLUCOSE: 108.3 MG/DL
HBA1C MFR BLD: 5.4 %
HEPATITIS C ANTIBODY INTERPRETATION: NORMAL

## 2019-06-17 DIAGNOSIS — E78.5 HYPERLIPIDEMIA, UNSPECIFIED HYPERLIPIDEMIA TYPE: Primary | ICD-10-CM

## 2019-06-17 DIAGNOSIS — E78.5 HYPERLIPIDEMIA, UNSPECIFIED HYPERLIPIDEMIA TYPE: ICD-10-CM

## 2019-06-17 RX ORDER — ATORVASTATIN CALCIUM 20 MG/1
TABLET, FILM COATED ORAL
Qty: 90 TABLET | Refills: 3 | Status: ON HOLD | OUTPATIENT
Start: 2019-06-17 | End: 2021-08-31 | Stop reason: HOSPADM

## 2019-06-17 RX ORDER — ATORVASTATIN CALCIUM 20 MG/1
20 TABLET, FILM COATED ORAL DAILY
Qty: 30 TABLET | Refills: 3 | Status: SHIPPED | OUTPATIENT
Start: 2019-06-17 | End: 2019-06-17 | Stop reason: SDUPTHER

## 2019-06-26 DIAGNOSIS — J44.9 CHRONIC OBSTRUCTIVE PULMONARY DISEASE, UNSPECIFIED COPD TYPE (HCC): ICD-10-CM

## 2019-06-27 ENCOUNTER — HOSPITAL ENCOUNTER (OUTPATIENT)
Dept: MAMMOGRAPHY | Age: 74
Discharge: HOME | End: 2019-07-02
Payer: COMMERCIAL

## 2019-06-27 DIAGNOSIS — Z12.31 VISIT FOR SCREENING MAMMOGRAM: ICD-10-CM

## 2019-06-28 RX ORDER — UMECLIDINIUM 62.5 UG/1
AEROSOL, POWDER ORAL
Qty: 30 EACH | Refills: 3 | OUTPATIENT
Start: 2019-06-28

## 2019-06-28 RX ORDER — PREDNISONE 10 MG/1
TABLET ORAL
Qty: 10 TABLET | Refills: 0 | OUTPATIENT
Start: 2019-06-28

## 2019-06-28 NOTE — TELEPHONE ENCOUNTER
The patient called requesting a refills as indicated.   Umeclidinium Bromide 62.5 MCG/INH AEPB [[id number]]     Order Details   Dose: 1 puff Route: Inhalation Frequency: DAILY   Dispense Quantity: 30 each Refills: 5 Fills remaining: --           Sig: Inhale 1 puff into the lungs daily          Written Date: 10/23/18 Expiration Date: 10/23/19     Start Date: 10/23/18 End Date: --     Ordering Provider:  -- Authorizing Provider:  Pete García MD Ordering User:  Pete García MD          Medication Notes      Sheron Camacholo, [address] [place] [address]  -- 4/23/19 12:53 PM                Last date filled -10-23-18 Pharmacy: [facility] [id number], [address] [place] [address] -  [phone]

## 2019-06-28 NOTE — TELEPHONE ENCOUNTER
[facility] sent a 2 request for prednisone 10 mg. It was D/C by Scherrie Fothergill. Spoke [facility] Pharmist  6-28-19.

## 2019-07-01 ENCOUNTER — TELEPHONE (OUTPATIENT)
Dept: INTERNAL MEDICINE CLINIC | Age: 74
End: 2019-07-01

## 2019-07-01 RX ORDER — ALBUTEROL SULFATE 90 UG/1
2 AEROSOL, METERED RESPIRATORY (INHALATION) EVERY 6 HOURS PRN
Qty: 1 INHALER | Refills: 3 | Status: ON HOLD | OUTPATIENT
Start: 2019-07-01 | End: 2021-08-31 | Stop reason: HOSPADM

## 2019-07-12 ENCOUNTER — HOSPITAL ENCOUNTER (INPATIENT)
Age: 74
LOS: 4 days | Discharge: NURSING, REHAB OR LONG-TERM CARE | DRG: 190 | End: 2019-07-16
Attending: EMERGENCY MEDICINE | Admitting: HOSPITALIST
Payer: COMMERCIAL

## 2019-07-12 ENCOUNTER — APPOINTMENT (OUTPATIENT)
Dept: GENERAL RADIOLOGY | Age: 74
DRG: 190 | End: 2019-07-12
Payer: COMMERCIAL

## 2019-07-12 ENCOUNTER — APPOINTMENT (OUTPATIENT)
Dept: CT IMAGING | Age: 74
DRG: 190 | End: 2019-07-12
Payer: COMMERCIAL

## 2019-07-12 DIAGNOSIS — F10.920 ACUTE ALCOHOLIC INTOXICATION WITHOUT COMPLICATION (HCC): ICD-10-CM

## 2019-07-12 DIAGNOSIS — R09.02 HYPOXIA: ICD-10-CM

## 2019-07-12 DIAGNOSIS — F10.10 ALCOHOL ABUSE: Primary | ICD-10-CM

## 2019-07-12 DIAGNOSIS — J44.1 COPD EXACERBATION (HCC): ICD-10-CM

## 2019-07-12 LAB
ACETAMINOPHEN LEVEL: <5 UG/ML (ref 10–30)
ALBUMIN SERPL-MCNC: 4.3 G/DL (ref 3.4–5)
ALP BLD-CCNC: 135 U/L (ref 40–129)
ALT SERPL-CCNC: 27 U/L (ref 10–40)
ANION GAP SERPL CALCULATED.3IONS-SCNC: 20 MMOL/L (ref 3–16)
APTT: 35.8 SEC (ref 26–36)
AST SERPL-CCNC: 63 U/L (ref 15–37)
BASOPHILS ABSOLUTE: 0.1 K/UL (ref 0–0.2)
BASOPHILS RELATIVE PERCENT: 3 %
BILIRUB SERPL-MCNC: <0.2 MG/DL (ref 0–1)
BILIRUBIN DIRECT: <0.2 MG/DL (ref 0–0.3)
BILIRUBIN, INDIRECT: ABNORMAL MG/DL (ref 0–1)
BUN BLDV-MCNC: 15 MG/DL (ref 7–20)
CALCIUM SERPL-MCNC: 9.2 MG/DL (ref 8.3–10.6)
CHLORIDE BLD-SCNC: 101 MMOL/L (ref 99–110)
CO2: 23 MMOL/L (ref 21–32)
CREAT SERPL-MCNC: <0.5 MG/DL (ref 0.6–1.2)
EOSINOPHILS ABSOLUTE: 0.2 K/UL (ref 0–0.6)
EOSINOPHILS RELATIVE PERCENT: 5 %
ETHANOL: 367 MG/DL (ref 0–0.08)
GFR AFRICAN AMERICAN: >60
GFR NON-AFRICAN AMERICAN: >60
GLUCOSE BLD-MCNC: 93 MG/DL (ref 70–99)
HCT VFR BLD CALC: 32.9 % (ref 36–48)
HEMOGLOBIN: 10.2 G/DL (ref 12–16)
INR BLD: 0.9 (ref 0.86–1.14)
LYMPHOCYTES ABSOLUTE: 2 K/UL (ref 1–5.1)
LYMPHOCYTES RELATIVE PERCENT: 42.6 %
MCH RBC QN AUTO: 25 PG (ref 26–34)
MCHC RBC AUTO-ENTMCNC: 31 G/DL (ref 31–36)
MCV RBC AUTO: 80.8 FL (ref 80–100)
MONOCYTES ABSOLUTE: 0.3 K/UL (ref 0–1.3)
MONOCYTES RELATIVE PERCENT: 7.1 %
NEUTROPHILS ABSOLUTE: 2 K/UL (ref 1.7–7.7)
NEUTROPHILS RELATIVE PERCENT: 42.3 %
PDW BLD-RTO: 19 % (ref 12.4–15.4)
PLATELET # BLD: 362 K/UL (ref 135–450)
PMV BLD AUTO: 7 FL (ref 5–10.5)
POTASSIUM REFLEX MAGNESIUM: 4.3 MMOL/L (ref 3.5–5.1)
PROTHROMBIN TIME: 10.3 SEC (ref 9.8–13)
RBC # BLD: 4.08 M/UL (ref 4–5.2)
SALICYLATE, SERUM: <0.3 MG/DL (ref 15–30)
SODIUM BLD-SCNC: 144 MMOL/L (ref 136–145)
TOTAL CK: 47 U/L (ref 26–192)
TOTAL PROTEIN: 8.1 G/DL (ref 6.4–8.2)
TROPONIN: <0.01 NG/ML
TSH REFLEX: 0.59 UIU/ML (ref 0.27–4.2)
WBC # BLD: 4.6 K/UL (ref 4–11)

## 2019-07-12 PROCEDURE — 85730 THROMBOPLASTIN TIME PARTIAL: CPT

## 2019-07-12 PROCEDURE — 70450 CT HEAD/BRAIN W/O DYE: CPT

## 2019-07-12 PROCEDURE — 2580000003 HC RX 258: Performed by: HOSPITALIST

## 2019-07-12 PROCEDURE — 6360000002 HC RX W HCPCS: Performed by: HOSPITALIST

## 2019-07-12 PROCEDURE — 96361 HYDRATE IV INFUSION ADD-ON: CPT

## 2019-07-12 PROCEDURE — 99285 EMERGENCY DEPT VISIT HI MDM: CPT

## 2019-07-12 PROCEDURE — 2700000000 HC OXYGEN THERAPY PER DAY

## 2019-07-12 PROCEDURE — 6370000000 HC RX 637 (ALT 250 FOR IP): Performed by: EMERGENCY MEDICINE

## 2019-07-12 PROCEDURE — 80076 HEPATIC FUNCTION PANEL: CPT

## 2019-07-12 PROCEDURE — 80048 BASIC METABOLIC PNL TOTAL CA: CPT

## 2019-07-12 PROCEDURE — 36415 COLL VENOUS BLD VENIPUNCTURE: CPT

## 2019-07-12 PROCEDURE — 85025 COMPLETE CBC W/AUTO DIFF WBC: CPT

## 2019-07-12 PROCEDURE — 82550 ASSAY OF CK (CPK): CPT

## 2019-07-12 PROCEDURE — G0480 DRUG TEST DEF 1-7 CLASSES: HCPCS

## 2019-07-12 PROCEDURE — 84484 ASSAY OF TROPONIN QUANT: CPT

## 2019-07-12 PROCEDURE — 2580000003 HC RX 258: Performed by: EMERGENCY MEDICINE

## 2019-07-12 PROCEDURE — 96365 THER/PROPH/DIAG IV INF INIT: CPT

## 2019-07-12 PROCEDURE — 84443 ASSAY THYROID STIM HORMONE: CPT

## 2019-07-12 PROCEDURE — 6360000002 HC RX W HCPCS: Performed by: EMERGENCY MEDICINE

## 2019-07-12 PROCEDURE — 94640 AIRWAY INHALATION TREATMENT: CPT

## 2019-07-12 PROCEDURE — 85610 PROTHROMBIN TIME: CPT

## 2019-07-12 PROCEDURE — 6370000000 HC RX 637 (ALT 250 FOR IP): Performed by: HOSPITALIST

## 2019-07-12 PROCEDURE — 71045 X-RAY EXAM CHEST 1 VIEW: CPT

## 2019-07-12 PROCEDURE — 1200000000 HC SEMI PRIVATE

## 2019-07-12 PROCEDURE — 94761 N-INVAS EAR/PLS OXIMETRY MLT: CPT

## 2019-07-12 RX ORDER — 0.9 % SODIUM CHLORIDE 0.9 %
1000 INTRAVENOUS SOLUTION INTRAVENOUS ONCE
Status: COMPLETED | OUTPATIENT
Start: 2019-07-12 | End: 2019-07-12

## 2019-07-12 RX ORDER — IPRATROPIUM BROMIDE AND ALBUTEROL SULFATE 2.5; .5 MG/3ML; MG/3ML
1 SOLUTION RESPIRATORY (INHALATION) ONCE
Status: DISCONTINUED | OUTPATIENT
Start: 2019-07-12 | End: 2019-07-15

## 2019-07-12 RX ORDER — LORAZEPAM 1 MG/1
4 TABLET ORAL
Status: DISCONTINUED | OUTPATIENT
Start: 2019-07-12 | End: 2019-07-16 | Stop reason: HOSPADM

## 2019-07-12 RX ORDER — LORAZEPAM 1 MG/1
3 TABLET ORAL
Status: DISCONTINUED | OUTPATIENT
Start: 2019-07-12 | End: 2019-07-16 | Stop reason: HOSPADM

## 2019-07-12 RX ORDER — SODIUM CHLORIDE 0.9 % (FLUSH) 0.9 %
10 SYRINGE (ML) INJECTION EVERY 12 HOURS SCHEDULED
Status: DISCONTINUED | OUTPATIENT
Start: 2019-07-12 | End: 2019-07-16 | Stop reason: HOSPADM

## 2019-07-12 RX ORDER — LORAZEPAM 1 MG/1
1 TABLET ORAL
Status: DISCONTINUED | OUTPATIENT
Start: 2019-07-12 | End: 2019-07-16 | Stop reason: HOSPADM

## 2019-07-12 RX ORDER — LORAZEPAM 2 MG/ML
1 INJECTION INTRAMUSCULAR
Status: DISCONTINUED | OUTPATIENT
Start: 2019-07-12 | End: 2019-07-16 | Stop reason: HOSPADM

## 2019-07-12 RX ORDER — MAGNESIUM SULFATE IN WATER 40 MG/ML
2 INJECTION, SOLUTION INTRAVENOUS ONCE
Status: DISCONTINUED | OUTPATIENT
Start: 2019-07-12 | End: 2019-07-12

## 2019-07-12 RX ORDER — LORAZEPAM 2 MG/ML
3 INJECTION INTRAMUSCULAR
Status: DISCONTINUED | OUTPATIENT
Start: 2019-07-12 | End: 2019-07-16 | Stop reason: HOSPADM

## 2019-07-12 RX ORDER — LORAZEPAM 2 MG/ML
4 INJECTION INTRAMUSCULAR
Status: DISCONTINUED | OUTPATIENT
Start: 2019-07-12 | End: 2019-07-16 | Stop reason: HOSPADM

## 2019-07-12 RX ORDER — THIAMINE MONONITRATE (VIT B1) 100 MG
100 TABLET ORAL DAILY
Status: DISCONTINUED | OUTPATIENT
Start: 2019-07-12 | End: 2019-07-16 | Stop reason: HOSPADM

## 2019-07-12 RX ORDER — LORAZEPAM 2 MG/ML
2 INJECTION INTRAMUSCULAR
Status: DISCONTINUED | OUTPATIENT
Start: 2019-07-12 | End: 2019-07-16 | Stop reason: HOSPADM

## 2019-07-12 RX ORDER — SODIUM CHLORIDE 0.9 % (FLUSH) 0.9 %
10 SYRINGE (ML) INJECTION PRN
Status: DISCONTINUED | OUTPATIENT
Start: 2019-07-12 | End: 2019-07-16 | Stop reason: HOSPADM

## 2019-07-12 RX ORDER — DULOXETIN HYDROCHLORIDE 30 MG/1
30 CAPSULE, DELAYED RELEASE ORAL DAILY
Status: DISCONTINUED | OUTPATIENT
Start: 2019-07-12 | End: 2019-07-16 | Stop reason: HOSPADM

## 2019-07-12 RX ORDER — FAMOTIDINE 20 MG/1
20 TABLET, FILM COATED ORAL 2 TIMES DAILY
Status: DISCONTINUED | OUTPATIENT
Start: 2019-07-12 | End: 2019-07-12 | Stop reason: ALTCHOICE

## 2019-07-12 RX ORDER — IPRATROPIUM BROMIDE AND ALBUTEROL SULFATE 2.5; .5 MG/3ML; MG/3ML
1 SOLUTION RESPIRATORY (INHALATION)
Status: DISCONTINUED | OUTPATIENT
Start: 2019-07-12 | End: 2019-07-16 | Stop reason: HOSPADM

## 2019-07-12 RX ORDER — ACETAMINOPHEN 325 MG/1
650 TABLET ORAL EVERY 6 HOURS PRN
Status: DISCONTINUED | OUTPATIENT
Start: 2019-07-12 | End: 2019-07-16 | Stop reason: HOSPADM

## 2019-07-12 RX ORDER — METHYLPREDNISOLONE SODIUM SUCCINATE 40 MG/ML
40 INJECTION, POWDER, LYOPHILIZED, FOR SOLUTION INTRAMUSCULAR; INTRAVENOUS EVERY 8 HOURS
Status: DISCONTINUED | OUTPATIENT
Start: 2019-07-12 | End: 2019-07-14

## 2019-07-12 RX ORDER — LORAZEPAM 1 MG/1
2 TABLET ORAL
Status: DISCONTINUED | OUTPATIENT
Start: 2019-07-12 | End: 2019-07-16 | Stop reason: HOSPADM

## 2019-07-12 RX ORDER — NICOTINE 21 MG/24HR
1 PATCH, TRANSDERMAL 24 HOURS TRANSDERMAL EVERY 24 HOURS
Status: DISCONTINUED | OUTPATIENT
Start: 2019-07-12 | End: 2019-07-16 | Stop reason: HOSPADM

## 2019-07-12 RX ORDER — LEVOFLOXACIN 5 MG/ML
500 INJECTION, SOLUTION INTRAVENOUS EVERY 24 HOURS
Status: DISCONTINUED | OUTPATIENT
Start: 2019-07-12 | End: 2019-07-15

## 2019-07-12 RX ORDER — BUSPIRONE HYDROCHLORIDE 10 MG/1
10 TABLET ORAL 2 TIMES DAILY
Status: DISCONTINUED | OUTPATIENT
Start: 2019-07-12 | End: 2019-07-16 | Stop reason: HOSPADM

## 2019-07-12 RX ORDER — LOPERAMIDE HYDROCHLORIDE 2 MG/1
2 CAPSULE ORAL 4 TIMES DAILY PRN
Status: DISCONTINUED | OUTPATIENT
Start: 2019-07-12 | End: 2019-07-16 | Stop reason: HOSPADM

## 2019-07-12 RX ORDER — PANTOPRAZOLE SODIUM 40 MG/1
40 TABLET, DELAYED RELEASE ORAL
Status: DISCONTINUED | OUTPATIENT
Start: 2019-07-13 | End: 2019-07-16 | Stop reason: HOSPADM

## 2019-07-12 RX ORDER — PREDNISONE 20 MG/1
60 TABLET ORAL ONCE
Status: COMPLETED | OUTPATIENT
Start: 2019-07-12 | End: 2019-07-12

## 2019-07-12 RX ORDER — ATORVASTATIN CALCIUM 20 MG/1
20 TABLET, FILM COATED ORAL DAILY
Status: DISCONTINUED | OUTPATIENT
Start: 2019-07-12 | End: 2019-07-16 | Stop reason: HOSPADM

## 2019-07-12 RX ORDER — SODIUM CHLORIDE 9 MG/ML
INJECTION, SOLUTION INTRAVENOUS CONTINUOUS
Status: DISCONTINUED | OUTPATIENT
Start: 2019-07-12 | End: 2019-07-15

## 2019-07-12 RX ORDER — METOPROLOL SUCCINATE 25 MG/1
25 TABLET, EXTENDED RELEASE ORAL DAILY
Status: DISCONTINUED | OUTPATIENT
Start: 2019-07-12 | End: 2019-07-16 | Stop reason: HOSPADM

## 2019-07-12 RX ORDER — ONDANSETRON 2 MG/ML
4 INJECTION INTRAMUSCULAR; INTRAVENOUS EVERY 6 HOURS PRN
Status: DISCONTINUED | OUTPATIENT
Start: 2019-07-12 | End: 2019-07-16 | Stop reason: HOSPADM

## 2019-07-12 RX ORDER — MAGNESIUM SULFATE IN WATER 40 MG/ML
2 INJECTION, SOLUTION INTRAVENOUS ONCE
Status: COMPLETED | OUTPATIENT
Start: 2019-07-12 | End: 2019-07-12

## 2019-07-12 RX ADMIN — METHYLPREDNISOLONE SODIUM SUCCINATE 40 MG: 40 INJECTION, POWDER, FOR SOLUTION INTRAMUSCULAR; INTRAVENOUS at 23:20

## 2019-07-12 RX ADMIN — ATORVASTATIN CALCIUM 20 MG: 20 TABLET, FILM COATED ORAL at 14:59

## 2019-07-12 RX ADMIN — RIVAROXABAN 20 MG: 20 TABLET, FILM COATED ORAL at 17:21

## 2019-07-12 RX ADMIN — LORAZEPAM 3 MG: 2 INJECTION INTRAMUSCULAR; INTRAVENOUS at 20:33

## 2019-07-12 RX ADMIN — TIOTROPIUM BROMIDE 18 MCG: 18 CAPSULE ORAL; RESPIRATORY (INHALATION) at 12:56

## 2019-07-12 RX ADMIN — SODIUM CHLORIDE: 9 INJECTION, SOLUTION INTRAVENOUS at 12:53

## 2019-07-12 RX ADMIN — Medication 10 ML: at 20:32

## 2019-07-12 RX ADMIN — BUSPIRONE HYDROCHLORIDE 10 MG: 10 TABLET ORAL at 20:32

## 2019-07-12 RX ADMIN — MAGNESIUM SULFATE HEPTAHYDRATE 2 G: 40 INJECTION, SOLUTION INTRAVENOUS at 11:01

## 2019-07-12 RX ADMIN — METHYLPREDNISOLONE SODIUM SUCCINATE 40 MG: 40 INJECTION, POWDER, FOR SOLUTION INTRAMUSCULAR; INTRAVENOUS at 15:00

## 2019-07-12 RX ADMIN — LEVOFLOXACIN 500 MG: 5 INJECTION, SOLUTION INTRAVENOUS at 14:59

## 2019-07-12 RX ADMIN — IPRATROPIUM BROMIDE AND ALBUTEROL SULFATE 1 AMPULE: .5; 3 SOLUTION RESPIRATORY (INHALATION) at 16:44

## 2019-07-12 RX ADMIN — PREDNISONE 60 MG: 20 TABLET ORAL at 10:44

## 2019-07-12 RX ADMIN — DULOXETINE HYDROCHLORIDE 30 MG: 30 CAPSULE, DELAYED RELEASE ORAL at 14:59

## 2019-07-12 RX ADMIN — METOPROLOL SUCCINATE 25 MG: 25 TABLET, FILM COATED, EXTENDED RELEASE ORAL at 14:59

## 2019-07-12 RX ADMIN — SODIUM CHLORIDE 1000 ML: 9 INJECTION, SOLUTION INTRAVENOUS at 11:13

## 2019-07-12 RX ADMIN — Medication 100 MG: at 14:59

## 2019-07-12 ASSESSMENT — ENCOUNTER SYMPTOMS
STRIDOR: 0
CHOKING: 0
CHEST TIGHTNESS: 0
ABDOMINAL DISTENTION: 0
SHORTNESS OF BREATH: 1
APNEA: 0
WHEEZING: 0

## 2019-07-12 ASSESSMENT — PAIN SCALES - GENERAL: PAINLEVEL_OUTOF10: 0

## 2019-07-12 NOTE — ED NOTES
Pt significant other and son to call. Both update. Pt significant other on way to hospital, ETA 20 minutes.       Dania De La Fuente RN  07/12/19 5315

## 2019-07-12 NOTE — ED NOTES
This RN to room x2 to medicate pt. Pt stating each time, \"your ugly, get out of my room. \" RN to attempt to redirect pt several times stating that RN was there to help pt with medication and to feel better. Pt continues to states that the RN is ugly and to, \"get the fuck out. \"  MD and nursing supervisor aware.       Calhoun Sever, RN  07/12/19 9045

## 2019-07-12 NOTE — H&P
Hospitalist  History and Physical    Patient:  Austin Ortez  MRN: [id number]  PCP: ABIGAIL Angel CNP    CHIEF COMPLAINT:   Slurred speech      HISTORY OF PRESENT ILLNESS:   The patient Austin Ortez is a 69 y. o.female who presented to the emergency room with slurred speech  Patient was noted to be intoxicated. At the time of presentation patient's only complaint was shortness of breath  Patient does have a history of COPD.patient's alcohol level was 367 mg/dL.   Patient reports that her cough and shortness of breath has been getting worse for the last few days  Patient does use bronchodilators at home with MDI  Patient denies fever or chills and no significant production of sputum        Past Medical History:        Diagnosis Date    Anemia     Arthritis     Clostridium difficile colitis     December 2018    Colostomy in place [facility])     SBO/diverticulosis     COPD [facility] obstructive [facility] disease) [facility]     Depression     DANIELLE (generalized anxiety disorder)     Gastric ulcer, unspecified as acute or chronic, without mention of hemorrhage, perforation, or obstruction     H/O ETOH abuse     Hiatal hernia     History of blood transfusion     Hyperlipidemia     Hypertension     Insomnia     Intestinal obstruction ([address]     Parkinson's disease ([facility]     Tobacco abuse     Vitamin D deficiency        Past Surgical History:        Procedure Laterality Date    ABDOMEN SURGERY      CHOLECYSTECTOMY      COLONOSCOPY  12/14/2018    COLONOSCOPY N/A 12/14/2018    COLONOSCOPY POLYPECTOMY SNARE/COLD BIOPSY performed by Graham Garcia MD at [address] [place] [address]  2018    ENDOSCOPY, COLON, DIAGNOSTIC      HYSTERECTOMY      SMALL INTESTINE SURGERY N/A 4/10/2019    LAPAROSCOPIC LYSISI OF ADHESIONS FOR GREATER THAN 3 HOURS,LAPORSCOPIC CONVERTED TO OPEN COLOSTOMY REVERSAL, SMALL BOWEL RESECTION performed by Robbi Chandra MD at [address] GASTROINTESTINAL ENDOSCOPY  12/13/2018    with biopsies    UPPER GASTROINTESTINAL ENDOSCOPY N/A 12/13/2018    EGD BIOPSY performed by Valente Meckel, MD at [facility]       Medications Prior to Admission:    Prior to Admission medications    Medication Sig Start Date End Date Taking? Authorizing Provider   albuterol sulfate HFA (PROAIR HFA) 108 (90 Base) MCG/ACT inhaler Inhale 2 puffs into the lungs every 6 hours as needed for Wheezing 7/1/19  Yes Minerva Kehr, APRN - CNP   Umeclidinium Bromide 62.5 MCG/INH AEPB Inhale 1 puff into the lungs daily 6/28/19  Yes Minerva Kehr, APRN - CNP   atorvastatin (LIPITOR) 20 MG tablet TAKE 1 TABLET BY MOUTH EVERY DAY 6/17/19  Yes Minerva Kehr, APRN - CNP   nicotine (NICODERM CQ) 21 MG/24HR Place 1 patch onto the skin every 24 hours 5/23/19  Yes Minerva Kehr, APRN - CNP   dexlansoprazole ([facility]) 60 MG CPDR delayed release capsule Take 1 capsule by mouth daily 5/23/19  Yes Minerva Kehr, APRN - CNP   DULoxetine (CYMBALTA) 30 MG extended release capsule TAKE ONE CAPSULE BY MOUTH DAILY 5/23/19  Yes Minerva Kehr, APRN - CNP   busPIRone (BUSPAR) 5 MG tablet Take 2 tablets by mouth 2 times daily 5/16/19  Yes Cristin Yanez, DO   metoprolol succinate (TOPROL XL) 25 MG extended release tablet Take 1 tablet by mouth daily 5/17/19  Yes Cristin Yanez, DO   rivaroxaban (XARELTO) 20 MG TABS tablet Take 1 tablet by mouth daily 5/16/19  Yes Cristin Yanez, DO   loperamide (IMODIUM) 2 MG capsule Take 2 mg by mouth 4 times daily as needed for Diarrhea    Historical Provider, MD   acetaminophen (TYLENOL) 325 MG tablet Take 650 mg by mouth every 6 hours as needed     Historical Provider, MD   vitamin B-1 100 MG tablet Take 1 tablet by mouth daily 12/17/18   Tayla Jung MD       Allergies:  Aspirin; Fentanyl; and Morphine      Social History:   TOBACCO:   reports that she has been smoking cigarettes. She started smoking about 59 years ago.  She has a 30.00 pack-year smoking history. She has never used smokeless tobacco.  ETOH:   reports that she drinks about 1.2 - 1.8 oz of alcohol per week. Family History:       Problem Relation Age of Onset    Cancer Mother     Diabetes Mother     Heart Disease Father            REVIEW OF SYSTEMS:     patients reported symptoms are in BOLD all other symptoms are negative. CONSTITUTIONAL:      fatigue, fever, chills or night sweats, recent weight gain, recent wt loss, insomnia,  General weakness, poor appetite, muscle aches and pains    HEAD: headache, dizziness    EYES:      blurriness,  double vision, dryness,  discharge, irritation,diplopia    EARS:      hearing loss, vertigo, ear discharge,  Earache. Ringing in the ears. NOSE:      Rhinorrhea, sneezing, epistaxis. Discharge, sinusitis,     MOUTH/THROAT:         sore throat, mouth ulcers, Hoarseness    RESPIRATORY:        Shortness of breath, wheezing,  cough, sputum, hemoptysis, obstructive sleep apnea,    CARDIOVASCULAR :      chest pain, palpitations, dyspnea on exercise, Lower extrimity edema (swelling),     GASTROINTESTINAL:       Dysphagia, Poor appetite,  Nausea, Vomiting, diarrhea, heartburn, abdominal pain. Blood in the stools, hematemesis. Pain with swallowing, constipation    GENITOURINARY:       Urinary frequency, hesitancy,  urgency, Dysuria, hematuria,  Urinary Incontinence. Urinary Retention. GYNECOLOGICAL: vaginal bleeding , vaginal discharge, menopause    MUSCULOSKELETAL:       joint swelling or stiffness, joint pain, muscle pain, balance problems, low back pain. NEUROLOGICAL:      Gait problems. Tremor. Dizziness. Pain and paresthesias, weakness in extremities.  Seizures, memory loss    PSYCHLOGICAL:        Anxiety, depression    SKIN :      Rashes ulcers, skin color changes, easy bruisability, lymphadenopathy      Physical Exam:      Vitals: BP (!) 149/77   Pulse 84   Temp 97.4 °F (36.3 °C) (Oral)   Resp 24   Ht 5' 2\" (1.575 m)   Wt 132

## 2019-07-12 NOTE — ED NOTES
Attempted to wean patient off of O2 since she does not wear any at home. Patient's sat went from 99% on 3L to 85%, O2 turned back on.       Nidhi Bo RN  07/12/19 6994

## 2019-07-12 NOTE — ED TRIAGE NOTES
Pt brought in by EMS with c/o unilateral, left-sided weakness. LKW 2 days ago.  called EMS and stated pt has been slurring her speech and has stayed in bed for 2 days. Pt admits to drinking some alcohol this morning and daily. Speech is slurred,  strength is =.

## 2019-07-12 NOTE — ED NOTES
Pt report called to JOANA wynn to assume care of pt. RN states no further questions at this time. Pt to be transported to room 4269. Transportation need placed at this time.        Jose Guadalupe Mcgarry RN  07/12/19 0196

## 2019-07-12 NOTE — ED NOTES
Fall risk screening completed. Fall risk bracelet applied to patient. Non-skid socks provided and placed on patient. The fall risk is indicated using  dome light . Based on score, a bed alarm was indicated and applied. The call light is within the patient's reach, and instructions for use were provided. The bed is in the lowest position with wheels locked. The patient has been advised to notify staff, using the call light, if there is a need to get up or use restroom. The patient verbalized understanding of safety precautions and how to contact staff for assistance.       Jyoti Hernandez RN  07/12/19 2148

## 2019-07-12 NOTE — PLAN OF CARE
Patient uses call light appropriately to express needs. Bed to lowest position with door open and call light in reach. All fall precautions implemented at this time. Patient has had no falls this shift.  Will continue to monitor

## 2019-07-12 NOTE — ED NOTES
Pt incontinent of urine. Pt cleaned up at this time, brief placed at this time.       Lewis Chin RN  07/12/19 0724

## 2019-07-12 NOTE — CARE COORDINATION
Met with pt at bedside. Pt reported that she has been living with her mother in law. Pt denies that her family wont allow [facility] in the home, Pt reported she does not want the home care because they \"dont do anything\"     Pt reports she wants to return to [address] [place] [address] for [address]. SW will contact [address]. Pt reports she will return to her mother in laws home [facility] [place], she will not accept a home care referral.      SW following. Thank You.    Electronically signed by BETO Betancourt, CHAS on 7/12/2019 at 4:10 PM

## 2019-07-12 NOTE — ED PROVIDER NOTES
[facility]  eMERGENCY dEPARTMENT eNCOUnter      Pt Name: Veda De Luna  MRN: [id number]  [place] 1945  Date of evaluation: 7/12/2019  Provider: Sherif Hightower MD    CHIEF COMPLAINT       Chief Complaint   Patient presents with    ETOH intoxication             HISTORY OF PRESENT ILLNESS    Veda De Luna is a 69 y.o. female HX COPD and ETOH abuse who presents to the emergency department with ETOH intoxication. Initially concerned that patient has been slurring speech for last few days per EMS.  called EMS however because patient was to intoxicated to get off the floor this AM. Patient without complaints other than some mild SOB. Denies pain or weakness. States she drank \"too much. \" Denies any other associated symptoms. Nursing Notes were reviewed. REVIEW OF SYSTEMS       Review of Systems   Constitutional: Negative for activity change, appetite change, chills, diaphoresis, fatigue, fever and unexpected weight change. HENT: Negative for congestion, dental problem and drooling. Respiratory: Positive for shortness of breath. Negative for apnea, choking, chest tightness, wheezing and stridor. Cardiovascular: Negative for chest pain and leg swelling. Gastrointestinal: Negative for abdominal distention. Endocrine: Negative for polyphagia and polyuria. Neurological: Negative for tremors, syncope and weakness. Except as noted above the remainder of the review of systems was reviewed and negative.      PAST MEDICAL HISTORY     Past Medical History:   Diagnosis Date    Anemia     Arthritis     Clostridium difficile colitis     December 2018    Colostomy in place [facility])     SBO/diverticulosis     COPD (chronic obstructive pulmonary disease) [facility]     Depression     DANIELLE (generalized anxiety disorder)     Gastric ulcer, unspecified as acute or chronic, without mention of hemorrhage, perforation, or obstruction     H/O ETOH abuse     Hiatal hernia     History of blood transfusion     Hyperlipidemia     Hypertension     Insomnia     Intestinal obstruction (H[facility])     Parkinson's disease ([facility]     Tobacco abuse     Vitamin D deficiency        SURGICAL HISTORY       Past Surgical History:   Procedure Laterality Date    ABDOMEN SURGERY      CHOLECYSTECTOMY      COLONOSCOPY  12/14/2018    COLONOSCOPY N/A 12/14/2018    COLONOSCOPY POLYPECTOMY SNARE/COLD BIOPSY performed by Eren Lewis MD at [facility]  2018    ENDOSCOPY, COLON, DIAGNOSTIC      HYSTERECTOMY      SMALL INTESTINE SURGERY N/A 4/10/2019    LAPAROSCOPIC LYSISI OF ADHESIONS FOR GREATER THAN 3 HOURS,LAPORSCOPIC CONVERTED TO OPEN COLOSTOMY REVERSAL, SMALL BOWEL RESECTION performed by Jose Luis Solis MD at [facility] ENDOSCOPY  12/13/2018    with biopsies    UPPER GASTROINTESTINAL ENDOSCOPY N/A 12/13/2018    EGD BIOPSY performed by Eren Lewis MD at [address] [facility] [facility]       Previous Medications    ACETAMINOPHEN (TYLENOL) 325 MG TABLET    Take 650 mg by mouth every 6 hours as needed     ALBUTEROL SULFATE HFA (PROAIR HFA) 108 (90 BASE) MCG/ACT INHALER    Inhale 2 puffs into the lungs every 6 hours as needed for Wheezing    ATORVASTATIN (LIPITOR) 20 MG TABLET    TAKE 1 TABLET BY MOUTH EVERY DAY    BUSPIRONE (BUSPAR) 5 MG TABLET    Take 2 tablets by mouth 2 times daily    DEXLANSOPRAZOLE (DEXILANT) 60 MG CPDR DELAYED RELEASE CAPSULE    Take 1 capsule by mouth daily    DULOXETINE (CYMBALTA) 30 MG EXTENDED RELEASE CAPSULE    TAKE ONE CAPSULE BY MOUTH DAILY    LOPERAMIDE (IMODIUM) 2 MG CAPSULE    Take 2 mg by mouth 4 times daily as needed for Diarrhea    METOPROLOL SUCCINATE (TOPROL XL) 25 MG EXTENDED RELEASE TABLET    Take 1 tablet by mouth daily    NICOTINE (NICODERM CQ) 21 MG/24HR    Place 1 patch onto the skin every 24 hours    RIVAROXABAN (XARELTO) 20 MG TABS TABLET    Take 1 tablet findings:    CT Head and CXR reassuring     ED BEDSIDE ULTRASOUND:   Performed by ED Physician - none    LABS:  Labs Reviewed   CBC WITH AUTO DIFFERENTIAL - Abnormal; Notable for the following components:       Result Value    Hemoglobin 10.2 (*)     Hematocrit 32.9 (*)     MCH 25.0 (*)     RDW 19.0 (*)     All other components within normal limits    Narrative:     Performed at:  [facility]  [address], [facility] [employer]   Phone [phone]   BASIC METABOLIC PANEL W/ REFLEX TO MG FOR LOW K - Abnormal; Notable for the following components:    Anion Gap 20 (*)     CREATININE <0.5 (*)     All other components within normal limits    Narrative:     Performed at:  [facility]  [address], [facility] [employer]   Phone [phone]   HEPATIC FUNCTION PANEL - Abnormal; Notable for the following components:    Alkaline Phosphatase 135 (*)     AST 63 (*)     All other components within normal limits    Narrative:     Performed at:  [facility]  [address], [facility] [employer]   Phone [phone]   ACETAMINOPHEN LEVEL - Abnormal; Notable for the following components:    Acetaminophen Level <5 (*)     All other components within normal limits    Narrative:     Performed at:  [facility]  [address], [facility] [employer]   Phone [phone]   SALICYLATE LEVEL - Abnormal; Notable for the following components:    Salicylate, Serum <7.0 (*)     All other components within normal limits    Narrative:     Performed at:  [facility]  [address], [place]   Phone [phone]   TROPONIN    Narrative:     Performed at:  [facility]  [address] [place] [address] [facility] [employer]   Phone [phone]   APTT    Narrative:     [facility]formed at:  [facility]

## 2019-07-13 LAB
BASOPHILS ABSOLUTE: 0 K/UL (ref 0–0.2)
BASOPHILS RELATIVE PERCENT: 0.4 %
EOSINOPHILS ABSOLUTE: 0 K/UL (ref 0–0.6)
EOSINOPHILS RELATIVE PERCENT: 0 %
HCT VFR BLD CALC: 32.1 % (ref 36–48)
HEMOGLOBIN: 10 G/DL (ref 12–16)
LYMPHOCYTES ABSOLUTE: 0.5 K/UL (ref 1–5.1)
LYMPHOCYTES RELATIVE PERCENT: 9 %
MCH RBC QN AUTO: 24.9 PG (ref 26–34)
MCHC RBC AUTO-ENTMCNC: 31.2 G/DL (ref 31–36)
MCV RBC AUTO: 79.8 FL (ref 80–100)
MONOCYTES ABSOLUTE: 0.1 K/UL (ref 0–1.3)
MONOCYTES RELATIVE PERCENT: 2.2 %
NEUTROPHILS ABSOLUTE: 5.3 K/UL (ref 1.7–7.7)
NEUTROPHILS RELATIVE PERCENT: 88.4 %
PDW BLD-RTO: 18.5 % (ref 12.4–15.4)
PLATELET # BLD: 273 K/UL (ref 135–450)
PMV BLD AUTO: 7.1 FL (ref 5–10.5)
RBC # BLD: 4.02 M/UL (ref 4–5.2)
WBC # BLD: 6 K/UL (ref 4–11)

## 2019-07-13 PROCEDURE — 36415 COLL VENOUS BLD VENIPUNCTURE: CPT

## 2019-07-13 PROCEDURE — 2700000000 HC OXYGEN THERAPY PER DAY

## 2019-07-13 PROCEDURE — 94761 N-INVAS EAR/PLS OXIMETRY MLT: CPT

## 2019-07-13 PROCEDURE — 6360000002 HC RX W HCPCS: Performed by: HOSPITALIST

## 2019-07-13 PROCEDURE — 6370000000 HC RX 637 (ALT 250 FOR IP): Performed by: HOSPITALIST

## 2019-07-13 PROCEDURE — 1200000000 HC SEMI PRIVATE

## 2019-07-13 PROCEDURE — 85025 COMPLETE CBC W/AUTO DIFF WBC: CPT

## 2019-07-13 PROCEDURE — 2580000003 HC RX 258: Performed by: HOSPITALIST

## 2019-07-13 PROCEDURE — 94640 AIRWAY INHALATION TREATMENT: CPT

## 2019-07-13 RX ADMIN — DULOXETINE HYDROCHLORIDE 30 MG: 30 CAPSULE, DELAYED RELEASE ORAL at 09:19

## 2019-07-13 RX ADMIN — METHYLPREDNISOLONE SODIUM SUCCINATE 40 MG: 40 INJECTION, POWDER, FOR SOLUTION INTRAMUSCULAR; INTRAVENOUS at 21:04

## 2019-07-13 RX ADMIN — IPRATROPIUM BROMIDE AND ALBUTEROL SULFATE 1 AMPULE: .5; 3 SOLUTION RESPIRATORY (INHALATION) at 16:17

## 2019-07-13 RX ADMIN — IPRATROPIUM BROMIDE AND ALBUTEROL SULFATE 1 AMPULE: .5; 3 SOLUTION RESPIRATORY (INHALATION) at 08:32

## 2019-07-13 RX ADMIN — BUSPIRONE HYDROCHLORIDE 10 MG: 10 TABLET ORAL at 21:04

## 2019-07-13 RX ADMIN — BUSPIRONE HYDROCHLORIDE 10 MG: 10 TABLET ORAL at 09:19

## 2019-07-13 RX ADMIN — LEVOFLOXACIN 500 MG: 5 INJECTION, SOLUTION INTRAVENOUS at 16:10

## 2019-07-13 RX ADMIN — METHYLPREDNISOLONE SODIUM SUCCINATE 40 MG: 40 INJECTION, POWDER, FOR SOLUTION INTRAMUSCULAR; INTRAVENOUS at 05:59

## 2019-07-13 RX ADMIN — Medication 10 ML: at 21:05

## 2019-07-13 RX ADMIN — SODIUM CHLORIDE: 9 INJECTION, SOLUTION INTRAVENOUS at 21:13

## 2019-07-13 RX ADMIN — IPRATROPIUM BROMIDE AND ALBUTEROL SULFATE 1 AMPULE: .5; 3 SOLUTION RESPIRATORY (INHALATION) at 20:32

## 2019-07-13 RX ADMIN — LORAZEPAM 2 MG: 2 INJECTION INTRAMUSCULAR; INTRAVENOUS at 21:04

## 2019-07-13 RX ADMIN — SODIUM CHLORIDE: 9 INJECTION, SOLUTION INTRAVENOUS at 06:46

## 2019-07-13 RX ADMIN — PANTOPRAZOLE SODIUM 40 MG: 40 TABLET, DELAYED RELEASE ORAL at 05:59

## 2019-07-13 RX ADMIN — Medication 100 MG: at 09:20

## 2019-07-13 RX ADMIN — ATORVASTATIN CALCIUM 20 MG: 20 TABLET, FILM COATED ORAL at 09:20

## 2019-07-13 RX ADMIN — METHYLPREDNISOLONE SODIUM SUCCINATE 40 MG: 40 INJECTION, POWDER, FOR SOLUTION INTRAMUSCULAR; INTRAVENOUS at 16:10

## 2019-07-13 RX ADMIN — IPRATROPIUM BROMIDE AND ALBUTEROL SULFATE 1 AMPULE: .5; 3 SOLUTION RESPIRATORY (INHALATION) at 11:55

## 2019-07-13 RX ADMIN — TIOTROPIUM BROMIDE 18 MCG: 18 CAPSULE ORAL; RESPIRATORY (INHALATION) at 08:32

## 2019-07-13 RX ADMIN — METOPROLOL SUCCINATE 25 MG: 25 TABLET, FILM COATED, EXTENDED RELEASE ORAL at 09:20

## 2019-07-13 RX ADMIN — RIVAROXABAN 20 MG: 20 TABLET, FILM COATED ORAL at 18:50

## 2019-07-13 ASSESSMENT — PAIN SCALES - GENERAL
PAINLEVEL_OUTOF10: 0

## 2019-07-14 PROCEDURE — 97116 GAIT TRAINING THERAPY: CPT

## 2019-07-14 PROCEDURE — 94640 AIRWAY INHALATION TREATMENT: CPT

## 2019-07-14 PROCEDURE — 97110 THERAPEUTIC EXERCISES: CPT

## 2019-07-14 PROCEDURE — 6370000000 HC RX 637 (ALT 250 FOR IP): Performed by: HOSPITALIST

## 2019-07-14 PROCEDURE — 6360000002 HC RX W HCPCS: Performed by: HOSPITALIST

## 2019-07-14 PROCEDURE — 97162 PT EVAL MOD COMPLEX 30 MIN: CPT

## 2019-07-14 PROCEDURE — 97530 THERAPEUTIC ACTIVITIES: CPT

## 2019-07-14 PROCEDURE — 94761 N-INVAS EAR/PLS OXIMETRY MLT: CPT

## 2019-07-14 PROCEDURE — 1200000000 HC SEMI PRIVATE

## 2019-07-14 PROCEDURE — 2580000003 HC RX 258: Performed by: HOSPITALIST

## 2019-07-14 RX ORDER — METHYLPREDNISOLONE SODIUM SUCCINATE 40 MG/ML
40 INJECTION, POWDER, LYOPHILIZED, FOR SOLUTION INTRAMUSCULAR; INTRAVENOUS EVERY 12 HOURS
Status: DISCONTINUED | OUTPATIENT
Start: 2019-07-14 | End: 2019-07-15

## 2019-07-14 RX ADMIN — BUSPIRONE HYDROCHLORIDE 10 MG: 10 TABLET ORAL at 21:45

## 2019-07-14 RX ADMIN — IPRATROPIUM BROMIDE AND ALBUTEROL SULFATE 1 AMPULE: .5; 3 SOLUTION RESPIRATORY (INHALATION) at 13:01

## 2019-07-14 RX ADMIN — BUSPIRONE HYDROCHLORIDE 10 MG: 10 TABLET ORAL at 08:48

## 2019-07-14 RX ADMIN — PANTOPRAZOLE SODIUM 40 MG: 40 TABLET, DELAYED RELEASE ORAL at 05:49

## 2019-07-14 RX ADMIN — IPRATROPIUM BROMIDE AND ALBUTEROL SULFATE 1 AMPULE: .5; 3 SOLUTION RESPIRATORY (INHALATION) at 16:35

## 2019-07-14 RX ADMIN — TIOTROPIUM BROMIDE 18 MCG: 18 CAPSULE ORAL; RESPIRATORY (INHALATION) at 09:11

## 2019-07-14 RX ADMIN — LORAZEPAM 4 MG: 1 TABLET ORAL at 21:45

## 2019-07-14 RX ADMIN — Medication 100 MG: at 08:47

## 2019-07-14 RX ADMIN — METOPROLOL SUCCINATE 25 MG: 25 TABLET, FILM COATED, EXTENDED RELEASE ORAL at 08:47

## 2019-07-14 RX ADMIN — METHYLPREDNISOLONE SODIUM SUCCINATE 40 MG: 40 INJECTION, POWDER, FOR SOLUTION INTRAMUSCULAR; INTRAVENOUS at 18:35

## 2019-07-14 RX ADMIN — IPRATROPIUM BROMIDE AND ALBUTEROL SULFATE 1 AMPULE: .5; 3 SOLUTION RESPIRATORY (INHALATION) at 09:10

## 2019-07-14 RX ADMIN — DULOXETINE HYDROCHLORIDE 30 MG: 30 CAPSULE, DELAYED RELEASE ORAL at 08:48

## 2019-07-14 RX ADMIN — LORAZEPAM 3 MG: 1 TABLET ORAL at 13:24

## 2019-07-14 RX ADMIN — RIVAROXABAN 20 MG: 20 TABLET, FILM COATED ORAL at 18:35

## 2019-07-14 RX ADMIN — ATORVASTATIN CALCIUM 20 MG: 20 TABLET, FILM COATED ORAL at 08:47

## 2019-07-14 RX ADMIN — METHYLPREDNISOLONE SODIUM SUCCINATE 40 MG: 40 INJECTION, POWDER, FOR SOLUTION INTRAMUSCULAR; INTRAVENOUS at 05:48

## 2019-07-14 RX ADMIN — ACETAMINOPHEN 650 MG: 325 TABLET ORAL at 08:47

## 2019-07-14 RX ADMIN — SODIUM CHLORIDE: 9 INJECTION, SOLUTION INTRAVENOUS at 13:24

## 2019-07-14 RX ADMIN — LEVOFLOXACIN 500 MG: 5 INJECTION, SOLUTION INTRAVENOUS at 13:24

## 2019-07-14 ASSESSMENT — PAIN DESCRIPTION - ONSET: ONSET: ON-GOING

## 2019-07-14 ASSESSMENT — PAIN SCALES - GENERAL
PAINLEVEL_OUTOF10: 0
PAINLEVEL_OUTOF10: 0
PAINLEVEL_OUTOF10: 3
PAINLEVEL_OUTOF10: 0

## 2019-07-14 ASSESSMENT — PAIN DESCRIPTION - PROGRESSION: CLINICAL_PROGRESSION: GRADUALLY WORSENING

## 2019-07-14 ASSESSMENT — PAIN DESCRIPTION - PAIN TYPE: TYPE: CHRONIC PAIN

## 2019-07-14 ASSESSMENT — PAIN DESCRIPTION - LOCATION: LOCATION: GENERALIZED

## 2019-07-14 ASSESSMENT — PAIN DESCRIPTION - DESCRIPTORS: DESCRIPTORS: ACHING

## 2019-07-14 ASSESSMENT — PAIN DESCRIPTION - FREQUENCY: FREQUENCY: CONTINUOUS

## 2019-07-14 ASSESSMENT — PAIN DESCRIPTION - ORIENTATION: ORIENTATION: RIGHT;LEFT

## 2019-07-15 LAB — PROCALCITONIN: 0.04 NG/ML (ref 0–0.15)

## 2019-07-15 PROCEDURE — 94640 AIRWAY INHALATION TREATMENT: CPT

## 2019-07-15 PROCEDURE — 97166 OT EVAL MOD COMPLEX 45 MIN: CPT

## 2019-07-15 PROCEDURE — 6370000000 HC RX 637 (ALT 250 FOR IP): Performed by: HOSPITALIST

## 2019-07-15 PROCEDURE — 84145 PROCALCITONIN (PCT): CPT

## 2019-07-15 PROCEDURE — 2580000003 HC RX 258: Performed by: HOSPITALIST

## 2019-07-15 PROCEDURE — 1200000000 HC SEMI PRIVATE

## 2019-07-15 PROCEDURE — 97116 GAIT TRAINING THERAPY: CPT

## 2019-07-15 PROCEDURE — 97530 THERAPEUTIC ACTIVITIES: CPT

## 2019-07-15 PROCEDURE — 6360000002 HC RX W HCPCS: Performed by: HOSPITALIST

## 2019-07-15 PROCEDURE — 6370000000 HC RX 637 (ALT 250 FOR IP): Performed by: NURSE PRACTITIONER

## 2019-07-15 PROCEDURE — 94760 N-INVAS EAR/PLS OXIMETRY 1: CPT

## 2019-07-15 PROCEDURE — 97535 SELF CARE MNGMENT TRAINING: CPT

## 2019-07-15 RX ORDER — LEVOFLOXACIN 500 MG/1
500 TABLET, FILM COATED ORAL
Status: DISCONTINUED | OUTPATIENT
Start: 2019-07-15 | End: 2019-07-16 | Stop reason: HOSPADM

## 2019-07-15 RX ORDER — LEVOFLOXACIN 500 MG/1
500 TABLET, FILM COATED ORAL
Qty: 5 TABLET | Refills: 0 | Status: SHIPPED | OUTPATIENT
Start: 2019-07-16 | End: 2019-07-21

## 2019-07-15 RX ORDER — GREEN TEA/HOODIA GORDONII 315-12.5MG
1 CAPSULE ORAL 2 TIMES DAILY
Qty: 10 TABLET | Refills: 0 | Status: SHIPPED | OUTPATIENT
Start: 2019-07-15 | End: 2019-07-20

## 2019-07-15 RX ORDER — PREDNISONE 20 MG/1
40 TABLET ORAL DAILY
Status: DISCONTINUED | OUTPATIENT
Start: 2019-07-16 | End: 2019-07-16 | Stop reason: HOSPADM

## 2019-07-15 RX ORDER — PREDNISONE 20 MG/1
40 TABLET ORAL DAILY
Qty: 10 TABLET | Refills: 0 | Status: SHIPPED | OUTPATIENT
Start: 2019-07-16 | End: 2019-07-21

## 2019-07-15 RX ORDER — LACTOBACILLUS RHAMNOSUS GG 10B CELL
1 CAPSULE ORAL 2 TIMES DAILY WITH MEALS
Status: DISCONTINUED | OUTPATIENT
Start: 2019-07-15 | End: 2019-07-16 | Stop reason: HOSPADM

## 2019-07-15 RX ORDER — LEVOFLOXACIN 500 MG/1
500 TABLET, FILM COATED ORAL
Status: DISCONTINUED | OUTPATIENT
Start: 2019-07-15 | End: 2019-07-15

## 2019-07-15 RX ADMIN — METOPROLOL SUCCINATE 25 MG: 25 TABLET, FILM COATED, EXTENDED RELEASE ORAL at 08:56

## 2019-07-15 RX ADMIN — IPRATROPIUM BROMIDE AND ALBUTEROL SULFATE 1 AMPULE: .5; 3 SOLUTION RESPIRATORY (INHALATION) at 13:06

## 2019-07-15 RX ADMIN — Medication 10 ML: at 20:56

## 2019-07-15 RX ADMIN — LEVOFLOXACIN 500 MG: 500 TABLET, FILM COATED ORAL at 13:33

## 2019-07-15 RX ADMIN — BUSPIRONE HYDROCHLORIDE 10 MG: 10 TABLET ORAL at 20:54

## 2019-07-15 RX ADMIN — TIOTROPIUM BROMIDE 18 MCG: 18 CAPSULE ORAL; RESPIRATORY (INHALATION) at 08:35

## 2019-07-15 RX ADMIN — Medication 100 MG: at 08:56

## 2019-07-15 RX ADMIN — DULOXETINE HYDROCHLORIDE 30 MG: 30 CAPSULE, DELAYED RELEASE ORAL at 08:56

## 2019-07-15 RX ADMIN — RIVAROXABAN 20 MG: 20 TABLET, FILM COATED ORAL at 18:43

## 2019-07-15 RX ADMIN — BUSPIRONE HYDROCHLORIDE 10 MG: 10 TABLET ORAL at 08:56

## 2019-07-15 RX ADMIN — LORAZEPAM 2 MG: 2 INJECTION INTRAMUSCULAR; INTRAVENOUS at 20:55

## 2019-07-15 RX ADMIN — METHYLPREDNISOLONE SODIUM SUCCINATE 40 MG: 40 INJECTION, POWDER, FOR SOLUTION INTRAMUSCULAR; INTRAVENOUS at 06:10

## 2019-07-15 RX ADMIN — IPRATROPIUM BROMIDE AND ALBUTEROL SULFATE 1 AMPULE: .5; 3 SOLUTION RESPIRATORY (INHALATION) at 08:34

## 2019-07-15 RX ADMIN — IPRATROPIUM BROMIDE AND ALBUTEROL SULFATE 1 AMPULE: .5; 3 SOLUTION RESPIRATORY (INHALATION) at 21:01

## 2019-07-15 RX ADMIN — ATORVASTATIN CALCIUM 20 MG: 20 TABLET, FILM COATED ORAL at 08:56

## 2019-07-15 RX ADMIN — SODIUM CHLORIDE: 9 INJECTION, SOLUTION INTRAVENOUS at 09:05

## 2019-07-15 RX ADMIN — PANTOPRAZOLE SODIUM 40 MG: 40 TABLET, DELAYED RELEASE ORAL at 06:10

## 2019-07-15 RX ADMIN — Medication 1 CAPSULE: at 18:42

## 2019-07-15 ASSESSMENT — PAIN SCALES - GENERAL
PAINLEVEL_OUTOF10: 0
PAINLEVEL_OUTOF10: 0

## 2019-07-15 NOTE — DISCHARGE INSTR - COC
Pneumococcal Conjugate 13-valent ([id number]) 09/24/2018    Tdap (Boostrix, Adacel) 11/29/2018    Zoster Live (Zostavax) 09/24/2016    Zoster Recombinant (Shingrix) 03/24/2018       Active Problems:  Patient Active Problem List   Diagnosis Code    COPD with acute exacerbation ([facility] J44.1    Essential hypertension I10    Tobacco abuse Z72.0    Alcohol abuse F10.10    Colostomy care ([facility] Z43.3    Recurrent major depressive disorder, in partial remission ([facility] F33.41    Personal history of nicotine dependence  Z87.891    Colitis K52.9    Colitis due to Clostridium difficile A04.72    Alcohol-induced acute pancreatitis without infection or necrosis K85.20    Gastrointestinal hemorrhage associated with duodenal ulcer K26.4    Small bowel obstruction ([facility]) K56.609    Ileus ([facility]) K56.7    Acute cystitis without hematuria N30.00    Non-intractable cyclical vomiting with nausea G43. A0    Parastomal hernia without obstruction or gangrene K43.5    Abdominal pain R10.9    C. difficile diarrhea A04.72    Colostomy present ([facility]) Z93.3    Postoperative intra-abdominal abscess T81.49XA    COPD exacerbation ([facility]) J44.1    Atrial fibrillation ([facility]) I48.91       Isolation/Infection:   Isolation          No Isolation            Nurse Assessment:  Last Vital Signs: BP (!) 170/72   Pulse 105   Temp 97.8 °F (36.6 °C)   Resp 22   Ht 5' 2\" (1.575 m)   Wt 130 lb 15.3 oz (59.4 kg)   SpO2 94%   BMI 23.95 kg/m²     Last documented pain score (0-10 scale): Pain Level: 0  Last Weight:   Wt Readings from Last 1 Encounters:   07/15/19 130 lb 15.3 oz (59.4 kg)     Mental Status:  oriented and alert    IV Access:  - None    Nursing Mobility/ADLs:  Walking   Dependent  Transfer  Dependent  Bathing  Assisted  Dressing  Assisted  Toileting  Assisted  Feeding  Dependent  Med Admin  Assisted  Med Delivery   whole    Wound Care Documentation and Therapy:        Elimination:  Continence:   · Bowel: No  · Bladder: No  Urinary

## 2019-07-15 NOTE — DISCHARGE SUMMARY
Hospital Medicine Discharge Summary      Patient ID: Sneha Burrell      Patient's PCP: ABIGAIL Gore CNP    Admit Date: 7/12/2019     Discharge Date: 7/15/19    Admitting Physician: Fior Salazar MD     Discharge Provider: ABIGAIL Leija NP     Discharge Diagnoses: Active Hospital Problems    Diagnosis Date Noted    COPD exacerbation ([facility] [J44.1] 05/13/2019       Disposition:  [] Home  [] Home with home health [] Rehab [] Psych [x] SNF  [] LTAC  [] Long term nursing home or group home [] Transfer to ICU  [] Transfer to PCU [] Other:    Discharge Instructions/Follow-up:    Please call and schedule an appointment with your Primary Care Provider ABIGAIL Aguayo CNP at [phone] for a follow up visit within 1 week. Stop smoking    Avoid ETOH use    SNF for continued PT/OT prior to return home. Take medications as prescribed. - If you have questions about your medications and/or changes to your medications, please ask your hospital provider and/or your primary care provider. Return to the emergency room for fever, cough, chest pain or worsening symptoms.           PCP/SNF to follow up: As above    Discharge Condition: Stable      Code Status:  Full Code     Activity: activity as tolerated    Diet: DIET GENERAL;     Discharge Medications:     Current Discharge Medication List           Details   levofloxacin (LEVAQUIN) 500 MG tablet Take 1 tablet by mouth Daily with lunch for 5 days  Qty: 5 tablet, Refills: 0      predniSONE (DELTASONE) 20 MG tablet Take 2 tablets by mouth daily for 5 days  Qty: 10 tablet, Refills: 0      Lactobacillus (PROBIOTIC ACIDOPHILUS) TABS Take 1 tablet by mouth 2 times daily for 5 days  Qty: 10 tablet, Refills: 0              Details   albuterol sulfate HFA (PROAIR HFA) 108 (90 Base) MCG/ACT inhaler Inhale 2 puffs into the lungs every 6 hours as needed for Wheezing  Qty: 1 Inhaler, Refills: 3      Umeclidinium Bromide 62.5

## 2019-07-15 NOTE — PLAN OF CARE
Problem: Falls - Risk of:  Goal: Will remain free from falls  Description  Will remain free from falls  7/15/2019 0258 by Lashell Hamlin RN  Outcome: Ongoing  7/14/2019 2009 by Tomás Burleson RN  Outcome: Met This Shift  Goal: Absence of physical injury  Description  Absence of physical injury  7/15/2019 0258 by Lashell Hamlin RN  Outcome: Ongoing  7/14/2019 2009 by Tomás Burleson RN  Outcome: Met This Shift     Problem: Risk for Impaired Skin Integrity  Goal: Tissue integrity - skin and mucous membranes  Description  Structural intactness and normal physiological function of skin and  mucous membranes.   7/15/2019 0258 by Lashell Hamlin RN  Outcome: Ongoing  7/14/2019 2009 by Tomás Burleson RN  Outcome: Met This Shift     Problem: Pain:  Goal: Pain level will decrease  Description  Pain level will decrease  7/15/2019 0258 by Lashell Hamlin RN  Outcome: Ongoing  7/14/2019 2009 by Tomás Burleson RN  Outcome: Met This Shift  Goal: Control of acute pain  Description  Control of acute pain  7/15/2019 0258 by Lashell Hamlin RN  Outcome: Ongoing  7/14/2019 2009 by Tomás Burleson RN  Outcome: Met This Shift  Goal: Control of chronic pain  Description  Control of chronic pain  7/15/2019 0258 by Lashell Hamlin RN  Outcome: Ongoing  7/14/2019 2009 by Tomás Burleson RN  Outcome: Met This Shift

## 2019-07-16 VITALS
DIASTOLIC BLOOD PRESSURE: 76 MMHG | SYSTOLIC BLOOD PRESSURE: 129 MMHG | BODY MASS INDEX: 23.37 KG/M2 | WEIGHT: 126.98 LBS | RESPIRATION RATE: 17 BRPM | HEART RATE: 111 BPM | HEIGHT: 62 IN | OXYGEN SATURATION: 90 % | TEMPERATURE: 98.6 F

## 2019-07-16 LAB
ANION GAP SERPL CALCULATED.3IONS-SCNC: 14 MMOL/L (ref 3–16)
BASOPHILS ABSOLUTE: 0 K/UL (ref 0–0.2)
BASOPHILS RELATIVE PERCENT: 0.2 %
BUN BLDV-MCNC: 17 MG/DL (ref 7–20)
CALCIUM SERPL-MCNC: 8.9 MG/DL (ref 8.3–10.6)
CHLORIDE BLD-SCNC: 98 MMOL/L (ref 99–110)
CO2: 28 MMOL/L (ref 21–32)
CREAT SERPL-MCNC: <0.5 MG/DL (ref 0.6–1.2)
EOSINOPHILS ABSOLUTE: 0 K/UL (ref 0–0.6)
EOSINOPHILS RELATIVE PERCENT: 0.6 %
GFR AFRICAN AMERICAN: >60
GFR NON-AFRICAN AMERICAN: >60
GLUCOSE BLD-MCNC: 106 MG/DL (ref 70–99)
HCT VFR BLD CALC: 27.8 % (ref 36–48)
HEMOGLOBIN: 8.8 G/DL (ref 12–16)
LYMPHOCYTES ABSOLUTE: 1.5 K/UL (ref 1–5.1)
LYMPHOCYTES RELATIVE PERCENT: 19.9 %
MAGNESIUM: 1.8 MG/DL (ref 1.8–2.4)
MCH RBC QN AUTO: 25.7 PG (ref 26–34)
MCHC RBC AUTO-ENTMCNC: 31.8 G/DL (ref 31–36)
MCV RBC AUTO: 80.6 FL (ref 80–100)
MONOCYTES ABSOLUTE: 0.5 K/UL (ref 0–1.3)
MONOCYTES RELATIVE PERCENT: 6 %
NEUTROPHILS ABSOLUTE: 5.5 K/UL (ref 1.7–7.7)
NEUTROPHILS RELATIVE PERCENT: 73.3 %
PDW BLD-RTO: 19.9 % (ref 12.4–15.4)
PLATELET # BLD: 187 K/UL (ref 135–450)
PMV BLD AUTO: 7.4 FL (ref 5–10.5)
POTASSIUM REFLEX MAGNESIUM: 3 MMOL/L (ref 3.5–5.1)
RBC # BLD: 3.45 M/UL (ref 4–5.2)
SODIUM BLD-SCNC: 140 MMOL/L (ref 136–145)
WBC # BLD: 7.5 K/UL (ref 4–11)

## 2019-07-16 PROCEDURE — 97530 THERAPEUTIC ACTIVITIES: CPT

## 2019-07-16 PROCEDURE — 80048 BASIC METABOLIC PNL TOTAL CA: CPT

## 2019-07-16 PROCEDURE — 83735 ASSAY OF MAGNESIUM: CPT

## 2019-07-16 PROCEDURE — 97116 GAIT TRAINING THERAPY: CPT

## 2019-07-16 PROCEDURE — 6370000000 HC RX 637 (ALT 250 FOR IP): Performed by: HOSPITALIST

## 2019-07-16 PROCEDURE — 2580000003 HC RX 258: Performed by: HOSPITALIST

## 2019-07-16 PROCEDURE — 36415 COLL VENOUS BLD VENIPUNCTURE: CPT

## 2019-07-16 PROCEDURE — 6370000000 HC RX 637 (ALT 250 FOR IP): Performed by: NURSE PRACTITIONER

## 2019-07-16 PROCEDURE — 85025 COMPLETE CBC W/AUTO DIFF WBC: CPT

## 2019-07-16 PROCEDURE — 94760 N-INVAS EAR/PLS OXIMETRY 1: CPT

## 2019-07-16 PROCEDURE — 94640 AIRWAY INHALATION TREATMENT: CPT

## 2019-07-16 RX ADMIN — PANTOPRAZOLE SODIUM 40 MG: 40 TABLET, DELAYED RELEASE ORAL at 06:34

## 2019-07-16 RX ADMIN — Medication 100 MG: at 08:00

## 2019-07-16 RX ADMIN — Medication 1 CAPSULE: at 08:01

## 2019-07-16 RX ADMIN — IPRATROPIUM BROMIDE AND ALBUTEROL SULFATE 1 AMPULE: .5; 3 SOLUTION RESPIRATORY (INHALATION) at 12:23

## 2019-07-16 RX ADMIN — DULOXETINE HYDROCHLORIDE 30 MG: 30 CAPSULE, DELAYED RELEASE ORAL at 08:00

## 2019-07-16 RX ADMIN — IPRATROPIUM BROMIDE AND ALBUTEROL SULFATE 1 AMPULE: .5; 3 SOLUTION RESPIRATORY (INHALATION) at 08:12

## 2019-07-16 RX ADMIN — ATORVASTATIN CALCIUM 20 MG: 20 TABLET, FILM COATED ORAL at 08:00

## 2019-07-16 RX ADMIN — TIOTROPIUM BROMIDE 18 MCG: 18 CAPSULE ORAL; RESPIRATORY (INHALATION) at 08:12

## 2019-07-16 RX ADMIN — PREDNISONE 40 MG: 20 TABLET ORAL at 08:02

## 2019-07-16 RX ADMIN — BUSPIRONE HYDROCHLORIDE 10 MG: 10 TABLET ORAL at 08:00

## 2019-07-16 RX ADMIN — LEVOFLOXACIN 500 MG: 500 TABLET, FILM COATED ORAL at 13:24

## 2019-07-16 RX ADMIN — METOPROLOL SUCCINATE 25 MG: 25 TABLET, FILM COATED, EXTENDED RELEASE ORAL at 08:00

## 2019-07-16 RX ADMIN — Medication 10 ML: at 08:08

## 2019-07-16 ASSESSMENT — PAIN SCALES - GENERAL: PAINLEVEL_OUTOF10: 0

## 2019-07-16 NOTE — CARE COORDINATION
SOCIAL WORK DISCHARGE SUMMARY:      DISCHARGE DATE:                 7/16/19      DISCHARGE PLACE:                [facility]                REPORT NUMBER:       [phone]               FAX NUMBER:                [phone]      TRANSPORTATION:                [address]  [phone]             TIME:                              3pm      PREFERRED PHARMACY:     At facility      INSURANCE PRECERT OBTAINED:       Yes, Devaughn Meredith approved Skilled stay    SEVERO/TATIANNA COMPLETED:                  SEVERO done    Call to [address] [place] [address] at [facility]. Insurance has approved Skilled stay. Patient, bedside RN, and [place]  aware as requested by patient.     Electronically signed by BETO Pennington on 7/16/2019 at 12:27 PM

## 2019-07-16 NOTE — PROGRESS NOTES
Took over pt care from [facility]. Pt is refusing new IV placement. Pt is agitated and I have a rapport from having her previously.
CHOLECYSTECTOMY      COLONOSCOPY  12/14/2018    COLONOSCOPY N/A 12/14/2018    COLONOSCOPY POLYPECTOMY SNARE/COLD BIOPSY performed by Luis F Tellez MD at [facility]  2018    ENDOSCOPY, COLON, DIAGNOSTIC      HYSTERECTOMY      SMALL INTESTINE SURGERY N/A 4/10/2019    LAPAROSCOPIC LYSISI OF ADHESIONS FOR GREATER THAN 3 HOURS,LAPORSCOPIC CONVERTED TO OPEN COLOSTOMY REVERSAL, SMALL BOWEL RESECTION performed by Margarita Wei MD at [facility] ENDOSCOPY  12/13/2018    with biopsies    UPPER GASTROINTESTINAL ENDOSCOPY N/A 12/13/2018    EGD BIOPSY performed by [facility] F [facility]z [facility] at [facility] / Functional History  Social/Functional History  Lives With: Significant other(Lives with sign other and  her ex mother-in-law. )  Type of Home: House  Home Layout: Multi-level, Able to Live on Main level with bedroom/bathroom  Home Access: Stairs to enter with rails(7 steps to enter.  )  Bathroom Shower/Tub: Tub/Shower unit  [facility] Toilet: Standard  Bathroom Equipment: Shower chair, 3-in-1 commode  Bathroom Accessibility: Accessible  Home Equipment: Rolling walker(Pt reports that her ex mother-in-law is using the walker.  )  ADL Assistance: Needs assistance(significant other reports he assists pt with all ADLs.)  Homemaking Assistance: (Family takes care of homemaking needs. )  Ambulation Assistance: Independent(Without assist device (house too little to use walker), does report h/o unsteady/occass falls. )  Transfer Assistance: Independent  Active : No(Family drives. 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 Additional Comments: Information obtained primarily from prior PT encounter, did confirm with pt (need to verify with family if able). Restrictions  Restrictions/Precautions: Fall Risk        Other position/activity restrictions: CIWA protocal (receives 1 beer with each meal).
Clostridium difficile    Alcohol-induced acute pancreatitis without infection or necrosis    Gastrointestinal hemorrhage associated with duodenal ulcer    Small bowel obstruction (HCC)    Ileus (HCC)    Acute cystitis without hematuria    Non-intractable cyclical vomiting with nausea    Parastomal hernia without obstruction or gangrene    Abdominal pain    C. difficile diarrhea    [address]ostomy present ([facility]    Postoperative intra-abdominal abscess    COPD exacerbation (HCC)    Atrial fibrillation (HCC)       Allergies  Aspirin; Fentanyl; and Morphine    Medications    Scheduled Meds:   ipratropium-albuterol  1 ampule Inhalation Once    atorvastatin  20 mg Oral Daily    busPIRone  10 mg Oral BID    pantoprazole  40 mg Oral QAM AC    DULoxetine  30 mg Oral Daily    metoprolol succinate  25 mg Oral Daily    nicotine  1 patch Transdermal Q24H    rivaroxaban  20 mg Oral Daily    thiamine  100 mg Oral Daily    sodium chloride flush  10 mL Intravenous 2 times per day    ipratropium-albuterol  1 ampule Inhalation Q4H WA    methylPREDNISolone  40 mg Intravenous Q8H    levofloxacin  500 mg Intravenous Q24H    tiotropium  18 mcg Inhalation Daily     Continuous Infusions:   sodium chloride 75 mL/hr at 07/13/19 0646     PRN Meds:  acetaminophen, loperamide, sodium chloride flush, magnesium hydroxide, ondansetron, LORazepam **OR** LORazepam **OR** LORazepam **OR** LORazepam **OR** LORazepam **OR** LORazepam **OR** LORazepam **OR** LORazepam    Vitals   Vitals /wt   Patient Vitals for the past 8 hrs:   BP Temp Temp src Pulse Resp SpO2 Weight   07/13/19 0905 (!) 145/73 98.1 °F (36.7 °C) Oral 95 18 95 % --   07/13/19 0834 -- -- -- -- 18 94 % --   07/13/19 0440 (!) 177/77 97.6 °F (36.4 °C) Axillary 78 16 99 % 127 lb 3.3 oz (57.7 kg)        72HR INTAKE/OUTPUT:      Intake/Output Summary (Last 24 hours) at 7/13/2019 0930  Last data filed at 7/12/2019 1634  Gross per 24 hour   Intake 480 ml   Output --   Net 480
Yes  Activity Tolerance  Activity Tolerance: Patient Tolerated treatment well;Patient limited by cognitive status       Patient Diagnosis(es): The primary encounter diagnosis was Alcohol abuse. Diagnoses of Acute alcoholic intoxication without complication ([address] COPD exacerbation ([address] and Hypoxia were also pertinent to this visit. has a past medical history of Anemia, Arthritis, Clostridium difficile colitis, Colostomy in place [facility]), COPD (chronic obstructive pulmonary disease) ([address] Depression, DANIELLE (generalized anxiety disorder), Gastric ulcer, unspecified as acute or chronic, without mention of hemorrhage, perforation, or obstruction, H/O ETOH abuse, Hiatal hernia, History of blood transfusion, Hyperlipidemia, Hypertension, Insomnia, Intestinal obstruction ([address] Parkinson's disease ([address] Tobacco abuse, and Vitamin D deficiency. has a past surgical history that includes Hysterectomy; Cholecystectomy; Tubal ligation; colostomy (2018); Upper gastrointestinal endoscopy (12/13/2018); Upper gastrointestinal endoscopy (N/A, 12/13/2018); Colonoscopy (12/14/2018); Colonoscopy (N/A, 12/14/2018); Small intestine surgery (N/A, 4/10/2019); Endoscopy, colon, diagnostic; and Abdomen surgery. Restrictions  Restrictions/Precautions  Restrictions/Precautions: Fall Risk  Position Activity Restriction  Other position/activity restrictions: O2 1L via NC. CIWA protocal (receives 1 beer with each meal). Vision/Hearing  Vision: Within Functional Limits  Hearing: Within functional limits     Subjective  General  Chart Reviewed: Yes  Patient assessed for rehabilitation services?: Yes  Additional Pertinent Hx: 70 y/o female admit 7/12/19 with COPD Exac, ETOH Abuse. CT Head negative. PMH as noted including COPD, SBO/Diverticulitis, Small Bowel Resection/Colostomy, Colostomy Reversal, Parkinsons, ETOH Abuse. Family / Caregiver Present: No  Referring Practitioner: Dr. Quiles Co  Diagnosis: COPD Exac, ETOH Abuse.    Other
tolerated: Follow Up:   Follow-up with the [facility] M.D.                                                               ----------------------------------------------------------      SUBJECTIVE COMPLAINTS- follow-up for shortness of breath    Diet: DIET GENERAL;      OBJECTIVE:   Patient Active Problem List   Diagnosis    COPD with acute exacerbation ([place] [address].)    Essential hypertension    Tobacco abuse    Alcohol abuse    Colostomy care ([place] [address]    Recurrent major depressive disorder, in partial remission ([place] [address].)    Personal history of nicotine dependence     Colitis    Colitis due to Clostridium difficile    Alcohol-induced acute pancreatitis without infection or necrosis    Gastrointestinal hemorrhage associated with duodenal ulcer    Small bowel obstruction (HCC)    Ileus ([place] [address]    Acute cystitis without hematuria    Non-intractable cyclical vomiting with nausea    Parastomal hernia without obstruction or gangrene    Abdominal pain    C. difficile diarrhea    Colostomy present ([place] [address]    Postoperative intra-abdominal abscess    COPD exacerbation (HCC)    Atrial fibrillation (HCC)       Allergies  Aspirin; Fentanyl; and Morphine    Medications    Scheduled Meds:   predniSONE  40 mg Oral Daily    levofloxacin  500 mg Oral Lunch    lactobacillus  1 capsule Oral BID WC    atorvastatin  20 mg Oral Daily    busPIRone  10 mg Oral BID    pantoprazole  40 mg Oral QAM AC    DULoxetine  30 mg Oral Daily    metoprolol succinate  25 mg Oral Daily    nicotine  1 patch Transdermal Q24H    rivaroxaban  20 mg Oral Daily    thiamine  100 mg Oral Daily    sodium chloride flush  10 mL Intravenous 2 times per day    ipratropium-albuterol  1 ampule Inhalation Q4H WA    tiotropium  18 mcg Inhalation Daily     Continuous Infusions:    PRN Meds:  acetaminophen, loperamide, sodium chloride flush, magnesium hydroxide, ondansetron, LORazepam **OR** LORazepam **OR** LORazepam **OR** LORazepam **OR** LORazepam
bed/bath. Today, patient was impulsive with movements and required Min Assist for transfers. She ambulated with a walker short distances and impulsively attempted to ambulate without her walker, requiring Mod Assist at that time. She is at high risk for falls and appears unsafe for returning home. Recommend that patient have a trial of skilled PT 3-5x/wk at discharge to optimize her functional mobility. Will follow. Maisha Rocha scored a 15/24 on the AM-PAC short mobility form. Initial research suggests that an AM-PAC score of 18 or greater may be associated with a discharge to patient's home setting. However in this initial research, cut off scores do not perfectly predict discharge location: 25% of patients with a score of 18 or greater actually went to a rehab facility and 20% of people with a score less than 18 actually went home. Based on my clinical judgement I recommend that the patient have 3-5 sessions per week of Physical Therapy at d/c to increase the patients independence. Goals:  Time Frame for Short term goals: Upon d/c acute care setting.  - all goals ongoing 07/16/19 except as noted below  Short term goal 1: Bed Mob Min assist.    Short term goal 2: Transfers with assist device CGA/Min assist.    Short term goal 3: Amb with assist device 48' Min assist.             Safety devices:   Type of devices: Call light within reach, All fall risk precautions in place, Patient at risk for falls, Left in bed, Bed alarm in place        PLAN:  Times per week: 3-5x week while in acute care setting. ;    Current Treatment Recommendations: Functional Mobility Training, Transfer Training, Gait Training, Safety Education & Training, Patient/Caregiver Education & Training    If patient is discharged prior to next treatment, this note will serve as the discharge summary.     Therapy Time    Individual Concurrent Group Co-treatment   Time In 0943            Time Out 1010          Minutes 27             Timed
bronchodilators and antibiotics  Clinically improving   decrease steroids to every 8 hours     Alcohol abuse with withdrawal symptoms  Patient is drinking beer   not using Ativan on CIWA scale      Anxiety depression F 41.9  Continue on home medication        Essential primary hypertension I10  Continue patient on home medication           DVT and GI prophylaxis        Full Code           Code Status: Full Code        Dispo - home in a.m. The patient and / or the family were informed of the results of any tests, a time was given to answer questions, a plan was proposed and they agreed with plan. Luc Mercado MD    This note was transcribed using [address] [employer]. Please disregard any translational errors.
complete fxl mobility and fxl transfers to/from ADL surfaces with SBA/CGA using AD prn. Short term goal 6: Pt will complete toileting with mod A. Short term goal 7: Further assess pt's cognitive status with efforts to improve pt's orientation and safety awareness. Long term goals  Time Frame for Long term goals : STG=LTG  Patient Goals   Patient goals : \"I want to go home. \" Significant other's goal is for pt to be able to walk. Therapy Time   Individual Concurrent Group Co-treatment   Time In 1045         Time Out 1125         Minutes 40         Timed Code Treatment Minutes: 25 Minutes     This note to serve as OT d/c summary if pt is d/c-ed prior to next therapy session.     Brenden San, OTR/L [id number]

## 2019-08-06 ENCOUNTER — TELEPHONE (OUTPATIENT)
Dept: INTERNAL MEDICINE CLINIC | Age: 74
End: 2019-08-06

## 2019-08-29 ENCOUNTER — TELEPHONE (OUTPATIENT)
Dept: INTERNAL MEDICINE CLINIC | Age: 74
End: 2019-08-29

## 2019-08-29 NOTE — TELEPHONE ENCOUNTER
Needs appointment! I have attempted to contact this patient by phone with the following results: phone  Number no good.

## 2019-08-29 NOTE — TELEPHONE ENCOUNTER
----- Message from ABIGAIL Mejia CNP sent at 8/28/2019 10:24 AM EDT -----  She needs to make an appointment.   She also needs a Mammogram

## 2019-10-26 ENCOUNTER — APPOINTMENT (OUTPATIENT)
Dept: CT IMAGING | Age: 74
DRG: 371 | End: 2019-10-26
Payer: COMMERCIAL

## 2019-10-26 ENCOUNTER — APPOINTMENT (OUTPATIENT)
Dept: GENERAL RADIOLOGY | Age: 74
DRG: 371 | End: 2019-10-26
Payer: COMMERCIAL

## 2019-10-26 ENCOUNTER — HOSPITAL ENCOUNTER (INPATIENT)
Age: 74
LOS: 3 days | Discharge: NURSING, REHAB OR LONG-TERM CARE | DRG: 371 | End: 2019-10-30
Attending: EMERGENCY MEDICINE | Admitting: INTERNAL MEDICINE
Payer: COMMERCIAL

## 2019-10-26 DIAGNOSIS — R09.02 HYPOXIA: Primary | ICD-10-CM

## 2019-10-26 DIAGNOSIS — R25.1 TREMOR: ICD-10-CM

## 2019-10-26 DIAGNOSIS — J44.1 COPD EXACERBATION (HCC): ICD-10-CM

## 2019-10-26 DIAGNOSIS — R47.9 DIFFICULTY SPEAKING: ICD-10-CM

## 2019-10-26 LAB
ALBUMIN SERPL-MCNC: 3.3 G/DL (ref 3.4–5)
ALP BLD-CCNC: 102 U/L (ref 40–129)
ALT SERPL-CCNC: 56 U/L (ref 10–40)
ANION GAP SERPL CALCULATED.3IONS-SCNC: 15 MMOL/L (ref 3–16)
AST SERPL-CCNC: 67 U/L (ref 15–37)
BACTERIA: ABNORMAL /HPF
BASE EXCESS VENOUS: 2.4 MMOL/L
BILIRUB SERPL-MCNC: 0.3 MG/DL (ref 0–1)
BILIRUBIN DIRECT: <0.2 MG/DL (ref 0–0.3)
BILIRUBIN URINE: NEGATIVE
BILIRUBIN, INDIRECT: ABNORMAL MG/DL (ref 0–1)
BLOOD, URINE: ABNORMAL
BUN BLDV-MCNC: 8 MG/DL (ref 7–20)
CALCIUM SERPL-MCNC: 9.2 MG/DL (ref 8.3–10.6)
CARBOXYHEMOGLOBIN: 3.6 %
CHLORIDE BLD-SCNC: 93 MMOL/L (ref 99–110)
CLARITY: ABNORMAL
CO2: 24 MMOL/L (ref 21–32)
COLOR: YELLOW
COMMENT UA: ABNORMAL
CREAT SERPL-MCNC: 0.6 MG/DL (ref 0.6–1.2)
EPITHELIAL CELLS, UA: 1 /HPF (ref 0–5)
ETHANOL: NORMAL MG/DL (ref 0–0.08)
GFR AFRICAN AMERICAN: >60
GFR NON-AFRICAN AMERICAN: >60
GLUCOSE BLD-MCNC: 149 MG/DL (ref 70–99)
GLUCOSE URINE: NEGATIVE MG/DL
HCO3 VENOUS: 27 MMOL/L (ref 23–29)
HYALINE CASTS: 0 /LPF (ref 0–8)
KETONES, URINE: NEGATIVE MG/DL
LACTIC ACID: 2.1 MMOL/L (ref 0.4–2)
LEUKOCYTE ESTERASE, URINE: ABNORMAL
MAGNESIUM: 1.8 MG/DL (ref 1.8–2.4)
METHEMOGLOBIN VENOUS: 0.5 %
MICROSCOPIC EXAMINATION: YES
NITRITE, URINE: NEGATIVE
O2 CONTENT, VEN: 10 ML/DL
O2 SAT, VEN: 99 %
O2 THERAPY: NORMAL
PCO2, VEN: 45.3 MMHG (ref 40–50)
PH UA: 6.5 (ref 5–8)
PH VENOUS: 7.39 (ref 7.35–7.45)
PO2, VEN: 107 MMHG
POTASSIUM REFLEX MAGNESIUM: 3.3 MMOL/L (ref 3.5–5.1)
PRO-BNP: 314 PG/ML (ref 0–124)
PROTEIN UA: 30 MG/DL
RBC UA: 3 /HPF (ref 0–4)
SODIUM BLD-SCNC: 132 MMOL/L (ref 136–145)
SPECIFIC GRAVITY UA: 1.01 (ref 1–1.03)
TCO2 CALC VENOUS: 28 MMOL/L
TOTAL CK: 40 U/L (ref 26–192)
TOTAL PROTEIN: 7 G/DL (ref 6.4–8.2)
TROPONIN: <0.01 NG/ML
URINE REFLEX TO CULTURE: YES
URINE TYPE: ABNORMAL
UROBILINOGEN, URINE: 1 E.U./DL
WBC UA: 215 /HPF (ref 0–5)

## 2019-10-26 PROCEDURE — G0480 DRUG TEST DEF 1-7 CLASSES: HCPCS

## 2019-10-26 PROCEDURE — 6370000000 HC RX 637 (ALT 250 FOR IP): Performed by: EMERGENCY MEDICINE

## 2019-10-26 PROCEDURE — 94640 AIRWAY INHALATION TREATMENT: CPT

## 2019-10-26 PROCEDURE — 87086 URINE CULTURE/COLONY COUNT: CPT

## 2019-10-26 PROCEDURE — 83735 ASSAY OF MAGNESIUM: CPT

## 2019-10-26 PROCEDURE — 84484 ASSAY OF TROPONIN QUANT: CPT

## 2019-10-26 PROCEDURE — 99285 EMERGENCY DEPT VISIT HI MDM: CPT

## 2019-10-26 PROCEDURE — 96374 THER/PROPH/DIAG INJ IV PUSH: CPT

## 2019-10-26 PROCEDURE — 80048 BASIC METABOLIC PNL TOTAL CA: CPT

## 2019-10-26 PROCEDURE — 70450 CT HEAD/BRAIN W/O DYE: CPT

## 2019-10-26 PROCEDURE — 6360000002 HC RX W HCPCS: Performed by: EMERGENCY MEDICINE

## 2019-10-26 PROCEDURE — 82550 ASSAY OF CK (CPK): CPT

## 2019-10-26 PROCEDURE — 80076 HEPATIC FUNCTION PANEL: CPT

## 2019-10-26 PROCEDURE — G0378 HOSPITAL OBSERVATION PER HR: HCPCS

## 2019-10-26 PROCEDURE — 82803 BLOOD GASES ANY COMBINATION: CPT

## 2019-10-26 PROCEDURE — 83880 ASSAY OF NATRIURETIC PEPTIDE: CPT

## 2019-10-26 PROCEDURE — 83605 ASSAY OF LACTIC ACID: CPT

## 2019-10-26 PROCEDURE — 71045 X-RAY EXAM CHEST 1 VIEW: CPT

## 2019-10-26 PROCEDURE — 94760 N-INVAS EAR/PLS OXIMETRY 1: CPT

## 2019-10-26 PROCEDURE — 85025 COMPLETE CBC W/AUTO DIFF WBC: CPT

## 2019-10-26 PROCEDURE — 81001 URINALYSIS AUTO W/SCOPE: CPT

## 2019-10-26 RX ORDER — IPRATROPIUM BROMIDE AND ALBUTEROL SULFATE 2.5; .5 MG/3ML; MG/3ML
1 SOLUTION RESPIRATORY (INHALATION) ONCE
Status: COMPLETED | OUTPATIENT
Start: 2019-10-26 | End: 2019-10-26

## 2019-10-26 RX ORDER — METHYLPREDNISOLONE SODIUM SUCCINATE 125 MG/2ML
125 INJECTION, POWDER, LYOPHILIZED, FOR SOLUTION INTRAMUSCULAR; INTRAVENOUS ONCE
Status: COMPLETED | OUTPATIENT
Start: 2019-10-26 | End: 2019-10-26

## 2019-10-26 RX ORDER — POTASSIUM CHLORIDE 750 MG/1
20 TABLET, FILM COATED, EXTENDED RELEASE ORAL ONCE
Status: COMPLETED | OUTPATIENT
Start: 2019-10-26 | End: 2019-10-26

## 2019-10-26 RX ORDER — ALBUTEROL SULFATE 2.5 MG/3ML
5 SOLUTION RESPIRATORY (INHALATION) ONCE
Status: COMPLETED | OUTPATIENT
Start: 2019-10-26 | End: 2019-10-26

## 2019-10-26 RX ADMIN — POTASSIUM CHLORIDE 20 MEQ: 750 TABLET, EXTENDED RELEASE ORAL at 22:11

## 2019-10-26 RX ADMIN — METHYLPREDNISOLONE SODIUM SUCCINATE 125 MG: 125 INJECTION, POWDER, FOR SOLUTION INTRAMUSCULAR; INTRAVENOUS at 21:10

## 2019-10-26 RX ADMIN — ALBUTEROL SULFATE 5 MG: 2.5 SOLUTION RESPIRATORY (INHALATION) at 20:57

## 2019-10-26 RX ADMIN — IPRATROPIUM BROMIDE AND ALBUTEROL SULFATE 1 AMPULE: .5; 3 SOLUTION RESPIRATORY (INHALATION) at 20:57

## 2019-10-26 ASSESSMENT — ENCOUNTER SYMPTOMS
SHORTNESS OF BREATH: 1
EYE PAIN: 0
EYE DISCHARGE: 0
EYE ITCHING: 0
EYE REDNESS: 0
COUGH: 1
WHEEZING: 1
APNEA: 0
CHOKING: 0
STRIDOR: 0
CHEST TIGHTNESS: 0
ABDOMINAL DISTENTION: 0
PHOTOPHOBIA: 0

## 2019-10-27 PROBLEM — G45.9 TIA (TRANSIENT ISCHEMIC ATTACK): Status: ACTIVE | Noted: 2019-10-27

## 2019-10-27 LAB
ABO/RH: NORMAL
ALBUMIN SERPL-MCNC: 3.8 G/DL (ref 3.4–5)
ANION GAP SERPL CALCULATED.3IONS-SCNC: 17 MMOL/L (ref 3–16)
ANTIBODY SCREEN: NORMAL
BLOOD BANK DISPENSE STATUS: NORMAL
BLOOD BANK PRODUCT CODE: NORMAL
BPU ID: NORMAL
BUN BLDV-MCNC: 12 MG/DL (ref 7–20)
C DIFF TOXIN/ANTIGEN: ABNORMAL
CALCIUM SERPL-MCNC: 9.3 MG/DL (ref 8.3–10.6)
CHLORIDE BLD-SCNC: 97 MMOL/L (ref 99–110)
CO2: 22 MMOL/L (ref 21–32)
CREAT SERPL-MCNC: <0.5 MG/DL (ref 0.6–1.2)
DESCRIPTION BLOOD BANK: NORMAL
FERRITIN: 13.9 NG/ML (ref 15–150)
GFR AFRICAN AMERICAN: >60
GFR NON-AFRICAN AMERICAN: >60
GLUCOSE BLD-MCNC: 150 MG/DL (ref 70–99)
HCT VFR BLD CALC: 26.4 % (ref 36–48)
HEMATOLOGY PATH CONSULT: NO
HEMOGLOBIN: 7.6 G/DL (ref 12–16)
IRON SATURATION: 5 % (ref 15–50)
IRON: 18 UG/DL (ref 37–145)
LACTIC ACID: 3.5 MMOL/L (ref 0.4–2)
MAGNESIUM: 1.7 MG/DL (ref 1.8–2.4)
MCH RBC QN AUTO: 19.3 PG (ref 26–34)
MCHC RBC AUTO-ENTMCNC: 28.9 G/DL (ref 31–36)
MCV RBC AUTO: 66.7 FL (ref 80–100)
OCCULT BLOOD SCREENING: NORMAL
PDW BLD-RTO: 23.5 % (ref 12.4–15.4)
PHOSPHORUS: 2.7 MG/DL (ref 2.5–4.9)
PLATELET # BLD: 548 K/UL (ref 135–450)
PMV BLD AUTO: 7.2 FL (ref 5–10.5)
POTASSIUM SERPL-SCNC: 3.8 MMOL/L (ref 3.5–5.1)
RBC # BLD: 3.96 M/UL (ref 4–5.2)
SODIUM BLD-SCNC: 136 MMOL/L (ref 136–145)
TOTAL IRON BINDING CAPACITY: 381 UG/DL (ref 260–445)
WBC # BLD: 7.9 K/UL (ref 4–11)

## 2019-10-27 PROCEDURE — 82728 ASSAY OF FERRITIN: CPT

## 2019-10-27 PROCEDURE — 83605 ASSAY OF LACTIC ACID: CPT

## 2019-10-27 PROCEDURE — 6370000000 HC RX 637 (ALT 250 FOR IP): Performed by: INTERNAL MEDICINE

## 2019-10-27 PROCEDURE — 85027 COMPLETE CBC AUTOMATED: CPT

## 2019-10-27 PROCEDURE — 92610 EVALUATE SWALLOWING FUNCTION: CPT

## 2019-10-27 PROCEDURE — 2580000003 HC RX 258: Performed by: INTERNAL MEDICINE

## 2019-10-27 PROCEDURE — 86901 BLOOD TYPING SEROLOGIC RH(D): CPT

## 2019-10-27 PROCEDURE — 83735 ASSAY OF MAGNESIUM: CPT

## 2019-10-27 PROCEDURE — 6360000002 HC RX W HCPCS: Performed by: INTERNAL MEDICINE

## 2019-10-27 PROCEDURE — 86850 RBC ANTIBODY SCREEN: CPT

## 2019-10-27 PROCEDURE — 83550 IRON BINDING TEST: CPT

## 2019-10-27 PROCEDURE — 83540 ASSAY OF IRON: CPT

## 2019-10-27 PROCEDURE — 87449 NOS EACH ORGANISM AG IA: CPT

## 2019-10-27 PROCEDURE — 94761 N-INVAS EAR/PLS OXIMETRY MLT: CPT

## 2019-10-27 PROCEDURE — 80069 RENAL FUNCTION PANEL: CPT

## 2019-10-27 PROCEDURE — 2700000000 HC OXYGEN THERAPY PER DAY

## 2019-10-27 PROCEDURE — 6370000000 HC RX 637 (ALT 250 FOR IP): Performed by: NURSE PRACTITIONER

## 2019-10-27 PROCEDURE — 86923 COMPATIBILITY TEST ELECTRIC: CPT

## 2019-10-27 PROCEDURE — G0328 FECAL BLOOD SCRN IMMUNOASSAY: HCPCS

## 2019-10-27 PROCEDURE — 94640 AIRWAY INHALATION TREATMENT: CPT

## 2019-10-27 PROCEDURE — 87324 CLOSTRIDIUM AG IA: CPT

## 2019-10-27 PROCEDURE — 2060000000 HC ICU INTERMEDIATE R&B

## 2019-10-27 PROCEDURE — P9016 RBC LEUKOCYTES REDUCED: HCPCS

## 2019-10-27 PROCEDURE — 36430 TRANSFUSION BLD/BLD COMPNT: CPT

## 2019-10-27 PROCEDURE — 86900 BLOOD TYPING SEROLOGIC ABO: CPT

## 2019-10-27 PROCEDURE — 36415 COLL VENOUS BLD VENIPUNCTURE: CPT

## 2019-10-27 RX ORDER — ONDANSETRON 2 MG/ML
4 INJECTION INTRAMUSCULAR; INTRAVENOUS EVERY 6 HOURS PRN
Status: DISCONTINUED | OUTPATIENT
Start: 2019-10-27 | End: 2019-10-30 | Stop reason: HOSPADM

## 2019-10-27 RX ORDER — PROCHLORPERAZINE MALEATE 10 MG
1 TABLET ORAL EVERY 6 HOURS PRN
Refills: 0 | COMMUNITY
Start: 2019-09-15 | End: 2021-08-27

## 2019-10-27 RX ORDER — VANCOMYCIN HYDROCHLORIDE 125 MG/1
125 CAPSULE ORAL 4 TIMES DAILY
Status: DISCONTINUED | OUTPATIENT
Start: 2019-10-27 | End: 2019-10-30 | Stop reason: HOSPADM

## 2019-10-27 RX ORDER — ALBUTEROL SULFATE 90 UG/1
2 AEROSOL, METERED RESPIRATORY (INHALATION) EVERY 6 HOURS PRN
Status: DISCONTINUED | OUTPATIENT
Start: 2019-10-27 | End: 2019-10-30 | Stop reason: HOSPADM

## 2019-10-27 RX ORDER — ATORVASTATIN CALCIUM 20 MG/1
20 TABLET, FILM COATED ORAL DAILY
Status: DISCONTINUED | OUTPATIENT
Start: 2019-10-27 | End: 2019-10-30 | Stop reason: HOSPADM

## 2019-10-27 RX ORDER — LANOLIN ALCOHOL/MO/W.PET/CERES
3 CREAM (GRAM) TOPICAL ONCE
Status: COMPLETED | OUTPATIENT
Start: 2019-10-27 | End: 2019-10-27

## 2019-10-27 RX ORDER — SODIUM CHLORIDE 0.9 % (FLUSH) 0.9 %
10 SYRINGE (ML) INJECTION EVERY 12 HOURS SCHEDULED
Status: DISCONTINUED | OUTPATIENT
Start: 2019-10-27 | End: 2019-10-30 | Stop reason: HOSPADM

## 2019-10-27 RX ORDER — PREDNISONE 20 MG/1
40 TABLET ORAL DAILY
Status: DISCONTINUED | OUTPATIENT
Start: 2019-10-27 | End: 2019-10-27

## 2019-10-27 RX ORDER — METOPROLOL SUCCINATE 25 MG/1
25 TABLET, EXTENDED RELEASE ORAL DAILY
Status: DISCONTINUED | OUTPATIENT
Start: 2019-10-27 | End: 2019-10-30 | Stop reason: HOSPADM

## 2019-10-27 RX ORDER — PANTOPRAZOLE SODIUM 40 MG/1
40 TABLET, DELAYED RELEASE ORAL
Status: DISCONTINUED | OUTPATIENT
Start: 2019-10-27 | End: 2019-10-28

## 2019-10-27 RX ORDER — ASPIRIN 81 MG/1
81 TABLET ORAL DAILY
Status: DISCONTINUED | OUTPATIENT
Start: 2019-10-27 | End: 2019-10-30 | Stop reason: HOSPADM

## 2019-10-27 RX ORDER — DULOXETIN HYDROCHLORIDE 30 MG/1
30 CAPSULE, DELAYED RELEASE ORAL DAILY
Status: DISCONTINUED | OUTPATIENT
Start: 2019-10-27 | End: 2019-10-30 | Stop reason: HOSPADM

## 2019-10-27 RX ORDER — BUSPIRONE HYDROCHLORIDE 10 MG/1
10 TABLET ORAL 2 TIMES DAILY
Status: DISCONTINUED | OUTPATIENT
Start: 2019-10-27 | End: 2019-10-30 | Stop reason: HOSPADM

## 2019-10-27 RX ORDER — ASPIRIN 300 MG/1
300 SUPPOSITORY RECTAL DAILY
Status: DISCONTINUED | OUTPATIENT
Start: 2019-10-27 | End: 2019-10-30 | Stop reason: HOSPADM

## 2019-10-27 RX ORDER — 0.9 % SODIUM CHLORIDE 0.9 %
250 INTRAVENOUS SOLUTION INTRAVENOUS ONCE
Status: COMPLETED | OUTPATIENT
Start: 2019-10-27 | End: 2019-10-27

## 2019-10-27 RX ORDER — SODIUM CHLORIDE 0.9 % (FLUSH) 0.9 %
10 SYRINGE (ML) INJECTION PRN
Status: DISCONTINUED | OUTPATIENT
Start: 2019-10-27 | End: 2019-10-30 | Stop reason: HOSPADM

## 2019-10-27 RX ORDER — NICOTINE 21 MG/24HR
1 PATCH, TRANSDERMAL 24 HOURS TRANSDERMAL EVERY 24 HOURS
Status: DISCONTINUED | OUTPATIENT
Start: 2019-10-27 | End: 2019-10-30 | Stop reason: HOSPADM

## 2019-10-27 RX ADMIN — VANCOMYCIN HYDROCHLORIDE 125 MG: 125 CAPSULE ORAL at 17:31

## 2019-10-27 RX ADMIN — CEFTRIAXONE 1 G: 1 INJECTION, POWDER, FOR SOLUTION INTRAMUSCULAR; INTRAVENOUS at 02:41

## 2019-10-27 RX ADMIN — VANCOMYCIN HYDROCHLORIDE 125 MG: 125 CAPSULE ORAL at 21:02

## 2019-10-27 RX ADMIN — ASPIRIN 81 MG: 81 TABLET, COATED ORAL at 10:36

## 2019-10-27 RX ADMIN — SODIUM CHLORIDE 250 ML: 9 INJECTION, SOLUTION INTRAVENOUS at 18:04

## 2019-10-27 RX ADMIN — METOPROLOL SUCCINATE 25 MG: 25 TABLET, EXTENDED RELEASE ORAL at 10:36

## 2019-10-27 RX ADMIN — RIVAROXABAN 20 MG: 20 TABLET, FILM COATED ORAL at 10:36

## 2019-10-27 RX ADMIN — IRON SUCROSE 200 MG: 20 INJECTION, SOLUTION INTRAVENOUS at 16:08

## 2019-10-27 RX ADMIN — BUSPIRONE HYDROCHLORIDE 10 MG: 10 TABLET ORAL at 10:36

## 2019-10-27 RX ADMIN — ATORVASTATIN CALCIUM 20 MG: 20 TABLET, FILM COATED ORAL at 10:36

## 2019-10-27 RX ADMIN — BUSPIRONE HYDROCHLORIDE 10 MG: 10 TABLET ORAL at 21:02

## 2019-10-27 RX ADMIN — SODIUM CHLORIDE, PRESERVATIVE FREE 10 ML: 5 INJECTION INTRAVENOUS at 21:01

## 2019-10-27 RX ADMIN — TIOTROPIUM BROMIDE 18 MCG: 18 CAPSULE ORAL; RESPIRATORY (INHALATION) at 11:43

## 2019-10-27 RX ADMIN — DULOXETINE HYDROCHLORIDE 30 MG: 30 CAPSULE, DELAYED RELEASE ORAL at 10:36

## 2019-10-27 RX ADMIN — SODIUM CHLORIDE, PRESERVATIVE FREE 10 ML: 5 INJECTION INTRAVENOUS at 10:35

## 2019-10-27 RX ADMIN — MELATONIN 3 MG: at 20:44

## 2019-10-27 ASSESSMENT — PAIN SCALES - GENERAL
PAINLEVEL_OUTOF10: 0

## 2019-10-28 ENCOUNTER — APPOINTMENT (OUTPATIENT)
Dept: MRI IMAGING | Age: 74
DRG: 371 | End: 2019-10-28
Payer: COMMERCIAL

## 2019-10-28 LAB
ALBUMIN SERPL-MCNC: 3.4 G/DL (ref 3.4–5)
ANION GAP SERPL CALCULATED.3IONS-SCNC: 14 MMOL/L (ref 3–16)
BASOPHILS ABSOLUTE: 0 K/UL (ref 0–0.2)
BASOPHILS RELATIVE PERCENT: 0 %
BUN BLDV-MCNC: 10 MG/DL (ref 7–20)
CALCIUM SERPL-MCNC: 9 MG/DL (ref 8.3–10.6)
CHLORIDE BLD-SCNC: 101 MMOL/L (ref 99–110)
CO2: 24 MMOL/L (ref 21–32)
CREAT SERPL-MCNC: <0.5 MG/DL (ref 0.6–1.2)
EOSINOPHILS ABSOLUTE: 0.1 K/UL (ref 0–0.6)
EOSINOPHILS RELATIVE PERCENT: 2 %
GFR AFRICAN AMERICAN: >60
GFR NON-AFRICAN AMERICAN: >60
GLUCOSE BLD-MCNC: 113 MG/DL (ref 70–99)
HCT VFR BLD CALC: 23.4 % (ref 36–48)
HCT VFR BLD CALC: 25.4 % (ref 36–48)
HCT VFR BLD CALC: 26.5 % (ref 36–48)
HCT VFR BLD CALC: 26.7 % (ref 36–48)
HEMATOLOGY PATH CONSULT: NO
HEMATOLOGY PATH CONSULT: NO
HEMATOLOGY PATH CONSULT: YES
HEMOGLOBIN: 6.8 G/DL (ref 12–16)
HEMOGLOBIN: 7.4 G/DL (ref 12–16)
HEMOGLOBIN: 8.1 G/DL (ref 12–16)
HEMOGLOBIN: 8.2 G/DL (ref 12–16)
HYPOCHROMIA: ABNORMAL
LYMPHOCYTES ABSOLUTE: 1.5 K/UL (ref 1–5.1)
LYMPHOCYTES RELATIVE PERCENT: 22 %
MAGNESIUM: 1.9 MG/DL (ref 1.8–2.4)
MCH RBC QN AUTO: 19.4 PG (ref 26–34)
MCH RBC QN AUTO: 19.4 PG (ref 26–34)
MCH RBC QN AUTO: 21.5 PG (ref 26–34)
MCHC RBC AUTO-ENTMCNC: 29 G/DL (ref 31–36)
MCHC RBC AUTO-ENTMCNC: 29 G/DL (ref 31–36)
MCHC RBC AUTO-ENTMCNC: 30.9 G/DL (ref 31–36)
MCV RBC AUTO: 66.8 FL (ref 80–100)
MCV RBC AUTO: 67 FL (ref 80–100)
MCV RBC AUTO: 69.5 FL (ref 80–100)
MICROCYTES: ABNORMAL
MONOCYTES ABSOLUTE: 0.4 K/UL (ref 0–1.3)
MONOCYTES RELATIVE PERCENT: 6 %
NEUTROPHILS ABSOLUTE: 4.8 K/UL (ref 1.7–7.7)
NEUTROPHILS RELATIVE PERCENT: 70 %
PDW BLD-RTO: 23.9 % (ref 12.4–15.4)
PDW BLD-RTO: 24 % (ref 12.4–15.4)
PDW BLD-RTO: 24.6 % (ref 12.4–15.4)
PHOSPHORUS: 3.1 MG/DL (ref 2.5–4.9)
PLATELET # BLD: 417 K/UL (ref 135–450)
PLATELET # BLD: 454 K/UL (ref 135–450)
PLATELET # BLD: 472 K/UL (ref 135–450)
PLATELET SLIDE REVIEW: ADEQUATE
PMV BLD AUTO: 7 FL (ref 5–10.5)
PMV BLD AUTO: 7.1 FL (ref 5–10.5)
PMV BLD AUTO: 7.2 FL (ref 5–10.5)
POTASSIUM SERPL-SCNC: 3.6 MMOL/L (ref 3.5–5.1)
RBC # BLD: 3.5 M/UL (ref 4–5.2)
RBC # BLD: 3.8 M/UL (ref 4–5.2)
RBC # BLD: 3.81 M/UL (ref 4–5.2)
SLIDE REVIEW: ABNORMAL
SODIUM BLD-SCNC: 139 MMOL/L (ref 136–145)
STOMATOCYTES: ABNORMAL
TARGET CELLS: ABNORMAL
URINE CULTURE, ROUTINE: NORMAL
WBC # BLD: 6.6 K/UL (ref 4–11)
WBC # BLD: 6.8 K/UL (ref 4–11)
WBC # BLD: 9.2 K/UL (ref 4–11)

## 2019-10-28 PROCEDURE — 85014 HEMATOCRIT: CPT

## 2019-10-28 PROCEDURE — 94760 N-INVAS EAR/PLS OXIMETRY 1: CPT

## 2019-10-28 PROCEDURE — 2580000003 HC RX 258: Performed by: INTERNAL MEDICINE

## 2019-10-28 PROCEDURE — 2700000000 HC OXYGEN THERAPY PER DAY

## 2019-10-28 PROCEDURE — 97127 HC SP THER IVNTJ W/FOCUS COG FUNCJ: CPT

## 2019-10-28 PROCEDURE — 97530 THERAPEUTIC ACTIVITIES: CPT

## 2019-10-28 PROCEDURE — 6360000002 HC RX W HCPCS: Performed by: INTERNAL MEDICINE

## 2019-10-28 PROCEDURE — 83735 ASSAY OF MAGNESIUM: CPT

## 2019-10-28 PROCEDURE — 80069 RENAL FUNCTION PANEL: CPT

## 2019-10-28 PROCEDURE — 97166 OT EVAL MOD COMPLEX 45 MIN: CPT

## 2019-10-28 PROCEDURE — 2060000000 HC ICU INTERMEDIATE R&B

## 2019-10-28 PROCEDURE — 6370000000 HC RX 637 (ALT 250 FOR IP): Performed by: NURSE PRACTITIONER

## 2019-10-28 PROCEDURE — 6370000000 HC RX 637 (ALT 250 FOR IP): Performed by: INTERNAL MEDICINE

## 2019-10-28 PROCEDURE — 85018 HEMOGLOBIN: CPT

## 2019-10-28 PROCEDURE — 92526 ORAL FUNCTION THERAPY: CPT

## 2019-10-28 PROCEDURE — 97162 PT EVAL MOD COMPLEX 30 MIN: CPT

## 2019-10-28 PROCEDURE — 94640 AIRWAY INHALATION TREATMENT: CPT

## 2019-10-28 PROCEDURE — 70551 MRI BRAIN STEM W/O DYE: CPT

## 2019-10-28 PROCEDURE — 36415 COLL VENOUS BLD VENIPUNCTURE: CPT

## 2019-10-28 PROCEDURE — 85027 COMPLETE CBC AUTOMATED: CPT

## 2019-10-28 RX ORDER — LANOLIN ALCOHOL/MO/W.PET/CERES
3 CREAM (GRAM) TOPICAL NIGHTLY PRN
Status: DISCONTINUED | OUTPATIENT
Start: 2019-10-28 | End: 2019-10-30 | Stop reason: HOSPADM

## 2019-10-28 RX ORDER — LACTOBACILLUS RHAMNOSUS GG 10B CELL
1 CAPSULE ORAL
Status: DISCONTINUED | OUTPATIENT
Start: 2019-10-29 | End: 2019-10-30 | Stop reason: HOSPADM

## 2019-10-28 RX ORDER — FAMOTIDINE 20 MG/1
20 TABLET, FILM COATED ORAL 2 TIMES DAILY
Status: DISCONTINUED | OUTPATIENT
Start: 2019-10-29 | End: 2019-10-30 | Stop reason: HOSPADM

## 2019-10-28 RX ADMIN — BUSPIRONE HYDROCHLORIDE 10 MG: 10 TABLET ORAL at 10:50

## 2019-10-28 RX ADMIN — ASPIRIN 81 MG: 81 TABLET, COATED ORAL at 10:50

## 2019-10-28 RX ADMIN — ATORVASTATIN CALCIUM 20 MG: 20 TABLET, FILM COATED ORAL at 10:50

## 2019-10-28 RX ADMIN — VANCOMYCIN HYDROCHLORIDE 125 MG: 125 CAPSULE ORAL at 20:19

## 2019-10-28 RX ADMIN — PANTOPRAZOLE SODIUM 40 MG: 40 TABLET, DELAYED RELEASE ORAL at 06:08

## 2019-10-28 RX ADMIN — SODIUM CHLORIDE, PRESERVATIVE FREE 10 ML: 5 INJECTION INTRAVENOUS at 22:10

## 2019-10-28 RX ADMIN — VANCOMYCIN HYDROCHLORIDE 125 MG: 125 CAPSULE ORAL at 17:34

## 2019-10-28 RX ADMIN — SODIUM CHLORIDE, PRESERVATIVE FREE 10 ML: 5 INJECTION INTRAVENOUS at 10:50

## 2019-10-28 RX ADMIN — IRON SUCROSE 200 MG: 20 INJECTION, SOLUTION INTRAVENOUS at 16:16

## 2019-10-28 RX ADMIN — VANCOMYCIN HYDROCHLORIDE 125 MG: 125 CAPSULE ORAL at 10:53

## 2019-10-28 RX ADMIN — BUSPIRONE HYDROCHLORIDE 10 MG: 10 TABLET ORAL at 20:19

## 2019-10-28 RX ADMIN — METOPROLOL SUCCINATE 25 MG: 25 TABLET, EXTENDED RELEASE ORAL at 10:50

## 2019-10-28 RX ADMIN — CEFTRIAXONE 1 G: 1 INJECTION, POWDER, FOR SOLUTION INTRAMUSCULAR; INTRAVENOUS at 01:47

## 2019-10-28 RX ADMIN — MELATONIN 3 MG: at 21:37

## 2019-10-28 RX ADMIN — TIOTROPIUM BROMIDE 18 MCG: 18 CAPSULE ORAL; RESPIRATORY (INHALATION) at 12:13

## 2019-10-28 RX ADMIN — DULOXETINE HYDROCHLORIDE 30 MG: 30 CAPSULE, DELAYED RELEASE ORAL at 10:50

## 2019-10-28 RX ADMIN — VANCOMYCIN HYDROCHLORIDE 125 MG: 125 CAPSULE ORAL at 13:51

## 2019-10-28 ASSESSMENT — PAIN SCALES - GENERAL
PAINLEVEL_OUTOF10: 0
PAINLEVEL_OUTOF10: 0

## 2019-10-29 LAB
ALBUMIN SERPL-MCNC: 2.9 G/DL (ref 3.4–5)
ANION GAP SERPL CALCULATED.3IONS-SCNC: 13 MMOL/L (ref 3–16)
BUN BLDV-MCNC: 9 MG/DL (ref 7–20)
CALCIUM SERPL-MCNC: 9.1 MG/DL (ref 8.3–10.6)
CHLORIDE BLD-SCNC: 101 MMOL/L (ref 99–110)
CO2: 25 MMOL/L (ref 21–32)
CREAT SERPL-MCNC: <0.5 MG/DL (ref 0.6–1.2)
GFR AFRICAN AMERICAN: >60
GFR NON-AFRICAN AMERICAN: >60
GLUCOSE BLD-MCNC: 115 MG/DL (ref 70–99)
HCT VFR BLD CALC: 26.3 % (ref 36–48)
HEMATOLOGY PATH CONSULT: NORMAL
HEMOGLOBIN: 7.9 G/DL (ref 12–16)
MAGNESIUM: 1.7 MG/DL (ref 1.8–2.4)
MCH RBC QN AUTO: 21 PG (ref 26–34)
MCHC RBC AUTO-ENTMCNC: 30 G/DL (ref 31–36)
MCV RBC AUTO: 70 FL (ref 80–100)
PDW BLD-RTO: 24.4 % (ref 12.4–15.4)
PHOSPHORUS: 3.6 MG/DL (ref 2.5–4.9)
PLATELET # BLD: 430 K/UL (ref 135–450)
PMV BLD AUTO: 7.1 FL (ref 5–10.5)
POTASSIUM SERPL-SCNC: 3.4 MMOL/L (ref 3.5–5.1)
RBC # BLD: 3.76 M/UL (ref 4–5.2)
SODIUM BLD-SCNC: 139 MMOL/L (ref 136–145)
WBC # BLD: 6 K/UL (ref 4–11)

## 2019-10-29 PROCEDURE — 80069 RENAL FUNCTION PANEL: CPT

## 2019-10-29 PROCEDURE — 92526 ORAL FUNCTION THERAPY: CPT

## 2019-10-29 PROCEDURE — 6370000000 HC RX 637 (ALT 250 FOR IP): Performed by: INTERNAL MEDICINE

## 2019-10-29 PROCEDURE — 6370000000 HC RX 637 (ALT 250 FOR IP): Performed by: NURSE PRACTITIONER

## 2019-10-29 PROCEDURE — 94760 N-INVAS EAR/PLS OXIMETRY 1: CPT

## 2019-10-29 PROCEDURE — 2060000000 HC ICU INTERMEDIATE R&B

## 2019-10-29 PROCEDURE — 36415 COLL VENOUS BLD VENIPUNCTURE: CPT

## 2019-10-29 PROCEDURE — 85027 COMPLETE CBC AUTOMATED: CPT

## 2019-10-29 PROCEDURE — 6360000002 HC RX W HCPCS: Performed by: INTERNAL MEDICINE

## 2019-10-29 PROCEDURE — 2580000003 HC RX 258: Performed by: INTERNAL MEDICINE

## 2019-10-29 PROCEDURE — 94640 AIRWAY INHALATION TREATMENT: CPT

## 2019-10-29 PROCEDURE — 83735 ASSAY OF MAGNESIUM: CPT

## 2019-10-29 RX ORDER — VANCOMYCIN HYDROCHLORIDE 125 MG/1
125 CAPSULE ORAL 4 TIMES DAILY
Qty: 56 CAPSULE | Refills: 0 | Status: SHIPPED | OUTPATIENT
Start: 2019-10-29 | End: 2019-11-12

## 2019-10-29 RX ORDER — ACETAMINOPHEN 325 MG/1
650 TABLET ORAL EVERY 4 HOURS PRN
Status: DISCONTINUED | OUTPATIENT
Start: 2019-10-29 | End: 2019-10-30 | Stop reason: HOSPADM

## 2019-10-29 RX ORDER — POTASSIUM CHLORIDE 20 MEQ/1
40 TABLET, EXTENDED RELEASE ORAL ONCE
Status: COMPLETED | OUTPATIENT
Start: 2019-10-29 | End: 2019-10-29

## 2019-10-29 RX ORDER — LACTOBACILLUS RHAMNOSUS GG 10B CELL
1 CAPSULE ORAL
Qty: 30 CAPSULE | Refills: 0 | Status: SHIPPED | OUTPATIENT
Start: 2019-10-30 | End: 2021-08-27

## 2019-10-29 RX ORDER — FERROUS SULFATE 325(65) MG
325 TABLET ORAL
Qty: 90 TABLET | Refills: 1 | Status: ON HOLD | OUTPATIENT
Start: 2019-10-29 | End: 2021-08-31 | Stop reason: HOSPADM

## 2019-10-29 RX ADMIN — SODIUM CHLORIDE, PRESERVATIVE FREE 10 ML: 5 INJECTION INTRAVENOUS at 08:58

## 2019-10-29 RX ADMIN — ATORVASTATIN CALCIUM 20 MG: 20 TABLET, FILM COATED ORAL at 08:54

## 2019-10-29 RX ADMIN — VANCOMYCIN HYDROCHLORIDE 125 MG: 125 CAPSULE ORAL at 20:52

## 2019-10-29 RX ADMIN — CEFTRIAXONE 1 G: 1 INJECTION, POWDER, FOR SOLUTION INTRAMUSCULAR; INTRAVENOUS at 02:18

## 2019-10-29 RX ADMIN — IRON SUCROSE 200 MG: 20 INJECTION, SOLUTION INTRAVENOUS at 18:34

## 2019-10-29 RX ADMIN — FAMOTIDINE 20 MG: 20 TABLET ORAL at 08:54

## 2019-10-29 RX ADMIN — ASPIRIN 81 MG: 81 TABLET, COATED ORAL at 08:59

## 2019-10-29 RX ADMIN — VANCOMYCIN HYDROCHLORIDE 125 MG: 125 CAPSULE ORAL at 08:54

## 2019-10-29 RX ADMIN — VANCOMYCIN HYDROCHLORIDE 125 MG: 125 CAPSULE ORAL at 14:05

## 2019-10-29 RX ADMIN — VANCOMYCIN HYDROCHLORIDE 125 MG: 125 CAPSULE ORAL at 18:34

## 2019-10-29 RX ADMIN — METOPROLOL SUCCINATE 25 MG: 25 TABLET, EXTENDED RELEASE ORAL at 08:54

## 2019-10-29 RX ADMIN — BUSPIRONE HYDROCHLORIDE 10 MG: 10 TABLET ORAL at 20:52

## 2019-10-29 RX ADMIN — ACETAMINOPHEN 650 MG: 325 TABLET ORAL at 19:05

## 2019-10-29 RX ADMIN — MELATONIN 3 MG: at 20:52

## 2019-10-29 RX ADMIN — DULOXETINE HYDROCHLORIDE 30 MG: 30 CAPSULE, DELAYED RELEASE ORAL at 08:54

## 2019-10-29 RX ADMIN — BUSPIRONE HYDROCHLORIDE 10 MG: 10 TABLET ORAL at 08:54

## 2019-10-29 RX ADMIN — SODIUM CHLORIDE, PRESERVATIVE FREE 10 ML: 5 INJECTION INTRAVENOUS at 20:53

## 2019-10-29 RX ADMIN — TIOTROPIUM BROMIDE 18 MCG: 18 CAPSULE ORAL; RESPIRATORY (INHALATION) at 09:59

## 2019-10-29 RX ADMIN — FAMOTIDINE 20 MG: 20 TABLET ORAL at 20:52

## 2019-10-29 RX ADMIN — Medication 1 CAPSULE: at 08:54

## 2019-10-29 RX ADMIN — POTASSIUM CHLORIDE 40 MEQ: 1500 TABLET, EXTENDED RELEASE ORAL at 18:34

## 2019-10-29 ASSESSMENT — PAIN - FUNCTIONAL ASSESSMENT: PAIN_FUNCTIONAL_ASSESSMENT: ACTIVITIES ARE NOT PREVENTED

## 2019-10-29 ASSESSMENT — PAIN DESCRIPTION - PAIN TYPE: TYPE: CHRONIC PAIN

## 2019-10-29 ASSESSMENT — PAIN DESCRIPTION - ONSET: ONSET: GRADUAL

## 2019-10-29 ASSESSMENT — PAIN SCALES - GENERAL
PAINLEVEL_OUTOF10: 0
PAINLEVEL_OUTOF10: 3
PAINLEVEL_OUTOF10: 0

## 2019-10-29 ASSESSMENT — PAIN DESCRIPTION - LOCATION: LOCATION: NECK

## 2019-10-29 ASSESSMENT — PAIN DESCRIPTION - ORIENTATION: ORIENTATION: MID

## 2019-10-29 ASSESSMENT — PAIN DESCRIPTION - FREQUENCY: FREQUENCY: CONTINUOUS

## 2019-10-29 ASSESSMENT — PAIN DESCRIPTION - DESCRIPTORS: DESCRIPTORS: ACHING

## 2019-10-29 ASSESSMENT — PAIN DESCRIPTION - PROGRESSION: CLINICAL_PROGRESSION: GRADUALLY IMPROVING

## 2019-10-30 VITALS
RESPIRATION RATE: 15 BRPM | SYSTOLIC BLOOD PRESSURE: 150 MMHG | WEIGHT: 126.32 LBS | TEMPERATURE: 98.8 F | HEART RATE: 75 BPM | HEIGHT: 62 IN | OXYGEN SATURATION: 93 % | DIASTOLIC BLOOD PRESSURE: 62 MMHG | BODY MASS INDEX: 23.25 KG/M2

## 2019-10-30 LAB
ALBUMIN SERPL-MCNC: 3.2 G/DL (ref 3.4–5)
ANION GAP SERPL CALCULATED.3IONS-SCNC: 13 MMOL/L (ref 3–16)
BUN BLDV-MCNC: 9 MG/DL (ref 7–20)
CALCIUM SERPL-MCNC: 9.3 MG/DL (ref 8.3–10.6)
CHLORIDE BLD-SCNC: 102 MMOL/L (ref 99–110)
CO2: 24 MMOL/L (ref 21–32)
CREAT SERPL-MCNC: <0.5 MG/DL (ref 0.6–1.2)
GFR AFRICAN AMERICAN: >60
GFR NON-AFRICAN AMERICAN: >60
GLUCOSE BLD-MCNC: 113 MG/DL (ref 70–99)
HCT VFR BLD CALC: 26.9 % (ref 36–48)
HEMOGLOBIN: 8.3 G/DL (ref 12–16)
MAGNESIUM: 1.8 MG/DL (ref 1.8–2.4)
MCH RBC QN AUTO: 21.6 PG (ref 26–34)
MCHC RBC AUTO-ENTMCNC: 30.8 G/DL (ref 31–36)
MCV RBC AUTO: 70 FL (ref 80–100)
PDW BLD-RTO: 25.4 % (ref 12.4–15.4)
PHOSPHORUS: 3.4 MG/DL (ref 2.5–4.9)
PLATELET # BLD: 484 K/UL (ref 135–450)
PMV BLD AUTO: 6.9 FL (ref 5–10.5)
POTASSIUM SERPL-SCNC: 4.1 MMOL/L (ref 3.5–5.1)
RBC # BLD: 3.84 M/UL (ref 4–5.2)
SODIUM BLD-SCNC: 139 MMOL/L (ref 136–145)
WBC # BLD: 6.8 K/UL (ref 4–11)

## 2019-10-30 PROCEDURE — 94760 N-INVAS EAR/PLS OXIMETRY 1: CPT

## 2019-10-30 PROCEDURE — 2580000003 HC RX 258: Performed by: INTERNAL MEDICINE

## 2019-10-30 PROCEDURE — 83735 ASSAY OF MAGNESIUM: CPT

## 2019-10-30 PROCEDURE — 97530 THERAPEUTIC ACTIVITIES: CPT

## 2019-10-30 PROCEDURE — 6370000000 HC RX 637 (ALT 250 FOR IP): Performed by: INTERNAL MEDICINE

## 2019-10-30 PROCEDURE — 36415 COLL VENOUS BLD VENIPUNCTURE: CPT

## 2019-10-30 PROCEDURE — 85027 COMPLETE CBC AUTOMATED: CPT

## 2019-10-30 PROCEDURE — 94640 AIRWAY INHALATION TREATMENT: CPT

## 2019-10-30 PROCEDURE — 80069 RENAL FUNCTION PANEL: CPT

## 2019-10-30 RX ADMIN — VANCOMYCIN HYDROCHLORIDE 125 MG: 125 CAPSULE ORAL at 14:03

## 2019-10-30 RX ADMIN — VANCOMYCIN HYDROCHLORIDE 125 MG: 125 CAPSULE ORAL at 09:07

## 2019-10-30 RX ADMIN — ATORVASTATIN CALCIUM 20 MG: 20 TABLET, FILM COATED ORAL at 09:07

## 2019-10-30 RX ADMIN — VANCOMYCIN HYDROCHLORIDE 125 MG: 125 CAPSULE ORAL at 17:10

## 2019-10-30 RX ADMIN — FAMOTIDINE 20 MG: 20 TABLET ORAL at 09:07

## 2019-10-30 RX ADMIN — Medication 1 CAPSULE: at 09:07

## 2019-10-30 RX ADMIN — BUSPIRONE HYDROCHLORIDE 10 MG: 10 TABLET ORAL at 09:06

## 2019-10-30 RX ADMIN — DULOXETINE HYDROCHLORIDE 30 MG: 30 CAPSULE, DELAYED RELEASE ORAL at 09:07

## 2019-10-30 RX ADMIN — METOPROLOL SUCCINATE 25 MG: 25 TABLET, EXTENDED RELEASE ORAL at 09:07

## 2019-10-30 RX ADMIN — SODIUM CHLORIDE, PRESERVATIVE FREE 10 ML: 5 INJECTION INTRAVENOUS at 09:08

## 2019-10-30 RX ADMIN — TIOTROPIUM BROMIDE 18 MCG: 18 CAPSULE ORAL; RESPIRATORY (INHALATION) at 07:56

## 2019-10-30 RX ADMIN — ASPIRIN 81 MG: 81 TABLET, COATED ORAL at 09:07

## 2019-10-30 ASSESSMENT — PAIN SCALES - GENERAL
PAINLEVEL_OUTOF10: 0

## 2019-11-06 ENCOUNTER — TELEPHONE (OUTPATIENT)
Dept: INTERNAL MEDICINE CLINIC | Age: 74
End: 2019-11-06

## 2019-11-12 ENCOUNTER — TELEPHONE (OUTPATIENT)
Dept: INTERNAL MEDICINE CLINIC | Age: 74
End: 2019-11-12

## 2019-12-19 ENCOUNTER — APPOINTMENT (OUTPATIENT)
Dept: CT IMAGING | Age: 74
End: 2019-12-19
Payer: COMMERCIAL

## 2019-12-19 ENCOUNTER — HOSPITAL ENCOUNTER (EMERGENCY)
Age: 74
Discharge: HOME | End: 2019-12-19
Attending: EMERGENCY MEDICINE
Payer: COMMERCIAL

## 2019-12-19 VITALS
SYSTOLIC BLOOD PRESSURE: 102 MMHG | TEMPERATURE: 97.9 F | HEIGHT: 62 IN | DIASTOLIC BLOOD PRESSURE: 80 MMHG | OXYGEN SATURATION: 96 % | RESPIRATION RATE: 23 BRPM | HEART RATE: 91 BPM | BODY MASS INDEX: 21.79 KG/M2 | WEIGHT: 118.39 LBS

## 2019-12-19 DIAGNOSIS — Z76.0 ENCOUNTER FOR MEDICATION REFILL: ICD-10-CM

## 2019-12-19 DIAGNOSIS — K25.7 CHRONIC GASTRIC ULCER, UNSPECIFIED WHETHER GASTRIC ULCER HEMORRHAGE OR PERFORATION PRESENT: ICD-10-CM

## 2019-12-19 DIAGNOSIS — N39.0 URINARY TRACT INFECTION WITHOUT HEMATURIA, SITE UNSPECIFIED: Primary | ICD-10-CM

## 2019-12-19 DIAGNOSIS — R11.0 NAUSEA: ICD-10-CM

## 2019-12-19 DIAGNOSIS — F32.A DEPRESSION, UNSPECIFIED DEPRESSION TYPE: ICD-10-CM

## 2019-12-19 LAB
A/G RATIO: 1 (ref 1.1–2.2)
ALBUMIN SERPL-MCNC: 3.3 G/DL (ref 3.4–5)
ALP BLD-CCNC: 142 U/L (ref 40–129)
ALT SERPL-CCNC: 33 U/L (ref 10–40)
ANION GAP SERPL CALCULATED.3IONS-SCNC: 17 MMOL/L (ref 3–16)
ANISOCYTOSIS: ABNORMAL
AST SERPL-CCNC: 66 U/L (ref 15–37)
BACTERIA: ABNORMAL /HPF
BASOPHILS ABSOLUTE: 0.1 K/UL (ref 0–0.2)
BASOPHILS RELATIVE PERCENT: 2.4 %
BILIRUB SERPL-MCNC: <0.2 MG/DL (ref 0–1)
BILIRUBIN URINE: NEGATIVE
BLOOD, URINE: ABNORMAL
BUN BLDV-MCNC: 8 MG/DL (ref 7–20)
CALCIUM SERPL-MCNC: 8.5 MG/DL (ref 8.3–10.6)
CHLORIDE BLD-SCNC: 96 MMOL/L (ref 99–110)
CLARITY: ABNORMAL
CO2: 21 MMOL/L (ref 21–32)
COLOR: YELLOW
CREAT SERPL-MCNC: <0.5 MG/DL (ref 0.6–1.2)
EKG ATRIAL RATE: 82 BPM
EKG DIAGNOSIS: NORMAL
EKG P AXIS: 30 DEGREES
EKG P-R INTERVAL: 142 MS
EKG Q-T INTERVAL: 438 MS
EKG QRS DURATION: 74 MS
EKG QTC CALCULATION (BAZETT): 511 MS
EKG R AXIS: 45 DEGREES
EKG T AXIS: 11 DEGREES
EKG VENTRICULAR RATE: 82 BPM
EOSINOPHILS ABSOLUTE: 0.1 K/UL (ref 0–0.6)
EOSINOPHILS RELATIVE PERCENT: 1.7 %
EPITHELIAL CELLS, UA: 9 /HPF (ref 0–5)
GFR AFRICAN AMERICAN: >60
GFR NON-AFRICAN AMERICAN: >60
GLOBULIN: 3.2 G/DL
GLUCOSE BLD-MCNC: 105 MG/DL (ref 70–99)
GLUCOSE URINE: NEGATIVE MG/DL
HCT VFR BLD CALC: 36.5 % (ref 36–48)
HEMOGLOBIN: 11.7 G/DL (ref 12–16)
HYPOCHROMIA: ABNORMAL
KETONES, URINE: NEGATIVE MG/DL
LEUKOCYTE ESTERASE, URINE: ABNORMAL
LIPASE: 52 U/L (ref 13–60)
LYMPHOCYTES ABSOLUTE: 1.1 K/UL (ref 1–5.1)
LYMPHOCYTES RELATIVE PERCENT: 19.6 %
MAGNESIUM: 1.9 MG/DL (ref 1.8–2.4)
MCH RBC QN AUTO: 27.7 PG (ref 26–34)
MCHC RBC AUTO-ENTMCNC: 32.1 G/DL (ref 31–36)
MCV RBC AUTO: 86.2 FL (ref 80–100)
MICROSCOPIC EXAMINATION: YES
MONOCYTES ABSOLUTE: 0.5 K/UL (ref 0–1.3)
MONOCYTES RELATIVE PERCENT: 8.1 %
NEUTROPHILS ABSOLUTE: 3.9 K/UL (ref 1.7–7.7)
NEUTROPHILS RELATIVE PERCENT: 68.2 %
NITRITE, URINE: POSITIVE
OCCULT BLOOD DIAGNOSTIC: NORMAL
PDW BLD-RTO: 21.1 % (ref 12.4–15.4)
PH UA: 6 (ref 5–8)
PLATELET # BLD: 211 K/UL (ref 135–450)
PMV BLD AUTO: 7.7 FL (ref 5–10.5)
POTASSIUM REFLEX MAGNESIUM: 3.5 MMOL/L (ref 3.5–5.1)
PROTEIN UA: ABNORMAL MG/DL
RBC # BLD: 4.23 M/UL (ref 4–5.2)
RBC UA: ABNORMAL /HPF (ref 0–2)
SODIUM BLD-SCNC: 134 MMOL/L (ref 136–145)
SPECIFIC GRAVITY UA: >1.03 (ref 1–1.03)
TOTAL PROTEIN: 6.5 G/DL (ref 6.4–8.2)
TROPONIN: <0.01 NG/ML
URINE TYPE: ABNORMAL
UROBILINOGEN, URINE: 1 E.U./DL
WBC # BLD: 5.7 K/UL (ref 4–11)
WBC UA: >900 /HPF (ref 0–5)

## 2019-12-19 PROCEDURE — 2500000003 HC RX 250 WO HCPCS: Performed by: PHYSICIAN ASSISTANT

## 2019-12-19 PROCEDURE — 83735 ASSAY OF MAGNESIUM: CPT

## 2019-12-19 PROCEDURE — 84484 ASSAY OF TROPONIN QUANT: CPT

## 2019-12-19 PROCEDURE — 87086 URINE CULTURE/COLONY COUNT: CPT

## 2019-12-19 PROCEDURE — 81001 URINALYSIS AUTO W/SCOPE: CPT

## 2019-12-19 PROCEDURE — 93010 ELECTROCARDIOGRAM REPORT: CPT | Performed by: INTERNAL MEDICINE

## 2019-12-19 PROCEDURE — 96366 THER/PROPH/DIAG IV INF ADDON: CPT

## 2019-12-19 PROCEDURE — 85025 COMPLETE CBC W/AUTO DIFF WBC: CPT

## 2019-12-19 PROCEDURE — 93005 ELECTROCARDIOGRAM TRACING: CPT | Performed by: PHYSICIAN ASSISTANT

## 2019-12-19 PROCEDURE — 6370000000 HC RX 637 (ALT 250 FOR IP): Performed by: PHYSICIAN ASSISTANT

## 2019-12-19 PROCEDURE — 6360000004 HC RX CONTRAST MEDICATION: Performed by: EMERGENCY MEDICINE

## 2019-12-19 PROCEDURE — 74177 CT ABD & PELVIS W/CONTRAST: CPT

## 2019-12-19 PROCEDURE — G0328 FECAL BLOOD SCRN IMMUNOASSAY: HCPCS

## 2019-12-19 PROCEDURE — 99284 EMERGENCY DEPT VISIT MOD MDM: CPT

## 2019-12-19 PROCEDURE — 2580000003 HC RX 258: Performed by: PHYSICIAN ASSISTANT

## 2019-12-19 PROCEDURE — 83690 ASSAY OF LIPASE: CPT

## 2019-12-19 PROCEDURE — 96375 TX/PRO/DX INJ NEW DRUG ADDON: CPT

## 2019-12-19 PROCEDURE — 96365 THER/PROPH/DIAG IV INF INIT: CPT

## 2019-12-19 PROCEDURE — 80053 COMPREHEN METABOLIC PANEL: CPT

## 2019-12-19 PROCEDURE — 6360000002 HC RX W HCPCS: Performed by: PHYSICIAN ASSISTANT

## 2019-12-19 RX ORDER — CEFUROXIME AXETIL 250 MG/1
250 TABLET ORAL 2 TIMES DAILY
Qty: 14 TABLET | Refills: 0 | Status: SHIPPED | OUTPATIENT
Start: 2019-12-19 | End: 2019-12-26

## 2019-12-19 RX ORDER — CEFUROXIME AXETIL 250 MG/1
250 TABLET ORAL ONCE
Status: COMPLETED | OUTPATIENT
Start: 2019-12-19 | End: 2019-12-19

## 2019-12-19 RX ORDER — DEXLANSOPRAZOLE 60 MG/1
60 CAPSULE, DELAYED RELEASE ORAL DAILY
Qty: 30 CAPSULE | Refills: 0 | Status: SHIPPED | OUTPATIENT
Start: 2019-12-19 | End: 2021-08-27

## 2019-12-19 RX ORDER — SODIUM CHLORIDE 9 MG/ML
1000 INJECTION, SOLUTION INTRAVENOUS CONTINUOUS
Status: DISCONTINUED | OUTPATIENT
Start: 2019-12-19 | End: 2019-12-19 | Stop reason: HOSPADM

## 2019-12-19 RX ORDER — ONDANSETRON 2 MG/ML
4 INJECTION INTRAMUSCULAR; INTRAVENOUS ONCE
Status: COMPLETED | OUTPATIENT
Start: 2019-12-19 | End: 2019-12-19

## 2019-12-19 RX ORDER — DULOXETIN HYDROCHLORIDE 30 MG/1
30 CAPSULE, DELAYED RELEASE ORAL DAILY
Qty: 30 CAPSULE | Refills: 0 | Status: SHIPPED | OUTPATIENT
Start: 2019-12-19

## 2019-12-19 RX ORDER — ONDANSETRON 4 MG/1
4 TABLET, ORALLY DISINTEGRATING ORAL EVERY 8 HOURS PRN
Qty: 20 TABLET | Refills: 0 | Status: SHIPPED | OUTPATIENT
Start: 2019-12-19 | End: 2021-04-17

## 2019-12-19 RX ADMIN — FAMOTIDINE 20 MG: 10 INJECTION, SOLUTION INTRAVENOUS at 14:31

## 2019-12-19 RX ADMIN — CEFUROXIME AXETIL 250 MG: 250 TABLET ORAL at 17:59

## 2019-12-19 RX ADMIN — IOPAMIDOL 75 ML: 755 INJECTION, SOLUTION INTRAVENOUS at 15:12

## 2019-12-19 RX ADMIN — SODIUM CHLORIDE 1000 ML: 9 INJECTION, SOLUTION INTRAVENOUS at 14:31

## 2019-12-19 RX ADMIN — ONDANSETRON 4 MG: 2 INJECTION INTRAMUSCULAR; INTRAVENOUS at 14:31

## 2019-12-19 ASSESSMENT — ENCOUNTER SYMPTOMS
VOMITING: 1
COUGH: 0
ABDOMINAL PAIN: 1
SHORTNESS OF BREATH: 0
NAUSEA: 1
DIARRHEA: 1

## 2019-12-22 LAB — URINE CULTURE, ROUTINE: NORMAL

## 2020-01-17 ENCOUNTER — TELEPHONE (OUTPATIENT)
Dept: INTERNAL MEDICINE CLINIC | Age: 75
End: 2020-01-17

## 2020-04-09 ENCOUNTER — APPOINTMENT (OUTPATIENT)
Dept: GENERAL RADIOLOGY | Age: 75
End: 2020-04-09
Payer: COMMERCIAL

## 2020-04-09 ENCOUNTER — APPOINTMENT (OUTPATIENT)
Dept: CT IMAGING | Age: 75
End: 2020-04-09
Payer: COMMERCIAL

## 2020-04-09 ENCOUNTER — HOSPITAL ENCOUNTER (EMERGENCY)
Age: 75
Discharge: HOME | End: 2020-04-10
Attending: EMERGENCY MEDICINE
Payer: COMMERCIAL

## 2020-04-09 PROCEDURE — 99285 EMERGENCY DEPT VISIT HI MDM: CPT

## 2020-04-09 PROCEDURE — 73560 X-RAY EXAM OF KNEE 1 OR 2: CPT

## 2020-04-09 PROCEDURE — 81003 URINALYSIS AUTO W/O SCOPE: CPT

## 2020-04-09 PROCEDURE — 72125 CT NECK SPINE W/O DYE: CPT

## 2020-04-09 PROCEDURE — 84484 ASSAY OF TROPONIN QUANT: CPT

## 2020-04-09 PROCEDURE — 70450 CT HEAD/BRAIN W/O DYE: CPT

## 2020-04-09 PROCEDURE — 80053 COMPREHEN METABOLIC PANEL: CPT

## 2020-04-09 PROCEDURE — 71046 X-RAY EXAM CHEST 2 VIEWS: CPT

## 2020-04-09 PROCEDURE — 93005 ELECTROCARDIOGRAM TRACING: CPT | Performed by: PHYSICIAN ASSISTANT

## 2020-04-09 PROCEDURE — 85025 COMPLETE CBC W/AUTO DIFF WBC: CPT

## 2020-04-09 PROCEDURE — 73080 X-RAY EXAM OF ELBOW: CPT

## 2020-04-09 PROCEDURE — 83735 ASSAY OF MAGNESIUM: CPT

## 2020-04-09 ASSESSMENT — ENCOUNTER SYMPTOMS
COUGH: 0
ABDOMINAL PAIN: 0
VOMITING: 0
NAUSEA: 0
SHORTNESS OF BREATH: 0

## 2020-04-10 VITALS
OXYGEN SATURATION: 91 % | DIASTOLIC BLOOD PRESSURE: 65 MMHG | SYSTOLIC BLOOD PRESSURE: 120 MMHG | RESPIRATION RATE: 16 BRPM | HEART RATE: 87 BPM | TEMPERATURE: 97.6 F

## 2020-04-10 LAB
A/G RATIO: 1 (ref 1.1–2.2)
ALBUMIN SERPL-MCNC: 3.3 G/DL (ref 3.4–5)
ALP BLD-CCNC: 114 U/L (ref 40–129)
ALT SERPL-CCNC: 21 U/L (ref 10–40)
ANION GAP SERPL CALCULATED.3IONS-SCNC: 20 MMOL/L (ref 3–16)
AST SERPL-CCNC: 35 U/L (ref 15–37)
BASOPHILS ABSOLUTE: 0 K/UL (ref 0–0.2)
BASOPHILS RELATIVE PERCENT: 0.7 %
BILIRUB SERPL-MCNC: 0.4 MG/DL (ref 0–1)
BILIRUBIN URINE: NEGATIVE
BLOOD, URINE: NEGATIVE
BUN BLDV-MCNC: 6 MG/DL (ref 7–20)
CALCIUM SERPL-MCNC: 9.4 MG/DL (ref 8.3–10.6)
CHLORIDE BLD-SCNC: 94 MMOL/L (ref 99–110)
CLARITY: CLEAR
CO2: 23 MMOL/L (ref 21–32)
COLOR: YELLOW
CREAT SERPL-MCNC: <0.5 MG/DL (ref 0.6–1.2)
EKG ATRIAL RATE: 117 BPM
EKG ATRIAL RATE: 92 BPM
EKG DIAGNOSIS: NORMAL
EKG DIAGNOSIS: NORMAL
EKG P AXIS: 48 DEGREES
EKG P AXIS: 75 DEGREES
EKG P-R INTERVAL: 144 MS
EKG P-R INTERVAL: 170 MS
EKG Q-T INTERVAL: 330 MS
EKG Q-T INTERVAL: 422 MS
EKG QRS DURATION: 72 MS
EKG QRS DURATION: 82 MS
EKG QTC CALCULATION (BAZETT): 460 MS
EKG QTC CALCULATION (BAZETT): 521 MS
EKG R AXIS: 53 DEGREES
EKG R AXIS: 70 DEGREES
EKG T AXIS: 50 DEGREES
EKG T AXIS: 74 DEGREES
EKG VENTRICULAR RATE: 117 BPM
EKG VENTRICULAR RATE: 92 BPM
EOSINOPHILS ABSOLUTE: 0.3 K/UL (ref 0–0.6)
EOSINOPHILS RELATIVE PERCENT: 5.7 %
GFR AFRICAN AMERICAN: >60
GFR NON-AFRICAN AMERICAN: >60
GLOBULIN: 3.4 G/DL
GLUCOSE BLD-MCNC: 105 MG/DL (ref 70–99)
GLUCOSE URINE: NEGATIVE MG/DL
HCT VFR BLD CALC: 42.5 % (ref 36–48)
HEMOGLOBIN: 14.2 G/DL (ref 12–16)
KETONES, URINE: NEGATIVE MG/DL
LEUKOCYTE ESTERASE, URINE: NEGATIVE
LYMPHOCYTES ABSOLUTE: 1.5 K/UL (ref 1–5.1)
LYMPHOCYTES RELATIVE PERCENT: 32.8 %
MAGNESIUM: 2 MG/DL (ref 1.8–2.4)
MCH RBC QN AUTO: 31.6 PG (ref 26–34)
MCHC RBC AUTO-ENTMCNC: 33.4 G/DL (ref 31–36)
MCV RBC AUTO: 94.8 FL (ref 80–100)
MICROSCOPIC EXAMINATION: NORMAL
MONOCYTES ABSOLUTE: 0.3 K/UL (ref 0–1.3)
MONOCYTES RELATIVE PERCENT: 7.6 %
NEUTROPHILS ABSOLUTE: 2.4 K/UL (ref 1.7–7.7)
NEUTROPHILS RELATIVE PERCENT: 53.2 %
NITRITE, URINE: NEGATIVE
PDW BLD-RTO: 15.2 % (ref 12.4–15.4)
PH UA: 6 (ref 5–8)
PLATELET # BLD: 349 K/UL (ref 135–450)
PMV BLD AUTO: 7.9 FL (ref 5–10.5)
POTASSIUM REFLEX MAGNESIUM: 3.2 MMOL/L (ref 3.5–5.1)
PROTEIN UA: NEGATIVE MG/DL
RBC # BLD: 4.48 M/UL (ref 4–5.2)
SODIUM BLD-SCNC: 137 MMOL/L (ref 136–145)
SPECIFIC GRAVITY UA: <=1.005 (ref 1–1.03)
TOTAL PROTEIN: 6.7 G/DL (ref 6.4–8.2)
TROPONIN: <0.01 NG/ML
TROPONIN: <0.01 NG/ML
URINE REFLEX TO CULTURE: NORMAL
URINE TYPE: NORMAL
UROBILINOGEN, URINE: 1 E.U./DL
WBC # BLD: 4.5 K/UL (ref 4–11)

## 2020-04-10 PROCEDURE — 93010 ELECTROCARDIOGRAM REPORT: CPT | Performed by: INTERNAL MEDICINE

## 2020-04-10 PROCEDURE — 84484 ASSAY OF TROPONIN QUANT: CPT

## 2020-04-10 PROCEDURE — 96366 THER/PROPH/DIAG IV INF ADDON: CPT

## 2020-04-10 PROCEDURE — 6360000002 HC RX W HCPCS: Performed by: EMERGENCY MEDICINE

## 2020-04-10 PROCEDURE — 2580000003 HC RX 258: Performed by: EMERGENCY MEDICINE

## 2020-04-10 PROCEDURE — 96365 THER/PROPH/DIAG IV INF INIT: CPT

## 2020-04-10 PROCEDURE — 93005 ELECTROCARDIOGRAM TRACING: CPT | Performed by: EMERGENCY MEDICINE

## 2020-04-10 RX ORDER — POTASSIUM CHLORIDE AND SODIUM CHLORIDE 900; 300 MG/100ML; MG/100ML
INJECTION, SOLUTION INTRAVENOUS CONTINUOUS
Status: DISCONTINUED | OUTPATIENT
Start: 2020-04-10 | End: 2020-04-10 | Stop reason: HOSPADM

## 2020-04-10 RX ORDER — 0.9 % SODIUM CHLORIDE 0.9 %
500 INTRAVENOUS SOLUTION INTRAVENOUS ONCE
Status: COMPLETED | OUTPATIENT
Start: 2020-04-10 | End: 2020-04-10

## 2020-04-10 RX ADMIN — SODIUM CHLORIDE 500 ML: 9 INJECTION, SOLUTION INTRAVENOUS at 01:02

## 2020-04-10 RX ADMIN — POTASSIUM CHLORIDE AND SODIUM CHLORIDE: 900; 300 INJECTION, SOLUTION INTRAVENOUS at 01:03

## 2020-04-10 NOTE — ED TRIAGE NOTES
Patient admitted to ED via EMS after fall out of bed. Patient states that she drank \"a half pint of whiskey. \" She denies hitting head or LOC. Patient is complaining of right elbow and left knee pain. There is a small lac on her right elbow and abrasion on left knee. Patient's VS are WNL. Patient is alert and oriented x4.

## 2020-04-10 NOTE — ED PROVIDER NOTES
97 % -- --       Physical Exam  Vitals signs and nursing note reviewed. Constitutional:       General: She is not in acute distress. Appearance: She is well-developed. She is not ill-appearing, toxic-appearing or diaphoretic. Comments: unkempt   HENT:      Head: Normocephalic and atraumatic. Right Ear: Tympanic membrane normal.      Left Ear: Tympanic membrane normal.      Mouth/Throat:      Mouth: Mucous membranes are moist.      Pharynx: Oropharynx is clear. Eyes:      Conjunctiva/sclera: Conjunctivae normal.      Pupils: Pupils are equal, round, and reactive to light. Cardiovascular:      Rate and Rhythm: Normal rate and regular rhythm. Pulmonary:      Effort: Pulmonary effort is normal. No respiratory distress. Abdominal:      General: Bowel sounds are normal. There is no distension. Palpations: Abdomen is soft. Tenderness: There is no abdominal tenderness. Musculoskeletal:      Right shoulder: Normal.      Left shoulder: Normal.      Right elbow: She exhibits laceration. She exhibits normal range of motion and no swelling. Left elbow: Normal.      Right hip: Normal.      Left hip: Normal.      Right knee: Normal.      Left knee: She exhibits laceration (abrasion). She exhibits normal range of motion and no swelling. No tenderness found. Cervical back: Normal.      Thoracic back: Normal.      Lumbar back: Normal.      Right hand: Normal.      Left hand: Normal.   Skin:     General: Skin is warm and dry. Neurological:      General: No focal deficit present. Mental Status: She is alert. Comments: Alert to self, birthdate, place, month. States year is 2099. Psychiatric:         Behavior: Behavior normal. Behavior is cooperative. Thought Content:  Thought content normal.         DIAGNOSTIC RESULTS   LABS:    Labs Reviewed   CBC WITH AUTO DIFFERENTIAL   COMPREHENSIVE METABOLIC PANEL W/ REFLEX TO MG FOR LOW K   TROPONIN   URINE RT REFLEX TO CULTURE All other labs were within normal range or not returned as of this dictation. EKG: All EKG's are interpreted by the Emergency Department Physician in the absence of a cardiologist.  Please see their note for interpretation of EKG. RADIOLOGY:   Non-plain film images such as CT, Ultrasound and MRI are read by the radiologist. Plain radiographic images are visualized and preliminarily interpreted by the ED Provider with the below findings:    Interpretation per the Radiologist below, if available at the time of this note:    XR CHEST STANDARD (2 VW)    (Results Pending)   CT Head WO Contrast    (Results Pending)   CT Cervical Spine WO Contrast    (Results Pending)   XR ELBOW RIGHT (MIN 3 VIEWS)    (Results Pending)   XR KNEE LEFT (1-2 VIEWS)    (Results Pending)     No results found. PROCEDURES   Unless otherwise noted below, none     Procedures    CRITICAL CARE TIME   N/A    CONSULTS:  None      EMERGENCY DEPARTMENT COURSE and DIFFERENTIAL DIAGNOSIS/MDM:   Vitals:    Vitals:    04/09/20 2235   Pulse: 87   Resp: 16   Temp: 97.6 °F (36.4 °C)   SpO2: 97%       Patient was given the following medications:  Medications - No data to display    ED COURSE & MEDICAL DECISION MAKING    Pertinent Labs & Imaging studies reviewed. (See chart for details)   -  Patient seen and evaluated in the emergency department. -  Triage and nursing notes reviewed and incorporated. -  Old chart records reviewed and incorporated. The patient last had a tetanus shot in 2018. -   I have seen and evaluated this patient with my supervising physician William Amaro MD.  -  Differential diagnosis includes: intracranial injury, cervical spine injury, chest/abdominal organ injury, extremity injury, abrasion/laceration, contusion, fracture, sprain/strain, dislocation  -  Work-up included:  See above  - At the time of the shift change the labs and imaging studies were pending. Care was turned over to Dr Pk Schultz.  Please see his note for final diagnosis and disposition for this patient. FINAL IMPRESSION      1. Fall, initial encounter    2. Laceration of right elbow, initial encounter    3. Abrasion, left knee, initial encounter    4. Alcohol abuse    5. Tetanus toxoid vaccination administered 1-4 years ago          DISPOSITION/PLAN   DISPOSITION        PATIENT REFERREDTO:  No follow-up provider specified.     DISCHARGE MEDICATIONS:  New Prescriptions    No medications on file       DISCONTINUED MEDICATIONS:  Discontinued Medications    No medications on file              (Please note that portions of this note were completed with a voice recognition program.  Efforts were made to edit the dictations but occasionally words are mis-transcribed.)    XIOMARA Perez (electronically signed)            [place]  04/09/20 4488

## 2020-04-11 ENCOUNTER — CARE COORDINATION (OUTPATIENT)
Dept: CARE COORDINATION | Age: 75
End: 2020-04-11

## 2020-04-11 NOTE — ED PROVIDER NOTES
Attending Supervisory Note/Shared Visit   I have personally performed a face to face diagnostic evaluation on this patient. I have reviewed the mid-levels findings and agree. History and Exam by me shows a chronically ill-appearing female in no acute distress. Had EMS called because she had fallen out of bed. She was complaining of right knee pain right elbow pain. States did not feel like she lost consciousness. No seizure-like activity. States she is a chronic alcoholic and did drink approximately 1 pint of hard liquor today. States that she does drink daily. Time evaluation the patient is awake and alert answering questions appropriately. She states that the person she is cohabitating with wanted her out of the house not for the called EMS. Time evaluation cranial nerves II to XII are grossly intact. Extraocular movements intact. She does have lateral nystagmus. Strength and sensation intact in all extremities. Abdomen soft nontender nondistended. Her work-up not demonstrate any emergent normalities. Patient did have a noted anion gap as well as mild hypo-kalemia. Given IV fluids as well as potassium. EKG showed ST inversions. Given the patient's medical history and the fact that she was intoxicated repeat EKG and troponin were obtained which were negative. On reevaluation patient is resting comfortably and denies chest pain. She is amenable to discharge home with outpatient follow-up. EKG shows sinus rhythm with a ventricular rate of 92 bpm.  Prolonged [place] interval.  QTc interval is prolonged. The patient has a normal axis. There are no significant ST elevations or depressions EKG is nondiagnostic for ACS. Patient has T wave inversions in the lateral leads which are new. Disposition:  1. Fall, initial encounter    2. Laceration of right elbow, initial encounter    3. Abrasion, left knee, initial encounter    4. Alcohol abuse    5.  Tetanus toxoid vaccination administered 1-4

## 2020-04-13 ENCOUNTER — TELEPHONE (OUTPATIENT)
Dept: INTERNAL MEDICINE CLINIC | Age: 75
End: 2020-04-13

## 2020-04-13 ENCOUNTER — NURSE TRIAGE (OUTPATIENT)
Dept: OTHER | Facility: CLINIC | Age: 75
End: 2020-04-13

## 2020-04-30 ENCOUNTER — TELEPHONE (OUTPATIENT)
Dept: INTERNAL MEDICINE CLINIC | Age: 75
End: 2020-04-30

## 2020-06-03 ENCOUNTER — APPOINTMENT (OUTPATIENT)
Dept: GENERAL RADIOLOGY | Age: 75
End: 2020-06-03
Payer: COMMERCIAL

## 2020-06-03 ENCOUNTER — HOSPITAL ENCOUNTER (EMERGENCY)
Age: 75
Discharge: HOME | End: 2020-06-03
Payer: COMMERCIAL

## 2020-06-03 VITALS
SYSTOLIC BLOOD PRESSURE: 158 MMHG | HEIGHT: 62 IN | WEIGHT: 104.94 LBS | RESPIRATION RATE: 20 BRPM | BODY MASS INDEX: 19.31 KG/M2 | DIASTOLIC BLOOD PRESSURE: 85 MMHG | OXYGEN SATURATION: 96 % | HEART RATE: 87 BPM | TEMPERATURE: 97.2 F

## 2020-06-03 LAB
A/G RATIO: 1.1 (ref 1.1–2.2)
ALBUMIN SERPL-MCNC: 3.7 G/DL (ref 3.4–5)
ALP BLD-CCNC: 120 U/L (ref 40–129)
ALT SERPL-CCNC: 21 U/L (ref 10–40)
ANION GAP SERPL CALCULATED.3IONS-SCNC: 11 MMOL/L (ref 3–16)
AST SERPL-CCNC: 35 U/L (ref 15–37)
BASOPHILS ABSOLUTE: 0.1 K/UL (ref 0–0.2)
BASOPHILS RELATIVE PERCENT: 1.2 %
BILIRUB SERPL-MCNC: 0.5 MG/DL (ref 0–1)
BUN BLDV-MCNC: 11 MG/DL (ref 7–20)
CALCIUM SERPL-MCNC: 8.8 MG/DL (ref 8.3–10.6)
CHLORIDE BLD-SCNC: 99 MMOL/L (ref 99–110)
CO2: 26 MMOL/L (ref 21–32)
CREAT SERPL-MCNC: <0.5 MG/DL (ref 0.6–1.2)
EKG ATRIAL RATE: 109 BPM
EKG DIAGNOSIS: NORMAL
EKG P AXIS: 56 DEGREES
EKG P-R INTERVAL: 158 MS
EKG Q-T INTERVAL: 370 MS
EKG QRS DURATION: 74 MS
EKG QTC CALCULATION (BAZETT): 498 MS
EKG R AXIS: 67 DEGREES
EKG T AXIS: 57 DEGREES
EKG VENTRICULAR RATE: 109 BPM
EOSINOPHILS ABSOLUTE: 0.2 K/UL (ref 0–0.6)
EOSINOPHILS RELATIVE PERCENT: 3.2 %
GFR AFRICAN AMERICAN: >60
GFR NON-AFRICAN AMERICAN: >60
GLOBULIN: 3.5 G/DL
GLUCOSE BLD-MCNC: 115 MG/DL (ref 70–99)
HCT VFR BLD CALC: 41.6 % (ref 36–48)
HEMOGLOBIN: 13.8 G/DL (ref 12–16)
LYMPHOCYTES ABSOLUTE: 1.2 K/UL (ref 1–5.1)
LYMPHOCYTES RELATIVE PERCENT: 26 %
MCH RBC QN AUTO: 30.2 PG (ref 26–34)
MCHC RBC AUTO-ENTMCNC: 33.2 G/DL (ref 31–36)
MCV RBC AUTO: 91 FL (ref 80–100)
MONOCYTES ABSOLUTE: 0.3 K/UL (ref 0–1.3)
MONOCYTES RELATIVE PERCENT: 7.3 %
NEUTROPHILS ABSOLUTE: 2.9 K/UL (ref 1.7–7.7)
NEUTROPHILS RELATIVE PERCENT: 62.3 %
PDW BLD-RTO: 17.7 % (ref 12.4–15.4)
PLATELET # BLD: 330 K/UL (ref 135–450)
PMV BLD AUTO: 7.6 FL (ref 5–10.5)
POTASSIUM REFLEX MAGNESIUM: 4 MMOL/L (ref 3.5–5.1)
RBC # BLD: 4.58 M/UL (ref 4–5.2)
SODIUM BLD-SCNC: 136 MMOL/L (ref 136–145)
TOTAL PROTEIN: 7.2 G/DL (ref 6.4–8.2)
WBC # BLD: 4.7 K/UL (ref 4–11)

## 2020-06-03 PROCEDURE — 93005 ELECTROCARDIOGRAM TRACING: CPT | Performed by: EMERGENCY MEDICINE

## 2020-06-03 PROCEDURE — 2580000003 HC RX 258: Performed by: PHYSICIAN ASSISTANT

## 2020-06-03 PROCEDURE — 85025 COMPLETE CBC W/AUTO DIFF WBC: CPT

## 2020-06-03 PROCEDURE — 2500000003 HC RX 250 WO HCPCS: Performed by: PHYSICIAN ASSISTANT

## 2020-06-03 PROCEDURE — 80053 COMPREHEN METABOLIC PANEL: CPT

## 2020-06-03 PROCEDURE — 93010 ELECTROCARDIOGRAM REPORT: CPT | Performed by: INTERNAL MEDICINE

## 2020-06-03 PROCEDURE — 96360 HYDRATION IV INFUSION INIT: CPT

## 2020-06-03 PROCEDURE — 99284 EMERGENCY DEPT VISIT MOD MDM: CPT

## 2020-06-03 PROCEDURE — 74022 RADEX COMPL AQT ABD SERIES: CPT

## 2020-06-03 RX ORDER — 0.9 % SODIUM CHLORIDE 0.9 %
500 INTRAVENOUS SOLUTION INTRAVENOUS ONCE
Status: COMPLETED | OUTPATIENT
Start: 2020-06-03 | End: 2020-06-03

## 2020-06-03 RX ADMIN — SODIUM CHLORIDE 500 ML: 9 INJECTION, SOLUTION INTRAVENOUS at 12:38

## 2020-06-03 RX ADMIN — Medication 330 ML: at 18:06

## 2020-06-03 ASSESSMENT — ENCOUNTER SYMPTOMS
RECTAL PAIN: 1
CHEST TIGHTNESS: 0
CONSTIPATION: 1
SHORTNESS OF BREATH: 0
VOMITING: 1
NAUSEA: 1
ABDOMINAL PAIN: 0

## 2020-06-03 ASSESSMENT — PAIN DESCRIPTION - LOCATION: LOCATION: ABDOMEN

## 2020-06-03 ASSESSMENT — PAIN DESCRIPTION - ONSET: ONSET: ON-GOING

## 2020-06-03 ASSESSMENT — PAIN SCALES - GENERAL
PAINLEVEL_OUTOF10: 5
PAINLEVEL_OUTOF10: 0

## 2020-06-03 ASSESSMENT — PAIN DESCRIPTION - FREQUENCY: FREQUENCY: CONTINUOUS

## 2020-06-03 ASSESSMENT — PAIN DESCRIPTION - PROGRESSION: CLINICAL_PROGRESSION: NOT CHANGED

## 2020-06-03 ASSESSMENT — PAIN DESCRIPTION - ORIENTATION: ORIENTATION: LOWER

## 2020-06-03 ASSESSMENT — PAIN DESCRIPTION - DESCRIPTORS: DESCRIPTORS: DISCOMFORT

## 2020-06-03 ASSESSMENT — PAIN DESCRIPTION - PAIN TYPE: TYPE: ACUTE PAIN

## 2020-06-03 NOTE — ED NOTES
Bed: [facility]  Expected date:   Expected time:   Means of arrival: [facility]  Comments:  devon Acosta RN  06/03/20 4824

## 2020-06-03 NOTE — ED NOTES
RN to bedside to complete enema, pt states that they do not want the enema and would like the pills instead. Nehemias SOLANO notified.       Ja Ayon RN  06/03/20 8887

## 2020-06-03 NOTE — ED PROVIDER NOTES
Constipation    SODIUM PHOSPHATES (RA SALINE ENEMA) 19-7 GM/118ML ENEM    Place 1 Dose rectally once for 1 dose       (Please note that portions of this note were completed with a voice recognition program.  Efforts were made to edit the dictations but occasionally words are mis-transcribed.)    [facility] [facility]          [address] [place], [place]  06/03/20 9977

## 2020-06-08 ENCOUNTER — OFFICE VISIT (OUTPATIENT)
Dept: SURGERY | Age: 75
End: 2020-06-08
Payer: COMMERCIAL

## 2020-06-08 VITALS
SYSTOLIC BLOOD PRESSURE: 124 MMHG | HEART RATE: 101 BPM | WEIGHT: 105 LBS | BODY MASS INDEX: 19.2 KG/M2 | DIASTOLIC BLOOD PRESSURE: 76 MMHG

## 2020-06-08 PROCEDURE — G8427 DOCREV CUR MEDS BY ELIG CLIN: HCPCS | Performed by: SURGERY

## 2020-06-08 PROCEDURE — 3017F COLORECTAL CA SCREEN DOC REV: CPT | Performed by: SURGERY

## 2020-06-08 PROCEDURE — 1090F PRES/ABSN URINE INCON ASSESS: CPT | Performed by: SURGERY

## 2020-06-08 PROCEDURE — G8420 CALC BMI NORM PARAMETERS: HCPCS | Performed by: SURGERY

## 2020-06-08 PROCEDURE — 4004F PT TOBACCO SCREEN RCVD TLK: CPT | Performed by: SURGERY

## 2020-06-08 PROCEDURE — G8400 PT W/DXA NO RESULTS DOC: HCPCS | Performed by: SURGERY

## 2020-06-08 PROCEDURE — 4040F PNEUMOC VAC/ADMIN/RCVD: CPT | Performed by: SURGERY

## 2020-06-08 PROCEDURE — 99213 OFFICE O/P EST LOW 20 MIN: CPT | Performed by: SURGERY

## 2020-06-08 PROCEDURE — 1123F ACP DISCUSS/DSCN MKR DOCD: CPT | Performed by: SURGERY

## 2021-04-05 NOTE — PROGRESS NOTES
Patient states she had a second small soft BM. This was not witnessed by this nurse as patient flushed the toilet prior to telling nurse. here w/ exertional CP w/ associated SOB. serial EKGs with no STEMI but does have TWI V2-3. will plan for cardiology admission for serial troponins, echo, and further management as indicated

## 2021-04-14 ENCOUNTER — TELEPHONE (OUTPATIENT)
Dept: SURGERY | Age: 76
End: 2021-04-14

## 2021-04-14 NOTE — TELEPHONE ENCOUNTER
Bridgett Schuster wants pt to og to the emergency room to get a full work up however pt wishes to go tomorrow

## 2021-04-15 ENCOUNTER — TELEPHONE (OUTPATIENT)
Dept: SURGERY | Age: 76
End: 2021-04-15

## 2021-04-17 ENCOUNTER — APPOINTMENT (OUTPATIENT)
Dept: CT IMAGING | Age: 76
End: 2021-04-17
Payer: MEDICARE

## 2021-04-17 ENCOUNTER — HOSPITAL ENCOUNTER (EMERGENCY)
Age: 76
Discharge: HOME | End: 2021-04-18
Attending: STUDENT IN AN ORGANIZED HEALTH CARE EDUCATION/TRAINING PROGRAM
Payer: MEDICARE

## 2021-04-17 DIAGNOSIS — R11.2 NON-INTRACTABLE VOMITING WITH NAUSEA, UNSPECIFIED VOMITING TYPE: Primary | ICD-10-CM

## 2021-04-17 DIAGNOSIS — K76.0 HEPATIC STEATOSIS: ICD-10-CM

## 2021-04-17 DIAGNOSIS — E87.6 HYPOKALEMIA: ICD-10-CM

## 2021-04-17 LAB
A/G RATIO: 1.2 (ref 1.1–2.2)
ALBUMIN SERPL-MCNC: 3.7 G/DL (ref 3.4–5)
ALP BLD-CCNC: 100 U/L (ref 40–129)
ALT SERPL-CCNC: 18 U/L (ref 10–40)
ANION GAP SERPL CALCULATED.3IONS-SCNC: 12 MMOL/L (ref 3–16)
AST SERPL-CCNC: 38 U/L (ref 15–37)
BASOPHILS ABSOLUTE: 0.1 K/UL (ref 0–0.2)
BASOPHILS RELATIVE PERCENT: 1.1 %
BILIRUB SERPL-MCNC: 0.6 MG/DL (ref 0–1)
BUN BLDV-MCNC: 5 MG/DL (ref 7–20)
CALCIUM SERPL-MCNC: 8.9 MG/DL (ref 8.3–10.6)
CHLORIDE BLD-SCNC: 94 MMOL/L (ref 99–110)
CO2: 26 MMOL/L (ref 21–32)
CREAT SERPL-MCNC: <0.5 MG/DL (ref 0.6–1.2)
EOSINOPHILS ABSOLUTE: 0.2 K/UL (ref 0–0.6)
EOSINOPHILS RELATIVE PERCENT: 2.6 %
GFR AFRICAN AMERICAN: >60
GFR NON-AFRICAN AMERICAN: >60
GLOBULIN: 3 G/DL
GLUCOSE BLD-MCNC: 89 MG/DL (ref 70–99)
HCT VFR BLD CALC: 39.9 % (ref 36–48)
HEMOGLOBIN: 13.6 G/DL (ref 12–16)
LACTIC ACID: 3 MMOL/L (ref 0.4–2)
LIPASE: 26 U/L (ref 13–60)
LYMPHOCYTES ABSOLUTE: 1.5 K/UL (ref 1–5.1)
LYMPHOCYTES RELATIVE PERCENT: 23.2 %
MAGNESIUM: 1.9 MG/DL (ref 1.8–2.4)
MCH RBC QN AUTO: 34 PG (ref 26–34)
MCHC RBC AUTO-ENTMCNC: 34.1 G/DL (ref 31–36)
MCV RBC AUTO: 99.8 FL (ref 80–100)
MONOCYTES ABSOLUTE: 0.5 K/UL (ref 0–1.3)
MONOCYTES RELATIVE PERCENT: 8.1 %
NEUTROPHILS ABSOLUTE: 4.2 K/UL (ref 1.7–7.7)
NEUTROPHILS RELATIVE PERCENT: 65 %
PDW BLD-RTO: 19.2 % (ref 12.4–15.4)
PLATELET # BLD: 222 K/UL (ref 135–450)
PMV BLD AUTO: 7.4 FL (ref 5–10.5)
POTASSIUM REFLEX MAGNESIUM: 3.1 MMOL/L (ref 3.5–5.1)
PRO-BNP: 219 PG/ML (ref 0–449)
RBC # BLD: 3.99 M/UL (ref 4–5.2)
SODIUM BLD-SCNC: 132 MMOL/L (ref 136–145)
TOTAL PROTEIN: 6.7 G/DL (ref 6.4–8.2)
TROPONIN: <0.01 NG/ML
WBC # BLD: 6.4 K/UL (ref 4–11)

## 2021-04-17 PROCEDURE — 74177 CT ABD & PELVIS W/CONTRAST: CPT

## 2021-04-17 PROCEDURE — 84484 ASSAY OF TROPONIN QUANT: CPT

## 2021-04-17 PROCEDURE — 36415 COLL VENOUS BLD VENIPUNCTURE: CPT

## 2021-04-17 PROCEDURE — 6370000000 HC RX 637 (ALT 250 FOR IP): Performed by: NURSE PRACTITIONER

## 2021-04-17 PROCEDURE — 96375 TX/PRO/DX INJ NEW DRUG ADDON: CPT

## 2021-04-17 PROCEDURE — 83605 ASSAY OF LACTIC ACID: CPT

## 2021-04-17 PROCEDURE — 83690 ASSAY OF LIPASE: CPT

## 2021-04-17 PROCEDURE — 83735 ASSAY OF MAGNESIUM: CPT

## 2021-04-17 PROCEDURE — 94761 N-INVAS EAR/PLS OXIMETRY MLT: CPT

## 2021-04-17 PROCEDURE — 94640 AIRWAY INHALATION TREATMENT: CPT

## 2021-04-17 PROCEDURE — 93005 ELECTROCARDIOGRAM TRACING: CPT | Performed by: NURSE PRACTITIONER

## 2021-04-17 PROCEDURE — 71260 CT THORAX DX C+: CPT

## 2021-04-17 PROCEDURE — 6360000002 HC RX W HCPCS: Performed by: NURSE PRACTITIONER

## 2021-04-17 PROCEDURE — 2580000003 HC RX 258: Performed by: NURSE PRACTITIONER

## 2021-04-17 PROCEDURE — 6360000004 HC RX CONTRAST MEDICATION

## 2021-04-17 PROCEDURE — 96374 THER/PROPH/DIAG INJ IV PUSH: CPT

## 2021-04-17 PROCEDURE — 83880 ASSAY OF NATRIURETIC PEPTIDE: CPT

## 2021-04-17 PROCEDURE — 85025 COMPLETE CBC W/AUTO DIFF WBC: CPT

## 2021-04-17 PROCEDURE — 80053 COMPREHEN METABOLIC PANEL: CPT

## 2021-04-17 PROCEDURE — 99285 EMERGENCY DEPT VISIT HI MDM: CPT

## 2021-04-17 RX ORDER — LORAZEPAM 2 MG/ML
1 INJECTION INTRAMUSCULAR ONCE
Status: COMPLETED | OUTPATIENT
Start: 2021-04-17 | End: 2021-04-17

## 2021-04-17 RX ORDER — 0.9 % SODIUM CHLORIDE 0.9 %
1000 INTRAVENOUS SOLUTION INTRAVENOUS ONCE
Status: COMPLETED | OUTPATIENT
Start: 2021-04-17 | End: 2021-04-17

## 2021-04-17 RX ORDER — ONDANSETRON 4 MG/1
4-8 TABLET, ORALLY DISINTEGRATING ORAL EVERY 12 HOURS PRN
Qty: 12 TABLET | Refills: 0 | Status: SHIPPED | OUTPATIENT
Start: 2021-04-17

## 2021-04-17 RX ORDER — ONDANSETRON 2 MG/ML
4 INJECTION INTRAMUSCULAR; INTRAVENOUS ONCE
Status: COMPLETED | OUTPATIENT
Start: 2021-04-17 | End: 2021-04-17

## 2021-04-17 RX ORDER — 0.9 % SODIUM CHLORIDE 0.9 %
500 INTRAVENOUS SOLUTION INTRAVENOUS ONCE
Status: COMPLETED | OUTPATIENT
Start: 2021-04-17 | End: 2021-04-18

## 2021-04-17 RX ORDER — IPRATROPIUM BROMIDE AND ALBUTEROL SULFATE 2.5; .5 MG/3ML; MG/3ML
2 SOLUTION RESPIRATORY (INHALATION) ONCE
Status: COMPLETED | OUTPATIENT
Start: 2021-04-17 | End: 2021-04-17

## 2021-04-17 RX ORDER — NICOTINE 21 MG/24HR
1 PATCH, TRANSDERMAL 24 HOURS TRANSDERMAL DAILY
Status: DISCONTINUED | OUTPATIENT
Start: 2021-04-17 | End: 2021-04-18 | Stop reason: HOSPADM

## 2021-04-17 RX ADMIN — POTASSIUM BICARBONATE 40 MEQ: 782 TABLET, EFFERVESCENT ORAL at 18:48

## 2021-04-17 RX ADMIN — IOPAMIDOL 75 ML: 755 INJECTION, SOLUTION INTRAVENOUS at 16:15

## 2021-04-17 RX ADMIN — IPRATROPIUM BROMIDE AND ALBUTEROL SULFATE 2 AMPULE: .5; 3 SOLUTION RESPIRATORY (INHALATION) at 16:52

## 2021-04-17 RX ADMIN — SODIUM CHLORIDE 1000 ML: 9 INJECTION, SOLUTION INTRAVENOUS at 16:28

## 2021-04-17 RX ADMIN — LORAZEPAM 1 MG: 2 INJECTION INTRAMUSCULAR; INTRAVENOUS at 16:28

## 2021-04-17 RX ADMIN — MAGNESIUM HYDROXIDE/ALUMINUM HYDROXICE/SIMETHICONE: 120; 1200; 1200 SUSPENSION ORAL at 16:27

## 2021-04-17 RX ADMIN — SODIUM CHLORIDE 500 ML: 9 INJECTION, SOLUTION INTRAVENOUS at 23:46

## 2021-04-17 RX ADMIN — ONDANSETRON 4 MG: 2 INJECTION INTRAMUSCULAR; INTRAVENOUS at 16:27

## 2021-04-17 ASSESSMENT — PAIN DESCRIPTION - PAIN TYPE: TYPE: ACUTE PAIN

## 2021-04-17 ASSESSMENT — PAIN SCALES - GENERAL: PAINLEVEL_OUTOF10: 6

## 2021-04-17 NOTE — ED PROVIDER NOTES
[facility] Emergency Department    CHIEF COMPLAINT  Emesis (Started at least a month ago, states it started after her colostomy reversal surgery)      SHARED SERVICE VISIT  I have seen and evaluated this patient with my supervising physician, Dr. Jocelyne Ribeiro. HISTORY OF PRESENT ILLNESS  Symone Acosta is a 78 y.o. female from home with family with a history of COPD, who drinks a pint of whiskey nightly, presents to the ED sent by her home health NP to rule out pancreatitis complaining of waking every a.m. with nonbilious, nonbloody vomiting with nausea x1 year- after colonoscopy reversal.  Accompanying symptoms include shortness of breath, cough productive of clearish phlegm and diarrhea x1 today. Denies vomiting today, increased shortness of breath, chest pain, presyncope, fever, chills, sweats, hemoptysis, leg/calf pain or swelling, urinary symptoms, or other complaints. No other complaints, modifying factors or associated symptoms. Nursing notes reviewed.    Past Medical History:   Diagnosis Date    Anemia     Arthritis     Clostridium difficile colitis     December 2018    Clostridium difficile diarrhea 10/27/2019    Colostomy in place [facility])     SBO/diverticulosis     COPD (chronic obstructive pulmonary disease) (HCC)     Depression     DANIELLE (generalized anxiety disorder)     Gastric ulcer, unspecified as acute or chronic, without mention of hemorrhage, perforation, or obstruction     H/O ETOH abuse     Hiatal hernia     History of blood transfusion     Hyperlipidemia     Hypertension     Insomnia     Intestinal obstruction ([address]     Parkinson's disease ([address]     Tobacco abuse     Vitamin D deficiency      Past Surgical History:   Procedure Laterality Date    ABDOMEN SURGERY      CHOLECYSTECTOMY      COLONOSCOPY  12/14/2018    COLONOSCOPY N/A 12/14/2018    COLONOSCOPY POLYPECTOMY SNARE/COLD BIOPSY performed by Clementina Biggs MD at [facility]  COLOSTOMY  2018    ENDOSCOPY, COLON, DIAGNOSTIC      HYSTERECTOMY      SMALL INTESTINE SURGERY N/A 4/10/2019    LAPAROSCOPIC LYSISI OF ADHESIONS FOR GREATER THAN 3 HOURS,LAPORSCOPIC CONVERTED TO OPEN COLOSTOMY REVERSAL, SMALL BOWEL RESECTION performed by Saima Shah MD at [address] ENDOSCOPY  12/13/2018    with biopsies    UPPER GASTROINTESTINAL ENDOSCOPY N/A 12/13/2018    EGD BIOPSY performed by Braeden Downing MD at [address] History   Problem Relation Age of Onset    Cancer Mother     Diabetes Mother     Heart Disease Father      Social History     Socioeconomic History    Marital status:      Spouse name: Not on file    Number of children: 3    Years of education: Not on file    Highest education level: Not on file   Occupational History    Not on file   Social Needs    Financial resource strain: Not on file    Food insecurity     Worry: Not on file     Inability: Not on file   Phys[employer] needs     Medical: Not on file     Non-medical: Not on file   Tobacco Use    Smoking status: Current Every Day Smoker     Packs/day: 1.00     Years: 60.00     Pack years: 60.00     Types: Cigarettes     Start date: 1/10/1960    Smokeless tobacco: Never Used   Substance and Sexual Activity    Alcohol use:  Yes     Alcohol/week: 4.0 standard drinks     Types: 4 Cans of beer per week     Comment: states drinks beer and whisky daily, unsure of amount    Drug use: No    Sexual activity: Never   Lifestyle    Physical activity     Days per week: Not on file     Minutes per session: Not on file    Stress: Not on file   Relationships    Social connections     Talks on phone: Not on file     Gets together: Not on file     Attends Denominational service: Not on file     Active member of club or organization: Not on file     Attends meetings of clubs or organizations: Not on file     Relationship status:     Intimate partner violence     Fear of current or ex partner: No     Emotionally abused: No     Physically abused: No     Forced sexual activity: No   Other Topics Concern    Not on file   Social History Narrative    Moved to the area from [place], [place] with family    Lives with ex- mother in law and friend     No current facility-administered medications for this encounter.       Current Outpatient Medications   Medication Sig Dispense Refill    ondansetron (ZOFRAN ODT) 4 MG disintegrating tablet Take 1 tablet by mouth every 8 hours as needed for Nausea 20 tablet 0    DULoxetine (CYMBALTA) 30 MG extended release capsule Take 1 capsule by mouth daily (Patient not taking: Reported on 6/8/2020) 30 capsule 0    dexlansoprazole (DEXILANT) 60 MG CPDR delayed release capsule Take 1 capsule by mouth daily (Patient not taking: Reported on 6/8/2020) 30 capsule 0    lactobacillus (CULTURELLE) capsule Take 1 capsule by mouth daily (with breakfast) 30 capsule 0    ferrous sulfate 325 (65 Fe) MG tablet Take 1 tablet by mouth daily (with breakfast) (Patient not taking: Reported on 6/8/2020) 90 tablet 1    prochlorperazine (COMPAZINE) 10 MG tablet Take 1 tablet by mouth every 6 hours as needed for Nausea  0    albuterol sulfate HFA (PROAIR HFA) 108 (90 Base) MCG/ACT inhaler Inhale 2 puffs into the lungs every 6 hours as needed for Wheezing (Patient not taking: Reported on 6/8/2020) 1 Inhaler 3    Umeclidinium Bromide 62.5 MCG/INH AEPB Inhale 1 puff into the lungs daily 30 each 5    atorvastatin (LIPITOR) 20 MG tablet TAKE 1 TABLET BY MOUTH EVERY DAY 90 tablet 3    nicotine (NICODERM CQ) 21 MG/24HR Place 1 patch onto the skin every 24 hours 30 patch 3    DULoxetine (CYMBALTA) 30 MG extended release capsule TAKE ONE CAPSULE BY MOUTH DAILY (Patient not taking: Reported on 6/8/2020) 90 capsule 1    metoprolol succinate (TOPROL XL) 25 MG extended release tablet Take 1 tablet by mouth daily (Patient not taking: Reported on 6/8/2020) 30 tablet 3    vitamin B-1 100 MG tablet Take 1 tablet by mouth daily 30 tablet 3     Allergies   Allergen Reactions    Aspirin Other (See Comments)     Ulcers in stomach    Fentanyl Other (See Comments)     Hallucinations     Morphine      Feels like she will have a heart attack       REVIEW OF SYSTEMS  10 systems reviewed, pertinent positives per HPI otherwise noted to be negative    PHYSICAL EXAM  BP (!) 145/125   Pulse 97   Temp 98.6 °F (37 °C) (Oral)   Resp 20   SpO2 91%   GENERAL APPEARANCE: Awake and alert. Cooperative. No apparent distress. Non-- toxic in appearance. HEAD: Normocephalic. Atraumatic. EYES: PERRL. EOM's grossly intact. ENT: Mucous membranes are dry. NECK: Supple. HEART: RRR. No murmurs, rubs, or gallops. LUNGS: Respirations unlabored.  + Diffuse wheezes with right sided rales prison up. Good air exchange. Speaking comfortably in full sentences. ABDOMEN: Soft. Non-distended. Non-tender. No guarding or rebound.  + Bowel sounds x 4 quadrants. + well headled large midline and RUQ scars as patient is status post cholecystectomy and colostomy placement with reversal. No masses. No organomegaly. EXTREMITIES: No peripheral edema. Moves all extremities equally. All extremities neurovascularly intact. SKIN: Warm and dry. No acute rashes. NEUROLOGICAL: Alert and oriented. CN's 2-12 intact. No gross facial drooping. Strength 5/5, sensation intact. PSYCHIATRIC: Normal mood and affect. RADIOLOGY  No results found. ED COURSE  Patient received GI cocktail for pain, with good relief. Triage vitals stable but hypertensive. A cardiac and abdominal workup was initiated including CBC, CMP, lipase, BNP, urinalysis reflex to culture, CT PE, CT abdomen pelvis,.       Interventions: Patient was given 1.5 L of normal saline while in the emergency department, GI cocktail and Zofran for GI discomfort, Ativan at her request because she feels very anxious, nicotine patch, DuoNeb's x2 for of acute diverticulitis. 9. Hepatic steatosis. 10.  Mild intra and extrahepatic bile duct dilatation status post cholecystectomy typical of reservoir effect. Reevaluation:  Patient resting comfortably on stretcher with eyes open with stable vital signs. She denies complaints of at this time.  + Resolution of wheezing noted upon auscultation status post treatments x2. A discussion was had with Mrs. Henley regarding non-intractable vomiting with nausea, hypokalemia, and discharge. Risk management discussed and shared decision making had with patient and/or surrogate. All questions were answered. Patient will follow up with her pcp for further evaluation/treatment. All questions answered. Patient will return to ED for new/worsening symptoms. Patient is agreeable with this plan. Patient was sent home with prescription for Zofran ODT. CRITICAL CARE TIME  0 Minutes of critical care time spent not including separately billable procedures. MDM  Patient presents to the emergency department with non-intractable vomiting with nausea. Alternate diagnoses are less likely based on history and physical. Considered ACS, kidney stone, pyelonephritis, UTI, appendicitis, bowel obstruction, diverticulitis, hernia, gastritis/gastroenteritis, pancreatitis, cholecystitis, hepatitis, constipation, IBS, IBD, among others are less likely based on history and physical.      Work-up reveals:  Lactic acid: 3.0 which normalized to 2.0 following rehydration with 1.5 L of fluids  Troponin: <0.01  BNP: [email or web address]  Magnesium: [email or web address]  CMP: There is mild electrolyte derangement with sodium 137, potassium 3.1, chloride 94. Electrolytes were repleted with fluids and p.o. potassium; BUN 5, creatinine <0.5, AST 38, otherwise unremarkable  Lipase: [email or web address]  CBC: Negative for leukocytosis WBC 6.4, RBC 3.99, RDW 19.2, otherwise unremarkable  EKG: [email or web address] bpm-normalized into the 80s bpm following the bolus.   CT chest pulmonary embolism: No pulmonary embolism. Calcific atherosclerosis aorta and coronary arteries. No acute pulmonary disease. Pulmonary sequela of the that seen with smoking, including COPD. CT A/P no CT evidence of acute intra-abdominal or intrapelvic process. Urine calcification right in keeping with bladder stones. Correlate with urinalysis. Hepatic steatosis. Diverticulosis coli without CT evidence of acute diverticulitis. Appendix steatosis. Mild intra and extrahepatic bile duct dilatation status post cholecystectomy typical of reservoir effect. Therefore:    My attending physician and I feel that the patient is safe and appropriate for discharge to home with outpatient follow-up with her PCP in the next 1-2 days. Patient will be treated with Zofran ODT 4 mg disintegrating tablet: Take 1-2 tablets by mouth every 12 hours as needed for nausea and is instructed to return to the emergency department immediately for new or worsening symptoms including, but not limited to, developing abdominal pain accompanied by nausea, with excessive vomiting/diarrhea, seeing blood in your vomit or stool, developing fever, chills, sweats, passing out, feeling sicker, pass out, or other concerns. .  Patient verbalizes understanding and is agreeable with plan for discharge and follow-up. Clinical Impression:  Non-intractable vomiting with nausea  Hypokalemia  Hepatic steatosis      DISPOSITION  Discharged    Jack Murillo CNP   [employer]    This chart was created using Dragon dictation. Every effort was made by myself to ensure accuracy, however due to limitations of this technology errors may be present.        Michelle Colunga, ABIGAIL - FIDELIA  04/18/21 7405

## 2021-04-17 NOTE — ED PROVIDER NOTES
MidLevel Attestation   I havepersonally performed and/or participated in the history, exam and medical decision making and agree with all pertinent clinical information. I have also reviewed and agree with the past medical, family and social historyunless otherwise noted. I have personally performed a face to face diagnostic evaluation onthis patient. I have reviewed the mid-levels findings and agree. In brief, Wallace Odonnell is a 78 y.o. female that presented to the emergency department with   Chief Complaint   Patient presents with    Emesis     Started at least a month ago, states it started after her colostomy reversal surgery   . CONSTITUTIONAL: elderly appearing, in no acute distress   EYES: PERRL, No injection, discharge or scleral icterus. NECK: Normal ROM, NO LAD and Moist mucous membranes. CARDIOVASCULAR: Regular rate and rhythm. No murmurs or gallop. PULMONARY/CHEST: Airway patent. No retractions. Breath sounds clear with good air entry bilaterally. ABDOMEN: Soft, Non-distended and non-tender, without guarding or rebound. SKIN: Acyanotic, warm, dry   MUSCULOSKELETAL: No swelling, tenderness or deformity   NEUROLOGICAL: Awake. Pulses intact. Grossly nonfocal     78year-old presenting with complaint of emesis which has been going on for a month. Her exam abdomen was benign vitals within normal limits. Nonetheless work-up was initiated including labs and imaging studies which were unremarkable. Patient remained stable in the emergency room was discharged home. EKG by my preliminary interpretation shows sinus tach with a rate of 107 normal axis, normal intervals, with no ST changes indicative of ischemia at this time.       I have reviewed and interpreted all of the currently available lab results and diagnostics from this visit:  Results for orders placed or performed during the hospital encounter of 04/17/21   CBC Auto Differential   Result Value Ref Range    WBC 6.4 4.0 - 11.0 K/uL    RBC 3.99 (L) 4.00 - 5.20 M/uL    Hemoglobin 13.6 12.0 - 16.0 g/dL    Hematocrit 39.9 36.0 - 48.0 %    MCV 99.8 80.0 - 100.0 fL    MCH 34.0 26.0 - 34.0 pg    MCHC 34.1 31.0 - 36.0 g/dL    RDW 19.2 (H) 12.4 - 15.4 %    Platelets 703 378 - 724 K/uL    MPV 7.4 5.0 - 10.5 fL    Neutrophils % 65.0 %    Lymphocytes % 23.2 %    Monocytes % 8.1 %    Eosinophils % 2.6 %    Basophils % 1.1 %    Neutrophils Absolute 4.2 1.7 - 7.7 K/uL    Lymphocytes Absolute 1.5 1.0 - 5.1 K/uL    Monocytes Absolute 0.5 0.0 - 1.3 K/uL    Eosinophils Absolute 0.2 0.0 - 0.6 K/uL    Basophils Absolute 0.1 0.0 - 0.2 K/uL   Comprehensive Metabolic Panel w/ Reflex to MG   Result Value Ref Range    Sodium 132 (L) 136 - 145 mmol/L    Potassium reflex Magnesium 3.1 (L) 3.5 - 5.1 mmol/L    Chloride 94 (L) 99 - 110 mmol/L    CO2 26 21 - 32 mmol/L    Anion Gap 12 3 - 16    Glucose 89 70 - 99 mg/dL    BUN 5 (L) 7 - 20 mg/dL    CREATININE <0.5 (L) 0.6 - 1.2 mg/dL    GFR Non-African American >60 >60    GFR African American >60 >60    Calcium 8.9 8.3 - 10.6 mg/dL    Total Protein 6.7 6.4 - 8.2 g/dL    Albumin 3.7 3.4 - 5.0 g/dL    Albumin/Globulin Ratio 1.2 1.1 - 2.2    Total Bilirubin 0.6 0.0 - 1.0 mg/dL    Alkaline Phosphatase 100 40 - 129 U/L    ALT 18 10 - 40 U/L    AST 38 (H) 15 - 37 U/L    Globulin 3.0 g/dL   Lipase   Result Value Ref Range    Lipase 26.0 13.0 - 60.0 U/L   Lactic Acid, Plasma   Result Value Ref Range    Lactic Acid 3.0 (H) 0.4 - 2.0 mmol/L   Troponin   Result Value Ref Range    Troponin <0.01 <0.01 ng/mL   Magnesium   Result Value Ref Range    Magnesium 1.90 1.80 - 2.40 mg/dL   Brain Natriuretic Peptide   Result Value Ref Range    Pro- 0 - 449 pg/mL   Lactic Acid, Plasma   Result Value Ref Range    Lactic Acid 2.0 0.4 - 2.0 mmol/L   EKG 12 Lead   Result Value Ref Range    Ventricular Rate 107 BPM    Atrial Rate 107 BPM    P-R Interval 172 ms    QRS Duration 76 ms    Q-T Interval 362 ms    QTc Calculation (Bazett) 483 ms P Axis 73 degrees    R Axis 48 degrees    T Axis 19 degrees    Diagnosis       Sinus tachycardiaRSR' pattern in [id number] ST and T wave abnormalityProlonged QTAbnormal ECGWhen compared with ECG of 03-JUN-2020 12:18,No significant change was foundConfirmed by John Obrien ([id number]) on 4/18/2021 4:37:44 PM     No results found. ED Medication Orders (From admission, onward)    Start Ordered     Status Ordering Provider    04/17/21 2315 04/17/21 2309  0.9 % sodium chloride bolus  ONCE      Last MAR action: Stopped - by Semaj Yo on 04/18/21 at [address] [facility], [address] [facility] E    04/17/21 1715 04/17/21 1701  potassium bicarb-citric acid (EFFER-K) effervescent tablet 40 mEq  ONCE      Last MAR action: Given - by BIA ABBOTT on 04/17/21 at [facility]    04/17/21 1615 04/17/21 1600  LORazepam (ATIVAN) injection 1 mg  ONCE      Last MAR action: Given - by BIA ABBOTT on 04/17/21 at [address] [facility]    04/17/21 1609 04/17/21 1609  iopamidol (ISOVUE-370) 76 % injection 75 mL  IMG ONCE PRN      Last MAR action: Given - by Elle Sauceda on 04/17/21 at 1615 BIA ABBOTT    04/17/21 1600 04/17/21 1549  ondansetron (ZOFRAN) injection 4 mg  ONCE      Last MAR action: Given - by BIA ABBOTT on 04/17/21 at [address] [facility]    04/17/21 1600 04/17/21 1549  0.9 % sodium chloride bolus  ONCE      Last MAR action: Stopped - by BIA ABBOTT on 04/17/21 at [address]    04/17/21 1600 04/17/21 1552  aluminum & magnesium hydroxide-simethicone (MAALOX) 30 mL, lidocaine viscous hcl (XYLOCAINE) 5 mL (GI COCKTAIL)  ONCE      Last MAR action: Given - by BIA ABBOTT on 04/17/21 at [address] [facility], [facility]    04/17/21 1600 04/17/21 1557  ipratropium-albuterol (DUONEB) nebulizer solution 2 ampule  ONCE      Last MAR action: Given - by Wendy RAZO on 04/17/21 at 1652 [facility]          Final Impression      1.  Non-intractable vomiting with nausea, unspecified vomiting type    2. Hypokalemia    3. Hepatic steatosis        DISPOSITION Decision To Discharge 04/18/2021 02:31:19 AM       This record is transcribed utilizing voice recognition technology. There are inherent limitations in this technology. In addition, there may be limitations in editing of this report. If there are any discrepancies, please contact me directly.     Jany Vargas MD   4/20/2021         Nsehniitooh Mikel Sidhu MD  04/20/21 9298

## 2021-04-17 NOTE — ED NOTES
Bed: B-09  Expected date: 4/17/21  Expected time: 2:40 PM  Means of arrival:   Comments:  Medic 78--74 F Mike De La Fuente RN  04/17/21 6170

## 2021-04-17 NOTE — ED NOTES
Pt is a daily drinker of a pint of whisky per day. States her last intake of ETOH was at 1300. Pt does have an odor of ETOH. Her daughter states that she has attempted to discuss her drinking with her and her abdominal surgery several times, but pt refuses to decrease her intake. Daughter wants to be called when/if pt is discharged for .      Latha Vargas RN  04/17/21 3307

## 2021-04-18 VITALS
OXYGEN SATURATION: 91 % | SYSTOLIC BLOOD PRESSURE: 117 MMHG | TEMPERATURE: 98.5 F | WEIGHT: 105 LBS | HEART RATE: 86 BPM | BODY MASS INDEX: 19.2 KG/M2 | RESPIRATION RATE: 19 BRPM | DIASTOLIC BLOOD PRESSURE: 57 MMHG

## 2021-04-18 LAB
EKG ATRIAL RATE: 107 BPM
EKG DIAGNOSIS: NORMAL
EKG P AXIS: 73 DEGREES
EKG P-R INTERVAL: 172 MS
EKG Q-T INTERVAL: 362 MS
EKG QRS DURATION: 76 MS
EKG QTC CALCULATION (BAZETT): 483 MS
EKG R AXIS: 48 DEGREES
EKG T AXIS: 19 DEGREES
EKG VENTRICULAR RATE: 107 BPM
LACTIC ACID: 2 MMOL/L (ref 0.4–2)

## 2021-04-18 PROCEDURE — 93010 ELECTROCARDIOGRAM REPORT: CPT | Performed by: INTERNAL MEDICINE

## 2021-04-18 PROCEDURE — 83605 ASSAY OF LACTIC ACID: CPT

## 2021-04-18 PROCEDURE — 36415 COLL VENOUS BLD VENIPUNCTURE: CPT

## 2021-04-18 PROCEDURE — 99285 EMERGENCY DEPT VISIT HI MDM: CPT

## 2021-04-18 ASSESSMENT — PAIN SCALES - GENERAL: PAINLEVEL_OUTOF10: 0

## 2021-05-03 NOTE — PROGRESS NOTES
Pt discharged. Pt not willing to wait for D/C paperwork and has walked out with boyfriend. Pt had no IV access. Report called to [facility] and informed them of pt walking out. Boyfriend stated he would transport pt to NH. no concerns

## 2021-06-07 ENCOUNTER — HOSPITAL ENCOUNTER (EMERGENCY)
Age: 76
Discharge: HOME | End: 2021-06-07
Payer: MEDICARE

## 2021-06-07 ENCOUNTER — APPOINTMENT (OUTPATIENT)
Dept: GENERAL RADIOLOGY | Age: 76
End: 2021-06-07
Payer: MEDICARE

## 2021-06-07 VITALS
WEIGHT: 126.1 LBS | HEIGHT: 62 IN | HEART RATE: 98 BPM | DIASTOLIC BLOOD PRESSURE: 69 MMHG | BODY MASS INDEX: 23.21 KG/M2 | RESPIRATION RATE: 14 BRPM | TEMPERATURE: 98.1 F | SYSTOLIC BLOOD PRESSURE: 125 MMHG | OXYGEN SATURATION: 96 %

## 2021-06-07 DIAGNOSIS — S93.401A SPRAIN OF RIGHT ANKLE, UNSPECIFIED LIGAMENT, INITIAL ENCOUNTER: Primary | ICD-10-CM

## 2021-06-07 PROCEDURE — 73610 X-RAY EXAM OF ANKLE: CPT

## 2021-06-07 PROCEDURE — 73630 X-RAY EXAM OF FOOT: CPT

## 2021-06-07 PROCEDURE — 99285 EMERGENCY DEPT VISIT HI MDM: CPT

## 2021-06-07 PROCEDURE — 6370000000 HC RX 637 (ALT 250 FOR IP): Performed by: PHYSICIAN ASSISTANT

## 2021-06-07 RX ORDER — OXYCODONE HYDROCHLORIDE AND ACETAMINOPHEN 5; 325 MG/1; MG/1
1 TABLET ORAL ONCE
Status: COMPLETED | OUTPATIENT
Start: 2021-06-07 | End: 2021-06-07

## 2021-06-07 RX ORDER — ACETAMINOPHEN 500 MG
500 TABLET ORAL 4 TIMES DAILY PRN
Qty: 20 TABLET | Refills: 0 | Status: SHIPPED | OUTPATIENT
Start: 2021-06-07

## 2021-06-07 RX ADMIN — OXYCODONE HYDROCHLORIDE AND ACETAMINOPHEN 1 TABLET: 5; 325 TABLET ORAL at 10:20

## 2021-06-07 ASSESSMENT — PAIN DESCRIPTION - LOCATION: LOCATION: ANKLE

## 2021-06-07 ASSESSMENT — PAIN DESCRIPTION - PAIN TYPE: TYPE: ACUTE PAIN

## 2021-06-07 ASSESSMENT — PAIN SCALES - GENERAL
PAINLEVEL_OUTOF10: 8
PAINLEVEL_OUTOF10: 7

## 2021-06-07 NOTE — ED NOTES
Discharge and education instructions reviewed. Patient verbalized understanding, teach-back successful. Patient denied questions at this time. No acute distress noted. Patient instructed to follow-up as noted - return to emergency department if symptoms worsen. Patient verbalized understanding. Discharged per EDMD with discharge instructions.      [facility] home     Hermila [facility]  06/07/21 4921

## 2021-06-07 NOTE — ED PROVIDER NOTES
[address]  [address] [address]  Dept: [phone]  Loc: [address] ENCOUNTER        This patient was not seen or evaluated by the attending physician. I evaluated this patient, the attending physician was available for consultation. CHIEF COMPLAINT    Chief Complaint   Patient presents with   [address] [address]     did not hit head, no LOC, tripped over blanket while using walker; patient states the place is a \"dump\"    Ankle Pain     right       HPI    Jamir Lagos is a 78 y.o. female who presents with fall that occurred yesterday evening. The context was that she was using her walker to ambulate when she tripped on a blanket and landed on her right ankle. She denies hitting her head or losing consciousness. She states she did not sit on the floor all night, she was able to scoot and get herself off the floor. The patient complains of pain located in the right ankle. She is also c/o pain to her right and left toes after her aide stepped on them last week accidentally. The severity of the pain is 8/10. The pain is aggravated by movement. The patient has no other complaints at this time. REVIEW OF SYSTEMS    Neurologic: no LOC, no Head injury  Cardiac: No Chest Pain, no syncope  Respiratory: No difficulty breathing  GI: No abdominal pain  Musculoskeletal: see HPI  All other systems reviewed and are negative.     PAST MEDICAL & SURGICAL HISTORY    Past Medical History:   Diagnosis Date    Anemia     Arthritis     Clostridium difficile colitis     December 2018    Clostridium difficile diarrhea 10/27/2019    Colostomy in place [facility])     SBO/diverticulosis     COPD (chronic obstructive pulmonary disease) (HCC)     Depression     DANIELLE (generalized anxiety disorder)     Gastric ulcer, unspecified as acute or chronic, without mention of hemorrhage, perforation, or obstruction     H/O ETOH abuse     Hiatal hernia     History of blood transfusion     Hyperlipidemia     Hypertension     Insomnia     Intestinal obstruction (HCC)     Parkinson's disease ([facility] [address]     Tobacco abuse     Vitamin D deficiency      Past Surgical History:   Procedure Laterality Date    ABDOMEN SURGERY      CHOLECYSTECTOMY      COLONOSCOPY  12/14/2018    COLONOSCOPY N/A 12/14/2018    COLONOSCOPY POLYPECTOMY SNARE/COLD BIOPSY performed by Evelyn Grissom MD at [facility]  2018    ENDOSCOPY, COLON, DIAGNOSTIC      HYSTERECTOMY      SMALL INTESTINE SURGERY N/A 4/10/2019    LAPAROSCOPIC LYSISI OF ADHESIONS FOR GREATER THAN 3 HOURS,LAPORSCOPIC CONVERTED TO OPEN COLOSTOMY REVERSAL, SMALL BOWEL RESECTION performed by Ritika Cifuentes MD at [address] ENDOSCOPY  12/13/2018    with biopsies    UPPER GASTROINTESTINAL ENDOSCOPY N/A 12/13/2018    EGD BIOPSY performed by Evelyn Grissom MD at [facility]  (may include discharge medications prescribed in the ED)  Current Outpatient Rx   Medication Sig Dispense Refill    acetaminophen (TYLENOL) 500 MG tablet Take 1 tablet by mouth 4 times daily as needed for Pain 20 tablet 0    ondansetron (ZOFRAN ODT) 4 MG disintegrating tablet Take 1-2 tablets by mouth every 12 hours as needed for Nausea May Sub regular tablet (non-ODT) if insurance does not cover ODT.  12 tablet 0    DULoxetine (CYMBALTA) 30 MG extended release capsule Take 1 capsule by mouth daily (Patient not taking: Reported on 6/8/2020) 30 capsule 0    dexlansoprazole (DEXILANT) 60 MG CPDR delayed release capsule Take 1 capsule by mouth daily (Patient not taking: Reported on 6/8/2020) 30 capsule 0    lactobacillus (CULTURELLE) capsule Take 1 capsule by mouth daily (with breakfast) 30 capsule 0    ferrous sulfate 325 (65 Fe) MG tablet Take 1 tablet by mouth daily (with breakfast) (Patient not taking: Reported on 6/8/2020) 90 tablet 1    prochlorperazine (COMPAZINE) 10 MG tablet Take 1 tablet by mouth every 6 hours as needed for Nausea  0    albuterol sulfate HFA (PROAIR HFA) 108 (90 Base) MCG/ACT inhaler Inhale 2 puffs into the lungs every 6 hours as needed for Wheezing (Patient not taking: Reported on 6/8/2020) 1 Inhaler 3    Umeclidinium Bromide 62.5 MCG/INH AEPB Inhale 1 puff into the lungs daily 30 each 5    atorvastatin (LIPITOR) 20 MG tablet TAKE 1 TABLET BY MOUTH EVERY DAY 90 tablet 3    nicotine (NICODERM CQ) 21 MG/24HR Place 1 patch onto the skin every 24 hours 30 patch 3    DULoxetine (CYMBALTA) 30 MG extended release capsule TAKE ONE CAPSULE BY MOUTH DAILY (Patient not taking: Reported on 6/8/2020) 90 capsule 1    metoprolol succinate (TOPROL XL) 25 MG extended release tablet Take 1 tablet by mouth daily (Patient not taking: Reported on 6/8/2020) 30 tablet 3    vitamin B-1 100 MG tablet Take 1 tablet by mouth daily 30 tablet 3       ALLERGIES    Allergies   Allergen Reactions    Aspirin Other (See Comments)     Ulcers in stomach    Fentanyl Other (See Comments)     Hallucinations     Morphine      Feels like she will have a heart attack       SOCIAL & FAMILY HISTORY    Social History     Socioeconomic History    Marital status:      Spouse name: None    Number of children: 3    Years of education: None    Highest education level: None   Occupational History    None   Tobacco Use    Smoking status: Current Every Day Smoker     Packs/day: 1.00     Years: 60.00     Pack years: 60.00     Types: Cigarettes     Start date: 1/10/1960    Smokeless tobacco: Never Used   Vaping Use    Vaping Use: Never used   Substance and Sexual Activity    Alcohol use:  Yes     Alcohol/week: 4.0 standard drinks     Types: 4 Cans of beer per week     Comment: states drinks beer and whisky daily, unsure of amount    Drug use: No    Sexual activity: Never   Other Topics Concern    None   Social History Narrative    Moved to the area from [place], [place] with family    Lives with ex- mother in law and friend     Social Determinants of Health     Financial Resource Strain:     Difficulty of Paying Living Expenses:    Food Insecurity:     Worried About Running Out of Food in the Last Year:     [address] in the Last Year:    Transportation Needs:     Lack of Transportation (Medical):  Lack of Transportation (Non-Medical):    Physical Activity:     Days of Exercise per Week:     Minutes of Exercise per Session:    Stress:     Feeling of Stress :    Social Connections:     Frequency of Communication with Friends and Family:     Frequency of Social Gatherings with Friends and Family:     Attends [employer] Services:     Active Member of Clubs or Organizations:     Attends Club or Organization Meetings:     Marital Status:    Intimate Partner Violence:     Fear of Current or Ex-Partner:     Emotionally Abused:     Physically Abused:     Sexually Abused:      Family History   Problem Relation Age of Onset    Cancer Mother     Diabetes Mother     Heart Disease Father        PHYSICAL EXAM    VITAL SIGNS: /88   Pulse 102   Temp 98.1 °F (36.7 °C) (Temporal)   Resp 16   Ht 5' 2\" (1.575 m)   Wt 126 lb 1.7 oz (57.2 kg)   SpO2 95%   BMI 23.06 kg/m²    Constitutional:  Well developed, well nourished, no acute distress   HENT:  atraumatic, no trismus  Neck: supple, no JVD, no posterior neck tenderness  Respiratory:  Lungs clear to auscultation bilaterally, no retractions   Cardiovascular:  regular rate, no murmurs  GI:  Soft, nontender, normal bowel sounds  Musculoskeletal:  No edema. Cervical, thoracic, and lumbar spine with no bony deformity, no bony tenderness, no paraspinal tenderness. Shoulders with no tenderness or restricted ROM. Hips stable, no tenderness on exam. Right ankle with pain to lateral malleolus, pain with movement, no edema or deformity.  Left ankle with no tenderness, no pain with movement. Right foot with tenderness to 2nd toe with bruising noted, though NVS intact. Left foot with tenderness to 3rd toe, no bruising appreciated, NVS intact. Vascular:  Dorsalis pedis Pulses 2+ bilaterally  Integument:  Well hydrated, no lacerations noted   Neurologic:  Alert & oriented, no slurred speech  Psych: Pleasant affect, no hallucinations    RADIOLOGY  (interpreted by the radiologist)  XR FOOT RIGHT (MIN 3 VIEWS)   Preliminary Result   Severe osteopenia limits detection of subtle abnormalities. Right foot demonstrates an age-indeterminate irregularity of the proximal   phalanx of the 1st digit. Please correlate with any focal pain for potential   of acute fracture. Lytic changes at the proximal phalanx of the 2nd digit left foot are age   indeterminate and may represent sequela of prior trauma. Please correlate   with history and clinical exam.  If there is clinical concern for infection   or an underlying lesion MRI would be more sensitive for evaluation. XR FOOT LEFT (MIN 3 VIEWS)   Preliminary Result   Severe osteopenia limits detection of subtle abnormalities. Right foot demonstrates an age-indeterminate irregularity of the proximal   phalanx of the 1st digit. Please correlate with any focal pain for potential   of acute fracture. Lytic changes at the proximal phalanx of the 2nd digit left foot are age   indeterminate and may represent sequela of prior trauma. Please correlate   with history and clinical exam.  If there is clinical concern for infection   or an underlying lesion MRI would be more sensitive for evaluation. XR ANKLE RIGHT (MIN 3 VIEWS)   Preliminary Result   Severe osteopenia limits detection of subtle abnormalities. Right foot demonstrates an age-indeterminate irregularity of the proximal   phalanx of the 1st digit. Please correlate with any focal pain for potential   of acute fracture.       Lytic changes at the proximal phalanx of the 2nd digit left foot are age   indeterminate and may represent sequela of prior trauma. Please correlate   with history and clinical exam.  If there is clinical concern for infection   or an underlying lesion MRI would be more sensitive for evaluation. ED COURSE & MEDICAL DECISION MAKING    Pertinent studies reviewed and interpreted. (See chart for details)  See chart for details of medications ordered    Differential Diagnosis: Fracture, Dislocation, ligamentous injury, other soft tissue injury, visceral injury, neurologic injury, other. Patient is afebrile and nontoxic in appearance. Plain films as above. There is noted to be her to be evidence of an acute fracture. She does have some lytic changes to the proximal phalanx of the second toe on the left foot, this is likely due to prior trauma she has no overlying bony tenderness on my exam at this time, with no cellulitis, edema, or evidence of infection. My concern for osteomyelitis is quite low. She will be given referral to orthopedics for further evaluation of her ankle and bilateral foot pain. No evidence of neurovascular injury on exam.    The patient was instructed to follow up as an outpatient in 2 days. The patient was instructed to return to the ED immediately for any new or worsening symptoms. The patient verbalized understanding. FINAL IMPRESSION    1.  Sprain of right ankle, unspecified ligament, initial encounter        PLAN  Discharge with outpatient instructions and follow-up (See EMR)      (Please note that this note was completed with a voice recognition program.  Every attempt was made to edit the dictations, but inevitably there remain words that are mis-transcribed.)            Daquan Roberts [place]  06/07/21 1100

## 2021-06-07 NOTE — ED NOTES
Patient cleaned up from BM.   Given turkey sandwich and Peps[employer] per request     William Flynn RN  06/07/21 [id number]

## 2021-06-14 ENCOUNTER — HOSPITAL ENCOUNTER (INPATIENT)
Age: 76
LOS: 9 days | Discharge: NURSING, REHAB OR LONG-TERM CARE | DRG: 438 | End: 2021-06-23
Attending: EMERGENCY MEDICINE | Admitting: HOSPITALIST
Payer: MEDICARE

## 2021-06-14 ENCOUNTER — APPOINTMENT (OUTPATIENT)
Dept: CT IMAGING | Age: 76
DRG: 438 | End: 2021-06-14
Payer: MEDICARE

## 2021-06-14 ENCOUNTER — APPOINTMENT (OUTPATIENT)
Dept: GENERAL RADIOLOGY | Age: 76
DRG: 438 | End: 2021-06-14
Payer: MEDICARE

## 2021-06-14 DIAGNOSIS — R07.9 CHEST PAIN, UNSPECIFIED TYPE: Primary | ICD-10-CM

## 2021-06-14 LAB
A/G RATIO: 1 (ref 1.1–2.2)
ALBUMIN SERPL-MCNC: 3.4 G/DL (ref 3.4–5)
ALP BLD-CCNC: 153 U/L (ref 40–129)
ALT SERPL-CCNC: 58 U/L (ref 10–40)
ANION GAP SERPL CALCULATED.3IONS-SCNC: 12 MMOL/L (ref 3–16)
AST SERPL-CCNC: 83 U/L (ref 15–37)
BASOPHILS ABSOLUTE: 0.1 K/UL (ref 0–0.2)
BASOPHILS RELATIVE PERCENT: 1.2 %
BILIRUB SERPL-MCNC: 0.5 MG/DL (ref 0–1)
BUN BLDV-MCNC: 7 MG/DL (ref 7–20)
CALCIUM SERPL-MCNC: 8.8 MG/DL (ref 8.3–10.6)
CHLORIDE BLD-SCNC: 100 MMOL/L (ref 99–110)
CO2: 26 MMOL/L (ref 21–32)
CREAT SERPL-MCNC: <0.5 MG/DL (ref 0.6–1.2)
EKG ATRIAL RATE: 107 BPM
EKG DIAGNOSIS: NORMAL
EKG P AXIS: 64 DEGREES
EKG P-R INTERVAL: 162 MS
EKG Q-T INTERVAL: 362 MS
EKG QRS DURATION: 82 MS
EKG QTC CALCULATION (BAZETT): 483 MS
EKG R AXIS: 32 DEGREES
EKG T AXIS: 80 DEGREES
EKG VENTRICULAR RATE: 107 BPM
EOSINOPHILS ABSOLUTE: 0.2 K/UL (ref 0–0.6)
EOSINOPHILS RELATIVE PERCENT: 2.3 %
GFR AFRICAN AMERICAN: >60
GFR NON-AFRICAN AMERICAN: >60
GLOBULIN: 3.3 G/DL
GLUCOSE BLD-MCNC: 111 MG/DL (ref 70–99)
HCT VFR BLD CALC: 42.1 % (ref 36–48)
HEMOGLOBIN: 14.3 G/DL (ref 12–16)
LIPASE: 84 U/L (ref 13–60)
LYMPHOCYTES ABSOLUTE: 1.4 K/UL (ref 1–5.1)
LYMPHOCYTES RELATIVE PERCENT: 18 %
MAGNESIUM: 1.8 MG/DL (ref 1.8–2.4)
MCH RBC QN AUTO: 36.1 PG (ref 26–34)
MCHC RBC AUTO-ENTMCNC: 34 G/DL (ref 31–36)
MCV RBC AUTO: 106 FL (ref 80–100)
MONOCYTES ABSOLUTE: 0.5 K/UL (ref 0–1.3)
MONOCYTES RELATIVE PERCENT: 6.5 %
NEUTROPHILS ABSOLUTE: 5.8 K/UL (ref 1.7–7.7)
NEUTROPHILS RELATIVE PERCENT: 72 %
PDW BLD-RTO: 18 % (ref 12.4–15.4)
PLATELET # BLD: 278 K/UL (ref 135–450)
PMV BLD AUTO: 7.6 FL (ref 5–10.5)
POTASSIUM REFLEX MAGNESIUM: 3.5 MMOL/L (ref 3.5–5.1)
PRO-BNP: 235 PG/ML (ref 0–449)
RBC # BLD: 3.97 M/UL (ref 4–5.2)
SODIUM BLD-SCNC: 138 MMOL/L (ref 136–145)
TOTAL PROTEIN: 6.7 G/DL (ref 6.4–8.2)
TROPONIN: <0.01 NG/ML
TROPONIN: <0.01 NG/ML
WBC # BLD: 8 K/UL (ref 4–11)

## 2021-06-14 PROCEDURE — 6360000002 HC RX W HCPCS: Performed by: NURSE PRACTITIONER

## 2021-06-14 PROCEDURE — 2580000003 HC RX 258: Performed by: HOSPITALIST

## 2021-06-14 PROCEDURE — G0378 HOSPITAL OBSERVATION PER HR: HCPCS

## 2021-06-14 PROCEDURE — 84484 ASSAY OF TROPONIN QUANT: CPT

## 2021-06-14 PROCEDURE — 96374 THER/PROPH/DIAG INJ IV PUSH: CPT

## 2021-06-14 PROCEDURE — 80053 COMPREHEN METABOLIC PANEL: CPT

## 2021-06-14 PROCEDURE — 83690 ASSAY OF LIPASE: CPT

## 2021-06-14 PROCEDURE — 1200000000 HC SEMI PRIVATE

## 2021-06-14 PROCEDURE — 71260 CT THORAX DX C+: CPT

## 2021-06-14 PROCEDURE — 94640 AIRWAY INHALATION TREATMENT: CPT

## 2021-06-14 PROCEDURE — 93005 ELECTROCARDIOGRAM TRACING: CPT | Performed by: NURSE PRACTITIONER

## 2021-06-14 PROCEDURE — 6370000000 HC RX 637 (ALT 250 FOR IP): Performed by: HOSPITALIST

## 2021-06-14 PROCEDURE — 6370000000 HC RX 637 (ALT 250 FOR IP): Performed by: NURSE PRACTITIONER

## 2021-06-14 PROCEDURE — 36415 COLL VENOUS BLD VENIPUNCTURE: CPT

## 2021-06-14 PROCEDURE — 93010 ELECTROCARDIOGRAM REPORT: CPT | Performed by: INTERNAL MEDICINE

## 2021-06-14 PROCEDURE — 83880 ASSAY OF NATRIURETIC PEPTIDE: CPT

## 2021-06-14 PROCEDURE — 83735 ASSAY OF MAGNESIUM: CPT

## 2021-06-14 PROCEDURE — 94761 N-INVAS EAR/PLS OXIMETRY MLT: CPT

## 2021-06-14 PROCEDURE — 71045 X-RAY EXAM CHEST 1 VIEW: CPT

## 2021-06-14 PROCEDURE — 85025 COMPLETE CBC W/AUTO DIFF WBC: CPT

## 2021-06-14 PROCEDURE — 6360000004 HC RX CONTRAST MEDICATION: Performed by: NURSE PRACTITIONER

## 2021-06-14 PROCEDURE — 99284 EMERGENCY DEPT VISIT MOD MDM: CPT

## 2021-06-14 RX ORDER — NICOTINE 21 MG/24HR
1 PATCH, TRANSDERMAL 24 HOURS TRANSDERMAL DAILY
Status: DISCONTINUED | OUTPATIENT
Start: 2021-06-14 | End: 2021-06-23 | Stop reason: HOSPADM

## 2021-06-14 RX ORDER — ONDANSETRON 2 MG/ML
4 INJECTION INTRAMUSCULAR; INTRAVENOUS EVERY 6 HOURS PRN
Status: DISCONTINUED | OUTPATIENT
Start: 2021-06-14 | End: 2021-06-23 | Stop reason: HOSPADM

## 2021-06-14 RX ORDER — LANOLIN ALCOHOL/MO/W.PET/CERES
6 CREAM (GRAM) TOPICAL NIGHTLY PRN
Status: DISCONTINUED | OUTPATIENT
Start: 2021-06-15 | End: 2021-06-15

## 2021-06-14 RX ORDER — GAUZE BANDAGE 2" X 2"
100 BANDAGE TOPICAL DAILY
Status: DISCONTINUED | OUTPATIENT
Start: 2021-06-15 | End: 2021-06-23 | Stop reason: HOSPADM

## 2021-06-14 RX ORDER — METHYLPREDNISOLONE SODIUM SUCCINATE 125 MG/2ML
125 INJECTION, POWDER, LYOPHILIZED, FOR SOLUTION INTRAMUSCULAR; INTRAVENOUS ONCE
Status: COMPLETED | OUTPATIENT
Start: 2021-06-14 | End: 2021-06-14

## 2021-06-14 RX ORDER — ONDANSETRON 4 MG/1
4 TABLET, ORALLY DISINTEGRATING ORAL EVERY 8 HOURS PRN
Status: DISCONTINUED | OUTPATIENT
Start: 2021-06-14 | End: 2021-06-23 | Stop reason: HOSPADM

## 2021-06-14 RX ORDER — ACETAMINOPHEN 325 MG/1
650 TABLET ORAL EVERY 6 HOURS PRN
Status: DISCONTINUED | OUTPATIENT
Start: 2021-06-14 | End: 2021-06-23 | Stop reason: HOSPADM

## 2021-06-14 RX ORDER — FERROUS SULFATE TAB EC 324 MG (65 MG FE EQUIVALENT) 324 (65 FE) MG
325 TABLET DELAYED RESPONSE ORAL
Status: DISCONTINUED | OUTPATIENT
Start: 2021-06-15 | End: 2021-06-23 | Stop reason: HOSPADM

## 2021-06-14 RX ORDER — SODIUM CHLORIDE 0.9 % (FLUSH) 0.9 %
5-40 SYRINGE (ML) INJECTION EVERY 12 HOURS SCHEDULED
Status: DISCONTINUED | OUTPATIENT
Start: 2021-06-14 | End: 2021-06-15 | Stop reason: SDUPTHER

## 2021-06-14 RX ORDER — ALBUTEROL SULFATE 90 UG/1
2 AEROSOL, METERED RESPIRATORY (INHALATION) EVERY 6 HOURS PRN
Status: DISCONTINUED | OUTPATIENT
Start: 2021-06-14 | End: 2021-06-23 | Stop reason: HOSPADM

## 2021-06-14 RX ORDER — DULOXETIN HYDROCHLORIDE 30 MG/1
30 CAPSULE, DELAYED RELEASE ORAL DAILY
Status: DISCONTINUED | OUTPATIENT
Start: 2021-06-15 | End: 2021-06-23 | Stop reason: HOSPADM

## 2021-06-14 RX ORDER — ATORVASTATIN CALCIUM 20 MG/1
20 TABLET, FILM COATED ORAL DAILY
Status: DISCONTINUED | OUTPATIENT
Start: 2021-06-15 | End: 2021-06-23 | Stop reason: HOSPADM

## 2021-06-14 RX ORDER — POLYETHYLENE GLYCOL 3350 17 G/17G
17 POWDER, FOR SOLUTION ORAL DAILY PRN
Status: DISCONTINUED | OUTPATIENT
Start: 2021-06-14 | End: 2021-06-23 | Stop reason: HOSPADM

## 2021-06-14 RX ORDER — LACTOBACILLUS RHAMNOSUS GG 10B CELL
1 CAPSULE ORAL
Status: DISCONTINUED | OUTPATIENT
Start: 2021-06-15 | End: 2021-06-23 | Stop reason: HOSPADM

## 2021-06-14 RX ORDER — METOPROLOL SUCCINATE 25 MG/1
25 TABLET, EXTENDED RELEASE ORAL DAILY
Status: DISCONTINUED | OUTPATIENT
Start: 2021-06-15 | End: 2021-06-16

## 2021-06-14 RX ORDER — SODIUM CHLORIDE 9 MG/ML
25 INJECTION, SOLUTION INTRAVENOUS PRN
Status: DISCONTINUED | OUTPATIENT
Start: 2021-06-14 | End: 2021-06-15 | Stop reason: SDUPTHER

## 2021-06-14 RX ORDER — IPRATROPIUM BROMIDE AND ALBUTEROL SULFATE 2.5; .5 MG/3ML; MG/3ML
2 SOLUTION RESPIRATORY (INHALATION) ONCE
Status: COMPLETED | OUTPATIENT
Start: 2021-06-14 | End: 2021-06-14

## 2021-06-14 RX ORDER — PANTOPRAZOLE SODIUM 40 MG/1
40 TABLET, DELAYED RELEASE ORAL
Status: DISCONTINUED | OUTPATIENT
Start: 2021-06-15 | End: 2021-06-15

## 2021-06-14 RX ORDER — ACETAMINOPHEN 650 MG/1
650 SUPPOSITORY RECTAL EVERY 6 HOURS PRN
Status: DISCONTINUED | OUTPATIENT
Start: 2021-06-14 | End: 2021-06-23 | Stop reason: HOSPADM

## 2021-06-14 RX ORDER — SODIUM CHLORIDE 0.9 % (FLUSH) 0.9 %
5-40 SYRINGE (ML) INJECTION PRN
Status: DISCONTINUED | OUTPATIENT
Start: 2021-06-14 | End: 2021-06-15 | Stop reason: SDUPTHER

## 2021-06-14 RX ORDER — PROCHLORPERAZINE MALEATE 5 MG/1
10 TABLET ORAL EVERY 6 HOURS PRN
Status: DISCONTINUED | OUTPATIENT
Start: 2021-06-14 | End: 2021-06-23 | Stop reason: HOSPADM

## 2021-06-14 RX ADMIN — ACETAMINOPHEN 650 MG: 325 TABLET ORAL at 20:33

## 2021-06-14 RX ADMIN — IPRATROPIUM BROMIDE AND ALBUTEROL SULFATE 2 AMPULE: .5; 2.5 SOLUTION RESPIRATORY (INHALATION) at 15:03

## 2021-06-14 RX ADMIN — IOPAMIDOL 75 ML: 755 INJECTION, SOLUTION INTRAVENOUS at 17:04

## 2021-06-14 RX ADMIN — Medication 10 ML: at 20:33

## 2021-06-14 RX ADMIN — METHYLPREDNISOLONE SODIUM SUCCINATE 125 MG: 125 INJECTION, POWDER, FOR SOLUTION INTRAMUSCULAR; INTRAVENOUS at 16:30

## 2021-06-14 ASSESSMENT — PAIN DESCRIPTION - ONSET: ONSET: GRADUAL

## 2021-06-14 ASSESSMENT — ENCOUNTER SYMPTOMS
VOMITING: 1
DIARRHEA: 0
SHORTNESS OF BREATH: 1
BACK PAIN: 0
COLOR CHANGE: 0
NAUSEA: 0
COUGH: 1
ABDOMINAL PAIN: 0

## 2021-06-14 ASSESSMENT — PAIN DESCRIPTION - FREQUENCY: FREQUENCY: INTERMITTENT

## 2021-06-14 ASSESSMENT — PAIN DESCRIPTION - PROGRESSION: CLINICAL_PROGRESSION: NOT CHANGED

## 2021-06-14 ASSESSMENT — PAIN SCALES - GENERAL
PAINLEVEL_OUTOF10: 0
PAINLEVEL_OUTOF10: 4
PAINLEVEL_OUTOF10: 0

## 2021-06-14 ASSESSMENT — PAIN DESCRIPTION - PAIN TYPE: TYPE: ACUTE PAIN;CHRONIC PAIN

## 2021-06-14 ASSESSMENT — PAIN DESCRIPTION - DESCRIPTORS: DESCRIPTORS: ACHING

## 2021-06-14 ASSESSMENT — PAIN DESCRIPTION - LOCATION: LOCATION: GENERALIZED

## 2021-06-14 ASSESSMENT — PAIN - FUNCTIONAL ASSESSMENT: PAIN_FUNCTIONAL_ASSESSMENT: ACTIVITIES ARE NOT PREVENTED

## 2021-06-14 ASSESSMENT — HEART SCORE: ECG: 0

## 2021-06-14 NOTE — ED NOTES
Pt to ED with c/o chest pain that started at 11:00am this morning. EMS gave pt 324 asa prior to ED arrival. Pain subsided at current time.        Damaso Szymanski RN  06/14/21 5224

## 2021-06-14 NOTE — ED NOTES
Bed: 28  Expected date: 6/14/21  Expected time:   Means of arrival: [facility]  Comments:  77 yo F, Chest pain     Michelle Roth RN  06/14/21 3750

## 2021-06-14 NOTE — PROGRESS NOTES
4 Eyes Skin Assessment     NAME:  Ananda Mayen  YOB: 1945  MEDICAL RECORD NUMBER:  [id number]    The patient is being assess for  Admission    I agree that 2 RN's have performed a thorough Head to Toe Skin Assessment on the patient. ALL assessment sites listed below have been assessed. Areas assessed by both nurses:    Head, Face, Ears, Shoulders, Back, Chest, Arms, Elbows, Hands, Sacrum. Buttock, Coccyx, Ischium and Legs. Feet and Heels        Does the Patient have a Wound?  No noted wound(s)       Chato Prevention initiated:  Yes   Wound Care Orders initiated:  NA    Pressure Injury (Stage 3,4, Unstageable, DTI, NWPT, and Complex wounds) if present place consult order under [de-identified] NA    New and Established Ostomies if present place consult order under : NA      Nurse 1 eSignature: Electronically signed by Lena Kapoor RN on 6/14/21 at 7:18 PM EDT    **SHARE this note so that the co-signing nurse is able to place an eSignature**    Nurse 2 eSignature: {Esignature:[id number]}

## 2021-06-14 NOTE — ED PROVIDER NOTES
[facility]        Pt Name: Symone Acosta  MRN: [id number]  [place] 1945  Date of evaluation: 6/14/2021  Provider: ABIGAIL Tim - FIDELIA  PCP: Krysten Banks MD  Note Started: 2:47 PM EDT        I have seen and evaluated this patient with my supervising physician Vickie Miles MD.    [address] [place] [address]       Chief Complaint   Patient presents with    Chest Pain     chest5 pain onset this am at 1100. ptr states paint has subsided at present. HISTORY OF PRESENT ILLNESS   (Location, Timing/Onset, Context/Setting, Quality, Duration, Modifying Factors, Severity, Associated Signs and Symptoms)  Note limiting factors. Symone Acosta is a 78 y.o. female with PMH significant for HLD, HTN, Parkinson's disease, COPD, cigarette smoking, DANIELLE, gastric ulcer, and TIA who presents to the emergency department today via EMS from home complaining of chest pain. Onset was shortly prior to arrival.  She advised that it lasted about 2 hours ago, and resolved in route to the ER. She was given aspirin by squad but no nitro. It was midsternal and did not radiate. No recent illness or injury. She advised that she has a chronic cough and chronic shortness of breath from cigarette smoking. She also advised that she vomits every day and has for years. She denies abdominal pain. She reports normal bowel movements. No fevers. Patient advised that she does not ambulate and stays in bed all day. Nursing Notes were all reviewed and agreed with or any disagreements were addressed in the HPI. REVIEW OF SYSTEMS    (2-9 systems for level 4, 10 or more for level 5)     Review of Systems   Constitutional: Negative for chills, diaphoresis, fatigue and fever. HENT: Negative. Respiratory: Positive for cough and shortness of breath (chronic). Cardiovascular: Positive for chest pain (resolved).  Negative for palpitations and leg swelling. Gastrointestinal: Positive for vomiting (daily). Negative for abdominal pain, diarrhea and nausea. Genitourinary: Negative for dysuria, flank pain and hematuria. Musculoskeletal: Negative for back pain, neck pain and neck stiffness. Skin: Negative for color change, pallor and rash. Allergic/Immunologic: Negative for immunocompromised state. Neurological: Negative for dizziness, syncope, weakness, numbness and headaches. Hematological: Negative for adenopathy. Psychiatric/Behavioral: Negative for confusion. All other systems reviewed and are negative. Positives and Pertinent negatives as per HPI. Except as noted above in the ROS, all other systems were reviewed and negative.        PAST MEDICAL HISTORY     Past Medical History:   Diagnosis Date    Anemia     Arthritis     Clostridium difficile colitis     December 2018    Clostridium difficile diarrhea 10/27/2019    Colostomy in place [facility])     SBO/diverticulosis     COPD (chronic obstructive pulmonary disease) (HCC)     Depression     DANIELLE (generalized anxiety disorder)     Gastric ulcer, unspecified as acute or chronic, without mention of hemorrhage, perforation, or obstruction     H/O ETOH abuse     Hiatal hernia     History of blood transfusion     Hyperlipidemia     Hypertension     Insomnia     Intestinal obstruction ([address]     Parkinson's disease ([facility]     Tobacco abuse     Vitamin D deficiency          SURGICAL HISTORY     Past Surgical History:   Procedure Laterality Date    ABDOMEN SURGERY      CHOLECYSTECTOMY      COLONOSCOPY  12/14/2018    COLONOSCOPY N/A 12/14/2018    COLONOSCOPY POLYPECTOMY SNARE/COLD BIOPSY performed by Evelyn Grissom MD at [address] [place] [address]  2018    ENDOSCOPY, COLON, DIAGNOSTIC      HYSTERECTOMY      SMALL INTESTINE SURGERY N/A 4/10/2019    LAPAROSCOPIC LYSISI OF ADHESIONS FOR GREATER THAN 3 HOURS,LAPORSCOPIC CONVERTED TO OPEN COLOSTOMY REVERSAL, SMALL BOWEL RESECTION performed by Charles Zayas MD at [address] ENDOSCOPY  12/13/2018    with biopsies    UPPER GASTROINTESTINAL ENDOSCOPY N/A 12/13/2018    EGD BIOPSY performed by James Carballo MD at [address]       Previous Medications    ACETAMINOPHEN (TYLENOL) 500 MG TABLET    Take 1 tablet by mouth 4 times daily as needed for Pain    ALBUTEROL SULFATE HFA (PROAIR HFA) 108 (90 BASE) MCG/ACT INHALER    Inhale 2 puffs into the lungs every 6 hours as needed for Wheezing    ATORVASTATIN (LIPITOR) 20 MG TABLET    TAKE 1 TABLET BY MOUTH EVERY DAY    DEXLANSOPRAZOLE (DEXILANT) 60 MG CPDR DELAYED RELEASE CAPSULE    Take 1 capsule by mouth daily    DULOXETINE (CYMBALTA) 30 MG EXTENDED RELEASE CAPSULE    TAKE ONE CAPSULE BY MOUTH DAILY    DULOXETINE (CYMBALTA) 30 MG EXTENDED RELEASE CAPSULE    Take 1 capsule by mouth daily    FERROUS SULFATE 325 (65 FE) MG TABLET    Take 1 tablet by mouth daily (with breakfast)    LACTOBACILLUS (CULTURELLE) CAPSULE    Take 1 capsule by mouth daily (with breakfast)    METOPROLOL SUCCINATE (TOPROL XL) 25 MG EXTENDED RELEASE TABLET    Take 1 tablet by mouth daily    NICOTINE (NICODERM CQ) 21 MG/24HR    Place 1 patch onto the skin every 24 hours    ONDANSETRON (ZOFRAN ODT) 4 MG DISINTEGRATING TABLET    Take 1-2 tablets by mouth every 12 hours as needed for Nausea May Sub regular tablet (non-ODT) if insurance does not cover ODT.     PROCHLORPERAZINE (COMPAZINE) 10 MG TABLET    Take 1 tablet by mouth every 6 hours as needed for Nausea    UMECLIDINIUM BROMIDE 62.5 MCG/INH AEPB    Inhale 1 puff into the lungs daily    VITAMIN B-1 100 MG TABLET    Take 1 tablet by mouth daily         ALLERGIES     Aspirin, Fentanyl, and Morphine    FAMILYHISTORY       Family History   Problem Relation Age of Onset    Cancer Mother     Diabetes Mother     Heart Disease Father           SOCIAL HISTORY       Social History Tobacco Use    Smoking status: Current Every Day Smoker     Packs/day: 1.00     Years: 60.00     Pack years: 60.00     Types: Cigarettes     Start date: 1/10/1960    Smokeless tobacco: Never Used   Vaping Use    Vaping Use: Never used   Substance Use Topics    Alcohol use: Yes     Alcohol/week: 4.0 standard drinks     Types: 4 Cans of beer per week     Comment: states drinks beer and whisky daily, unsure of amount    Drug use: No       SCREENINGS      Heart Score for chest pain patients  History: Moderately Suspicious  ECG: Normal  Patient Age: > 65 years  *Risk factors for Atherosclerotic disease: Hypercholesterolemia, Hypertension, Cigarette smoking  Risk Factors: > 3 Risk factors or history of atherosclerotic disease*  Troponin: < 1X normal limit  Heart Score Total: 5      PHYSICAL EXAM    (up to 7 for level 4, 8 or more for level 5)     ED Triage Vitals   BP Temp Temp src Pulse Resp SpO2 Height Weight   -- -- -- -- -- -- -- --       Physical Exam  Vitals and nursing note reviewed. Constitutional:       General: She is not in acute distress. Appearance: Normal appearance. She is well-developed. She is not toxic-appearing. HENT:      Head: Normocephalic and atraumatic. Eyes:      General: No scleral icterus. Conjunctiva/sclera: Conjunctivae normal.   Neck:      Vascular: No JVD. Cardiovascular:      Rate and Rhythm: Regular rhythm. Tachycardia present. Heart sounds: Normal heart sounds. Pulmonary:      Effort: Pulmonary effort is normal. No respiratory distress. Breath sounds: Wheezing present. Abdominal:      General: There is no distension. Palpations: Abdomen is soft. Abdomen is not rigid. Tenderness: There is no abdominal tenderness. There is no guarding or rebound. Musculoskeletal:         General: Normal range of motion. Cervical back: Full passive range of motion without pain and neck supple. No rigidity. Right lower leg: No edema.       Left lower leg: No edema. Skin:     General: Skin is warm and dry. Capillary Refill: Capillary refill takes less than 2 seconds. Findings: No rash. Neurological:      General: No focal deficit present. Mental Status: She is alert and oriented to person, place, and time.    Psychiatric:         Mood and Affect: Mood normal.         DIAGNOSTIC RESULTS   LABS:    Labs Reviewed   CBC WITH AUTO DIFFERENTIAL - Abnormal; Notable for the following components:       Result Value    RBC 3.97 (*)     .0 (*)     MCH 36.1 (*)     RDW 18.0 (*)     All other components within normal limits    Narrative:     Performed at:  [facility] [address]   Phone [phone]   COMPREHENSIVE METABOLIC PANEL W/ REFLEX TO MG FOR LOW K - Abnormal; Notable for the following components:    Glucose 111 (*)     CREATININE <0.5 (*)     Albumin/Globulin Ratio 1.0 (*)     Alkaline Phosphatase 153 (*)     ALT 58 (*)     AST 83 (*)     All other components within normal limits    Narrative:     Performed at:  [facility]  [address] [place], [place] [address]   Phone [phone]   LIPASE - Abnormal; Notable for the following components:    Lipase 84.0 (*)     All other components within normal limits    Narrative:     Performed at:  [facility]  [address] [place], [place] [address]   Phone [phone]   TROPONIN    Narrative:     Performed at:  [facility]  [address] [place], [place] [address]   Phone [phone]   BRAIN NATRIURETIC PEPTIDE    Narrative:     Performed at:  [facility]  [address] [place] [address]   Phone [phone]   MAGNESIUM    Narrative:     Performed at:  [facility]  [address] [facility] [address]   Phone [phone]   URINE RT REFLEX TO CULTURE       All other labs were within normal range or not returned as of this dictation. EKG: All EKG's are interpreted by the Emergency Department Physician in the absence of a cardiologist.  Please see their note for interpretation of EKG. RADIOLOGY:   Non-plain film images such as CT, Ultrasound and MRI are read by the radiologist. Plain radiographic images are visualized and preliminarily interpreted by the ED Provider with the below findings:        Interpretation per the Radiologist below, if available at the time of this note:    CT CHEST PULMONARY EMBOLISM W CONTRAST   Preliminary Result   1. No CT evidence of a pulmonary embolism. 2. Mild consolidative opacity within the lingula, most likely representing   either atelectasis or parenchymal scar. Pneumonia is considered less likely. XR CHEST PORTABLE   Final Result   No acute cardiopulmonary disease. No results found.         PROCEDURES   Unless otherwise noted below, none     Procedures    CRITICAL CARE TIME   N/A    CONSULTS:  None      EMERGENCY DEPARTMENT COURSE and DIFFERENTIAL DIAGNOSIS/MDM:   Vitals:    Vitals:    06/14/21 1515 06/14/21 1519 06/14/21 1630 06/14/21 1700   BP: 128/70 128/70 129/71 (!) 144/78   Pulse: 107 100 102 98   Resp: 19 17 20 19   Temp:  97.9 °F (36.6 °C)     TempSrc:  Oral     SpO2: 95% 95% 94% 97%   Weight:  123 lb 10.9 oz (56.1 kg)     Height:  5' 2\" (1.575 m)         Patient was given the following medications:  Medications   methylPREDNISolone sodium (SOLU-MEDROL) injection 125 mg (125 mg Intravenous Given 6/14/21 1630)   ipratropium-albuterol (DUONEB) nebulizer solution 2 ampule (2 ampules Inhalation Given 6/14/21 1503)   iopamidol (ISOVUE-370) 76 % injection 75 mL (75 mLs Intravenous Given 6/14/21 1704)           Differential Diagnosis: Acute Coronary Syndrome, Congestive Heart Failure, Thoracic Dissection, Pericarditis, Pericardial Effusion, Pulmonary Embolism, Pneumonia, Pneumothorax, Ischemic Bowel, Bowel Obstruction, PUD, GERD, Acute Cholecystitis, Pancreatitis, Hepatitis, Colitis, other    Patient presents with chest pain. See HPI for full presentation. Physical exam as above. 78year-old lying in bed in no acute distress. Awake alert and oriented. No swelling and extremities. Expiratory wheezing on exam. Cardiac exam reveals tachycardia. CTA pulmonary shows no PE or acute pulmonary abnormality. . Troponin negative. CBC normal. Chemistry shows elevated LFTs with no electrolyte abnormality or renal dysfunction. Emergency department course included breathing treatments and steroids. Patient resting comfortably. Heart score 5 with no recent provocative testing. Will be admitted for further work-up. She was given all test results and given an opportunity to ask and have any questions answered. She was agreeable to admission. The hospitalist was consulted and agreed to admit patient and write orders. The patient tolerated their visit well. They were seen and evaluated by the attending physician, Skip London MD who agreed with the assessment and plan. The patient and / or the family were informed of the results of any tests, a time was given to answer questions, a plan was proposed and they agreed with plan. FINAL IMPRESSION      1. Chest pain, unspecified type          DISPOSITION/PLAN   DISPOSITION Admitted 06/14/2021 05:31:23 PM      PATIENT REFERRED TO:  No follow-up provider specified.     DISCHARGE MEDICATIONS:  New Prescriptions    No medications on file       DISCONTINUED MEDICATIONS:  Discontinued Medications    No medications on file              (Please note that portions of this note were completed with a voice recognition program.  Efforts were made to edit the dictations but occasionally words are mis-transcribed.)    ABIGAIL Lara CNP (electronically signed)            ABIGAIL Lara CNP  06/14/21 [id number]

## 2021-06-14 NOTE — ED NOTES
ED SBAR report provider to [facility]. Patient to be transported to Room 5106 via stretcher by transport tech. Patient transported with bedside cardiac monitor and with IV medications infusing. IV site clean, dry, and intact. MEWS score and pain assessed and documented. Updated patient on plan of care.      Brain Gibson RN  06/14/21 1667

## 2021-06-14 NOTE — ED PROVIDER NOTES
Attending Supervisory Note/Shared Visit   I have personally performed a face to face diagnostic evaluation on this patient. I have reviewed the mid-levels findings and agree. History and Exam by me shows alert white female no acute distress. Patient had an episode of midsternal chest pain which she describes as feeling like someone stepping on her chest.  It lasted about 2 hours. It resolved by the time we saw her. She states she has never had pain like this before. Patient is a smoker. She has COPD. Change in her breathing. Heart: Regular rate and rhythm. No murmurs or gallops noted. Lungs: Breath sounds equal bilaterally with rare expiratory wheeze. No retractions or accessory muscle use. Abdomen: Soft, nondistended, nontender. Musculoskeletal: No lower extremity edema. No calf tenderness. EKG: Sinus tachycardia, rate of 107, anterior ischemic changes. Rhythm strip shows a sinus tachycardia with a rate of 107, SC interval 160 ms, QRS of 82 ms with no other ectopy as interpreted by me. This compared to an EKG dated 4/17/2021, the changes in the anterior precordial leads are more pronounced on today's tracing than they were in the previous tracing in April. Chest x-ray: No acute cardiopulmonary disease. Lab reviewed. H&H are normal.  White blood count 8000 with 72 neutrophils 19 lymphs. Electrolytes BUN and creatinine are normal.  ALT of 58 with an AST of 83. Bilirubin is normal.  Lipase of 84. Troponin less than 0.01. BNP of 235. CT chest pulmonary embolism: No evidence of pulmonary embolism. Mild consolidative opacity within the lingula most likely representing either atelectasis or scarring. Patient presented with chest pain suspicious for cardiac pain. Her work-up here is unremarkable. She will require admission for further evaluation. The hospitalist was consulted for admission.     (Please note that portions of this note were completed with a voice recognition program. Efforts were made to edit the dictations but occasionally words are mis-transcribed.)    Manuel Vogel MD  Attending Emergency Physician        Dimitry Geronimo MD  06/14/21 8028

## 2021-06-14 NOTE — PROGRESS NOTES
Patient arrived to room 5106 via stretcher. Alert and oriented x 4. Oriented to room and call light. Telemetry box 54 applied and verified NSR with CMU. Call light and bedside table in reach. Bed alarm on.   Electronically signed by Trey Ahuja RN on 6/14/2021 at 6:40 PM

## 2021-06-15 PROBLEM — K70.9 ALCOHOLIC LIVER DISEASE (HCC): Status: ACTIVE | Noted: 2021-06-15

## 2021-06-15 LAB
ALBUMIN SERPL-MCNC: 3.3 G/DL (ref 3.4–5)
ALP BLD-CCNC: 145 U/L (ref 40–129)
ALT SERPL-CCNC: 47 U/L (ref 10–40)
ANION GAP SERPL CALCULATED.3IONS-SCNC: 14 MMOL/L (ref 3–16)
AST SERPL-CCNC: 54 U/L (ref 15–37)
BASOPHILS ABSOLUTE: 0 K/UL (ref 0–0.2)
BASOPHILS RELATIVE PERCENT: 0.3 %
BILIRUB SERPL-MCNC: 0.4 MG/DL (ref 0–1)
BILIRUBIN DIRECT: <0.2 MG/DL (ref 0–0.3)
BILIRUBIN, INDIRECT: ABNORMAL MG/DL (ref 0–1)
BUN BLDV-MCNC: 10 MG/DL (ref 7–20)
CALCIUM SERPL-MCNC: 9.4 MG/DL (ref 8.3–10.6)
CHLORIDE BLD-SCNC: 99 MMOL/L (ref 99–110)
CO2: 24 MMOL/L (ref 21–32)
CREAT SERPL-MCNC: 0.5 MG/DL (ref 0.6–1.2)
EKG ATRIAL RATE: 92 BPM
EKG DIAGNOSIS: NORMAL
EKG P AXIS: 24 DEGREES
EKG P-R INTERVAL: 170 MS
EKG Q-T INTERVAL: 426 MS
EKG QRS DURATION: 82 MS
EKG QTC CALCULATION (BAZETT): 526 MS
EKG R AXIS: 44 DEGREES
EKG T AXIS: 61 DEGREES
EKG VENTRICULAR RATE: 92 BPM
EOSINOPHILS ABSOLUTE: 0 K/UL (ref 0–0.6)
EOSINOPHILS RELATIVE PERCENT: 0 %
GFR AFRICAN AMERICAN: >60
GFR NON-AFRICAN AMERICAN: >60
GLUCOSE BLD-MCNC: 185 MG/DL (ref 70–99)
HCT VFR BLD CALC: 39 % (ref 36–48)
HEMOGLOBIN: 13.4 G/DL (ref 12–16)
LIPASE: 155 U/L (ref 13–60)
LIPASE: 49 U/L (ref 13–60)
LV EF: 90 %
LVEF MODALITY: NORMAL
LYMPHOCYTES ABSOLUTE: 0.6 K/UL (ref 1–5.1)
LYMPHOCYTES RELATIVE PERCENT: 14 %
MCH RBC QN AUTO: 36.1 PG (ref 26–34)
MCHC RBC AUTO-ENTMCNC: 34.4 G/DL (ref 31–36)
MCV RBC AUTO: 105 FL (ref 80–100)
MONOCYTES ABSOLUTE: 0 K/UL (ref 0–1.3)
MONOCYTES RELATIVE PERCENT: 1 %
NEUTROPHILS ABSOLUTE: 3.8 K/UL (ref 1.7–7.7)
NEUTROPHILS RELATIVE PERCENT: 84.7 %
PDW BLD-RTO: 17.7 % (ref 12.4–15.4)
PLATELET # BLD: 249 K/UL (ref 135–450)
PMV BLD AUTO: 7.7 FL (ref 5–10.5)
POTASSIUM REFLEX MAGNESIUM: 3.9 MMOL/L (ref 3.5–5.1)
RBC # BLD: 3.71 M/UL (ref 4–5.2)
SODIUM BLD-SCNC: 137 MMOL/L (ref 136–145)
TOTAL PROTEIN: 6.5 G/DL (ref 6.4–8.2)
TROPONIN: <0.01 NG/ML
WBC # BLD: 4.5 K/UL (ref 4–11)

## 2021-06-15 PROCEDURE — 6370000000 HC RX 637 (ALT 250 FOR IP): Performed by: INTERNAL MEDICINE

## 2021-06-15 PROCEDURE — 6360000002 HC RX W HCPCS: Performed by: HOSPITALIST

## 2021-06-15 PROCEDURE — A9502 TC99M TETROFOSMIN: HCPCS | Performed by: HOSPITALIST

## 2021-06-15 PROCEDURE — 80076 HEPATIC FUNCTION PANEL: CPT

## 2021-06-15 PROCEDURE — 83690 ASSAY OF LIPASE: CPT

## 2021-06-15 PROCEDURE — 94640 AIRWAY INHALATION TREATMENT: CPT

## 2021-06-15 PROCEDURE — 3430000000 HC RX DIAGNOSTIC RADIOPHARMACEUTICAL: Performed by: INTERNAL MEDICINE

## 2021-06-15 PROCEDURE — 93010 ELECTROCARDIOGRAM REPORT: CPT | Performed by: INTERNAL MEDICINE

## 2021-06-15 PROCEDURE — 78452 HT MUSCLE IMAGE SPECT MULT: CPT

## 2021-06-15 PROCEDURE — 6370000000 HC RX 637 (ALT 250 FOR IP): Performed by: NURSE PRACTITIONER

## 2021-06-15 PROCEDURE — 2580000003 HC RX 258: Performed by: HOSPITALIST

## 2021-06-15 PROCEDURE — 6370000000 HC RX 637 (ALT 250 FOR IP): Performed by: HOSPITALIST

## 2021-06-15 PROCEDURE — 93017 CV STRESS TEST TRACING ONLY: CPT

## 2021-06-15 PROCEDURE — A9502 TC99M TETROFOSMIN: HCPCS | Performed by: INTERNAL MEDICINE

## 2021-06-15 PROCEDURE — 85025 COMPLETE CBC W/AUTO DIFF WBC: CPT

## 2021-06-15 PROCEDURE — 94760 N-INVAS EAR/PLS OXIMETRY 1: CPT

## 2021-06-15 PROCEDURE — 80048 BASIC METABOLIC PNL TOTAL CA: CPT

## 2021-06-15 PROCEDURE — 36415 COLL VENOUS BLD VENIPUNCTURE: CPT

## 2021-06-15 PROCEDURE — 93005 ELECTROCARDIOGRAM TRACING: CPT | Performed by: INTERNAL MEDICINE

## 2021-06-15 PROCEDURE — 1200000000 HC SEMI PRIVATE

## 2021-06-15 PROCEDURE — 3430000000 HC RX DIAGNOSTIC RADIOPHARMACEUTICAL: Performed by: HOSPITALIST

## 2021-06-15 PROCEDURE — 84484 ASSAY OF TROPONIN QUANT: CPT

## 2021-06-15 PROCEDURE — 2580000003 HC RX 258: Performed by: INTERNAL MEDICINE

## 2021-06-15 RX ORDER — SODIUM CHLORIDE 9 MG/ML
25 INJECTION, SOLUTION INTRAVENOUS PRN
Status: DISCONTINUED | OUTPATIENT
Start: 2021-06-15 | End: 2021-06-23 | Stop reason: HOSPADM

## 2021-06-15 RX ORDER — IBUPROFEN 200 MG
200 TABLET ORAL ONCE
Status: COMPLETED | OUTPATIENT
Start: 2021-06-15 | End: 2021-06-15

## 2021-06-15 RX ORDER — LANOLIN ALCOHOL/MO/W.PET/CERES
6 CREAM (GRAM) TOPICAL NIGHTLY PRN
Status: DISCONTINUED | OUTPATIENT
Start: 2021-06-15 | End: 2021-06-23 | Stop reason: HOSPADM

## 2021-06-15 RX ORDER — SODIUM CHLORIDE 0.9 % (FLUSH) 0.9 %
5-40 SYRINGE (ML) INJECTION PRN
Status: DISCONTINUED | OUTPATIENT
Start: 2021-06-15 | End: 2021-06-23 | Stop reason: HOSPADM

## 2021-06-15 RX ORDER — SODIUM CHLORIDE 0.9 % (FLUSH) 0.9 %
5-40 SYRINGE (ML) INJECTION EVERY 12 HOURS SCHEDULED
Status: DISCONTINUED | OUTPATIENT
Start: 2021-06-15 | End: 2021-06-23 | Stop reason: HOSPADM

## 2021-06-15 RX ORDER — DOCUSATE SODIUM 100 MG/1
100 CAPSULE, LIQUID FILLED ORAL ONCE
Status: COMPLETED | OUTPATIENT
Start: 2021-06-15 | End: 2021-06-15

## 2021-06-15 RX ORDER — PANTOPRAZOLE SODIUM 40 MG/1
40 TABLET, DELAYED RELEASE ORAL
Status: DISCONTINUED | OUTPATIENT
Start: 2021-06-15 | End: 2021-06-23 | Stop reason: HOSPADM

## 2021-06-15 RX ORDER — MULTIVITAMIN WITH IRON
1 TABLET ORAL DAILY
Status: DISCONTINUED | OUTPATIENT
Start: 2021-06-15 | End: 2021-06-23 | Stop reason: HOSPADM

## 2021-06-15 RX ORDER — FOLIC ACID 1 MG/1
1 TABLET ORAL DAILY
Status: DISCONTINUED | OUTPATIENT
Start: 2021-06-15 | End: 2021-06-23 | Stop reason: HOSPADM

## 2021-06-15 RX ADMIN — Medication 10 ML: at 13:07

## 2021-06-15 RX ADMIN — Medication 6 MG: at 20:43

## 2021-06-15 RX ADMIN — DULOXETINE HYDROCHLORIDE 30 MG: 30 CAPSULE, DELAYED RELEASE ORAL at 13:01

## 2021-06-15 RX ADMIN — ENOXAPARIN SODIUM 40 MG: 40 INJECTION SUBCUTANEOUS at 13:04

## 2021-06-15 RX ADMIN — SODIUM CHLORIDE, PRESERVATIVE FREE 10 ML: 5 INJECTION INTRAVENOUS at 20:44

## 2021-06-15 RX ADMIN — FERROUS SULFATE TAB EC 324 MG (65 MG FE EQUIVALENT) 325 MG: 324 (65 FE) TABLET DELAYED RESPONSE at 13:01

## 2021-06-15 RX ADMIN — ONDANSETRON 4 MG: 2 INJECTION INTRAMUSCULAR; INTRAVENOUS at 17:17

## 2021-06-15 RX ADMIN — PANTOPRAZOLE SODIUM 40 MG: 40 TABLET, DELAYED RELEASE ORAL at 16:48

## 2021-06-15 RX ADMIN — TETROFOSMIN 10 MILLICURIE: 1.38 INJECTION, POWDER, LYOPHILIZED, FOR SOLUTION INTRAVENOUS at 07:25

## 2021-06-15 RX ADMIN — POLYETHYLENE GLYCOL 3350 17 G: 17 POWDER, FOR SOLUTION ORAL at 17:02

## 2021-06-15 RX ADMIN — TETROFOSMIN 30 MILLICURIE: 1.38 INJECTION, POWDER, LYOPHILIZED, FOR SOLUTION INTRAVENOUS at 10:48

## 2021-06-15 RX ADMIN — PROCHLORPERAZINE MALEATE 10 MG: 5 TABLET ORAL at 20:43

## 2021-06-15 RX ADMIN — ACETAMINOPHEN 650 MG: 325 TABLET ORAL at 16:57

## 2021-06-15 RX ADMIN — TIOTROPIUM BROMIDE INHALATION SPRAY 2 PUFF: 3.12 SPRAY, METERED RESPIRATORY (INHALATION) at 08:12

## 2021-06-15 RX ADMIN — Medication 1 CAPSULE: at 13:00

## 2021-06-15 RX ADMIN — Medication 100 MG: at 13:00

## 2021-06-15 RX ADMIN — FOLIC ACID 1 MG: 1 TABLET ORAL at 14:12

## 2021-06-15 RX ADMIN — THERA TABS 1 TABLET: TAB at 14:12

## 2021-06-15 RX ADMIN — ATORVASTATIN CALCIUM 20 MG: 20 TABLET, FILM COATED ORAL at 13:00

## 2021-06-15 RX ADMIN — REGADENOSON 0.4 MG: 0.08 INJECTION, SOLUTION INTRAVENOUS at 10:46

## 2021-06-15 RX ADMIN — ACETAMINOPHEN 650 MG: 325 TABLET ORAL at 22:58

## 2021-06-15 RX ADMIN — Medication 6 MG: at 00:53

## 2021-06-15 RX ADMIN — IBUPROFEN 200 MG: 200 TABLET, FILM COATED ORAL at 12:58

## 2021-06-15 RX ADMIN — DOCUSATE SODIUM 100 MG: 100 CAPSULE, LIQUID FILLED ORAL at 20:43

## 2021-06-15 ASSESSMENT — PAIN DESCRIPTION - LOCATION
LOCATION: ABDOMEN
LOCATION: ABDOMEN
LOCATION: CHEST

## 2021-06-15 ASSESSMENT — PAIN DESCRIPTION - ONSET
ONSET: ON-GOING
ONSET: PROGRESSIVE

## 2021-06-15 ASSESSMENT — PAIN - FUNCTIONAL ASSESSMENT
PAIN_FUNCTIONAL_ASSESSMENT: PREVENTS OR INTERFERES SOME ACTIVE ACTIVITIES AND ADLS
PAIN_FUNCTIONAL_ASSESSMENT: ACTIVITIES ARE NOT PREVENTED
PAIN_FUNCTIONAL_ASSESSMENT: ACTIVITIES ARE NOT PREVENTED

## 2021-06-15 ASSESSMENT — PAIN SCALES - GENERAL
PAINLEVEL_OUTOF10: 9
PAINLEVEL_OUTOF10: 7
PAINLEVEL_OUTOF10: 3
PAINLEVEL_OUTOF10: 3
PAINLEVEL_OUTOF10: 9
PAINLEVEL_OUTOF10: 3

## 2021-06-15 ASSESSMENT — PAIN DESCRIPTION - DESCRIPTORS
DESCRIPTORS: DISCOMFORT
DESCRIPTORS: SHARP
DESCRIPTORS: SHARP

## 2021-06-15 ASSESSMENT — PAIN DESCRIPTION - ORIENTATION
ORIENTATION: RIGHT;UPPER
ORIENTATION: RIGHT;UPPER

## 2021-06-15 ASSESSMENT — PAIN DESCRIPTION - PAIN TYPE
TYPE: ACUTE PAIN

## 2021-06-15 ASSESSMENT — PAIN DESCRIPTION - FREQUENCY
FREQUENCY: CONTINUOUS
FREQUENCY: INTERMITTENT
FREQUENCY: CONTINUOUS

## 2021-06-15 ASSESSMENT — PAIN DESCRIPTION - PROGRESSION
CLINICAL_PROGRESSION: GRADUALLY WORSENING
CLINICAL_PROGRESSION: NOT CHANGED
CLINICAL_PROGRESSION: GRADUALLY WORSENING

## 2021-06-15 NOTE — PROGRESS NOTES
Patient stated that she started drinking alcohol (bourbon), about a pint each night, to help her sleep because Melatonin is not working for her any more. Patient also does not want to go back to her home after being discharged. When asked where she would want to go to and patient stated that she had nowhere to go to because she is on limited income.

## 2021-06-15 NOTE — FLOWSHEET NOTE
06/15/21 1334   Encounter Summary   Services provided to: Patient   Referral/Consult From: Nurse   Wallace Alarcon  (Pt mentioned she has a son. )   Continue Visiting   (AD, Pt not interested. Prayer provided.  6/15)   Complexity of Encounter Low   Length of Encounter 15 minutes   Spiritual/Restorationism   Type Spiritual support   Assessment Calm; Approachable   Intervention Active listening;Prayer;Sustaining presence/ Ministry of presence   Outcome Connection/belonging;Engaged in conversation;Expressed feelings/needs/concerns;Coping;Receptive   Advance Directives (For Healthcare)   Healthcare Directive No, patient does not have an advance directive for healthcare treatment   Information on [place] Requested No   Patient Requests Assistance No   Advance Directives Pt. not interested at this time     See ACP note for details. Electronically signed by Armida Tilley on 6/15/2021 at 1:37 PM    Advance Care Planning     Advance Care Planning Inpatient Note  Spiritual Care Department    Today's Date: 6/15/2021  Unit: [facility] PROGRESSIVE CARE    Received request from IDT Member. Upon review of chart and communication with care team, patient's decision making abilities are not in question. Patientwas/were present in the room during visit. Goals of ACP Conversation:  Discuss advance care planning documents    Assessment:   self-initiated this visit w/the pt per spiritual care consult for AD's. Pt engaged easily w/ at this time and expressed that she was not interested in reviewing AD documentation at this time. Pt requested prayer for her on-going comfort and health while she is in the hospital.   prayed in accordance w/the pt's wishes. Pt shared that she has a son, but did not elaborate on their relationship at this time. No other needs were immediately expressed by the pt at this time.   Should needs arise, please contact spiritual care services. Interventions:  Encouraged ongoing ACP conversation with future decision makers and loved ones.   Deferred conversation as patient not interested in completing an advance directive at this time    Outcomes/Plan:  ACP Discussion: Refused    Electronically signed by Mark Anthony Garcia on 6/15/2021 at 1:37 PM

## 2021-06-15 NOTE — PLAN OF CARE
Problem: Pain:  Description: Pain management should include both nonpharmacologic and pharmacologic interventions.   Goal: Pain level will decrease  Description: Pain level will decrease  6/15/2021 1100 by Gisele Fuentes RN  Outcome: Ongoing    Goal: Control of acute pain  Description: Control of acute pain  6/15/2021 1100 by Gisele Fuentes RN  Outcome: Ongoing       Problem: Skin Integrity:  Goal: Will show no infection signs and symptoms  Description: Will show no infection signs and symptoms  6/15/2021 1100 by Gisele Fuentes RN  Outcome: Ongoing    Goal: Absence of new skin breakdown  Description: Absence of new skin breakdown  6/15/2021 1100 by Gisele Fuentes RN  Outcome: Ongoing       Problem: Falls - Risk of:  Goal: Will remain free from falls  Description: Will remain free from falls  6/15/2021 1100 by Gisele Fuentes RN  Outcome: Ongoing    Goal: Absence of physical injury  Description: Absence of physical injury  6/15/2021 1100 by Gisele Fuentes RN  Outcome: Ongoing

## 2021-06-15 NOTE — H&P
Hospital Medicine History & Physical      PCP: Martell Polanco MD    Date of Admission: 6/14/2021    Date of Service: Pt seen/examined on 6/14/2021 and Placed in Observation. Chief Complaint:  Chest pain      History Of Present Illness:       78 y.o. female presents with chest pain, onset at 100 am, while at rest. Pain was across precordial, sharp, with radiation to her back. She denies palpitations, lightheadedness dizziness, fever, or chills and denies change in her chronic cough or daily emesis. Chest pain resolved with asa given per squad.     Past Medical History:          Diagnosis Date    Anemia     Arthritis     Clostridium difficile colitis     December 2018    Clostridium difficile diarrhea 10/27/2019    Colostomy in place [facility])     SBO/diverticulosis     COPD (chronic obstructive pulmonary disease) (HCC)     Depression     DANIELLE (generalized anxiety disorder)     Gastric ulcer, unspecified as acute or chronic, without mention of hemorrhage, perforation, or obstruction     H/O ETOH abuse     Hiatal hernia     History of blood transfusion     Hyperlipidemia     Hypertension     Insomnia     Intestinal obstruction ([address]     Parkinson's disease [address] [address]     Tobacco abuse     Vitamin D deficiency        Past Surgical History:          Procedure Laterality Date    ABDOMEN SURGERY      CHOLECYSTECTOMY      COLONOSCOPY  12/14/2018    COLONOSCOPY N/A 12/14/2018    COLONOSCOPY POLYPECTOMY SNARE/COLD BIOPSY performed by Sidra Hugo MD at [address]  2018    ENDOSCOPY, COLON, DIAGNOSTIC      HYSTERECTOMY      SMALL INTESTINE SURGERY N/A 4/10/2019    LAPAROSCOPIC LYSISI OF ADHESIONS FOR GREATER THAN 3 HOURS,LAPORSCOPIC CONVERTED TO OPEN COLOSTOMY REVERSAL, SMALL BOWEL RESECTION performed by Paul Gilmore MD at [address] ENDOSCOPY  12/13/2018    with biopsies    UPPER GASTROINTESTINAL ENDOSCOPY N/A 12/13/2018    EGD BIOPSY performed by Sade Nunez MD at [facility]       Medications Prior to Admission:      Prior to Admission medications    Medication Sig Start Date End Date Taking? Authorizing Provider   acetaminophen (TYLENOL) 500 MG tablet Take 1 tablet by mouth 4 times daily as needed for Pain 6/7/21  Yes XIOMARA Blanco   ondansetron (ZOFRAN ODT) 4 MG disintegrating tablet Take 1-2 tablets by mouth every 12 hours as needed for Nausea May Sub regular tablet (non-ODT) if insurance does not cover ODT.  4/17/21  Yes ABIGAIL Whatley CNP   DULoxetine (CYMBALTA) 30 MG extended release capsule Take 1 capsule by mouth daily 12/19/19  Yes Eliazar EveryIsidrobama   dexlansoprazole [facility]) 60 MG CPDR delayed release capsule Take 1 capsule by mouth daily 12/19/19  Yes Eliazar Every, [place]   lactobacillus (CULTURELLE) capsule Take 1 capsule by mouth daily (with breakfast) 10/30/19  Yes Nichol Landa MD   ferrous sulfate 325 (65 Fe) MG tablet Take 1 tablet by mouth daily (with breakfast) 10/29/19  Yes Nichol Landa MD   prochlorperazine (COMPAZINE) 10 MG tablet Take 1 tablet by mouth every 6 hours as needed for Nausea 9/15/19  Yes Historical Provider, MD   albuterol sulfate HFA (PROAIR HFA) 108 (90 Base) MCG/ACT inhaler Inhale 2 puffs into the lungs every 6 hours as needed for Wheezing 7/1/19  Yes ABIGAIL Rios CNP   Umeclidinium Bromide 62.5 MCG/INH AEPB Inhale 1 puff into the lungs daily 6/28/19  Yes ABIGAIL Rios CNP   atorvastatin (LIPITOR) 20 MG tablet TAKE 1 TABLET BY MOUTH EVERY DAY 6/17/19  Yes ABIGAIL Rios CNP   nicotine (NICODERM CQ) 21 MG/24HR Place 1 patch onto the skin every 24 hours 5/23/19  Yes ABIGAIL Rios CNP   metoprolol succinate (TOPROL XL) 25 MG extended release tablet Take 1 tablet by mouth daily 5/17/19  Yes Cristin Yanez,    vitamin B-1 100 MG tablet Take 1 tablet by mouth daily 12/17/18  Yes Jakub Willoughby MD       Allergies: hours. Recent Labs     06/14/21  1557 06/14/21  1944   TROPONINI <0.01 <0.01       Urinalysis:      Lab Results   Component Value Date    NITRU Negative 04/09/2020    WBCUA >900 12/19/2019    BACTERIA 2+ 12/19/2019    RBCUA 20-50 12/19/2019    BLOODU Negative 04/09/2020    SPECGRAV <=1.005 04/09/2020    GLUCOSEU Negative 04/09/2020       Radiology:          CT CHEST PULMONARY EMBOLISM W CONTRAST   Preliminary Result   1. No CT evidence of a pulmonary embolism. 2. Mild consolidative opacity within the lingula, most likely representing   either atelectasis or parenchymal scar. Pneumonia is considered less likely. XR CHEST PORTABLE   Final Result   No acute cardiopulmonary disease. NM Cardiac Stress Test Nuclear Imaging    (Results Pending)       ASSESSMENT:    Active Hospital Problems    Diagnosis Date Noted    Chest pain [R07.9] 06/14/2021         PLAN:    Chest pain  - serial trop, tele, stress hesham    Tobacco abuse  - nicotine patch    HLD  - continue statin    COPD  - stable on room air    DVT Prophylaxis: lovenox  Diet: Diet NPO  ADULT DIET; Regular  Diet NPO  Code Status: Full Code         Dispo - obs       Kathi Ramos MD    Thank you Fidel Bingham MD for the opportunity to be involved in this patient's care. If you have any questions or concerns please feel free to contact me at [phone].

## 2021-06-16 ENCOUNTER — APPOINTMENT (OUTPATIENT)
Dept: ULTRASOUND IMAGING | Age: 76
DRG: 438 | End: 2021-06-16
Payer: MEDICARE

## 2021-06-16 ENCOUNTER — APPOINTMENT (OUTPATIENT)
Dept: CT IMAGING | Age: 76
DRG: 438 | End: 2021-06-16
Payer: MEDICARE

## 2021-06-16 LAB
A/G RATIO: 1.1 (ref 1.1–2.2)
ALBUMIN SERPL-MCNC: 3.2 G/DL (ref 3.4–5)
ALP BLD-CCNC: 132 U/L (ref 40–129)
ALT SERPL-CCNC: 33 U/L (ref 10–40)
ANION GAP SERPL CALCULATED.3IONS-SCNC: 11 MMOL/L (ref 3–16)
AST SERPL-CCNC: 33 U/L (ref 15–37)
BASOPHILS ABSOLUTE: 0 K/UL (ref 0–0.2)
BASOPHILS RELATIVE PERCENT: 0.4 %
BILIRUB SERPL-MCNC: 0.7 MG/DL (ref 0–1)
BUN BLDV-MCNC: 12 MG/DL (ref 7–20)
CALCIUM SERPL-MCNC: 8.8 MG/DL (ref 8.3–10.6)
CHLORIDE BLD-SCNC: 94 MMOL/L (ref 99–110)
CO2: 27 MMOL/L (ref 21–32)
CREAT SERPL-MCNC: <0.5 MG/DL (ref 0.6–1.2)
EOSINOPHILS ABSOLUTE: 0.1 K/UL (ref 0–0.6)
EOSINOPHILS RELATIVE PERCENT: 0.7 %
GFR AFRICAN AMERICAN: >60
GFR NON-AFRICAN AMERICAN: >60
GLOBULIN: 3 G/DL
GLUCOSE BLD-MCNC: 128 MG/DL (ref 70–99)
HCT VFR BLD CALC: 38 % (ref 36–48)
HCT VFR BLD CALC: 43.2 % (ref 36–48)
HEMOGLOBIN: 12.9 G/DL (ref 12–16)
HEMOGLOBIN: 14.9 G/DL (ref 12–16)
LYMPHOCYTES ABSOLUTE: 0.7 K/UL (ref 1–5.1)
LYMPHOCYTES RELATIVE PERCENT: 10.1 %
MAGNESIUM: 1.8 MG/DL (ref 1.8–2.4)
MCH RBC QN AUTO: 35.8 PG (ref 26–34)
MCHC RBC AUTO-ENTMCNC: 33.9 G/DL (ref 31–36)
MCV RBC AUTO: 105.4 FL (ref 80–100)
MONOCYTES ABSOLUTE: 0.5 K/UL (ref 0–1.3)
MONOCYTES RELATIVE PERCENT: 6.2 %
NEUTROPHILS ABSOLUTE: 6.1 K/UL (ref 1.7–7.7)
NEUTROPHILS RELATIVE PERCENT: 82.6 %
PDW BLD-RTO: 17.9 % (ref 12.4–15.4)
PLATELET # BLD: 266 K/UL (ref 135–450)
PMV BLD AUTO: 7.8 FL (ref 5–10.5)
POTASSIUM SERPL-SCNC: 3.4 MMOL/L (ref 3.5–5.1)
RBC # BLD: 3.61 M/UL (ref 4–5.2)
SODIUM BLD-SCNC: 132 MMOL/L (ref 136–145)
TOTAL PROTEIN: 6.2 G/DL (ref 6.4–8.2)
WBC # BLD: 7.4 K/UL (ref 4–11)

## 2021-06-16 PROCEDURE — 2580000003 HC RX 258: Performed by: HOSPITALIST

## 2021-06-16 PROCEDURE — 6360000002 HC RX W HCPCS: Performed by: NURSE PRACTITIONER

## 2021-06-16 PROCEDURE — 80053 COMPREHEN METABOLIC PANEL: CPT

## 2021-06-16 PROCEDURE — 36415 COLL VENOUS BLD VENIPUNCTURE: CPT

## 2021-06-16 PROCEDURE — 6370000000 HC RX 637 (ALT 250 FOR IP): Performed by: NURSE PRACTITIONER

## 2021-06-16 PROCEDURE — 83735 ASSAY OF MAGNESIUM: CPT

## 2021-06-16 PROCEDURE — 85018 HEMOGLOBIN: CPT

## 2021-06-16 PROCEDURE — 85014 HEMATOCRIT: CPT

## 2021-06-16 PROCEDURE — 6360000002 HC RX W HCPCS: Performed by: HOSPITALIST

## 2021-06-16 PROCEDURE — 6370000000 HC RX 637 (ALT 250 FOR IP): Performed by: HOSPITALIST

## 2021-06-16 PROCEDURE — 1200000000 HC SEMI PRIVATE

## 2021-06-16 PROCEDURE — 6370000000 HC RX 637 (ALT 250 FOR IP): Performed by: INTERNAL MEDICINE

## 2021-06-16 PROCEDURE — 94761 N-INVAS EAR/PLS OXIMETRY MLT: CPT

## 2021-06-16 PROCEDURE — 85025 COMPLETE CBC W/AUTO DIFF WBC: CPT

## 2021-06-16 PROCEDURE — 2580000003 HC RX 258: Performed by: INTERNAL MEDICINE

## 2021-06-16 PROCEDURE — 76705 ECHO EXAM OF ABDOMEN: CPT

## 2021-06-16 PROCEDURE — 94640 AIRWAY INHALATION TREATMENT: CPT

## 2021-06-16 RX ORDER — POTASSIUM CHLORIDE 750 MG/1
40 TABLET, FILM COATED, EXTENDED RELEASE ORAL ONCE
Status: COMPLETED | OUTPATIENT
Start: 2021-06-16 | End: 2021-06-16

## 2021-06-16 RX ORDER — METOPROLOL SUCCINATE 25 MG/1
25 TABLET, EXTENDED RELEASE ORAL DAILY
Status: DISCONTINUED | OUTPATIENT
Start: 2021-06-16 | End: 2021-06-23 | Stop reason: HOSPADM

## 2021-06-16 RX ORDER — LORAZEPAM 1 MG/1
4 TABLET ORAL
Status: DISCONTINUED | OUTPATIENT
Start: 2021-06-16 | End: 2021-06-17

## 2021-06-16 RX ORDER — BISACODYL 10 MG
10 SUPPOSITORY, RECTAL RECTAL ONCE
Status: COMPLETED | OUTPATIENT
Start: 2021-06-16 | End: 2021-06-16

## 2021-06-16 RX ORDER — SENNA PLUS 8.6 MG/1
1 TABLET ORAL NIGHTLY
Status: DISCONTINUED | OUTPATIENT
Start: 2021-06-16 | End: 2021-06-21

## 2021-06-16 RX ORDER — LORAZEPAM 2 MG/ML
1 INJECTION INTRAMUSCULAR
Status: DISCONTINUED | OUTPATIENT
Start: 2021-06-16 | End: 2021-06-17

## 2021-06-16 RX ORDER — LORAZEPAM 2 MG/ML
2 INJECTION INTRAMUSCULAR
Status: DISCONTINUED | OUTPATIENT
Start: 2021-06-16 | End: 2021-06-17

## 2021-06-16 RX ORDER — SODIUM CHLORIDE 9 MG/ML
INJECTION, SOLUTION INTRAVENOUS CONTINUOUS
Status: DISCONTINUED | OUTPATIENT
Start: 2021-06-16 | End: 2021-06-17

## 2021-06-16 RX ORDER — HYDRALAZINE HYDROCHLORIDE 20 MG/ML
5 INJECTION INTRAMUSCULAR; INTRAVENOUS EVERY 6 HOURS PRN
Status: DISCONTINUED | OUTPATIENT
Start: 2021-06-16 | End: 2021-06-23 | Stop reason: HOSPADM

## 2021-06-16 RX ORDER — LORAZEPAM 1 MG/1
2 TABLET ORAL
Status: DISCONTINUED | OUTPATIENT
Start: 2021-06-16 | End: 2021-06-17

## 2021-06-16 RX ORDER — SODIUM CHLORIDE 0.9 % (FLUSH) 0.9 %
5-40 SYRINGE (ML) INJECTION EVERY 12 HOURS SCHEDULED
Status: DISCONTINUED | OUTPATIENT
Start: 2021-06-16 | End: 2021-06-16 | Stop reason: SDUPTHER

## 2021-06-16 RX ORDER — SODIUM CHLORIDE 0.9 % (FLUSH) 0.9 %
5-40 SYRINGE (ML) INJECTION PRN
Status: DISCONTINUED | OUTPATIENT
Start: 2021-06-16 | End: 2021-06-16 | Stop reason: SDUPTHER

## 2021-06-16 RX ORDER — MEPERIDINE HYDROCHLORIDE 25 MG/ML
25 INJECTION INTRAMUSCULAR; INTRAVENOUS; SUBCUTANEOUS ONCE
Status: COMPLETED | OUTPATIENT
Start: 2021-06-16 | End: 2021-06-16

## 2021-06-16 RX ORDER — SODIUM CHLORIDE 9 MG/ML
25 INJECTION, SOLUTION INTRAVENOUS PRN
Status: DISCONTINUED | OUTPATIENT
Start: 2021-06-16 | End: 2021-06-16 | Stop reason: SDUPTHER

## 2021-06-16 RX ORDER — LORAZEPAM 1 MG/1
1 TABLET ORAL
Status: DISCONTINUED | OUTPATIENT
Start: 2021-06-16 | End: 2021-06-17

## 2021-06-16 RX ORDER — LORAZEPAM 2 MG/ML
3 INJECTION INTRAMUSCULAR
Status: DISCONTINUED | OUTPATIENT
Start: 2021-06-16 | End: 2021-06-17

## 2021-06-16 RX ORDER — LORAZEPAM 2 MG/ML
4 INJECTION INTRAMUSCULAR
Status: DISCONTINUED | OUTPATIENT
Start: 2021-06-16 | End: 2021-06-17

## 2021-06-16 RX ORDER — LORAZEPAM 1 MG/1
3 TABLET ORAL
Status: DISCONTINUED | OUTPATIENT
Start: 2021-06-16 | End: 2021-06-17

## 2021-06-16 RX ORDER — LACTULOSE 10 G/15ML
20 SOLUTION ORAL 3 TIMES DAILY PRN
Status: DISCONTINUED | OUTPATIENT
Start: 2021-06-16 | End: 2021-06-23 | Stop reason: HOSPADM

## 2021-06-16 RX ADMIN — LORAZEPAM 2 MG: 2 INJECTION INTRAMUSCULAR; INTRAVENOUS at 03:01

## 2021-06-16 RX ADMIN — Medication 100 MG: at 10:24

## 2021-06-16 RX ADMIN — ACETAMINOPHEN 650 MG: 325 TABLET ORAL at 22:12

## 2021-06-16 RX ADMIN — FERROUS SULFATE TAB EC 324 MG (65 MG FE EQUIVALENT) 325 MG: 324 (65 FE) TABLET DELAYED RESPONSE at 10:24

## 2021-06-16 RX ADMIN — DULOXETINE HYDROCHLORIDE 30 MG: 30 CAPSULE, DELAYED RELEASE ORAL at 10:24

## 2021-06-16 RX ADMIN — FOLIC ACID 1 MG: 1 TABLET ORAL at 10:24

## 2021-06-16 RX ADMIN — MEPERIDINE HYDROCHLORIDE 25 MG: 25 INJECTION INTRAMUSCULAR; INTRAVENOUS; SUBCUTANEOUS at 03:01

## 2021-06-16 RX ADMIN — PANTOPRAZOLE SODIUM 40 MG: 40 TABLET, DELAYED RELEASE ORAL at 16:18

## 2021-06-16 RX ADMIN — LACTULOSE 20 G: 20 SOLUTION ORAL at 10:24

## 2021-06-16 RX ADMIN — Medication 1 CAPSULE: at 10:24

## 2021-06-16 RX ADMIN — SENNOSIDES 8.6 MG: 8.6 TABLET, FILM COATED ORAL at 14:48

## 2021-06-16 RX ADMIN — ENOXAPARIN SODIUM 40 MG: 40 INJECTION SUBCUTANEOUS at 10:21

## 2021-06-16 RX ADMIN — POTASSIUM CHLORIDE 40 MEQ: 750 TABLET, FILM COATED, EXTENDED RELEASE ORAL at 12:13

## 2021-06-16 RX ADMIN — Medication 6 MG: at 22:12

## 2021-06-16 RX ADMIN — ONDANSETRON 4 MG: 2 INJECTION INTRAMUSCULAR; INTRAVENOUS at 18:04

## 2021-06-16 RX ADMIN — THERA TABS 1 TABLET: TAB at 10:24

## 2021-06-16 RX ADMIN — SODIUM CHLORIDE: 9 INJECTION, SOLUTION INTRAVENOUS at 19:04

## 2021-06-16 RX ADMIN — SODIUM CHLORIDE, PRESERVATIVE FREE 10 ML: 5 INJECTION INTRAVENOUS at 10:21

## 2021-06-16 RX ADMIN — ATORVASTATIN CALCIUM 20 MG: 20 TABLET, FILM COATED ORAL at 10:24

## 2021-06-16 RX ADMIN — METOPROLOL SUCCINATE 25 MG: 25 TABLET, EXTENDED RELEASE ORAL at 14:28

## 2021-06-16 RX ADMIN — BISACODYL 10 MG: 10 SUPPOSITORY RECTAL at 14:48

## 2021-06-16 RX ADMIN — TIOTROPIUM BROMIDE INHALATION SPRAY 2 PUFF: 3.12 SPRAY, METERED RESPIRATORY (INHALATION) at 09:01

## 2021-06-16 ASSESSMENT — PAIN DESCRIPTION - PAIN TYPE
TYPE: ACUTE PAIN
TYPE: ACUTE PAIN

## 2021-06-16 ASSESSMENT — PAIN DESCRIPTION - ORIENTATION
ORIENTATION: RIGHT;UPPER
ORIENTATION: UPPER

## 2021-06-16 ASSESSMENT — PAIN SCALES - GENERAL
PAINLEVEL_OUTOF10: 8
PAINLEVEL_OUTOF10: 10
PAINLEVEL_OUTOF10: 0

## 2021-06-16 ASSESSMENT — PAIN DESCRIPTION - PROGRESSION
CLINICAL_PROGRESSION: RAPIDLY WORSENING
CLINICAL_PROGRESSION: GRADUALLY WORSENING

## 2021-06-16 ASSESSMENT — PAIN DESCRIPTION - FREQUENCY
FREQUENCY: CONTINUOUS
FREQUENCY: CONTINUOUS

## 2021-06-16 ASSESSMENT — PAIN DESCRIPTION - LOCATION
LOCATION: ABDOMEN
LOCATION: ABDOMEN

## 2021-06-16 ASSESSMENT — PAIN DESCRIPTION - ONSET
ONSET: ON-GOING
ONSET: ON-GOING

## 2021-06-16 ASSESSMENT — PAIN DESCRIPTION - DESCRIPTORS
DESCRIPTORS: SHARP
DESCRIPTORS: PRESSURE

## 2021-06-16 NOTE — PROGRESS NOTES
Patient vomited large amount of reddish liquid after taking a few bites of the salad and veggies from the dinner tray. Zofran given. MD notified.

## 2021-06-16 NOTE — PROGRESS NOTES
Patient slept all day and would wake up only for medications and water. Nurse asked if patient still experiencing pain and patient stated that she no longer had pain. Patient did vomit twice, one after taking lactulose, and the second time was after taking a few bites of her vegetables (dinner).

## 2021-06-16 NOTE — PLAN OF CARE
Problem: Pain:  Description: Pain management should include both nonpharmacologic and pharmacologic interventions.   Goal: Pain level will decrease  Description: Pain level will decrease  6/16/2021 1219 by Fior Pham RN  Outcome: Ongoing    Goal: Control of acute pain  Description: Control of acute pain  6/16/2021 1219 by Fior Pham RN  Outcome: Ongoing       Problem: Skin Integrity:  Goal: Will show no infection signs and symptoms  Description: Will show no infection signs and symptoms  6/16/2021 1219 by Fior Pham RN  Outcome: Ongoing    Goal: Absence of new skin breakdown  Description: Absence of new skin breakdown  6/16/2021 1219 by Fior Pham RN  Outcome: Ongoing       Problem: Falls - Risk of:  Goal: Will remain free from falls  Description: Will remain free from falls  6/16/2021 1219 by Fior Pham RN  Outcome: Ongoing    Goal: Absence of physical injury  Description: Absence of physical injury  6/16/2021 1219 by Fior Pham RN  Outcome: Ongoing

## 2021-06-16 NOTE — PROGRESS NOTES
Pt scored 12 on CIWA scale. Pt also had increasing severity of RUQ pain, ranked 10/10. NP informed, see new orders. PRN meds given as ordered, see MAR.    Electronically signed by Alysha Whitman RN on 6/16/2021 at 4:19 AM

## 2021-06-16 NOTE — PROGRESS NOTES
Hospitalist Progress Note    Patient:  Tere Solomon  Unit/Bed:S1O-7612/5106-01   YOB: 1945       MRN: [id number] Acct: [de-identified]  PCP: Declan Garcia MD    Date of Admission: 6/14/2021  --------------------------    Chief Complaint:     Chest pain    Hospital Course:     Tere Solomon is a 78 y.o. female hospitalized on 6/14/2021   chest pain, ACS ruled out, stress test without reversible ischemia. Patient started complaining of abdominal pain as well. .        Assessment:       Chest pain, atypical  ACS ruled out, stress test showed no reversible ischemia  CT of the chest showed no PE, mild consolidative changes on the lingula (no pneumonia symptoms)        Acute on chronic abdominal pain  Report prior abdominal surgery, including cholecystectomy, on chronic abdominal pain   Right upper quadrant abdominal ultrasonography showed surgically absent gallbladder, heterogeneous liver texture ? Capsular stretching      Alcohol withdrawal syndrome/alcohol dependence/alcoholic liver disease  -Continue thiamine, multivitamin, CIWA scale per benzodiazepine per CIWA scale  -Encouraged to avoid alcohol use  Continue PPI  -Obtain CT scan of the abdomen the light of the reported constipation to rule out bowel obstruction      Dyslipidemia on statin    Hypertensive urgency  Continue metoprolol, as needed hydralazine    Nicotine dependence  Continue nicotine patch          Code Status: Full Code         DVT prophylaxis: Lovenox     Disposition: Continue work-up for abdominal pain. I discussed my thought processes at length with patient/family and patient understood. Question and concerns  Addressed     Discussed with RN     ----------------      Subjective:     Patient seen and examined  Overnight events noted  RN and ancillary staff note reviewed    Continue to have abdominal pain, did not tolerate lactulose, CIWA scale noted. Diet: ADULT DIET;  Regular    OBJECTIVE     Exam:  BP (!) 182/90 38.0    249 266     Recent Labs     06/14/21  1557 06/15/21  0436 06/16/21  0423    137 132*   K 3.5 3.9 3.4*    99 94*   CO2 26 24 27   BUN 7 10 12   CREATININE <0.5* 0.5* <0.5*   CALCIUM 8.8 9.4 8.8     Recent Labs     06/14/21  1557 06/15/21  0436 06/16/21  0423   AST 83* 54* 33   ALT 58* 47* 33   BILIDIR  --  <0.2  --    BILITOT 0.5 0.4 0.7   ALKPHOS 153* 145* 132*     No results for input(s): INR in the last 72 hours. Recent Labs     06/14/21  1557 06/14/21  1944 06/15/21  0003   TROPONINI <0.01 <0.01 <0.01       Urinalysis:      Lab Results   Component Value Date    NITRU Negative 04/09/2020    WBCUA >900 12/19/2019    BACTERIA 2+ 12/19/2019    RBCUA 20-50 12/19/2019    BLOODU Negative 04/09/2020    SPECGRAV <=1.005 04/09/2020    GLUCOSEU Negative 04/09/2020       Radiology:  [address] [place] organ? GALLBLADDER, PANCREAS   Final Result   Surgically absent gallbladder. No biliary ductal dilatation. Heterogeneous echotexture throughout the liver, compatible with diffuse   hepatocellular disease         NM Cardiac Stress Test Nuclear Imaging   Final Result      CT CHEST PULMONARY EMBOLISM W CONTRAST   Final Result   1. No CT evidence of a pulmonary embolism. 2. Mild consolidative opacity within the lingula, most likely representing   either atelectasis or parenchymal scar. Pneumonia is considered less likely. XR CHEST PORTABLE   Final Result   No acute cardiopulmonary disease.                      Electronically signed by Lainey Bowman MD on 6/16/2021 at 1:21 PM

## 2021-06-17 ENCOUNTER — APPOINTMENT (OUTPATIENT)
Dept: CT IMAGING | Age: 76
DRG: 438 | End: 2021-06-17
Payer: MEDICARE

## 2021-06-17 ENCOUNTER — APPOINTMENT (OUTPATIENT)
Dept: GENERAL RADIOLOGY | Age: 76
DRG: 438 | End: 2021-06-17
Payer: MEDICARE

## 2021-06-17 LAB
ALBUMIN SERPL-MCNC: 3 G/DL (ref 3.4–5)
ALP BLD-CCNC: 122 U/L (ref 40–129)
ALT SERPL-CCNC: 24 U/L (ref 10–40)
ANION GAP SERPL CALCULATED.3IONS-SCNC: 8 MMOL/L (ref 3–16)
AST SERPL-CCNC: 28 U/L (ref 15–37)
BASE EXCESS ARTERIAL: 4.9 MMOL/L (ref -3–3)
BILIRUB SERPL-MCNC: 0.8 MG/DL (ref 0–1)
BILIRUBIN DIRECT: 0.3 MG/DL (ref 0–0.3)
BILIRUBIN, INDIRECT: 0.5 MG/DL (ref 0–1)
BUN BLDV-MCNC: 11 MG/DL (ref 7–20)
CALCIUM SERPL-MCNC: 8.3 MG/DL (ref 8.3–10.6)
CARBOXYHEMOGLOBIN ARTERIAL: <1 % (ref 0–1.5)
CHLORIDE BLD-SCNC: 96 MMOL/L (ref 99–110)
CO2: 26 MMOL/L (ref 21–32)
CREAT SERPL-MCNC: <0.5 MG/DL (ref 0.6–1.2)
GFR AFRICAN AMERICAN: >60
GFR NON-AFRICAN AMERICAN: >60
GLUCOSE BLD-MCNC: 92 MG/DL (ref 70–99)
HCO3 ARTERIAL: 29.4 MMOL/L (ref 21–29)
HCT VFR BLD CALC: 41 % (ref 36–48)
HEMOGLOBIN, ART, EXTENDED: 14.7 G/DL (ref 12–16)
HEMOGLOBIN: 14.2 G/DL (ref 12–16)
LIPASE: 619 U/L (ref 13–60)
MCH RBC QN AUTO: 35.9 PG (ref 26–34)
MCHC RBC AUTO-ENTMCNC: 34.6 G/DL (ref 31–36)
MCV RBC AUTO: 104 FL (ref 80–100)
METHEMOGLOBIN ARTERIAL: 0.4 %
O2 SAT, ARTERIAL: 99.9 %
O2 THERAPY: ABNORMAL
PCO2 ARTERIAL: 42 MMHG (ref 35–45)
PDW BLD-RTO: 17.9 % (ref 12.4–15.4)
PH ARTERIAL: 7.45 (ref 7.35–7.45)
PLATELET # BLD: 261 K/UL (ref 135–450)
PMV BLD AUTO: 7.8 FL (ref 5–10.5)
PO2 ARTERIAL: 310 MMHG (ref 75–108)
POTASSIUM SERPL-SCNC: 3.8 MMOL/L (ref 3.5–5.1)
RBC # BLD: 3.94 M/UL (ref 4–5.2)
SODIUM BLD-SCNC: 130 MMOL/L (ref 136–145)
TCO2 ARTERIAL: 30.7 MMOL/L
TOTAL PROTEIN: 5.9 G/DL (ref 6.4–8.2)
WBC # BLD: 10.7 K/UL (ref 4–11)

## 2021-06-17 PROCEDURE — 6360000002 HC RX W HCPCS: Performed by: HOSPITALIST

## 2021-06-17 PROCEDURE — 80048 BASIC METABOLIC PNL TOTAL CA: CPT

## 2021-06-17 PROCEDURE — 2580000003 HC RX 258: Performed by: INTERNAL MEDICINE

## 2021-06-17 PROCEDURE — 2580000003 HC RX 258: Performed by: HOSPITALIST

## 2021-06-17 PROCEDURE — 6370000000 HC RX 637 (ALT 250 FOR IP): Performed by: HOSPITALIST

## 2021-06-17 PROCEDURE — 2700000000 HC OXYGEN THERAPY PER DAY

## 2021-06-17 PROCEDURE — 36415 COLL VENOUS BLD VENIPUNCTURE: CPT

## 2021-06-17 PROCEDURE — 83690 ASSAY OF LIPASE: CPT

## 2021-06-17 PROCEDURE — 94761 N-INVAS EAR/PLS OXIMETRY MLT: CPT

## 2021-06-17 PROCEDURE — 82803 BLOOD GASES ANY COMBINATION: CPT

## 2021-06-17 PROCEDURE — 1200000000 HC SEMI PRIVATE

## 2021-06-17 PROCEDURE — 71260 CT THORAX DX C+: CPT

## 2021-06-17 PROCEDURE — 71045 X-RAY EXAM CHEST 1 VIEW: CPT

## 2021-06-17 PROCEDURE — 94640 AIRWAY INHALATION TREATMENT: CPT

## 2021-06-17 PROCEDURE — 92610 EVALUATE SWALLOWING FUNCTION: CPT

## 2021-06-17 PROCEDURE — 74177 CT ABD & PELVIS W/CONTRAST: CPT

## 2021-06-17 PROCEDURE — 85027 COMPLETE CBC AUTOMATED: CPT

## 2021-06-17 PROCEDURE — 36600 WITHDRAWAL OF ARTERIAL BLOOD: CPT

## 2021-06-17 PROCEDURE — 80076 HEPATIC FUNCTION PANEL: CPT

## 2021-06-17 PROCEDURE — 51798 US URINE CAPACITY MEASURE: CPT

## 2021-06-17 PROCEDURE — 6360000004 HC RX CONTRAST MEDICATION: Performed by: STUDENT IN AN ORGANIZED HEALTH CARE EDUCATION/TRAINING PROGRAM

## 2021-06-17 RX ORDER — SODIUM CHLORIDE 9 MG/ML
INJECTION, SOLUTION INTRAVENOUS CONTINUOUS
Status: DISCONTINUED | OUTPATIENT
Start: 2021-06-17 | End: 2021-06-17

## 2021-06-17 RX ORDER — SODIUM CHLORIDE, SODIUM LACTATE, POTASSIUM CHLORIDE, CALCIUM CHLORIDE 600; 310; 30; 20 MG/100ML; MG/100ML; MG/100ML; MG/100ML
INJECTION, SOLUTION INTRAVENOUS CONTINUOUS
Status: DISCONTINUED | OUTPATIENT
Start: 2021-06-17 | End: 2021-06-20

## 2021-06-17 RX ORDER — OXYCODONE HYDROCHLORIDE 5 MG/1
5 TABLET ORAL EVERY 4 HOURS PRN
Status: DISCONTINUED | OUTPATIENT
Start: 2021-06-17 | End: 2021-06-22

## 2021-06-17 RX ADMIN — TIOTROPIUM BROMIDE INHALATION SPRAY 2 PUFF: 3.12 SPRAY, METERED RESPIRATORY (INHALATION) at 07:37

## 2021-06-17 RX ADMIN — ENOXAPARIN SODIUM 40 MG: 40 INJECTION SUBCUTANEOUS at 09:18

## 2021-06-17 RX ADMIN — HYDROMORPHONE HYDROCHLORIDE 0.5 MG: 1 INJECTION, SOLUTION INTRAMUSCULAR; INTRAVENOUS; SUBCUTANEOUS at 22:10

## 2021-06-17 RX ADMIN — HYDROMORPHONE HYDROCHLORIDE 0.5 MG: 1 INJECTION, SOLUTION INTRAMUSCULAR; INTRAVENOUS; SUBCUTANEOUS at 13:37

## 2021-06-17 RX ADMIN — SODIUM CHLORIDE, POTASSIUM CHLORIDE, SODIUM LACTATE AND CALCIUM CHLORIDE: 600; 310; 30; 20 INJECTION, SOLUTION INTRAVENOUS at 16:11

## 2021-06-17 RX ADMIN — IOPAMIDOL 75 ML: 755 INJECTION, SOLUTION INTRAVENOUS at 05:07

## 2021-06-17 RX ADMIN — SODIUM CHLORIDE: 9 INJECTION, SOLUTION INTRAVENOUS at 09:38

## 2021-06-17 RX ADMIN — SENNOSIDES 8.6 MG: 8.6 TABLET, FILM COATED ORAL at 22:01

## 2021-06-17 RX ADMIN — SODIUM CHLORIDE, PRESERVATIVE FREE 10 ML: 5 INJECTION INTRAVENOUS at 09:38

## 2021-06-17 RX ADMIN — HYDROMORPHONE HYDROCHLORIDE 0.5 MG: 1 INJECTION, SOLUTION INTRAMUSCULAR; INTRAVENOUS; SUBCUTANEOUS at 17:59

## 2021-06-17 ASSESSMENT — PAIN DESCRIPTION - DESCRIPTORS
DESCRIPTORS: ACHING;DISCOMFORT

## 2021-06-17 ASSESSMENT — PAIN DESCRIPTION - PROGRESSION
CLINICAL_PROGRESSION: NOT CHANGED
CLINICAL_PROGRESSION: GRADUALLY WORSENING

## 2021-06-17 ASSESSMENT — PAIN DESCRIPTION - PAIN TYPE
TYPE: ACUTE PAIN

## 2021-06-17 ASSESSMENT — PAIN DESCRIPTION - FREQUENCY
FREQUENCY: CONTINUOUS

## 2021-06-17 ASSESSMENT — PAIN SCALES - GENERAL
PAINLEVEL_OUTOF10: 9
PAINLEVEL_OUTOF10: 0
PAINLEVEL_OUTOF10: 0
PAINLEVEL_OUTOF10: 10
PAINLEVEL_OUTOF10: 0
PAINLEVEL_OUTOF10: 0
PAINLEVEL_OUTOF10: 8
PAINLEVEL_OUTOF10: 8
PAINLEVEL_OUTOF10: 7

## 2021-06-17 ASSESSMENT — PAIN DESCRIPTION - ORIENTATION
ORIENTATION: RIGHT;UPPER

## 2021-06-17 ASSESSMENT — PAIN DESCRIPTION - LOCATION
LOCATION: ABDOMEN

## 2021-06-17 ASSESSMENT — PAIN DESCRIPTION - ONSET
ONSET: OTHER (COMMENT)
ONSET: ON-GOING

## 2021-06-17 ASSESSMENT — PAIN - FUNCTIONAL ASSESSMENT
PAIN_FUNCTIONAL_ASSESSMENT: PREVENTS OR INTERFERES SOME ACTIVE ACTIVITIES AND ADLS
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
## 2021-06-17 NOTE — PROGRESS NOTES
Occupational Therapy  Orders received. Chart reviewed. Pt requesting to defer OT evaluation at this time due to pt \"very tired\". Pt continues to decline despite encouragement. Will follow up with pt as time allows.      Jessy Gardiner, OTR/L

## 2021-06-17 NOTE — PLAN OF CARE
Problem: Pain:  Goal: Control of acute pain  Description: Control of acute pain  Outcome: Ongoing     Problem: Skin Integrity:  Goal: Absence of new skin breakdown  Description: Absence of new skin breakdown  Outcome: Ongoing     Problem: Falls - Risk of:  Goal: Will remain free from falls  Description: Will remain free from falls  Outcome: Ongoing

## 2021-06-17 NOTE — PROGRESS NOTES
Notified by nursing for new hypoxia, patient was initially sleeping but then apparently was noted that her oxygen levels were dropping significantly into the mid 80s, patient required high levels of oxygen up to the nonrebreather at 15 L, she was not looking hypoxic or in any respiratory distress but was still somewhat somnolent at times. Examination did only demonstrate diminished breath sounds and no crackles. Patient did have slight congestion of cough but does not seem to explain her hypoxia. Chest x-ray was obtained and looked overall clear and was comparable to the previous x-ray from a few days ago. Decided to get stat CTA of the chest to look for pulmonary embolus and also order the CT abdomen and pelvis that was ordered per the previous physician. No pulmonary embolus was noted on the CT scan, there are findings per the CT scan of note somewhat pancreatitis and some stones in the bladder. Will leave the day team physician to follow-up.   Patient is now currently more stable on around 0645, patient is on 7 L

## 2021-06-17 NOTE — PROGRESS NOTES
Patient's oxygen noted to be maintaining in mid 80's on RA. Placed patient on 15 L high flow nasal cannula; patient remains mid 80's. Placed on NRB and oxygen saturation increased to 90%. Patient currently remains on 15 L HF. Patient in no apparent distress. CXR obtained. IVF d/c'ed per Dr. Cleve Lam. ABG and CT ordered. Will continue to monitor.     Electronically signed by Laquita Humphries RN on 6/17/2021 at 4:37 AM

## 2021-06-17 NOTE — PROGRESS NOTES
Hospitalist Progress Note    Patient:  Sammy Hannon  Unit/Bed:[id number]-7106/5106-01   YOB: 1945       MRN: [id number] Acct: [de-identified]  PCP: Santy Greer MD    Date of Admission: 6/14/2021  --------------------------    Chief Complaint:     Chest pain    Hospital Course:     Sammy Hannon is a 78 y.o. female hospitalized on 6/14/2021   chest pain, ACS ruled out, stress test without reversible ischemia. Patient started complaining of abdominal pain as well. .        Assessment:       Chest pain, atypical  ACS ruled out, stress test showed no reversible ischemia  CT of the chest showed no PE, mild consolidative changes on the lingula (no pneumonia symptoms)        Acute on chronic abdominal pain secondary to acute alcoholic pancreatitis  Report prior abdominal surgery, including cholecystectomy, on chronic abdominal pain   CT scan reviewed  Add Dilaudid for pain control      Acute hypoxic respiratory failure  ? Aspiration event, no pneumonia noted on CTA of the chest (no PE)  Wean off oxygen as tolerated  Swallow evaluation noted, will consult GI      Alcohol withdrawal syndrome/alcohol dependence/alcoholic liver disease  -Continue thiamine, multivitamin, CIWA scale per benzodiazepine per CIWA scale    Mild hyponatremia, likely hypovolemic  Continue IV fluid    Dyslipidemia on statin    Hypertensive urgency  Continue metoprolol, as needed hydralazine    Nicotine dependence  Continue nicotine patch          Code Status: Full Code         DVT prophylaxis: Lovenox     Disposition: Awaiting improvement of acute pancreatitis, GI bleed.   Expecting discharge in 2 days    Discussed with the patient    Discussed with RN    ----------------      Subjective:     Patient seen and examined  Overnight events noted  RN and ancillary staff note reviewed    Thirsty and would like to drink something, still have intermittent abdominal pain, overnight developed hypoxia requiring 15 L stat CTA of the chest noted no PE or gross infiltrate. CT abdomen suggestive of acute pancreatitis      Diet: ADULT DIET; Clear Liquid    OBJECTIVE     Exam:  BP (!) 144/85   Pulse 98   Temp 98.5 °F (36.9 °C) (Oral)   Resp 16   Ht 5' 2\" (1.575 m)   Wt 126 lb 15.8 oz (57.6 kg)   SpO2 96%   BMI 23.23 kg/m²           Gen: Not in distress. Alert. Dry mucous membrane  Head: Normocephalic. Atraumatic. Eyes: Conjunctivae/corneas clear. ENT: Oral mucosa dry  Neck: No JVD. No obvious thyromegaly. CVS: Nml S1S2, no murmur , RRR  Pulmomary: Clear bilaterally. No crackles. No wheezes. Gastrointestinal: Soft, epigastric tenderness, no rebound tenderness, non distend, . Musculoskeletal: No edema. Warm  Neuro: No focal deficit. Moves extremity spontaneously. Psychiatry: Appropriate affect. Not agitated.       Medications:  Reviewed    Infusion Medications    sodium chloride 100 mL/hr at 06/17/21 4318    sodium chloride       Scheduled Medications    metoprolol succinate  25 mg Oral Daily    senna  1 tablet Oral Nightly    folic acid  1 mg Oral Daily    sodium chloride flush  5-40 mL Intravenous 2 times per day    multivitamin  1 tablet Oral Daily    pantoprazole  40 mg Oral BID AC    atorvastatin  20 mg Oral Daily    DULoxetine  30 mg Oral Daily    ferrous sulfate  325 mg Oral Daily with breakfast    lactobacillus  1 capsule Oral Daily with breakfast    nicotine  1 patch Transdermal Daily    tiotropium  2 puff Inhalation Daily    thiamine mononitrate  100 mg Oral Daily    enoxaparin  40 mg Subcutaneous Daily     PRN Meds: HYDROmorphone **OR** oxyCODONE, lactulose, hydrALAZINE, melatonin, sodium chloride flush, sodium chloride, albuterol sulfate HFA, prochlorperazine, ondansetron **OR** ondansetron, polyethylene glycol, acetaminophen **OR** acetaminophen      Intake/Output Summary (Last 24 hours) at 6/17/2021 1341  Last data filed at 6/17/2021 1118  Gross per 24 hour   Intake 340 ml   Output 1350 ml   Net -1010 ml Labs:   Recent Labs     06/15/21  0436 06/16/21  0422 06/16/21  1849 06/17/21  0910   WBC 4.5 7.4  --  10.7   HGB 13.4 12.9 14.9 14.2   HCT 39.0 38.0 43.2 41.0    266  --  261     Recent Labs     06/15/21  0436 06/16/21  0423 06/17/21  0910    132* 130*   K 3.9 3.4* 3.8   CL 99 94* 96*   CO2 24 27 26   BUN 10 12 11   CREATININE 0.5* <0.5* <0.5*   CALCIUM 9.4 8.8 8.3     Recent Labs     06/15/21  0436 06/16/21  0423 06/17/21  0910   AST 54* 33 28   ALT 47* 33 24   BILIDIR <0.2  --  0.3   BILITOT 0.4 0.7 0.8   ALKPHOS 145* 132* 122     No results for input(s): INR in the last 72 hours. Recent Labs     06/14/21  1557 06/14/21  1944 06/15/21  0003   TROPONINI <0.01 <0.01 <0.01       Urinalysis:      Lab Results   Component Value Date    NITRU Negative 04/09/2020    WBCUA >900 12/19/2019    BACTERIA 2+ 12/19/2019    RBCUA 20-50 12/19/2019    BLOODU Negative 04/09/2020    SPECGRAV <=1.005 04/09/2020    GLUCOSEU Negative 04/09/2020       Radiology:  CT ABDOMEN PELVIS W IV CONTRAST Additional Contrast? None   Final Result   Emphysema. No central pulmonary embolus identified. De pendant opacity is   seen at the lung bases, likely atelectasis. Ill-defined peripancreatic fluid and edema, compatible with acute   pancreatitis. .  Findings are new compared to most recent chest CT      Scattered areas of colonic wall thickening are seen, likely due to the   partially contracted state of the colon in the absence of clinical signs of   colitis      Increased density is seen within the bladder on the right, compatible with   small bladder stones. No significant change from prior. Scattered anastomotic staple lines are seen within the bowel. No bowel   obstruction         CT CHEST PULMONARY EMBOLISM W CONTRAST   Final Result   Emphysema. No central pulmonary embolus identified. De pendant opacity is   seen at the lung bases, likely atelectasis.       Ill-defined peripancreatic fluid and edema, compatible with acute   pancreatitis. .  Findings are new compared to most recent chest CT      Scattered areas of colonic wall thickening are seen, likely due to the   partially contracted state of the colon in the absence of clinical signs of   colitis      Increased density is seen within the bladder on the right, compatible with   small bladder stones. No significant change from prior. Scattered anastomotic staple lines are seen within the bowel. No bowel   obstruction         XR CHEST PORTABLE   Final Result   Negative portable chest.         US ABDOMEN LIMITED Specify organ? GALLBLADDER, PANCREAS   Final Result   Surgically absent gallbladder. No biliary ductal dilatation. Heterogeneous echotexture throughout the liver, compatible with diffuse   hepatocellular disease         NM Cardiac Stress Test Nuclear Imaging   Final Result      CT CHEST PULMONARY EMBOLISM W CONTRAST   Final Result   1. No CT evidence of a pulmonary embolism. 2. Mild consolidative opacity within the lingula, most likely representing   either atelectasis or parenchymal scar. Pneumonia is considered less likely. XR CHEST PORTABLE   Final Result   No acute cardiopulmonary disease.                      Electronically signed by Noa Osborn MD on 6/17/2021 at 1:41 PM

## 2021-06-17 NOTE — PROGRESS NOTES
Speech Language Pathology  Facility/Department: [address] [place] [address]   CLINICAL BEDSIDE SWALLOW EVALUATION    NAME: Tc Mcallister  : 1945  MRN: [id number]    ADMISSION DATE: 2021  ADMITTING DIAGNOSIS:Chest pain [E10.2]  Alcoholic liver disease ([address] [K70.9]     Tc Mcallister has COPD with acute exacerbation ([address] Essential hypertension; Tobacco abuse; Alcohol abuse; Colostomy care [facility]); Recurrent major depressive disorder, in partial remission ([address] Personal history of nicotine dependence ; Colitis; Colitis due to Clostridium difficile; Alcohol-induced acute pancreatitis without infection or necrosis; Gastrointestinal hemorrhage associated with duodenal ulcer; Small bowel obstruction ([address] Ileus ([address] Acute cystitis without hematuria; Non-intractable cyclical vomiting with nausea; Parastomal hernia without obstruction or gangrene; Abdominal pain; C. difficile diarrhea; Colostomy present ([address] Postoperative intra-abdominal abscess; COPD exacerbation ([address] Atrial fibrillation ([address] Difficulty speaking; TIA (transient ischemic attack); Chest pain; and Alcoholic liver disease ([address] on their problem list.    ONSET DATE: 2021    CHART REVIEW:  2021 admitted with c/o chest pain: ADMISSION H&P HPI: 78 y.o. female presents with chest pain, onset at 100 am, while at rest. Pain was across precordial, sharp, with radiation to her back. She denies palpitations, lightheadedness dizziness, fever, or chills and denies change in her chronic cough or daily emesis. Chest pain resolved with asa given per squad. 2021 RN notes: Patient slept all day and would wake up only for medications and water. Nurse asked if patient still experiencing pain and patient stated that she no longer had pain. Patient did vomit twice, one after taking lactulose, and the second time was after taking a few bites of her vegetables (dinner).     2021 CT CHEST/ABDOMEN  Impression   Emphysema.  No central pulmonary embolus identified.  De pendant opacity is   seen at the lung bases, likely atelectasis.       Ill-defined peripancreatic fluid and edema, compatible with acute   pancreatitis. .  Findings are new compared to most recent chest CT       Scattered areas of colonic wall thickening are seen, likely due to the   partially contracted state of the colon in the absence of clinical signs of   colitis       Increased density is seen within the bladder on the right, compatible with   small bladder stones.  No significant change from prior.       Scattered anastomotic staple lines are seen within the bowel.  No bowel   obstruction       Date of Eval: 6/17/2021  Evaluating Therapist: ELSY Lynch    Current Diet level:  Current Diet : NPO  Current Liquid Diet : Clear      Primary Complaint  Patient Complaint: patient denies dysphagia; RN reports med team concern for dysphagia d/t concern for lethargy and emesis yesterday    Pain:  Pain Level: 0    Reason for Referral  Jany Brito was referred for a bedside swallow evaluation to assess the efficiency of her swallow function, identify signs and symptoms of aspiration and make recommendations regarding safe dietary consistencies, effective compensatory strategies, and safe eating environment. Impression  Dysphagia Diagnosis: Mild pharyngeal stage dysphagia; Suspected needs further assessment  Accepted and tolerated evaluation at bedside. Evaluation limited to liquids per MD order at this time. Patient alert, cooperative, pleasant; oriented x4; follows dx; verbally responsive. Oral phase appears grossly [place] for items presented this date; recommend continued assessment as diet is advanced to include solids. Mild pharyngeal stage dysphagia characterized by delayed swallow and decreased laryngeal elevation. No overt signs/symptoms of penetration/aspiration during or immediately following assessment.    Upon SLP exit to room, patient noted with coughing; suspect potential esophageal involvements, but recommend continued ST monitor for potential penetration/aspiration. RN notes appear to indicate episodes of emesis post-PO yesterday; it may be beneficial to consider GI consult for continued work-up/rule-out of potential esophageal involvements. Dysphagia Outcome Severity Scale: Level 4: Mild moderate dysphagia- Intermittent supervision/cueing. One - two diet consistencies restricted     Treatment Plan  Requires SLP Intervention: Yes  Duration/Frequency of Treatment: ST to tx 3-5 times per week during acute admission unless otherwise noted  D/C Recommendations:  (suspect no skilled ST needs upon discharge)    Recommended Diet and Intervention  Diet Solids Recommendation:  (defer to MD)  Liquid Consistency Recommendation: Thin  Recommended Form of Meds: PO     Therapeutic Interventions: Diet tolerance monitoring; Therapeutic PO trials with SLP;Patient/Family education    Compensatory Swallowing Strategies  Compensatory Swallowing Strategies: Upright as possible for all oral intake;Remain upright for 30-45 minutes after meals    Treatment/Goals  Dysphagia Goals: The patient will tolerate recommended diet without observed clinical signs of aspiration; The patient/caregiver will demonstrate understanding of compensatory strategies for improved swallowing safety. ;The patient will tolerate repeat BSE when able. General  Chart Reviewed: Yes  Subjective: Accepted and tolerated evaluation at bedside  Behavior/Cognition: Alert; Cooperative;Pleasant mood  Communication Observation: Functional  Follows Directions: Complex  Dentition: Dentures bottom  Patient Positioning: Upright in bed  Baseline Vocal Quality: Normal  Volitional Cough: Strong  Volitional Swallow: Delayed  Prior Dysphagia History: no documented history; patient denies h/o oropharyngeal and/or esophageal dysphagia  Consistencies Administered: Nectar - straw; Thin - straw;Dysphagia Pureed (Dysphagia I) (limited to 36.7

## 2021-06-17 NOTE — PROGRESS NOTES
Pt called out c/o pain in RUQ rating it a 10/10. Pain started after patient consumed 25% of her clear liquid lunch tray. Dr. Nicholes Dandy requesting pain medicine. Only Tylenol ordered - pt has chronic liver disease. Awaiting response now.     Electronically signed by Nichol Ball RN on 6/17/2021 at 1:35 PM

## 2021-06-18 LAB
ALBUMIN SERPL-MCNC: 2.5 G/DL (ref 3.4–5)
ALP BLD-CCNC: 99 U/L (ref 40–129)
ALT SERPL-CCNC: 23 U/L (ref 10–40)
ANION GAP SERPL CALCULATED.3IONS-SCNC: 8 MMOL/L (ref 3–16)
AST SERPL-CCNC: 34 U/L (ref 15–37)
BASOPHILS ABSOLUTE: 0 K/UL (ref 0–0.2)
BASOPHILS RELATIVE PERCENT: 0.3 %
BILIRUB SERPL-MCNC: 0.8 MG/DL (ref 0–1)
BILIRUBIN DIRECT: 0.4 MG/DL (ref 0–0.3)
BILIRUBIN, INDIRECT: 0.4 MG/DL (ref 0–1)
BUN BLDV-MCNC: 5 MG/DL (ref 7–20)
CALCIUM SERPL-MCNC: 8 MG/DL (ref 8.3–10.6)
CHLORIDE BLD-SCNC: 100 MMOL/L (ref 99–110)
CO2: 27 MMOL/L (ref 21–32)
CREAT SERPL-MCNC: <0.5 MG/DL (ref 0.6–1.2)
EOSINOPHILS ABSOLUTE: 0.2 K/UL (ref 0–0.6)
EOSINOPHILS RELATIVE PERCENT: 2.7 %
GFR AFRICAN AMERICAN: >60
GFR NON-AFRICAN AMERICAN: >60
GLUCOSE BLD-MCNC: 86 MG/DL (ref 70–99)
HCT VFR BLD CALC: 34 % (ref 36–48)
HEMOGLOBIN: 11.6 G/DL (ref 12–16)
LIPASE: 155 U/L (ref 13–60)
LYMPHOCYTES ABSOLUTE: 0.7 K/UL (ref 1–5.1)
LYMPHOCYTES RELATIVE PERCENT: 8.9 %
MAGNESIUM: 1.6 MG/DL (ref 1.8–2.4)
MCH RBC QN AUTO: 35.8 PG (ref 26–34)
MCHC RBC AUTO-ENTMCNC: 34.1 G/DL (ref 31–36)
MCV RBC AUTO: 105 FL (ref 80–100)
MONOCYTES ABSOLUTE: 0.6 K/UL (ref 0–1.3)
MONOCYTES RELATIVE PERCENT: 7.5 %
NEUTROPHILS ABSOLUTE: 6.2 K/UL (ref 1.7–7.7)
NEUTROPHILS RELATIVE PERCENT: 80.6 %
PDW BLD-RTO: 17.8 % (ref 12.4–15.4)
PHOSPHORUS: 2.4 MG/DL (ref 2.5–4.9)
PLATELET # BLD: 210 K/UL (ref 135–450)
PMV BLD AUTO: 7.5 FL (ref 5–10.5)
POTASSIUM SERPL-SCNC: 3 MMOL/L (ref 3.5–5.1)
RBC # BLD: 3.24 M/UL (ref 4–5.2)
SODIUM BLD-SCNC: 135 MMOL/L (ref 136–145)
TOTAL PROTEIN: 5 G/DL (ref 6.4–8.2)
WBC # BLD: 7.7 K/UL (ref 4–11)

## 2021-06-18 PROCEDURE — 85025 COMPLETE CBC W/AUTO DIFF WBC: CPT

## 2021-06-18 PROCEDURE — 94761 N-INVAS EAR/PLS OXIMETRY MLT: CPT

## 2021-06-18 PROCEDURE — 1200000000 HC SEMI PRIVATE

## 2021-06-18 PROCEDURE — 6370000000 HC RX 637 (ALT 250 FOR IP): Performed by: INTERNAL MEDICINE

## 2021-06-18 PROCEDURE — 6360000002 HC RX W HCPCS: Performed by: HOSPITALIST

## 2021-06-18 PROCEDURE — 2500000003 HC RX 250 WO HCPCS: Performed by: HOSPITALIST

## 2021-06-18 PROCEDURE — 36415 COLL VENOUS BLD VENIPUNCTURE: CPT

## 2021-06-18 PROCEDURE — 83690 ASSAY OF LIPASE: CPT

## 2021-06-18 PROCEDURE — 2580000003 HC RX 258: Performed by: INTERNAL MEDICINE

## 2021-06-18 PROCEDURE — 80076 HEPATIC FUNCTION PANEL: CPT

## 2021-06-18 PROCEDURE — 6370000000 HC RX 637 (ALT 250 FOR IP): Performed by: HOSPITALIST

## 2021-06-18 PROCEDURE — 6370000000 HC RX 637 (ALT 250 FOR IP): Performed by: NURSE PRACTITIONER

## 2021-06-18 PROCEDURE — 83735 ASSAY OF MAGNESIUM: CPT

## 2021-06-18 PROCEDURE — 80069 RENAL FUNCTION PANEL: CPT

## 2021-06-18 PROCEDURE — 94640 AIRWAY INHALATION TREATMENT: CPT

## 2021-06-18 PROCEDURE — 2580000003 HC RX 258: Performed by: HOSPITALIST

## 2021-06-18 RX ORDER — POTASSIUM CHLORIDE 750 MG/1
40 TABLET, FILM COATED, EXTENDED RELEASE ORAL ONCE
Status: COMPLETED | OUTPATIENT
Start: 2021-06-18 | End: 2021-06-18

## 2021-06-18 RX ORDER — MAGNESIUM SULFATE IN WATER 40 MG/ML
4000 INJECTION, SOLUTION INTRAVENOUS ONCE
Status: COMPLETED | OUTPATIENT
Start: 2021-06-18 | End: 2021-06-18

## 2021-06-18 RX ORDER — SODIUM PHOSPHATE, DIBASIC AND SODIUM PHOSPHATE, MONOBASIC 7; 19 G/133ML; G/133ML
1 ENEMA RECTAL DAILY PRN
Status: DISCONTINUED | OUTPATIENT
Start: 2021-06-18 | End: 2021-06-23 | Stop reason: HOSPADM

## 2021-06-18 RX ORDER — TRAZODONE HYDROCHLORIDE 50 MG/1
50 TABLET ORAL NIGHTLY
Status: DISCONTINUED | OUTPATIENT
Start: 2021-06-18 | End: 2021-06-23 | Stop reason: HOSPADM

## 2021-06-18 RX ADMIN — POTASSIUM PHOSPHATE, MONOBASIC POTASSIUM PHOSPHATE, DIBASIC 20 MMOL: 224; 236 INJECTION, SOLUTION, CONCENTRATE INTRAVENOUS at 10:28

## 2021-06-18 RX ADMIN — DULOXETINE HYDROCHLORIDE 30 MG: 30 CAPSULE, DELAYED RELEASE ORAL at 08:16

## 2021-06-18 RX ADMIN — Medication 100 MG: at 08:20

## 2021-06-18 RX ADMIN — HYDROMORPHONE HYDROCHLORIDE 0.5 MG: 1 INJECTION, SOLUTION INTRAMUSCULAR; INTRAVENOUS; SUBCUTANEOUS at 08:15

## 2021-06-18 RX ADMIN — Medication 1 CAPSULE: at 08:16

## 2021-06-18 RX ADMIN — METOPROLOL SUCCINATE 25 MG: 25 TABLET, EXTENDED RELEASE ORAL at 08:15

## 2021-06-18 RX ADMIN — FERROUS SULFATE TAB EC 324 MG (65 MG FE EQUIVALENT) 325 MG: 324 (65 FE) TABLET DELAYED RESPONSE at 08:15

## 2021-06-18 RX ADMIN — HYDROMORPHONE HYDROCHLORIDE 0.5 MG: 1 INJECTION, SOLUTION INTRAMUSCULAR; INTRAVENOUS; SUBCUTANEOUS at 03:14

## 2021-06-18 RX ADMIN — PANTOPRAZOLE SODIUM 40 MG: 40 TABLET, DELAYED RELEASE ORAL at 15:20

## 2021-06-18 RX ADMIN — SODIUM CHLORIDE, POTASSIUM CHLORIDE, SODIUM LACTATE AND CALCIUM CHLORIDE: 600; 310; 30; 20 INJECTION, SOLUTION INTRAVENOUS at 03:16

## 2021-06-18 RX ADMIN — POLYETHYLENE GLYCOL 3350 17 G: 17 POWDER, FOR SOLUTION ORAL at 15:25

## 2021-06-18 RX ADMIN — THERA TABS 1 TABLET: TAB at 08:16

## 2021-06-18 RX ADMIN — Medication 6 MG: at 21:31

## 2021-06-18 RX ADMIN — SODIUM CHLORIDE, POTASSIUM CHLORIDE, SODIUM LACTATE AND CALCIUM CHLORIDE: 600; 310; 30; 20 INJECTION, SOLUTION INTRAVENOUS at 06:42

## 2021-06-18 RX ADMIN — HYDROMORPHONE HYDROCHLORIDE 0.5 MG: 1 INJECTION, SOLUTION INTRAMUSCULAR; INTRAVENOUS; SUBCUTANEOUS at 18:32

## 2021-06-18 RX ADMIN — FOLIC ACID 1 MG: 1 TABLET ORAL at 08:16

## 2021-06-18 RX ADMIN — MAGNESIUM SULFATE HEPTAHYDRATE 4000 MG: 40 INJECTION, SOLUTION INTRAVENOUS at 15:19

## 2021-06-18 RX ADMIN — PANTOPRAZOLE SODIUM 40 MG: 40 TABLET, DELAYED RELEASE ORAL at 06:02

## 2021-06-18 RX ADMIN — SENNOSIDES 8.6 MG: 8.6 TABLET, FILM COATED ORAL at 21:32

## 2021-06-18 RX ADMIN — HYDROMORPHONE HYDROCHLORIDE 0.5 MG: 1 INJECTION, SOLUTION INTRAMUSCULAR; INTRAVENOUS; SUBCUTANEOUS at 13:25

## 2021-06-18 RX ADMIN — TRAZODONE HYDROCHLORIDE 50 MG: 50 TABLET ORAL at 21:32

## 2021-06-18 RX ADMIN — LACTULOSE 20 G: 20 SOLUTION ORAL at 15:24

## 2021-06-18 RX ADMIN — ENOXAPARIN SODIUM 40 MG: 40 INJECTION SUBCUTANEOUS at 08:15

## 2021-06-18 RX ADMIN — TIOTROPIUM BROMIDE INHALATION SPRAY 2 PUFF: 3.12 SPRAY, METERED RESPIRATORY (INHALATION) at 07:45

## 2021-06-18 RX ADMIN — SODIUM CHLORIDE, PRESERVATIVE FREE 10 ML: 5 INJECTION INTRAVENOUS at 08:15

## 2021-06-18 RX ADMIN — ATORVASTATIN CALCIUM 20 MG: 20 TABLET, FILM COATED ORAL at 08:15

## 2021-06-18 RX ADMIN — POTASSIUM CHLORIDE 40 MEQ: 750 TABLET, FILM COATED, EXTENDED RELEASE ORAL at 10:28

## 2021-06-18 ASSESSMENT — PAIN SCALES - GENERAL
PAINLEVEL_OUTOF10: 7
PAINLEVEL_OUTOF10: 0
PAINLEVEL_OUTOF10: 10
PAINLEVEL_OUTOF10: 5
PAINLEVEL_OUTOF10: 0
PAINLEVEL_OUTOF10: 8
PAINLEVEL_OUTOF10: 10

## 2021-06-18 ASSESSMENT — PAIN DESCRIPTION - ONSET
ONSET: ON-GOING

## 2021-06-18 ASSESSMENT — PAIN DESCRIPTION - LOCATION
LOCATION: ABDOMEN

## 2021-06-18 ASSESSMENT — PAIN DESCRIPTION - ORIENTATION
ORIENTATION: RIGHT;UPPER

## 2021-06-18 ASSESSMENT — PAIN - FUNCTIONAL ASSESSMENT
PAIN_FUNCTIONAL_ASSESSMENT: PREVENTS OR INTERFERES SOME ACTIVE ACTIVITIES AND ADLS

## 2021-06-18 ASSESSMENT — PAIN DESCRIPTION - PROGRESSION
CLINICAL_PROGRESSION: NOT CHANGED
CLINICAL_PROGRESSION: NOT CHANGED
CLINICAL_PROGRESSION: GRADUALLY WORSENING

## 2021-06-18 ASSESSMENT — PAIN DESCRIPTION - FREQUENCY
FREQUENCY: CONTINUOUS

## 2021-06-18 ASSESSMENT — PAIN DESCRIPTION - DESCRIPTORS
DESCRIPTORS: ACHING;DISCOMFORT

## 2021-06-18 ASSESSMENT — PAIN DESCRIPTION - PAIN TYPE
TYPE: ACUTE PAIN

## 2021-06-18 NOTE — PROGRESS NOTES
INPATIENT PROGRESS NOTE        IDENTIFYING DATA/REASON FOR CONSULTATION   PATIENT:  Wallace Odonnell  MRN:  [id number]  ADMIT DATE: 6/14/2021  TIME OF EVALUATION: 6/18/2021 7:40 AM  HOSPITAL STAY:   LOS: 3 days   CONSULTING PHYSICIAN: Destiney Sinclair MD   REASON FOR CONSULTATION:    Subjective:    Patient reports her abdominal pain is improviing and she's feeling a little better. She is urinating frequently. Her SOB remains at baseline. MEDICATIONS   SCHEDULED:  metoprolol succinate, 25 mg, Daily  senna, 1 tablet, Nightly  folic acid, 1 mg, Daily  sodium chloride flush, 5-40 mL, 2 times per day  multivitamin, 1 tablet, Daily  pantoprazole, 40 mg, BID AC  atorvastatin, 20 mg, Daily  DULoxetine, 30 mg, Daily  ferrous sulfate, 325 mg, Daily with breakfast  lactobacillus, 1 capsule, Daily with breakfast  nicotine, 1 patch, Daily  tiotropium, 2 puff, Daily  thiamine mononitrate, 100 mg, Daily  enoxaparin, 40 mg, Daily      FLUIDS/DRIPS:     lactated ringers 150 mL/hr at 06/18/21 7893    sodium chloride       PRNs: HYDROmorphone, 0.5 mg, Q4H PRN   Or  oxyCODONE, 5 mg, Q4H PRN  lactulose, 20 g, TID PRN  hydrALAZINE, 5 mg, Q6H PRN  melatonin, 6 mg, Nightly PRN  sodium chloride flush, 5-40 mL, PRN  sodium chloride, 25 mL, PRN  albuterol sulfate HFA, 2 puff, Q6H PRN  prochlorperazine, 10 mg, Q6H PRN  ondansetron, 4 mg, Q8H PRN   Or  ondansetron, 4 mg, Q6H PRN  polyethylene glycol, 17 g, Daily PRN  acetaminophen, 650 mg, Q6H PRN   Or  acetaminophen, 650 mg, Q6H PRN      ALLERGIES:    Allergies   Allergen Reactions    Aspirin Other (See Comments)     Ulcers in stomach    Fentanyl Other (See Comments)     Hallucinations     Morphine      Feels like she will have a heart attack         PHYSICAL EXAM   VITALS:  /75   Pulse 85   Temp 97.6 °F (36.4 °C) (Oral)   Resp 16   Ht 5' 2\" (1.575 m)   Wt 126 lb 12.2 oz (57.5 kg)   SpO2 95%   BMI 23.19 kg/m²   TEMPERATURE:  Current - Temp: 97.6 °F (36.4 °C);  Max - Temp Av.1 °F (36.7 °C)  Min: 97.6 °F (36.4 °C)  Max: 98.6 °F (37 °C)    Physical Exam:  General appearance: alert, cooperative, no distress, appears stated age  Eyes: Anicteric  Head: Normocephalic, without obvious abnormality  Lungs: clear to auscultation bilaterally, Normal Effort  Heart: regular rate and rhythm, normal S1 and S2, no murmurs or rubs  Abdomen: soft, non-distended, epigastric tenderness. Bowel sounds normal. No masses,  no organomegaly. Extremities: atraumatic, no cyanosis or edema  Skin: warm and dry, no jaundice  Neuro: Grossly intact, A&OX3    LABS AND IMAGING   Laboratory   Recent Labs     21  0422 21  1849 21  0910 21  0437   WBC 7.4  --  10.7 7.7   HGB 12.9 14.9 14.2 11.6*   HCT 38.0 43.2 41.0 34.0*   .4*  --  104.0* 105.0*     --  261 210     Recent Labs     21  0423 21  0910 21  0437   * 130* 135*   K 3.4* 3.8 3.0*   CL 94* 96* 100   CO2 27 26 27   PHOS  --   --  2.4*   BUN 12 11 5*   CREATININE <0.5* <0.5* <0.5*     Recent Labs     21  0423 21  0910 21  0437   AST 33 28 34   ALT 33 24 23   BILIDIR  --  0.3 0.4*   BILITOT 0.7 0.8 0.8   ALKPHOS 132* 122 99     Recent Labs     06/15/21  1358 21  0910   LIPASE 155.0* 619.0*     No results for input(s): PROTIME, INR in the last 72 hours. Imaging  CT ABDOMEN PELVIS W IV CONTRAST Additional Contrast? None   Final Result   Emphysema. No central pulmonary embolus identified. De pendant opacity is   seen at the lung bases, likely atelectasis. Ill-defined peripancreatic fluid and edema, compatible with acute   pancreatitis. .  Findings are new compared to most recent chest CT      Scattered areas of colonic wall thickening are seen, likely due to the   partially contracted state of the colon in the absence of clinical signs of   colitis      Increased density is seen within the bladder on the right, compatible with   small bladder stones.   No significant change from prior. Scattered anastomotic staple lines are seen within the bowel. No bowel   obstruction         CT CHEST PULMONARY EMBOLISM W CONTRAST   Final Result   Emphysema. No central pulmonary embolus identified. De pendant opacity is   seen at the lung bases, likely atelectasis. Ill-defined peripancreatic fluid and edema, compatible with acute   pancreatitis. .  Findings are new compared to most recent chest CT      Scattered areas of colonic wall thickening are seen, likely due to the   partially contracted state of the colon in the absence of clinical signs of   colitis      Increased density is seen within the bladder on the right, compatible with   small bladder stones. No significant change from prior. Scattered anastomotic staple lines are seen within the bowel. No bowel   obstruction         XR CHEST PORTABLE   Final Result   Negative portable chest.         US ABDOMEN LIMITED Specify organ? GALLBLADDER, PANCREAS   Final Result   Surgically absent gallbladder. No biliary ductal dilatation. Heterogeneous echotexture throughout the liver, compatible with diffuse   hepatocellular disease         NM Cardiac Stress Test Nuclear Imaging   Final Result      CT CHEST PULMONARY EMBOLISM W CONTRAST   Final Result   1. No CT evidence of a pulmonary embolism. 2. Mild consolidative opacity within the lingula, most likely representing   either atelectasis or parenchymal scar. Pneumonia is considered less likely. XR CHEST PORTABLE   Final Result   No acute cardiopulmonary disease. Prior Endoscopic Evaluations:  EGD 12/13/18:  1) Mild erythema of the antrum.  Biopsies were obtained from the antrum and body of the stomach to rule out active gastritis and H.pylori gastritis. 2) Very small clean-based ulcer in the duodenal bulb (<5mm) with no active bleeding.      Colonoscopy 12/14/18:  1)  Scattered white plaques throughout the colon consistent with known C.  Dif colitis. 2)  3mm cecal polyp.  This was completely removed with biopsy polypectomy. There was no residual polyp or significant bleeding at the polypectomy site.       ASSESSMENT AND RECOMMENDATIONS   Sammy Hannon is a 78 y.o. female with a PMH of COPD, nicotine abuse, depression/anxiety, h/o PUD, alcohol abuse, HTN, Parkinson's disease, TIA, bowel obstruction s/p colectomy and colostomy reversal, cholecystectomy who presented on 6/14/2021 with chest pain, acs was ruled out. We have been consulted regarding pancreatitis, possible esophageal dysfunction. 1. Acute alcoholic pancreatitis   -Lipase 619, LFTs otherwise normal. CT A/P with contrast shows emphysema and no central PE. Ill-defined peripancreatic fluid and edema, compatible with acute pancreatitis. Scattered areas of colonic wall thickening are seen, likely due to the partially contracted state of the colon in the absence of clinical signs of colitis. Scattered anastomotic staple liens are seen within the bowel with no bowel obstruction. Ca normal. RUQ U/S 6/16/21 showed surgery absent gallbladder, no biliary ductal dilatation. Heterogeneous echotexture throughout the liver, compatible with diffuse hepatocellular disease.   -No prior episodes of pancreatitis   -She has a significant history of etoh abuse reporting she drinks a pint of bourbon every night to sleep for many years. Her last drink was Sunday night.     2. Possible esophageal dysfunction         -Per SLP note: patient noted coughing with swallowing. They will monitor for potential penetration/aspiration. RN notes appear to indicate episodes of emesis post-PO yesterday.  -Patient denies any dysphagia   3. Acute hypoxic respiratory failure  4. Alcoholic abuse, on thiamine and CIWA protocol. RECOMMENDATIONS:    -Advanced to full liquids and monitor tolerance.  Can advance as tolerated to low fat diet.  -Keep on 150cc LR  -Encouraged alcohol abstinence, and rec SW consult for chem dependency treatment and housing options prior to discharge from the hospital.    -Pain control/Antiemetics per Primary team   -Patient may need MBS by SLP. No plan for EGD at this time    If you have any questions or need any further information, please feel free to contact us [phone]. Thank you for allowing us to participate in the care of Sanchez Stroud. The note was completed using Dragon voice recognition transcription. Every effort was made to ensure accuracy; however, inadvertent transcription errors may be present despite my best efforts to edit errors. Elba Tran PA-C     Attending physician addendum:      I have personally seen and examined the patient, reviewed the patient's medical record and pertinent labs and clinical imaging. I have personally staffed the case with Elba Tran PA-C. I agree with her consultation note, exam findings, assessment and plans  as written above. I have made appropriate modifications and edited her assessment and plan where needed to reflect my impression and plans for this patient. The patient is reporting improvement of her abdominal pain. She has had no further vomiting. She has remained afebrile. She has tolerated clear liquids without any issues. She is having good urine output. She denies any shortness of breath    BP (!) 144/80   Pulse 76   Temp 98.1 °F (36.7 °C) (Oral)   Resp 16   Ht 5' 2\" (1.575 m)   Wt 126 lb 12.2 oz (57.5 kg)   SpO2 95%   BMI 23.19 kg/m²      Abd- soft, mild TTP in epigastrium  CV- RRR  Lungs _ CTAB    Impression:    1) Acute interstitial pancreatitis-secondary to alcohol. No evidence of gallstone pancreatitis and patient has had previous cholecystectomy. Her liver enzymes are normal.  2) alcohol abuse  3) Diastolic CHF  4) HTN    Plan:  Continue LR at 150ml/hr  Full liquids and advance to low fat as tolerated  Will follow  Patient needs to stop drinking Etoh    Thank you for allowing me to participate in this patient's care. If there are any questions or concerns regarding this patient, or the plan we have set in place, please feel free to contact me at [phone].      Karyle Melody, DO

## 2021-06-18 NOTE — PROGRESS NOTES
Physical Therapy  Symone Acosta  [id number]  [id number]    PT orders received and chart reviewed; attempted to see for PT eval however pt refused and said \"get out, I don't want any therapy\". Pt refused OT eval yesterday afternoon as well.   Will attempt again tomorrow however if pt continues to refuse, will sign off until pt willing to participate  Electronically signed by BIA SHEFFIELD PT on 6/18/2021 at 7:47 AM

## 2021-06-18 NOTE — PROGRESS NOTES
Occupational Therapy  Evaluation Attempt    Air[place]   6/18/2021  [id number]    OT orders noted. Pt lying supine in flat hospital bed. Pt reports \"get out, I already have a physical therapist and I did not get any sleep last night\". Pt declined therapy evaluation at this time. Pt reports she plans to discharge home and continue with therapy at home. Will trial evaluation one more time.     Electronically signed by Ángel Bolden OTR/L#[id number] on 6/18/2021 at 7:45 AM

## 2021-06-18 NOTE — PROGRESS NOTES
Hospitalist Progress Note    Patient:  Jamir Lagos  Unit/Bed:[id number]/5106-01   YOB: 1945       MRN: [id number] Acct: [de-identified]  PCP: Saima Rodriguez MD    Date of Admission: 6/14/2021  --------------------------    Chief Complaint:     Chest pain    Hospital Course:     Jamir Lagos is a 78 y.o. female hospitalized on 6/14/2021   chest pain, ACS ruled out, stress test without reversible ischemia. Patient started complaining of abdominal pain as well. . Found to have acute pancreatitis       Assessment:       Chest pain, atypical  ACS ruled out, stress test showed no reversible ischemia  CT of the chest showed no PE, mild consolidative changes on the lingula (no pneumonia symptoms)    No recurrence of symptoms, continue monitoring    Acute on chronic abdominal pain secondary to acute alcoholic pancreatitis  Report prior abdominal surgery, including cholecystectomy, on chronic abdominal pain   CT scan reviewed      -Continue current pain regimen, continue current diet, advance slowly  -Curtail IV fluid        Acute hypoxic respiratory failure  ? Aspiration event, no pneumonia noted on CTA of the chest (no PE)  Wean off oxygen as tolerated     -Swallow evaluation noted, continue weaning off oxygen. Alcohol withdrawal syndrome/alcohol dependence/alcoholic liver disease  -Continue thiamine, multivitamin,      -Continue monitoring, Ativan as needed    Mild hyponatremia, likely hypovolemic  Sodium level improved. Hypokalemia  Repleted    Hypomagnesemia  Repleted    Hypophosphatemia  Repleted    Dyslipidemia on statin    Hypertensive urgency  Continue metoprolol, as needed hydralazine    Nicotine dependence  Continue nicotine patch    Insomnia  Add trazodone      Code Status: Full Code         DVT prophylaxis: Lovenox     Disposition: Awaiting improvement of acute pancreatitis, GI bleed.   Expecting discharge in 2 days    Discussed with the patient    Discussed with RN    ----------------      Subjective:     Patient seen and examined  Overnight events noted  RN and ancillary staff note reviewed  Able to tolerate full liquid diet, report intermittent pain. Reports insomnia, multiple stressors at home contributing to her ongoing alcohol use. Diet: ADULT DIET; Full Liquid    OBJECTIVE     Exam:  /72   Pulse 74   Temp 98.2 °F (36.8 °C) (Oral)   Resp 16   Ht 5' 2\" (1.575 m)   Wt 126 lb 12.2 oz (57.5 kg)   SpO2 95%   BMI 23.19 kg/m²         Gen: Not in distress. Alert. Chronically ill  Head: Normocephalic. Atraumatic. Eyes: Conjunctivae/corneas clear. ENT: Oral mucosa moist  Neck: No JVD. No obvious thyromegaly. CVS: Nml S1S2, no murmur  , RRR  Pulmomary: Clear bilaterally. No crackles. No wheezes. Gastrointestinal: Soft, minimal tenderness to deep palpation, no rebound tenderness, non distend, . Musculoskeletal: No edema. Warm  Neuro: No focal deficit. Moves extremity spontaneously. Psychiatry: Appropriate affect. Not agitated.       Medications:  Reviewed    Infusion Medications    lactated ringers 150 mL/hr at 06/18/21 1037    sodium chloride       Scheduled Medications    potassium phosphate IVPB  20 mmol Intravenous Once    traZODone  50 mg Oral Nightly    magnesium sulfate  4,000 mg Intravenous Once    metoprolol succinate  25 mg Oral Daily    senna  1 tablet Oral Nightly    folic acid  1 mg Oral Daily    sodium chloride flush  5-40 mL Intravenous 2 times per day    multivitamin  1 tablet Oral Daily    pantoprazole  40 mg Oral BID AC    atorvastatin  20 mg Oral Daily    DULoxetine  30 mg Oral Daily    ferrous sulfate  325 mg Oral Daily with breakfast    lactobacillus  1 capsule Oral Daily with breakfast    nicotine  1 patch Transdermal Daily    tiotropium  2 puff Inhalation Daily    thiamine mononitrate  100 mg Oral Daily    enoxaparin  40 mg Subcutaneous Daily     PRN Meds: fleet, HYDROmorphone **OR** oxyCODONE, lactulose, hydrALAZINE, melatonin, sodium chloride flush, sodium chloride, albuterol sulfate HFA, prochlorperazine, ondansetron **OR** ondansetron, polyethylene glycol, acetaminophen **OR** acetaminophen      Intake/Output Summary (Last 24 hours) at 6/18/2021 1210  Last data filed at 6/18/2021 1194  Gross per 24 hour   Intake 960 ml   Output 400 ml   Net 560 ml             Labs:   Recent Labs     06/16/21  0422 06/16/21  1849 06/17/21  0910 06/18/21  0437   WBC 7.4  --  10.7 7.7   HGB 12.9 14.9 14.2 11.6*   HCT 38.0 43.2 41.0 34.0*     --  261 210     Recent Labs     06/16/21 0423 06/17/21 0910 06/18/21 0437   * 130* 135*   K 3.4* 3.8 3.0*   CL 94* 96* 100   CO2 27 26 27   BUN 12 11 5*   CREATININE <0.5* <0.5* <0.5*   CALCIUM 8.8 8.3 8.0*   PHOS  --   --  2.4*     Recent Labs     06/16/21 0423 06/17/21  0910 06/18/21  0437   AST 33 28 34   ALT 33 24 23   BILIDIR  --  0.3 0.4*   BILITOT 0.7 0.8 0.8   ALKPHOS 132* 122 99     No results for input(s): INR in the last 72 hours. No results for input(s): Earnie Lent in the last 72 hours. Urinalysis:      Lab Results   Component Value Date    NITRU Negative 04/09/2020    WBCUA >900 12/19/2019    BACTERIA 2+ 12/19/2019    RBCUA 20-50 12/19/2019    BLOODU Negative 04/09/2020    SPECGRAV <=1.005 04/09/2020    GLUCOSEU Negative 04/09/2020       Radiology:  CT ABDOMEN PELVIS W IV CONTRAST Additional Contrast? None   Final Result   Emphysema. No central pulmonary embolus identified. De pendant opacity is   seen at the lung bases, likely atelectasis. Ill-defined peripancreatic fluid and edema, compatible with acute   pancreatitis. .  Findings are new compared to most recent chest CT      Scattered areas of colonic wall thickening are seen, likely due to the   partially contracted state of the colon in the absence of clinical signs of   colitis      Increased density is seen within the bladder on the right, compatible with   small bladder stones.   No significant change from prior. Scattered anastomotic staple lines are seen within the bowel. No bowel   obstruction         CT CHEST PULMONARY EMBOLISM W CONTRAST   Final Result   Emphysema. No central pulmonary embolus identified. De pendant opacity is   seen at the lung bases, likely atelectasis. Ill-defined peripancreatic fluid and edema, compatible with acute   pancreatitis. .  Findings are new compared to most recent chest CT      Scattered areas of colonic wall thickening are seen, likely due to the   partially contracted state of the colon in the absence of clinical signs of   colitis      Increased density is seen within the bladder on the right, compatible with   small bladder stones. No significant change from prior. Scattered anastomotic staple lines are seen within the bowel. No bowel   obstruction         XR CHEST PORTABLE   Final Result   Negative portable chest.         US ABDOMEN LIMITED Specify organ? GALLBLADDER, PANCREAS   Final Result   Surgically absent gallbladder. No biliary ductal dilatation. Heterogeneous echotexture throughout the liver, compatible with diffuse   hepatocellular disease         NM Cardiac Stress Test Nuclear Imaging   Final Result      CT CHEST PULMONARY EMBOLISM W CONTRAST   Final Result   1. No CT evidence of a pulmonary embolism. 2. Mild consolidative opacity within the lingula, most likely representing   either atelectasis or parenchymal scar. Pneumonia is considered less likely. XR CHEST PORTABLE   Final Result   No acute cardiopulmonary disease.                      Electronically signed by Landis Dakin, MD on 6/18/2021 at 12:10 PM

## 2021-06-18 NOTE — PROGRESS NOTES
Speech Language Pathology    Attempted to see patient for dysphagia tx. Noted patient was advanced to full liquid diet per MD order. Attempted to see patient for ongoing assessment of full liquid items, however patient continuously refused intake of any PO. Patient educated on purpose of ST services however patient continued to refuse. Discussed with RN. Follow-up Monday.      Patrcia Essex, [place], [address] [place] [address]  Speech-Language Pathologist  06/18/21 16:05

## 2021-06-19 LAB
ALBUMIN SERPL-MCNC: 2.5 G/DL (ref 3.4–5)
ALP BLD-CCNC: 116 U/L (ref 40–129)
ALT SERPL-CCNC: 22 U/L (ref 10–40)
ANION GAP SERPL CALCULATED.3IONS-SCNC: 9 MMOL/L (ref 3–16)
AST SERPL-CCNC: 35 U/L (ref 15–37)
BASOPHILS ABSOLUTE: 0 K/UL (ref 0–0.2)
BASOPHILS RELATIVE PERCENT: 0.3 %
BILIRUB SERPL-MCNC: 0.5 MG/DL (ref 0–1)
BILIRUBIN DIRECT: <0.2 MG/DL (ref 0–0.3)
BILIRUBIN, INDIRECT: ABNORMAL MG/DL (ref 0–1)
BUN BLDV-MCNC: 3 MG/DL (ref 7–20)
CALCIUM SERPL-MCNC: 8.2 MG/DL (ref 8.3–10.6)
CHLORIDE BLD-SCNC: 101 MMOL/L (ref 99–110)
CO2: 25 MMOL/L (ref 21–32)
CREAT SERPL-MCNC: <0.5 MG/DL (ref 0.6–1.2)
EOSINOPHILS ABSOLUTE: 0.2 K/UL (ref 0–0.6)
EOSINOPHILS RELATIVE PERCENT: 2.9 %
GFR AFRICAN AMERICAN: >60
GFR NON-AFRICAN AMERICAN: >60
GLUCOSE BLD-MCNC: 104 MG/DL (ref 70–99)
HCT VFR BLD CALC: 36 % (ref 36–48)
HEMOGLOBIN: 12.3 G/DL (ref 12–16)
LYMPHOCYTES ABSOLUTE: 0.9 K/UL (ref 1–5.1)
LYMPHOCYTES RELATIVE PERCENT: 12.7 %
MCH RBC QN AUTO: 36.8 PG (ref 26–34)
MCHC RBC AUTO-ENTMCNC: 34.1 G/DL (ref 31–36)
MCV RBC AUTO: 108 FL (ref 80–100)
MONOCYTES ABSOLUTE: 0.5 K/UL (ref 0–1.3)
MONOCYTES RELATIVE PERCENT: 7 %
NEUTROPHILS ABSOLUTE: 5.2 K/UL (ref 1.7–7.7)
NEUTROPHILS RELATIVE PERCENT: 77.1 %
PDW BLD-RTO: 17.2 % (ref 12.4–15.4)
PHOSPHORUS: 2.9 MG/DL (ref 2.5–4.9)
PLATELET # BLD: 162 K/UL (ref 135–450)
PLATELET SLIDE REVIEW: ADEQUATE
PMV BLD AUTO: 7.8 FL (ref 5–10.5)
POTASSIUM SERPL-SCNC: 3.9 MMOL/L (ref 3.5–5.1)
RBC # BLD: 3.33 M/UL (ref 4–5.2)
SLIDE REVIEW: ABNORMAL
SODIUM BLD-SCNC: 135 MMOL/L (ref 136–145)
TOTAL PROTEIN: 5.3 G/DL (ref 6.4–8.2)
WBC # BLD: 6.8 K/UL (ref 4–11)

## 2021-06-19 PROCEDURE — 6370000000 HC RX 637 (ALT 250 FOR IP): Performed by: INTERNAL MEDICINE

## 2021-06-19 PROCEDURE — 97166 OT EVAL MOD COMPLEX 45 MIN: CPT

## 2021-06-19 PROCEDURE — 80076 HEPATIC FUNCTION PANEL: CPT

## 2021-06-19 PROCEDURE — 85025 COMPLETE CBC W/AUTO DIFF WBC: CPT

## 2021-06-19 PROCEDURE — 1200000000 HC SEMI PRIVATE

## 2021-06-19 PROCEDURE — 6370000000 HC RX 637 (ALT 250 FOR IP): Performed by: NURSE PRACTITIONER

## 2021-06-19 PROCEDURE — 97530 THERAPEUTIC ACTIVITIES: CPT

## 2021-06-19 PROCEDURE — 6360000002 HC RX W HCPCS: Performed by: HOSPITALIST

## 2021-06-19 PROCEDURE — 2580000003 HC RX 258: Performed by: HOSPITALIST

## 2021-06-19 PROCEDURE — 80069 RENAL FUNCTION PANEL: CPT

## 2021-06-19 PROCEDURE — 94760 N-INVAS EAR/PLS OXIMETRY 1: CPT

## 2021-06-19 PROCEDURE — 36415 COLL VENOUS BLD VENIPUNCTURE: CPT

## 2021-06-19 PROCEDURE — 2580000003 HC RX 258: Performed by: INTERNAL MEDICINE

## 2021-06-19 PROCEDURE — 94640 AIRWAY INHALATION TREATMENT: CPT

## 2021-06-19 PROCEDURE — 6370000000 HC RX 637 (ALT 250 FOR IP): Performed by: HOSPITALIST

## 2021-06-19 PROCEDURE — 97162 PT EVAL MOD COMPLEX 30 MIN: CPT

## 2021-06-19 RX ADMIN — HYDROMORPHONE HYDROCHLORIDE 0.5 MG: 1 INJECTION, SOLUTION INTRAMUSCULAR; INTRAVENOUS; SUBCUTANEOUS at 21:07

## 2021-06-19 RX ADMIN — PANTOPRAZOLE SODIUM 40 MG: 40 TABLET, DELAYED RELEASE ORAL at 06:22

## 2021-06-19 RX ADMIN — ALBUTEROL SULFATE 2 PUFF: 90 AEROSOL, METERED RESPIRATORY (INHALATION) at 21:18

## 2021-06-19 RX ADMIN — OXYCODONE 5 MG: 5 TABLET ORAL at 12:49

## 2021-06-19 RX ADMIN — TIOTROPIUM BROMIDE INHALATION SPRAY 2 PUFF: 3.12 SPRAY, METERED RESPIRATORY (INHALATION) at 08:16

## 2021-06-19 RX ADMIN — SODIUM CHLORIDE, PRESERVATIVE FREE 10 ML: 5 INJECTION INTRAVENOUS at 20:10

## 2021-06-19 RX ADMIN — SENNOSIDES 8.6 MG: 8.6 TABLET, FILM COATED ORAL at 20:06

## 2021-06-19 RX ADMIN — FOLIC ACID 1 MG: 1 TABLET ORAL at 09:21

## 2021-06-19 RX ADMIN — DULOXETINE HYDROCHLORIDE 30 MG: 30 CAPSULE, DELAYED RELEASE ORAL at 09:21

## 2021-06-19 RX ADMIN — THERA TABS 1 TABLET: TAB at 09:22

## 2021-06-19 RX ADMIN — ENOXAPARIN SODIUM 40 MG: 40 INJECTION SUBCUTANEOUS at 12:29

## 2021-06-19 RX ADMIN — Medication 100 MG: at 09:22

## 2021-06-19 RX ADMIN — HYDROMORPHONE HYDROCHLORIDE 0.5 MG: 1 INJECTION, SOLUTION INTRAMUSCULAR; INTRAVENOUS; SUBCUTANEOUS at 08:39

## 2021-06-19 RX ADMIN — Medication 6 MG: at 21:28

## 2021-06-19 RX ADMIN — HYDROMORPHONE HYDROCHLORIDE 0.5 MG: 1 INJECTION, SOLUTION INTRAMUSCULAR; INTRAVENOUS; SUBCUTANEOUS at 16:54

## 2021-06-19 RX ADMIN — FERROUS SULFATE TAB EC 324 MG (65 MG FE EQUIVALENT) 324 MG: 324 (65 FE) TABLET DELAYED RESPONSE at 09:31

## 2021-06-19 RX ADMIN — PANTOPRAZOLE SODIUM 40 MG: 40 TABLET, DELAYED RELEASE ORAL at 16:54

## 2021-06-19 RX ADMIN — TRAZODONE HYDROCHLORIDE 50 MG: 50 TABLET ORAL at 20:06

## 2021-06-19 RX ADMIN — ATORVASTATIN CALCIUM 20 MG: 20 TABLET, FILM COATED ORAL at 09:21

## 2021-06-19 RX ADMIN — Medication 1 CAPSULE: at 09:21

## 2021-06-19 RX ADMIN — METOPROLOL SUCCINATE 25 MG: 25 TABLET, EXTENDED RELEASE ORAL at 09:21

## 2021-06-19 RX ADMIN — SODIUM CHLORIDE, POTASSIUM CHLORIDE, SODIUM LACTATE AND CALCIUM CHLORIDE: 600; 310; 30; 20 INJECTION, SOLUTION INTRAVENOUS at 20:06

## 2021-06-19 RX ADMIN — SODIUM CHLORIDE, POTASSIUM CHLORIDE, SODIUM LACTATE AND CALCIUM CHLORIDE: 600; 310; 30; 20 INJECTION, SOLUTION INTRAVENOUS at 06:22

## 2021-06-19 RX ADMIN — HYDROMORPHONE HYDROCHLORIDE 0.5 MG: 1 INJECTION, SOLUTION INTRAMUSCULAR; INTRAVENOUS; SUBCUTANEOUS at 02:57

## 2021-06-19 ASSESSMENT — PAIN DESCRIPTION - LOCATION
LOCATION: ABDOMEN

## 2021-06-19 ASSESSMENT — PAIN SCALES - GENERAL
PAINLEVEL_OUTOF10: 8
PAINLEVEL_OUTOF10: 9
PAINLEVEL_OUTOF10: 7
PAINLEVEL_OUTOF10: 0
PAINLEVEL_OUTOF10: 2
PAINLEVEL_OUTOF10: 6
PAINLEVEL_OUTOF10: 6
PAINLEVEL_OUTOF10: 0
PAINLEVEL_OUTOF10: 5
PAINLEVEL_OUTOF10: 7

## 2021-06-19 ASSESSMENT — PAIN DESCRIPTION - ONSET
ONSET: ON-GOING
ONSET: ON-GOING
ONSET: GRADUAL
ONSET: ON-GOING

## 2021-06-19 ASSESSMENT — PAIN DESCRIPTION - PAIN TYPE
TYPE: ACUTE PAIN

## 2021-06-19 ASSESSMENT — PAIN DESCRIPTION - ORIENTATION
ORIENTATION: RIGHT;UPPER
ORIENTATION: UPPER;LEFT
ORIENTATION: RIGHT;UPPER
ORIENTATION: RIGHT;UPPER

## 2021-06-19 ASSESSMENT — PAIN DESCRIPTION - DESCRIPTORS
DESCRIPTORS: ACHING;DISCOMFORT
DESCRIPTORS: ACHING
DESCRIPTORS: ACHING
DESCRIPTORS: ACHING;DISCOMFORT
DESCRIPTORS: ACHING;DISCOMFORT
DESCRIPTORS: ACHING

## 2021-06-19 ASSESSMENT — PAIN DESCRIPTION - PROGRESSION
CLINICAL_PROGRESSION: GRADUALLY IMPROVING
CLINICAL_PROGRESSION: GRADUALLY WORSENING
CLINICAL_PROGRESSION: NOT CHANGED

## 2021-06-19 ASSESSMENT — PAIN DESCRIPTION - FREQUENCY
FREQUENCY: CONTINUOUS

## 2021-06-19 NOTE — PLAN OF CARE
Problem: Pain:  Goal: Pain level will decrease  Description: Pain level will decrease  6/19/2021 1438 by Chandra Peters RN  Outcome: Ongoing

## 2021-06-19 NOTE — PROGRESS NOTES
Occupational Therapy   Occupational Therapy Initial Assessment and Tentative D/C    Date: 2021   Patient Name: Sanchez Stroud  MRN: [id number]     : 1945    Date of Service: 2021    Discharge Recommendations: Sanchez Stroud scored a 16/24 on the AM-PAC ADL Inpatient form. Current research shows that an AM-PAC score of 17 or less is typically not associated with a discharge to the patient's home setting. Based on the patient's AM-PAC score and their current ADL deficits, it is recommended that the patient have 3-5 sessions per week of Occupational Therapy at d/c to increase the patient's independence. Please see assessment section for further patient specific details. If patient discharges prior to next session this note will serve as a discharge summary. Please see below for the latest assessment towards goals. Continue to assess pending progress, 3-5 sessions per week       Assessment   Performance deficits / Impairments: Decreased functional mobility ; Decreased strength;Decreased endurance;Decreased ADL status; Decreased high-level IADLs;Decreased safe awareness;Decreased cognition;Decreased balance  Assessment: Pt is \"78year-old female with history of peptic ulcer disease, alcohol abuse, hypertension, Parkinson's disease, prior bowel obstruction status post colectomy, COPD, who presented with cute onset of epigastric and chest pain with radiation to the right upper quadrant and associated nausea and vomiting. ACS was ruled out. The patient had lab work revealing an elevated lipase. CT scan was done and revealed acute pancreatitis. The patient has had a previous cholecystectomy. She admits to drinking heavily. \" PTA pt from home with friend and mother in law where pt was Ind with mobility and ADLs with history of falls. Pt currenlty completes bed mobility with Mod A. Pt completes transfers with mobility with Min Ax2 and use of RW. Pt reports being limited due to R ankle sprain.  Pt very fearful/anxious with mobility and unwilling to sit up in chair. Anticipate pt needing up to Max A for ADLs. Pt will benefit from skilled OT services at this time. Anticipate pt with need for ongoing OT at time of D/C. Prognosis: Fair  Decision Making: Medium Complexity  Exam: see above  Assistance / Modification: RW  OT Education: OT Role;Plan of Care;Transfer Training  REQUIRES OT FOLLOW UP: Yes  Activity Tolerance  Activity Tolerance: Patient limited by fatigue;Treatment limited secondary to agitation  Safety Devices  Safety Devices in place: Yes  Type of devices: Call light within reach;Gait belt;Nurse notified; Bed alarm in place; Left in bed           Patient Diagnosis(es): The encounter diagnosis was Chest pain, unspecified type. has a past medical history of Anemia, Arthritis, Clostridium difficile colitis, Clostridium difficile diarrhea, Colostomy in place [facility]), COPD (chronic obstructive pulmonary disease) ([facility] [address] Depression, DANIELLE (generalized anxiety disorder), Gastric ulcer, unspecified as acute or chronic, without mention of hemorrhage, perforation, or obstruction, H/O ETOH abuse, Hiatal hernia, History of blood transfusion, Hyperlipidemia, Hypertension, Insomnia, Intestinal obstruction ([facility] [address] Parkinson's disease ([facility] [address] Tobacco abuse, and Vitamin D deficiency. has a past surgical history that includes Hysterectomy; Cholecystectomy; Tubal ligation; colostomy (2018); Upper gastrointestinal endoscopy (12/13/2018); Upper gastrointestinal endoscopy (N/A, 12/13/2018); Colonoscopy (12/14/2018); Colonoscopy (N/A, 12/14/2018); Small intestine surgery (N/A, 4/10/2019); Endoscopy, colon, diagnostic; and Abdomen surgery.            Restrictions  Restrictions/Precautions  Restrictions/Precautions: Fall Risk    Subjective   General  Chart Reviewed: Yes  Patient assessed for rehabilitation services?: Yes  Additional Pertinent Hx: Per Dr. Kenney Echavarria: Pt is \"78year-old female with history of peptic ulcer disease, alcohol abuse, hypertension, Parkinson's disease, prior bowel obstruction status post colectomy, COPD, who presented with cute onset of epigastric and chest pain with radiation to the right upper quadrant and associated nausea and vomiting. ACS was ruled out. The patient had lab work revealing an elevated lipase. CT scan was done and revealed acute pancreatitis. The patient has had a previous cholecystectomy. She admits to drinking heavily. \"  Family / Caregiver Present: No  Referring Practitioner: Charlene Velazco MD  Diagnosis: Chest pain, pancreatitis  Subjective  Subjective: Pt met bedside, agreeable for therapy evaluation and OOB activity. Pt reports pain in R ankle with no number stated. General Comment  Comments: okay for therapy per RN. Social/Functional History  Social/Functional History  Lives With: Family (79 y/o ex mother-in-law; and a \". \")  Type of Home: House  Home Layout: Able to Live on Main level with bedroom/bathroom  Home Access: Stairs to enter with rails (8 steps to enter.)  Bathroom Shower/Tub:  (Pt only sponge bathes with family assist.)  Bathroom Toilet:  (Wears depends; does not use toilet or bsc.)  Bathroom Accessibility: Not accessible  ADL Assistance: Needs assistance (Family assists with sponge baths and depends.)  Ambulation Assistance: Needs assistance (With Divina David. Recent ankle sprain ~ 1 wk ago ; unable to amb since.)  Transfer Assistance: Needs assistance  Active : No (Family drives.)  Additional Comments: Pt reports the home that she shares with family is \"deplorable\" and she would like permanent placement elsewhere.        Objective   Vision: Within Functional Limits  Hearing: Within functional limits    Orientation  Overall Orientation Status: Within Functional Limits  Orientation Level: Oriented X4     Balance  Sitting Balance: Supervision  Standing Balance: Contact guard assistance (RW)  Functional Mobility  Functional - Mobility Device: Rolling Walker  Activity: Other (short distances; ~5ft x2)  Assist Level: Minimal assistance (Min Ax2)  Functional Mobility Comments: no overt LOB; pt anxious with mobility; reports increased pain in R ankle  Wheelchair Bed Transfers  Wheelchair/Bed - Technique: Ambulating (RW)  Equipment Used: Bed;Other (bed <> chair)  Level of Asssistance: 2 Person assistance;Minimal assistance  ADL  LE Dressing: Maximum assistance (assist to don bilateral socks; assist to sylvie brief including thread B LE and manage over hips)  Toileting: Dependent/Total (purewick)  Additional Comments: Anticipate pt needing up to Max A for ADLs including dressing, bathing, and toileting based on ROM, strength, and balance. Tone RUE  RUE Tone: Normotonic  Tone LUE  LUE Tone: Normotonic  Coordination  Movements Are Fluid And Coordinated: Yes     Bed mobility  Supine to Sit: Moderate assistance  Sit to Supine: Minimal assistance  Transfers  Sit to stand: 2 Person assistance;Minimal assistance  Stand to sit: Minimal assistance  Transfer Comments: to/from RW; cues for hand placement     Cognition  Overall Cognitive Status: Exceptions  Arousal/Alertness: Appropriate responses to stimuli  Following Commands:  Follows one step commands with increased time  Attention Span: Appears intact  Safety Judgement: Decreased awareness of need for safety;Decreased awareness of need for assistance  Insights: Decreased awareness of deficits  Cognition Comment: anxious with all mobility; fear of falls        Sensation  Overall Sensation Status: WFL        LUE AROM (degrees)  LUE AROM : WFL  RUE AROM (degrees)  RUE AROM : WFL  LUE Strength  Gross LUE Strength: WFL  L Hand General: 4+/5  RUE Strength  Gross RUE Strength: WFL  R Hand General: 4+/5                   Plan   Plan  Times per week: 3-5x  Current Treatment Recommendations: Strengthening, Functional Mobility Training, Gait Training, Balance Training, Self-Care / ADL, Safety Education & Training, Endurance Training, Patient/Caregiver

## 2021-06-19 NOTE — PROGRESS NOTES
Gastroenterology Progress Note    Xiang Herrera is a 78 y.o. female patient. Hospitalization Day:4    SUBJECTIVE:  No nausea or vomiting. No abdominal pain . Tolerating full liquids. ROS:  Gastrointestinal ROS: no abdominal pain, change in bowel habits, or black or bloody stools    Physical    VITALS:  BP (!) 154/79   Pulse 75   Temp 97.9 °F (36.6 °C) (Oral)   Resp 18   Ht 5' 2\" (1.575 m)   Wt 124 lb 12.5 oz (56.6 kg)   SpO2 96%   BMI 22.82 kg/m²   TEMPERATURE:  Current - Temp: 97.9 °F (36.6 °C); Max - Temp  Av.2 °F (36.8 °C)  Min: 97.9 °F (36.6 °C)  Max: 98.7 °F (37.1 °C)    General:  Alert and oriented,  No apparent distress  Skin- without jaundice  Eyes: anicteric sclera  Cardiac: RRR, Nl s1s2, without murmurs  Lungs CTA Bilaterally, normal effort  Abdomen soft, ND, NT, no HSM, Bowel sounds normal  Ext: without edema  Neuro: no asterixis     Data    Data Review:    Recent Labs     21  0910 217 21  0526   WBC 10.7 7.7 6.8   HGB 14.2 11.6* 12.3   HCT 41.0 34.0* 36.0   .0* 105.0* 108.0*    210 162     Recent Labs     21  0910 21  0526   * 135* 135*   K 3.8 3.0* 3.9   CL 96* 100 101   CO2 26 27 25   PHOS  --  2.4* 2.9   BUN 11 5* 3*   CREATININE <0.5* <0.5* <0.5*     Recent Labs     21  0910 21  0437 21  0526   AST 28 34 35   ALT 24 23 22   BILIDIR 0.3 0.4* <0.2   BILITOT 0.8 0.8 0.5   ALKPHOS 122 99 116     Recent Labs     21  0910 21  043   LIPASE 619.0* 155.0*     No results for input(s): PROTIME, INR in the last 72 hours. Radiology Review:    CT ABDOMEN PELVIS W IV CONTRAST Additional Contrast? None   Final Result   Emphysema. No central pulmonary embolus identified. De pendant opacity is   seen at the lung bases, likely atelectasis. Ill-defined peripancreatic fluid and edema, compatible with acute   pancreatitis. .  Findings are new compared to most recent chest CT      Scattered areas of colonic wall thickening are seen, likely due to the   partially contracted state of the colon in the absence of clinical signs of   colitis      Increased density is seen within the bladder on the right, compatible with   small bladder stones. No significant change from prior. Scattered anastomotic staple lines are seen within the bowel. No bowel   obstruction         CT CHEST PULMONARY EMBOLISM W CONTRAST   Final Result   Emphysema. No central pulmonary embolus identified. De pendant opacity is   seen at the lung bases, likely atelectasis. Ill-defined peripancreatic fluid and edema, compatible with acute   pancreatitis. .  Findings are new compared to most recent chest CT      Scattered areas of colonic wall thickening are seen, likely due to the   partially contracted state of the colon in the absence of clinical signs of   colitis      Increased density is seen within the bladder on the right, compatible with   small bladder stones. No significant change from prior. Scattered anastomotic staple lines are seen within the bowel. No bowel   obstruction         XR CHEST PORTABLE   Final Result   Negative portable chest.         US ABDOMEN LIMITED Specify organ? GALLBLADDER, PANCREAS   Final Result   Surgically absent gallbladder. No biliary ductal dilatation. Heterogeneous echotexture throughout the liver, compatible with diffuse   hepatocellular disease         NM Cardiac Stress Test Nuclear Imaging   Final Result      CT CHEST PULMONARY EMBOLISM W CONTRAST   Final Result   1. No CT evidence of a pulmonary embolism. 2. Mild consolidative opacity within the lingula, most likely representing   either atelectasis or parenchymal scar. Pneumonia is considered less likely. XR CHEST PORTABLE   Final Result   No acute cardiopulmonary disease. ASSESSMENT: 78 y. o. female with a PMH of COPD, nicotine abuse, depression/anxiety, h/o PUD, alcohol abuse, HTN, Parkinson's disease, TIA, bowel obstruction s/p colectomy and colostomy reversal, cholecystectomy who presented on 6/14/2021 with       1) Acute interstitial pancreatitis-secondary to alcohol. No evidence of gallstone pancreatitis and patient has had previous cholecystectomy. Her liver enzymes are normal.  2) Alcohol abuse- active  3) Diastolic CHF- stable  4) HTN     Plan:  Continue LR at 75ml/hr while inpatient  Advance to low fat as tolerated  Patient can be d/c home today from my standpoint  Patient needs to stop drinking Etoh  Call with ? Will sign off. Thank you for allowing me to participate in the care of your patient. Please feel free to contact me with any concerns.   [address] [place] [address], DO

## 2021-06-19 NOTE — PROGRESS NOTES
Hospitalist Progress Note    Patient:  Izzy Aguila  Unit/Bed:[id number]-1495/5106-01   YOB: 1945       MRN: [id number] Acct: [de-identified]  PCP: Jolene Rogers MD    Date of Admission: 6/14/2021  --------------------------    Chief Complaint:     Chest pain    Hospital Course:     Izzy Aguila is a 78 y.o. female hospitalized on 6/14/2021   chest pain, ACS ruled out, stress test without reversible ischemia. Patient started complaining of abdominal pain as well. . Found to have acute pancreatitis       Assessment:       Chest pain, atypical  ACS ruled out, stress test showed no reversible ischemia  CT of the chest showed no PE, mild consolidative changes on the lingula (no pneumonia symptoms)    No recurrence of symptoms, continue monitoring    Acute on chronic abdominal pain secondary to acute alcoholic pancreatitis         -Continue current pain regimen, continue current diet, advance slowly  -Curtail IV fluid        Acute hypoxic respiratory failure  ? Aspiration event, no pneumonia noted on CTA of the chest (no PE)  Wean off oxygen as tolerated     -Currently off oxygen    Alcohol withdrawal syndrome/alcohol dependence/alcoholic liver disease  -Continue thiamine, multivitamin,      -Overall improving, continue monitoring    Mild hyponatremia, likely hypovolemic  Continue monitoring    Hypokalemia  Resolved    Hypomagnesemia  Repleted    Hypophosphatemia  Resolved  Dyslipidemia on statin    Hypertensive urgency  Continue metoprolol, as needed hydralazine    Nicotine dependence  Continue nicotine patch    Insomnia  Add trazodone      Code Status: Full Code         DVT prophylaxis: Lovenox     Disposition: Physical debility noted, likely need ECF. Discussed with the patient    Discussed with RN    ----------------      Subjective:     Patient seen and examined  Overnight events noted  RN and ancillary staff note reviewed     Feels better, no major overnight event.   Currently off oxygen. Diet: ADULT DIET; Regular; Low Fat (less than or equal to 50 gm/day)    OBJECTIVE     Exam:  /64   Pulse 71   Temp 97.9 °F (36.6 °C) (Oral)   Resp 18   Ht 5' 2\" (1.575 m)   Wt 124 lb 12.5 oz (56.6 kg)   SpO2 96%   BMI 22.82 kg/m²     Unchanged physical finding from 6/18/2021 as below    Gen: Not in distress. Alert. Chronically ill  Head: Normocephalic. Atraumatic. Eyes: Conjunctivae/corneas clear. ENT: Oral mucosa moist  Neck: No JVD. No obvious thyromegaly. CVS: Nml S1S2, no murmur  , RRR  Pulmomary: Clear bilaterally. No crackles. No wheezes. Gastrointestinal: Soft, minimal tenderness to deep palpation, no rebound tenderness, non distend, . Musculoskeletal: No edema. Warm  Neuro: No focal deficit. Moves extremity spontaneously. Psychiatry: Appropriate affect. Not agitated.       Medications:  Reviewed    Infusion Medications    lactated ringers 75 mL/hr at 06/19/21 0622    sodium chloride       Scheduled Medications    traZODone  50 mg Oral Nightly    metoprolol succinate  25 mg Oral Daily    senna  1 tablet Oral Nightly    folic acid  1 mg Oral Daily    sodium chloride flush  5-40 mL Intravenous 2 times per day    multivitamin  1 tablet Oral Daily    pantoprazole  40 mg Oral BID AC    atorvastatin  20 mg Oral Daily    DULoxetine  30 mg Oral Daily    ferrous sulfate  325 mg Oral Daily with breakfast    lactobacillus  1 capsule Oral Daily with breakfast    nicotine  1 patch Transdermal Daily    tiotropium  2 puff Inhalation Daily    thiamine mononitrate  100 mg Oral Daily    enoxaparin  40 mg Subcutaneous Daily     PRN Meds: fleet, HYDROmorphone **OR** oxyCODONE, lactulose, hydrALAZINE, melatonin, sodium chloride flush, sodium chloride, albuterol sulfate HFA, prochlorperazine, ondansetron **OR** ondansetron, polyethylene glycol, acetaminophen **OR** acetaminophen      Intake/Output Summary (Last 24 hours) at 6/19/2021 1344  Last data filed at 6/19/2021 1028  Gross per 24 hour   Intake 2906 ml   Output 600 ml   Net 2306 ml             Labs:   Recent Labs     06/17/21  0910 06/18/21  0437 06/19/21  0526   WBC 10.7 7.7 6.8   HGB 14.2 11.6* 12.3   HCT 41.0 34.0* 36.0    210 162     Recent Labs     06/17/21  0910 06/18/21  0437 06/19/21  0526   * 135* 135*   K 3.8 3.0* 3.9   CL 96* 100 101   CO2 26 27 25   BUN 11 5* 3*   CREATININE <0.5* <0.5* <0.5*   CALCIUM 8.3 8.0* 8.2*   PHOS  --  2.4* 2.9     Recent Labs     06/17/21  0910 06/18/21 0437 06/19/21  0526   AST 28 34 35   ALT 24 23 22   BILIDIR 0.3 0.4* <0.2   BILITOT 0.8 0.8 0.5   ALKPHOS 122 99 116     No results for input(s): INR in the last 72 hours. No results for input(s): Saud Killings in the last 72 hours. Urinalysis:      Lab Results   Component Value Date    NITRU Negative 04/09/2020    WBCUA >900 12/19/2019    BACTERIA 2+ 12/19/2019    RBCUA 20-50 12/19/2019    BLOODU Negative 04/09/2020    SPECGRAV <=1.005 04/09/2020    GLUCOSEU Negative 04/09/2020       Radiology:  CT ABDOMEN PELVIS W IV CONTRAST Additional Contrast? None   Final Result   Emphysema. No central pulmonary embolus identified. De pendant opacity is   seen at the lung bases, likely atelectasis. Ill-defined peripancreatic fluid and edema, compatible with acute   pancreatitis. .  Findings are new compared to most recent chest CT      Scattered areas of colonic wall thickening are seen, likely due to the   partially contracted state of the colon in the absence of clinical signs of   colitis      Increased density is seen within the bladder on the right, compatible with   small bladder stones. No significant change from prior. Scattered anastomotic staple lines are seen within the bowel. No bowel   obstruction         CT CHEST PULMONARY EMBOLISM W CONTRAST   Final Result   Emphysema. No central pulmonary embolus identified. De pendant opacity is   seen at the lung bases, likely atelectasis.       Ill-defined peripancreatic fluid and edema, compatible with acute   pancreatitis. .  Findings are new compared to most recent chest CT      Scattered areas of colonic wall thickening are seen, likely due to the   partially contracted state of the colon in the absence of clinical signs of   colitis      Increased density is seen within the bladder on the right, compatible with   small bladder stones. No significant change from prior. Scattered anastomotic staple lines are seen within the bowel. No bowel   obstruction         XR CHEST PORTABLE   Final Result   Negative portable chest.         US ABDOMEN LIMITED Specify organ? GALLBLADDER, PANCREAS   Final Result   Surgically absent gallbladder. No biliary ductal dilatation. Heterogeneous echotexture throughout the liver, compatible with diffuse   hepatocellular disease         NM Cardiac Stress Test Nuclear Imaging   Final Result      CT CHEST PULMONARY EMBOLISM W CONTRAST   Final Result   1. No CT evidence of a pulmonary embolism. 2. Mild consolidative opacity within the lingula, most likely representing   either atelectasis or parenchymal scar. Pneumonia is considered less likely. XR CHEST PORTABLE   Final Result   No acute cardiopulmonary disease.                      Electronically signed by Charlene Velazco MD on 6/19/2021 at 1:44 PM

## 2021-06-19 NOTE — PROGRESS NOTES
Physical Therapy    Facility/Department: [facility] 3L PROGRESSIVE CARE  Initial Assessment    NAME: Chitra Reeder  : 1945  MRN: [id number]    Date of Service: 2021    Discharge Recommendations:  Continue to assess pending progress, 3-5 sessions per week   PT Equipment Recommendations  Other: Defer to next level of care. Chitra Reeder scored a  on the AM-PAC short mobility form. Current research shows that an AM-PAC score of 17 or less is typically not associated with a discharge to the patient's home setting. Based on the patient's AM-PAC score and their current functional mobility deficits, it is recommended that the patient have 3-5 sessions per week of Physical Therapy at d/c to increase the patient's independence. Please see assessment section for further patient specific details. If patient discharges prior to next session this note will serve as a discharge summary. Please see below for the latest assessment towards goals. Assessment   Body structures, Functions, Activity limitations: Decreased functional mobility ; Decreased strength;Decreased safe awareness;Decreased endurance  Assessment: 79 y/o female admit 2021 with Chest and Abd Pain. Abd pain likely ETOH Liver Disease. Chest pain : Stress Test negative. Recent R Ankle Sprain ~ 1 wk prior. PMH as noted including COPD, SBO/Diverticulosis, Parkinsons, ETOH. PTA pt living in home with ex m-i-l and a \". \"  Pt only wears depends and son helps her sponge bathe; anticipate very little mobility. At this time pt requiring assist x 2 with [place] or use of Stedy for oob. Pt immediately request return to bed despite encourage oob. Unsafe to return home; recommend cont PT (3-5) upon d/c. Prognosis: Fair  Decision Making: Medium Complexity  History: 79 y/o female admit 2021 with Chest and Abd Pain. Abd pain likely ETOH Liver Disease. Chest pain : Stress Test negative. Recent R Ankle Sprain ~ 1 wk prior.   PMH as noted including COPD, SBO/Diverticulosis, Parkinsons, ETOH. Exam: See above. Clinical Presentation: See above. Patient Education: Role of PT, POC, Need to call for assist, Encourage oob. Barriers to Learning: Limited insight/need for activity. REQUIRES PT FOLLOW UP: Yes  Activity Tolerance  Activity Tolerance: Patient limited by cognitive status  Activity Tolerance: Limited pt insight/importance oob activities. Patient Diagnosis(es): The encounter diagnosis was Chest pain, unspecified type. has a past medical history of Anemia, Arthritis, Clostridium difficile colitis, Clostridium difficile diarrhea, Colostomy in place [facility]), COPD (chronic obstructive pulmonary disease) ([facility] [address] Depression, DANIELLE (generalized anxiety disorder), Gastric ulcer, unspecified as acute or chronic, without mention of hemorrhage, perforation, or obstruction, H/O ETOH abuse, Hiatal hernia, History of blood transfusion, Hyperlipidemia, Hypertension, Insomnia, Intestinal obstruction ([facility] [address] Parkinson's disease ([facility] [address] Tobacco abuse, and Vitamin D deficiency. has a past surgical history that includes Hysterectomy; Cholecystectomy; Tubal ligation; colostomy (2018); Upper gastrointestinal endoscopy (12/13/2018); Upper gastrointestinal endoscopy (N/A, 12/13/2018); Colonoscopy (12/14/2018); Colonoscopy (N/A, 12/14/2018); Small intestine surgery (N/A, 4/10/2019); Endoscopy, colon, diagnostic; and Abdomen surgery. Restrictions  Restrictions/Precautions  Restrictions/Precautions: Fall Risk  Vision/Hearing  Vision: Within Functional Limits  Hearing: Within functional limits     Subjective  General  Chart Reviewed: Yes  Patient assessed for rehabilitation services?: Yes  Additional Pertinent Hx: 79 y/o female admit 6/14/2021 with Chest and Abd Pain. Abd pain likely ETOH Liver Disease. Chest pain : Stress Test negative. Recent R Ankle Sprain ~ 1 wk prior. PMH as noted including COPD, SBO/Diverticulosis, Parkinsons, ETOH.   Family / Caregiver Present: No  Referring Practitioner: Dr. Yolande Ramos  Other (Comment): Pt follows simple commands; needs encouragement. Subjective  Subjective: Pt agreeable to PT Eval/Rx. Orientation  Orientation  Overall Orientation Status: Within Functional Limits  Social/Functional History  Social/Functional History  Lives With: Family (81 y/o ex mother-in-law; and a \". \")  Type of Home: House  Home Layout: Able to Live on Main level with bedroom/bathroom  Home Access: Stairs to enter with rails (8 steps to enter.)  Bathroom Shower/Tub:  (Pt only sponge bathes with family assist.)  Bathroom Toilet:  (Wears depends; does not use toilet or bsc.)  Bathroom Accessibility: Not accessible  ADL Assistance: Needs assistance (Family assists with sponge baths and depends.)  Ambulation Assistance: Needs assistance (With Marely [place]. Recent ankle sprain ~ 1 wk ago ; unable to amb since.)  Transfer Assistance: Needs assistance  Active : No (Family drives.)  Additional Comments: Pt reports the home that she shares with family is \"deplorable\" and she would like permanent placement elsewhere. Cognition   Cognition  Overall Cognitive Status: Exceptions  Arousal/Alertness: Appropriate responses to stimuli  Following Commands: Follows one step commands with increased time  Attention Span: Appears intact  Safety Judgement: Decreased awareness of need for safety;Decreased awareness of need for assistance  Insights: Decreased awareness of deficits  Cognition Comment: Anxious with all mobility; fear of falls    Objective     Observation/Palpation  Observation: No swelling/discoloration R Ankle. AROM RLE (degrees)  RLE AROM: WFL  RLE General AROM: Full AROM R Ankle without c/o pain. AROM LLE (degrees)  LLE AROM : WFL  AROM RUE (degrees)  RUE General AROM: Limited mid/endrange overhead. AROM LUE (degrees)  LUE General AROM: Limited mid/endrange overhead. Strength Other  Other:  At least 3+ 4-/5; unable to formally asses. Sensation  Overall Sensation Status: WFL  Bed mobility  Supine to Sit: Moderate assistance  Sit to Supine: Minimal assistance  Transfers  Sit to Stand: Minimal Assistance;2 Person Assistance (With Walker.)  Stand to sit: Minimal Assistance;2 Person Assistance (With Walker.)  Comment: Upon arrival at chair, pt immediately request return to bed. Attempts to encourage oob, pt becoming more irritable and insist return to bed. Transfer via [place] as return to bed. Ambulation  Ambulation?: Yes  Ambulation 1  Surface: level tile  Device: Rolling Walker  Distance: 4-5 steps bed to chair with Walker Mod assist x 2. Short/shuffling steps; appears antalgic during wgt bear R LE. Plan   Plan  Times per week: 3-5x week while in acute care setting. Current Treatment Recommendations: Strengthening, Functional Mobility Training, Transfer Training, Gait Training, Safety Education & Training, Patient/Caregiver Education & Training  Safety Devices  Type of devices: Bed alarm in place, Call light within reach, Left in bed, Nurse notified      AM-PAC Score  AM-PAC Inpatient Mobility Raw Score : 11 (06/19/21 1206)  AM-PAC Inpatient T-Scale Score : 33.86 (06/19/21 1206)  Mobility Inpatient CMS 0-100% Score: 72.57 (06/19/21 1206)  Mobility Inpatient CMS G-Code Modifier : CL (06/19/21 1206)          Goals  Short term goals  Time Frame for Short term goals: Upon d/c acute care setting. Short term goal 1: Bed Mob Min assist.  Short term goal 2: Transfers with Walker Min assist.  Short term goal 3: Amb with Walker 5-10' Min assist x 2. Patient Goals   Patient goals : None stated.        Therapy Time   Individual Concurrent Group Co-treatment   Time In [facility] 38         Time Out 1130         Minutes [address] [facility] Luciano Marcelo

## 2021-06-20 ENCOUNTER — APPOINTMENT (OUTPATIENT)
Dept: GENERAL RADIOLOGY | Age: 76
DRG: 438 | End: 2021-06-20
Payer: MEDICARE

## 2021-06-20 LAB
ALBUMIN SERPL-MCNC: 2.5 G/DL (ref 3.4–5)
ALP BLD-CCNC: 113 U/L (ref 40–129)
ALT SERPL-CCNC: 18 U/L (ref 10–40)
ANION GAP SERPL CALCULATED.3IONS-SCNC: 7 MMOL/L (ref 3–16)
AST SERPL-CCNC: 23 U/L (ref 15–37)
BASOPHILS ABSOLUTE: 0 K/UL (ref 0–0.2)
BASOPHILS RELATIVE PERCENT: 0.7 %
BILIRUB SERPL-MCNC: 0.5 MG/DL (ref 0–1)
BILIRUBIN DIRECT: <0.2 MG/DL (ref 0–0.3)
BILIRUBIN, INDIRECT: ABNORMAL MG/DL (ref 0–1)
BUN BLDV-MCNC: 4 MG/DL (ref 7–20)
CALCIUM SERPL-MCNC: 8.1 MG/DL (ref 8.3–10.6)
CHLORIDE BLD-SCNC: 98 MMOL/L (ref 99–110)
CO2: 27 MMOL/L (ref 21–32)
CREAT SERPL-MCNC: <0.5 MG/DL (ref 0.6–1.2)
EOSINOPHILS ABSOLUTE: 0.2 K/UL (ref 0–0.6)
EOSINOPHILS RELATIVE PERCENT: 3 %
GFR AFRICAN AMERICAN: >60
GFR NON-AFRICAN AMERICAN: >60
GLUCOSE BLD-MCNC: 102 MG/DL (ref 70–99)
HCT VFR BLD CALC: 32.5 % (ref 36–48)
HEMOGLOBIN: 11 G/DL (ref 12–16)
LYMPHOCYTES ABSOLUTE: 0.8 K/UL (ref 1–5.1)
LYMPHOCYTES RELATIVE PERCENT: 16.1 %
MCH RBC QN AUTO: 35.8 PG (ref 26–34)
MCHC RBC AUTO-ENTMCNC: 33.9 G/DL (ref 31–36)
MCV RBC AUTO: 105.7 FL (ref 80–100)
MONOCYTES ABSOLUTE: 0.5 K/UL (ref 0–1.3)
MONOCYTES RELATIVE PERCENT: 9.7 %
NEUTROPHILS ABSOLUTE: 3.7 K/UL (ref 1.7–7.7)
NEUTROPHILS RELATIVE PERCENT: 70.5 %
PDW BLD-RTO: 17 % (ref 12.4–15.4)
PHOSPHORUS: 2.9 MG/DL (ref 2.5–4.9)
PLATELET # BLD: 260 K/UL (ref 135–450)
PMV BLD AUTO: 7.8 FL (ref 5–10.5)
POTASSIUM SERPL-SCNC: 3.6 MMOL/L (ref 3.5–5.1)
RBC # BLD: 3.07 M/UL (ref 4–5.2)
SODIUM BLD-SCNC: 132 MMOL/L (ref 136–145)
TOTAL PROTEIN: 5.2 G/DL (ref 6.4–8.2)
WBC # BLD: 5.3 K/UL (ref 4–11)

## 2021-06-20 PROCEDURE — 2580000003 HC RX 258: Performed by: HOSPITALIST

## 2021-06-20 PROCEDURE — 74018 RADEX ABDOMEN 1 VIEW: CPT

## 2021-06-20 PROCEDURE — 94760 N-INVAS EAR/PLS OXIMETRY 1: CPT

## 2021-06-20 PROCEDURE — 85025 COMPLETE CBC W/AUTO DIFF WBC: CPT

## 2021-06-20 PROCEDURE — 6370000000 HC RX 637 (ALT 250 FOR IP): Performed by: NURSE PRACTITIONER

## 2021-06-20 PROCEDURE — 36415 COLL VENOUS BLD VENIPUNCTURE: CPT

## 2021-06-20 PROCEDURE — 94640 AIRWAY INHALATION TREATMENT: CPT

## 2021-06-20 PROCEDURE — 1200000000 HC SEMI PRIVATE

## 2021-06-20 PROCEDURE — 2580000003 HC RX 258: Performed by: INTERNAL MEDICINE

## 2021-06-20 PROCEDURE — 80069 RENAL FUNCTION PANEL: CPT

## 2021-06-20 PROCEDURE — 6360000002 HC RX W HCPCS: Performed by: HOSPITALIST

## 2021-06-20 PROCEDURE — 6370000000 HC RX 637 (ALT 250 FOR IP): Performed by: HOSPITALIST

## 2021-06-20 PROCEDURE — 80076 HEPATIC FUNCTION PANEL: CPT

## 2021-06-20 PROCEDURE — 6370000000 HC RX 637 (ALT 250 FOR IP): Performed by: INTERNAL MEDICINE

## 2021-06-20 RX ADMIN — TIOTROPIUM BROMIDE INHALATION SPRAY 2 PUFF: 3.12 SPRAY, METERED RESPIRATORY (INHALATION) at 08:11

## 2021-06-20 RX ADMIN — SODIUM CHLORIDE, PRESERVATIVE FREE 10 ML: 5 INJECTION INTRAVENOUS at 09:57

## 2021-06-20 RX ADMIN — FOLIC ACID 1 MG: 1 TABLET ORAL at 09:56

## 2021-06-20 RX ADMIN — TRAZODONE HYDROCHLORIDE 50 MG: 50 TABLET ORAL at 22:25

## 2021-06-20 RX ADMIN — PANTOPRAZOLE SODIUM 40 MG: 40 TABLET, DELAYED RELEASE ORAL at 06:04

## 2021-06-20 RX ADMIN — HYDROMORPHONE HYDROCHLORIDE 0.5 MG: 1 INJECTION, SOLUTION INTRAMUSCULAR; INTRAVENOUS; SUBCUTANEOUS at 18:22

## 2021-06-20 RX ADMIN — HYDROMORPHONE HYDROCHLORIDE 0.5 MG: 1 INJECTION, SOLUTION INTRAMUSCULAR; INTRAVENOUS; SUBCUTANEOUS at 09:57

## 2021-06-20 RX ADMIN — HYDROMORPHONE HYDROCHLORIDE 0.5 MG: 1 INJECTION, SOLUTION INTRAMUSCULAR; INTRAVENOUS; SUBCUTANEOUS at 06:04

## 2021-06-20 RX ADMIN — HYDROMORPHONE HYDROCHLORIDE 0.5 MG: 1 INJECTION, SOLUTION INTRAMUSCULAR; INTRAVENOUS; SUBCUTANEOUS at 01:24

## 2021-06-20 RX ADMIN — ATORVASTATIN CALCIUM 20 MG: 20 TABLET, FILM COATED ORAL at 09:56

## 2021-06-20 RX ADMIN — Medication 6 MG: at 22:26

## 2021-06-20 RX ADMIN — ENOXAPARIN SODIUM 40 MG: 40 INJECTION SUBCUTANEOUS at 10:02

## 2021-06-20 RX ADMIN — HYDROMORPHONE HYDROCHLORIDE 0.5 MG: 1 INJECTION, SOLUTION INTRAMUSCULAR; INTRAVENOUS; SUBCUTANEOUS at 14:07

## 2021-06-20 RX ADMIN — SODIUM CHLORIDE, POTASSIUM CHLORIDE, SODIUM LACTATE AND CALCIUM CHLORIDE: 600; 310; 30; 20 INJECTION, SOLUTION INTRAVENOUS at 10:19

## 2021-06-20 RX ADMIN — SODIUM CHLORIDE, PRESERVATIVE FREE 10 ML: 5 INJECTION INTRAVENOUS at 22:26

## 2021-06-20 RX ADMIN — FERROUS SULFATE TAB EC 324 MG (65 MG FE EQUIVALENT) 325 MG: 324 (65 FE) TABLET DELAYED RESPONSE at 10:19

## 2021-06-20 RX ADMIN — METOPROLOL SUCCINATE 25 MG: 25 TABLET, EXTENDED RELEASE ORAL at 09:56

## 2021-06-20 RX ADMIN — PANTOPRAZOLE SODIUM 40 MG: 40 TABLET, DELAYED RELEASE ORAL at 16:38

## 2021-06-20 RX ADMIN — OXYCODONE 5 MG: 5 TABLET ORAL at 22:25

## 2021-06-20 RX ADMIN — THERA TABS 1 TABLET: TAB at 09:55

## 2021-06-20 RX ADMIN — Medication 1 CAPSULE: at 09:55

## 2021-06-20 RX ADMIN — HYDROMORPHONE HYDROCHLORIDE 0.5 MG: 1 INJECTION, SOLUTION INTRAMUSCULAR; INTRAVENOUS; SUBCUTANEOUS at 23:47

## 2021-06-20 RX ADMIN — SENNOSIDES 8.6 MG: 8.6 TABLET, FILM COATED ORAL at 22:25

## 2021-06-20 RX ADMIN — DULOXETINE HYDROCHLORIDE 30 MG: 30 CAPSULE, DELAYED RELEASE ORAL at 09:56

## 2021-06-20 RX ADMIN — Medication 100 MG: at 09:55

## 2021-06-20 ASSESSMENT — PAIN DESCRIPTION - ONSET
ONSET: ON-GOING
ONSET: GRADUAL
ONSET: ON-GOING

## 2021-06-20 ASSESSMENT — PAIN DESCRIPTION - PROGRESSION
CLINICAL_PROGRESSION: NOT CHANGED
CLINICAL_PROGRESSION: GRADUALLY IMPROVING
CLINICAL_PROGRESSION: GRADUALLY IMPROVING
CLINICAL_PROGRESSION: GRADUALLY WORSENING
CLINICAL_PROGRESSION: GRADUALLY IMPROVING
CLINICAL_PROGRESSION: GRADUALLY IMPROVING
CLINICAL_PROGRESSION: GRADUALLY WORSENING
CLINICAL_PROGRESSION: GRADUALLY WORSENING
CLINICAL_PROGRESSION: GRADUALLY IMPROVING

## 2021-06-20 ASSESSMENT — PAIN SCALES - GENERAL
PAINLEVEL_OUTOF10: 8
PAINLEVEL_OUTOF10: 7
PAINLEVEL_OUTOF10: 6
PAINLEVEL_OUTOF10: 2
PAINLEVEL_OUTOF10: 8
PAINLEVEL_OUTOF10: 0
PAINLEVEL_OUTOF10: 6
PAINLEVEL_OUTOF10: 7
PAINLEVEL_OUTOF10: 6
PAINLEVEL_OUTOF10: 8
PAINLEVEL_OUTOF10: 9
PAINLEVEL_OUTOF10: 7
PAINLEVEL_OUTOF10: 2
PAINLEVEL_OUTOF10: 8

## 2021-06-20 ASSESSMENT — PAIN SCALES - WONG BAKER
WONGBAKER_NUMERICALRESPONSE: 0
WONGBAKER_NUMERICALRESPONSE: 0

## 2021-06-20 ASSESSMENT — PAIN DESCRIPTION - DESCRIPTORS
DESCRIPTORS: ACHING

## 2021-06-20 ASSESSMENT — PAIN DESCRIPTION - FREQUENCY
FREQUENCY: CONTINUOUS

## 2021-06-20 ASSESSMENT — PAIN DESCRIPTION - ORIENTATION
ORIENTATION: RIGHT
ORIENTATION: LEFT;UPPER;RIGHT
ORIENTATION: RIGHT
ORIENTATION: UPPER;RIGHT;LEFT
ORIENTATION: RIGHT;LEFT;UPPER
ORIENTATION: RIGHT
ORIENTATION: RIGHT;LEFT

## 2021-06-20 ASSESSMENT — PAIN DESCRIPTION - PAIN TYPE
TYPE: ACUTE PAIN

## 2021-06-20 ASSESSMENT — PAIN - FUNCTIONAL ASSESSMENT
PAIN_FUNCTIONAL_ASSESSMENT: PREVENTS OR INTERFERES SOME ACTIVE ACTIVITIES AND ADLS

## 2021-06-20 ASSESSMENT — PAIN DESCRIPTION - LOCATION
LOCATION: ABDOMEN

## 2021-06-20 NOTE — PROGRESS NOTES
Gastroenterology Progress Note    Thai Hong is a 78 y.o. female patient. Hospitalization Day:5    SUBJECTIVE:  No nausea or vomiting. Still with some abdominal pain . Tolerating low fat diet. ROS:  Gastrointestinal ROS:+ abdominal pain, change in bowel habits, or black or bloody stools    Physical    VITALS:  /75   Pulse 66   Temp 98.2 °F (36.8 °C) (Oral)   Resp 18   Ht 5' 2\" (1.575 m)   Wt 128 lb 15.5 oz (58.5 kg)   SpO2 94%   BMI 23.59 kg/m²   TEMPERATURE:  Current - Temp: 98.2 °F (36.8 °C); Max - Temp  Av.3 °F (36.8 °C)  Min: 98 °F (36.7 °C)  Max: 98.6 °F (37 °C)    General:  Alert and oriented,  No apparent distress  Skin- without jaundice  Eyes: anicteric sclera  Cardiac: RRR, Nl s1s2, without murmurs  Lungs CTA Bilaterally, normal effort  Abdomen soft, ND, NT, no HSM, Bowel sounds normal  Ext: without edema  Neuro: no asterixis     Data    Data Review:    Recent Labs     21  0521  0712   WBC 7.7 6.8 5.3   HGB 11.6* 12.3 11.0*   HCT 34.0* 36.0 32.5*   .0* 108.0* 105.7*    162 260     Recent Labs     21  0521  0712   * 135* 132*   K 3.0* 3.9 3.6    101 98*   CO2 27 25 27   PHOS 2.4* 2.9 2.9   BUN 5* 3* 4*   CREATININE <0.5* <0.5* <0.5*     Recent Labs     217 21  0526 21  0712   AST 34 35 23   ALT 23 22 18   BILIDIR 0.4* <0.2 <0.2   BILITOT 0.8 0.5 0.5   ALKPHOS 99 116 113     Recent Labs     21   LIPASE 155.0*     No results for input(s): PROTIME, INR in the last 72 hours. Radiology Review:    CT ABDOMEN PELVIS W IV CONTRAST Additional Contrast? None   Final Result   Emphysema. No central pulmonary embolus identified. De pendant opacity is   seen at the lung bases, likely atelectasis. Ill-defined peripancreatic fluid and edema, compatible with acute   pancreatitis. .  Findings are new compared to most recent chest CT      Scattered areas of colonic wall thickening are seen, likely due to the   partially contracted state of the colon in the absence of clinical signs of   colitis      Increased density is seen within the bladder on the right, compatible with   small bladder stones. No significant change from prior. Scattered anastomotic staple lines are seen within the bowel. No bowel   obstruction         CT CHEST PULMONARY EMBOLISM W CONTRAST   Final Result   Emphysema. No central pulmonary embolus identified. De pendant opacity is   seen at the lung bases, likely atelectasis. Ill-defined peripancreatic fluid and edema, compatible with acute   pancreatitis. .  Findings are new compared to most recent chest CT      Scattered areas of colonic wall thickening are seen, likely due to the   partially contracted state of the colon in the absence of clinical signs of   colitis      Increased density is seen within the bladder on the right, compatible with   small bladder stones. No significant change from prior. Scattered anastomotic staple lines are seen within the bowel. No bowel   obstruction         XR CHEST PORTABLE   Final Result   Negative portable chest.         US ABDOMEN LIMITED Specify organ? GALLBLADDER, PANCREAS   Final Result   Surgically absent gallbladder. No biliary ductal dilatation. Heterogeneous echotexture throughout the liver, compatible with diffuse   hepatocellular disease         NM Cardiac Stress Test Nuclear Imaging   Final Result      CT CHEST PULMONARY EMBOLISM W CONTRAST   Final Result   1. No CT evidence of a pulmonary embolism. 2. Mild consolidative opacity within the lingula, most likely representing   either atelectasis or parenchymal scar. Pneumonia is considered less likely. XR CHEST PORTABLE   Final Result   No acute cardiopulmonary disease. ASSESSMENT: 78 y. o. female with a PMH of COPD, nicotine abuse, depression/anxiety, h/o PUD, alcohol abuse, HTN, Parkinson's disease, TIA,

## 2021-06-20 NOTE — PROGRESS NOTES
Pt assisted to bedside commode, pt able to pass stool and end cap of enema, KUB performed at bedside after end cap was passed. This RN notified Dr. Saulo Wolfe of pt passing end cap. Pt is free of pain and family at bedside made aware of situation. This RN did not notify G.I. as end cap was passed in stool.

## 2021-06-20 NOTE — PROGRESS NOTES
Hospitalist Progress Note    Patient:  Tc Mcallister  Unit/Bed:[id number]-3839/5106-01   YOB: 1945       MRN: [id number] Acct: [de-identified]  PCP: Terri Araujo MD    Date of Admission: 6/14/2021  --------------------------    Chief Complaint:     Chest pain    Hospital Course:     Tc Mcallister is a 78 y.o. female hospitalized on 6/14/2021   chest pain, ACS ruled out, stress test without reversible ischemia. Patient started complaining of abdominal pain as well. . Found to have acute pancreatitis       Assessment:       Chest pain, atypical  ACS ruled out, stress test showed no reversible ischemia  CT of the chest showed no PE, mild consolidative changes on the lingula (no pneumonia symptoms)    No recurrence of symptoms, continue monitoring    Acute on chronic abdominal pain secondary to acute alcoholic pancreatitis           Stop IV fluids  Advance diet to low-fat regular diet    Constipation  Add enema, Dulcolax    Acute hypoxic respiratory failure  ? Aspiration event, no pneumonia noted on CTA of the chest (no PE)  Wean off oxygen as tolerated     -Resolved, monitoring    Alcohol withdrawal syndrome/alcohol dependence/alcoholic liver disease  -Continue thiamine, multivitamin,        Mild hyponatremia, likely hypovolemic  Continue monitoring    Hypokalemia  Resolved    Hypomagnesemia  Repleted    Hypophosphatemia  Resolved    Dyslipidemia on statin    Hypertensive urgency  Continue metoprolol, as needed hydralazine    Nicotine dependence  Continue nicotine patch    Insomnia  Continue trazodone      Code Status: Full Code         DVT prophylaxis: Lovenox     Disposition: Physical debility noted, likely need ECF. Discussed with the patient    Discussed with RN    ----------------      Subjective:     Patient seen and examined  Overnight events noted  RN and ancillary staff note reviewed     Report constipation, passing gas no vomiting, tolerating current diet.   No major overnight event.      Diet: ADULT DIET; Regular; Low Fat (less than or equal to 50 gm/day)    OBJECTIVE     Exam:  /75   Pulse 66   Temp 98.2 °F (36.8 °C) (Oral)   Resp 18   Ht 5' 2\" (1.575 m)   Wt 128 lb 15.5 oz (58.5 kg)   SpO2 94%   BMI 23.59 kg/m²         Gen: Not in distress. Alert. Head: Normocephalic. Atraumatic. Eyes: Conjunctivae/corneas clear. ENT: Oral mucosa moist  Neck: No JVD. No obvious thyromegaly. CVS: Nml S1S2, no murmur , RRR  Pulmomary: Clear bilaterally. No crackles. No wheezes. Gastrointestinal: Soft, mild tenderness with deep palpation in the upper abdomen, no rebound tenderness. Positive bowel sound. .  Musculoskeletal: No edema. Warm  Neuro: No focal deficit. Moves extremity spontaneously. Psychiatry: Appropriate affect. Not agitated.         Medications:  Reviewed    Infusion Medications    sodium chloride       Scheduled Medications    traZODone  50 mg Oral Nightly    metoprolol succinate  25 mg Oral Daily    senna  1 tablet Oral Nightly    folic acid  1 mg Oral Daily    sodium chloride flush  5-40 mL Intravenous 2 times per day    multivitamin  1 tablet Oral Daily    pantoprazole  40 mg Oral BID AC    atorvastatin  20 mg Oral Daily    DULoxetine  30 mg Oral Daily    ferrous sulfate  325 mg Oral Daily with breakfast    lactobacillus  1 capsule Oral Daily with breakfast    nicotine  1 patch Transdermal Daily    tiotropium  2 puff Inhalation Daily    thiamine mononitrate  100 mg Oral Daily    enoxaparin  40 mg Subcutaneous Daily     PRN Meds: bisacodyl, fleet, HYDROmorphone **OR** oxyCODONE, lactulose, hydrALAZINE, melatonin, sodium chloride flush, sodium chloride, albuterol sulfate HFA, prochlorperazine, ondansetron **OR** ondansetron, polyethylene glycol, acetaminophen **OR** acetaminophen      Intake/Output Summary (Last 24 hours) at 6/20/2021 1345  Last data filed at 6/20/2021 1221  Gross per 24 hour   Intake 2615 ml   Output 1600 ml   Net 1015 ml Findings are new compared to most recent chest CT      Scattered areas of colonic wall thickening are seen, likely due to the   partially contracted state of the colon in the absence of clinical signs of   colitis      Increased density is seen within the bladder on the right, compatible with   small bladder stones. No significant change from prior. Scattered anastomotic staple lines are seen within the bowel. No bowel   obstruction         XR CHEST PORTABLE   Final Result   Negative portable chest.         US ABDOMEN LIMITED Specify organ? GALLBLADDER, PANCREAS   Final Result   Surgically absent gallbladder. No biliary ductal dilatation. Heterogeneous echotexture throughout the liver, compatible with diffuse   hepatocellular disease         NM Cardiac Stress Test Nuclear Imaging   Final Result      CT CHEST PULMONARY EMBOLISM W CONTRAST   Final Result   1. No CT evidence of a pulmonary embolism. 2. Mild consolidative opacity within the lingula, most likely representing   either atelectasis or parenchymal scar. Pneumonia is considered less likely. XR CHEST PORTABLE   Final Result   No acute cardiopulmonary disease.                      Electronically signed by Rivas Peters MD on 6/20/2021 at 1:45 PM

## 2021-06-20 NOTE — PROGRESS NOTES
D: This RN was giving pt a tap water enema and got distracted when preparing the equipment for an enema by dietary coming to door, this RN forgot to remove end cap before inserting enema into pt's rectum A: this RN called Dr. Carlos Knowles to notify him of pt having the end cap in her rectum R: Dr. Carlos Knowles ordered a STAT KUB and asked this RN to call G. ISyed

## 2021-06-21 LAB
ALBUMIN SERPL-MCNC: 2.5 G/DL (ref 3.4–5)
ALP BLD-CCNC: 114 U/L (ref 40–129)
ALT SERPL-CCNC: 16 U/L (ref 10–40)
ANION GAP SERPL CALCULATED.3IONS-SCNC: 9 MMOL/L (ref 3–16)
AST SERPL-CCNC: 22 U/L (ref 15–37)
BASOPHILS ABSOLUTE: 0 K/UL (ref 0–0.2)
BASOPHILS RELATIVE PERCENT: 0.6 %
BILIRUB SERPL-MCNC: 0.4 MG/DL (ref 0–1)
BILIRUBIN DIRECT: <0.2 MG/DL (ref 0–0.3)
BILIRUBIN URINE: NEGATIVE
BILIRUBIN, INDIRECT: ABNORMAL MG/DL (ref 0–1)
BLOOD, URINE: NEGATIVE
BUN BLDV-MCNC: 4 MG/DL (ref 7–20)
CALCIUM SERPL-MCNC: 8.3 MG/DL (ref 8.3–10.6)
CHLORIDE BLD-SCNC: 100 MMOL/L (ref 99–110)
CLARITY: CLEAR
CO2: 27 MMOL/L (ref 21–32)
COLOR: YELLOW
CREAT SERPL-MCNC: <0.5 MG/DL (ref 0.6–1.2)
EOSINOPHILS ABSOLUTE: 0.2 K/UL (ref 0–0.6)
EOSINOPHILS RELATIVE PERCENT: 2.9 %
GFR AFRICAN AMERICAN: >60
GFR NON-AFRICAN AMERICAN: >60
GLUCOSE BLD-MCNC: 98 MG/DL (ref 70–99)
GLUCOSE URINE: NEGATIVE MG/DL
HCT VFR BLD CALC: 34.5 % (ref 36–48)
HEMOGLOBIN: 11.9 G/DL (ref 12–16)
KETONES, URINE: NEGATIVE MG/DL
LEUKOCYTE ESTERASE, URINE: NEGATIVE
LYMPHOCYTES ABSOLUTE: 0.8 K/UL (ref 1–5.1)
LYMPHOCYTES RELATIVE PERCENT: 12.4 %
MCH RBC QN AUTO: 36.3 PG (ref 26–34)
MCHC RBC AUTO-ENTMCNC: 34.4 G/DL (ref 31–36)
MCV RBC AUTO: 105.3 FL (ref 80–100)
MICROSCOPIC EXAMINATION: NORMAL
MONOCYTES ABSOLUTE: 0.5 K/UL (ref 0–1.3)
MONOCYTES RELATIVE PERCENT: 8.5 %
NEUTROPHILS ABSOLUTE: 4.8 K/UL (ref 1.7–7.7)
NEUTROPHILS RELATIVE PERCENT: 75.6 %
NITRITE, URINE: NEGATIVE
PDW BLD-RTO: 16.8 % (ref 12.4–15.4)
PH UA: 7.5 (ref 5–8)
PHOSPHORUS: 2.9 MG/DL (ref 2.5–4.9)
PLATELET # BLD: 321 K/UL (ref 135–450)
PMV BLD AUTO: 8 FL (ref 5–10.5)
POTASSIUM SERPL-SCNC: 3.6 MMOL/L (ref 3.5–5.1)
PROTEIN UA: NEGATIVE MG/DL
RBC # BLD: 3.27 M/UL (ref 4–5.2)
SODIUM BLD-SCNC: 136 MMOL/L (ref 136–145)
SPECIFIC GRAVITY UA: <1.005 (ref 1–1.03)
TOTAL PROTEIN: 5.3 G/DL (ref 6.4–8.2)
URINE REFLEX TO CULTURE: NORMAL
URINE TYPE: NORMAL
UROBILINOGEN, URINE: 1 E.U./DL
WBC # BLD: 6.4 K/UL (ref 4–11)

## 2021-06-21 PROCEDURE — 85025 COMPLETE CBC W/AUTO DIFF WBC: CPT

## 2021-06-21 PROCEDURE — 6370000000 HC RX 637 (ALT 250 FOR IP): Performed by: HOSPITALIST

## 2021-06-21 PROCEDURE — 92526 ORAL FUNCTION THERAPY: CPT

## 2021-06-21 PROCEDURE — 81003 URINALYSIS AUTO W/O SCOPE: CPT

## 2021-06-21 PROCEDURE — 6370000000 HC RX 637 (ALT 250 FOR IP): Performed by: INTERNAL MEDICINE

## 2021-06-21 PROCEDURE — 6360000002 HC RX W HCPCS: Performed by: HOSPITALIST

## 2021-06-21 PROCEDURE — 2580000003 HC RX 258: Performed by: INTERNAL MEDICINE

## 2021-06-21 PROCEDURE — 80069 RENAL FUNCTION PANEL: CPT

## 2021-06-21 PROCEDURE — 94640 AIRWAY INHALATION TREATMENT: CPT

## 2021-06-21 PROCEDURE — 36415 COLL VENOUS BLD VENIPUNCTURE: CPT

## 2021-06-21 PROCEDURE — 94761 N-INVAS EAR/PLS OXIMETRY MLT: CPT

## 2021-06-21 PROCEDURE — 80076 HEPATIC FUNCTION PANEL: CPT

## 2021-06-21 PROCEDURE — 97530 THERAPEUTIC ACTIVITIES: CPT

## 2021-06-21 PROCEDURE — 1200000000 HC SEMI PRIVATE

## 2021-06-21 RX ORDER — LUBIPROSTONE 8 UG/1
8 CAPSULE, GELATIN COATED ORAL 2 TIMES DAILY WITH MEALS
Status: DISCONTINUED | OUTPATIENT
Start: 2021-06-21 | End: 2021-06-21

## 2021-06-21 RX ADMIN — TRAZODONE HYDROCHLORIDE 50 MG: 50 TABLET ORAL at 21:31

## 2021-06-21 RX ADMIN — HYDROMORPHONE HYDROCHLORIDE 0.5 MG: 1 INJECTION, SOLUTION INTRAMUSCULAR; INTRAVENOUS; SUBCUTANEOUS at 13:01

## 2021-06-21 RX ADMIN — ATORVASTATIN CALCIUM 20 MG: 20 TABLET, FILM COATED ORAL at 08:50

## 2021-06-21 RX ADMIN — PANTOPRAZOLE SODIUM 40 MG: 40 TABLET, DELAYED RELEASE ORAL at 17:15

## 2021-06-21 RX ADMIN — ALBUTEROL SULFATE 2 PUFF: 90 AEROSOL, METERED RESPIRATORY (INHALATION) at 09:00

## 2021-06-21 RX ADMIN — OXYCODONE 5 MG: 5 TABLET ORAL at 04:16

## 2021-06-21 RX ADMIN — FERROUS SULFATE TAB EC 324 MG (65 MG FE EQUIVALENT) 325 MG: 324 (65 FE) TABLET DELAYED RESPONSE at 08:50

## 2021-06-21 RX ADMIN — FOLIC ACID 1 MG: 1 TABLET ORAL at 08:50

## 2021-06-21 RX ADMIN — DULOXETINE HYDROCHLORIDE 30 MG: 30 CAPSULE, DELAYED RELEASE ORAL at 08:50

## 2021-06-21 RX ADMIN — PANTOPRAZOLE SODIUM 40 MG: 40 TABLET, DELAYED RELEASE ORAL at 08:50

## 2021-06-21 RX ADMIN — SODIUM CHLORIDE, PRESERVATIVE FREE 10 ML: 5 INJECTION INTRAVENOUS at 08:51

## 2021-06-21 RX ADMIN — THERA TABS 1 TABLET: TAB at 08:50

## 2021-06-21 RX ADMIN — METOPROLOL SUCCINATE 25 MG: 25 TABLET, EXTENDED RELEASE ORAL at 08:50

## 2021-06-21 RX ADMIN — Medication 100 MG: at 08:50

## 2021-06-21 RX ADMIN — Medication 1 CAPSULE: at 08:50

## 2021-06-21 RX ADMIN — HYDROMORPHONE HYDROCHLORIDE 0.5 MG: 1 INJECTION, SOLUTION INTRAMUSCULAR; INTRAVENOUS; SUBCUTANEOUS at 17:16

## 2021-06-21 RX ADMIN — ENOXAPARIN SODIUM 40 MG: 40 INJECTION SUBCUTANEOUS at 08:50

## 2021-06-21 RX ADMIN — HYDROMORPHONE HYDROCHLORIDE 0.5 MG: 1 INJECTION, SOLUTION INTRAMUSCULAR; INTRAVENOUS; SUBCUTANEOUS at 21:31

## 2021-06-21 RX ADMIN — BISACODYL 10 MG: 5 TABLET, COATED ORAL at 14:14

## 2021-06-21 RX ADMIN — SODIUM CHLORIDE, PRESERVATIVE FREE 10 ML: 5 INJECTION INTRAVENOUS at 21:31

## 2021-06-21 RX ADMIN — TIOTROPIUM BROMIDE INHALATION SPRAY 2 PUFF: 3.12 SPRAY, METERED RESPIRATORY (INHALATION) at 08:58

## 2021-06-21 RX ADMIN — HYDROMORPHONE HYDROCHLORIDE 0.5 MG: 1 INJECTION, SOLUTION INTRAMUSCULAR; INTRAVENOUS; SUBCUTANEOUS at 08:51

## 2021-06-21 ASSESSMENT — PAIN SCALES - GENERAL
PAINLEVEL_OUTOF10: 6
PAINLEVEL_OUTOF10: 7
PAINLEVEL_OUTOF10: 7
PAINLEVEL_OUTOF10: 8
PAINLEVEL_OUTOF10: 7

## 2021-06-21 ASSESSMENT — PAIN DESCRIPTION - FREQUENCY
FREQUENCY: CONTINUOUS

## 2021-06-21 ASSESSMENT — PAIN DESCRIPTION - PAIN TYPE
TYPE: ACUTE PAIN

## 2021-06-21 ASSESSMENT — PAIN DESCRIPTION - PROGRESSION
CLINICAL_PROGRESSION: NOT CHANGED

## 2021-06-21 ASSESSMENT — PAIN SCALES - WONG BAKER
WONGBAKER_NUMERICALRESPONSE: 0
WONGBAKER_NUMERICALRESPONSE: 6
WONGBAKER_NUMERICALRESPONSE: 0

## 2021-06-21 ASSESSMENT — PAIN DESCRIPTION - ONSET
ONSET: ON-GOING

## 2021-06-21 ASSESSMENT — PAIN DESCRIPTION - LOCATION
LOCATION: ABDOMEN

## 2021-06-21 ASSESSMENT — PAIN - FUNCTIONAL ASSESSMENT
PAIN_FUNCTIONAL_ASSESSMENT: PREVENTS OR INTERFERES SOME ACTIVE ACTIVITIES AND ADLS
PAIN_FUNCTIONAL_ASSESSMENT: ACTIVITIES ARE NOT PREVENTED
PAIN_FUNCTIONAL_ASSESSMENT: PREVENTS OR INTERFERES SOME ACTIVE ACTIVITIES AND ADLS
PAIN_FUNCTIONAL_ASSESSMENT: ACTIVITIES ARE NOT PREVENTED

## 2021-06-21 ASSESSMENT — PAIN DESCRIPTION - ORIENTATION
ORIENTATION: RIGHT

## 2021-06-21 ASSESSMENT — PAIN DESCRIPTION - DESCRIPTORS
DESCRIPTORS: ACHING

## 2021-06-21 NOTE — PROGRESS NOTES
[facility]   Speech Therapy  Daily Dysphagia Treatment Note  DISCHARGE SUMMARY    Ginger Ku  AGE: 78 y.o. GENDER: female  : 1945  [id number]  EPISODE DATE:  2021     Patient Active Problem List   Diagnosis    COPD with acute exacerbation ([facility]    Essential hypertension    Tobacco abuse    Alcohol abuse    Colostomy care ([facility]    Recurrent major depressive disorder, in partial remission ([facility]    Personal history of nicotine dependence     Colitis    Colitis due to Clostridium difficile    Alcohol-induced acute pancreatitis without infection or necrosis    Gastrointestinal hemorrhage associated with duodenal ulcer    Small bowel obstruction ([facility]    Ileus ([facility]    Acute cystitis without hematuria    Non-intractable cyclical vomiting with nausea    Parastomal hernia without obstruction or gangrene    Abdominal pain    C. difficile diarrhea    Colostomy present ([facility]    Postoperative intra-abdominal abscess    COPD exacerbation (HCC)    Atrial fibrillation (HCC)    Difficulty speaking    TIA (transient ischemic attack)    Chest pain    Alcoholic liver disease (HCC)     Allergies   Allergen Reactions    Aspirin Other (See Comments)     Ulcers in stomach    Fentanyl Other (See Comments)     Hallucinations     Morphine      Feels like she will have a heart attack     Treatment Diagnosis: Dysphagia     CHART REVIEW:  2021 admitted with c/o chest pain: ADMISSION H&P HPI: 78 y. o. female presents with chest pain, onset at 100 am, while at rest. Pain was across precordial, sharp, with radiation to her back. She denies palpitations, lightheadedness dizziness, fever, or chills and denies change in her chronic cough or daily emesis. Chest pain resolved with asa given per squad.     2021 RN notes: Patient slept all day and would wake up only for medications and water.  Nurse asked if patient still experiencing pain and patient stated that she no longer had pain. Patient did vomit twice, one after taking lactulose, and the second time was after taking a few bites of her vegetables (dinner).     6/16/2021 CT CHEST/ABDOMEN  Impression   Emphysema.  No central pulmonary embolus identified.  De pendant opacity is   seen at the lung bases, likely atelectasis.       Ill-defined peripancreatic fluid and edema, compatible with acute   pancreatitis. .  Findings are new compared to most recent chest CT       Scattered areas of colonic wall thickening are seen, likely due to the   partially contracted state of the colon in the absence of clinical signs of   colitis       Increased density is seen within the bladder on the right, compatible with   small bladder stones.  No significant change from prior.       Scattered anastomotic staple lines are seen within the bowel.  No bowel   obstruction        Subjective:     Current diet  Regular/Thin - LOW-FAT    Comments regarding tolerating Current Diet:   Good tolerance reported by RN      Objective:     Pain   Patient Currently in Pain: Yes    Cognitive/Behavior   Behavior/Cognition: Alert, Cooperative, Pleasant mood    Presentations   Consistencies Administered: Thin - straw, Regular    Positioning   Fully upright in bed    PO Trials:  · Thin Liquids: delayed swallow; decreased laryngeal elevation; NO overt signs/symptoms of penetration/aspiration  · Regular food: delayed swallow; decreased laryngeal elevation; NO overt signs/symptoms of penetration/aspiration    Dysphagia Tx:   PO trials: safely tolerating current diet; no overt signs/symptoms of penetration/aspiration    Goals: The patient will tolerate recommended diet without observed clinical signs of aspiration, MET  The patient/caregiver will demonstrate understanding of compensatory strategies for improved swallowing safety., MET  The patient will tolerate repeat BSE when able.  MET    Assessment:   Impressions:   Dysphagia Diagnosis: Mild pharyngeal stage dysphagia  · Accepted and tolerated tx at bedside. · Patient alert, cooperative, pleasant; oriented x4; follows dx; verbally responsive. · Oral phase appears grossly WFL  · Mild pharyngeal stage dysphagia characterized by delayed swallow and decreased laryngeal elevation. No overt signs/symptoms of penetration/aspiration during or immediately following assessment.    · Discontinue skilled ST 2/2 max potential achieved at this time    Diet Recommendations:  Continue Regular/Thin  Recommended Form of Meds: PO  Compensatory Swallowing Strategies: Upright as possible for all oral intake, Remain upright for 30-45 minutes after meals    Education:  Consulted and agree with results and recommendations: Patient, RN  Patient Education: Completed on results/recs/plan  Patient Education Response: Verbalizes understanding    Prognosis:   Good for diet tolerance    Plan:     Continue Dysphagia Therapy: NO      Coded treatment time: 0  Total treatment time: 15    Electronically signed by ELSY Ochoa on 6/21/2021 at 11:42 AM

## 2021-06-21 NOTE — PLAN OF CARE
Problem: Pain:  Goal: Pain level will decrease  Description: Pain level will decrease  Outcome: Ongoing   Pain/discomfort being managed with PRN analgesics per MD orders. Pt able to express presence and absence of pain and rate pain appropriately using numerical scale. Problem: Skin Integrity:  Goal: Absence of new skin breakdown  Description: Absence of new skin breakdown  Outcome: Ongoing   Skin assessment completed every shift. Pt assessed for incontinence, appropriate barrier cream applied prn. Pt encouraged to turn/rotate every 2 hours. Assistance provided if pt unable to do so themselves. Problem: Falls - Risk of:  Goal: Absence of physical injury  Description: Absence of physical injury  Outcome: Ongoing   Fall risk assessment completed . Fall precautions in place, bed/ chair alarm on, side rails 2/4 up, call light in reach, educated pt on calling for assistance when needed, room clear of clutter. Pt verbalized understanding.

## 2021-06-21 NOTE — PROGRESS NOTES
Physical Therapy  6/21/2021  Blanca Garibay  [id number]  [id number]    Attempt Note          Therapist to room to see patient. Patient in bed, awake. Watching TV. She declines therapy despite gentle encouragement, and instruction of importance of mobilization. She states multiple times, \"I don't want to. \"   Patient left in bed as found. Needs in reach. Bed alarm on. JOANA Whitaker informed. Will attempt to see later as time allows and patient able to participate.       Electronically signed by Gema Pradhan, [address] [place] [address] [phone])  on 6/21/2021 at 11:09 AM

## 2021-06-21 NOTE — PROGRESS NOTES
Hospitalist Progress Note    Patient:  Tay Caldera  Unit/Bed:E5T-8798/5106-01   YOB: 1945       MRN: [id number] Acct: [de-identified]  PCP: Stevie Stevens MD    Date of Admission: 6/14/2021  --------------------------    Chief Complaint:     Chest pain    Hospital Course:     Tay Caldera is a 78 y.o. female hospitalized on 6/14/2021   chest pain, ACS ruled out, stress test without reversible ischemia. Patient started complaining of abdominal pain as well. . Found to have acute pancreatitis       Assessment:       Chest pain, atypical  ACS ruled out, stress test showed no reversible ischemia  CT of the chest showed no PE, mild consolidative changes on the lingula (no pneumonia symptoms)    No recurrence of symptoms, continue monitoring    Acute on chronic abdominal pain secondary to acute alcoholic pancreatitis     Continue low-fat diet    Constipation  Schedule Dulcolax, magnesium citrate. Acute hypoxic respiratory failure  ? Aspiration event, no pneumonia noted on CTA of the chest (no PE)  Wean off oxygen as tolerated     Resolved    Alcohol withdrawal syndrome/alcohol dependence/alcoholic liver disease  -Continue thiamine, multivitamin,        Mild hyponatremia, likely hypovolemic  Resolved    Hypokalemia  Resolved    Hypomagnesemia  Repleted    Hypophosphatemia  Resolved    Dyslipidemia on statin    Hypertensive urgency  Continue metoprolol, as needed hydralazine    Nicotine dependence  Continue nicotine patch    Insomnia  Continue trazodone      Code Status: Full Code         DVT prophylaxis: Lovenox     Disposition: Medically stable for discharge, plan for discharge to ECF, pre-CERT pending    Discussed with RN    ----------------      Subjective:     Patient seen and examined  Overnight events noted  RN and ancillary staff note reviewed     Had 2 bowel movement yesterday but still feel constipated, no nausea vomiting, tolerating diet      Diet: ADULT DIET;  Regular; Low Fat (less than or equal to 50 gm/day)    OBJECTIVE     Exam:  /67   Pulse 84   Temp 98.1 °F (36.7 °C) (Oral)   Resp 16   Ht 5' 2\" (1.575 m)   Wt 128 lb 15.5 oz (58.5 kg)   SpO2 92%   BMI 23.59 kg/m²       Unchanged physical finding from 6/20/2021     Gen: Not in distress. Alert. Head: Normocephalic. Atraumatic. Eyes: Conjunctivae/corneas clear. ENT: Oral mucosa moist  Neck: No JVD. No obvious thyromegaly. CVS: Nml S1S2, no murmur , RRR  Pulmomary: Clear bilaterally. No crackles. No wheezes. Gastrointestinal: Soft, mild tenderness with deep palpation in the upper abdomen, no rebound tenderness. Positive bowel sound. .  Musculoskeletal: No edema. Warm  Neuro: No focal deficit. Moves extremity spontaneously. Psychiatry: Appropriate affect. Not agitated.         Medications:  Reviewed    Infusion Medications    sodium chloride       Scheduled Medications    magnesium citrate  296 mL Oral Once    bisacodyl  10 mg Oral Daily    traZODone  50 mg Oral Nightly    metoprolol succinate  25 mg Oral Daily    folic acid  1 mg Oral Daily    sodium chloride flush  5-40 mL Intravenous 2 times per day    multivitamin  1 tablet Oral Daily    pantoprazole  40 mg Oral BID AC    atorvastatin  20 mg Oral Daily    DULoxetine  30 mg Oral Daily    ferrous sulfate  325 mg Oral Daily with breakfast    lactobacillus  1 capsule Oral Daily with breakfast    nicotine  1 patch Transdermal Daily    tiotropium  2 puff Inhalation Daily    thiamine mononitrate  100 mg Oral Daily    enoxaparin  40 mg Subcutaneous Daily     PRN Meds: fleet, HYDROmorphone **OR** oxyCODONE, lactulose, hydrALAZINE, melatonin, sodium chloride flush, sodium chloride, albuterol sulfate HFA, prochlorperazine, ondansetron **OR** ondansetron, polyethylene glycol, acetaminophen **OR** acetaminophen      Intake/Output Summary (Last 24 hours) at 6/21/2021 1338  Last data filed at 6/21/2021 1000  Gross per 24 hour   Intake 960 ml   Output 1900 ml   Net -940 ml             Labs:   Recent Labs     06/19/21  0526 06/20/21  0712 06/21/21  0549   WBC 6.8 5.3 6.4   HGB 12.3 11.0* 11.9*   HCT 36.0 32.5* 34.5*    260 321     Recent Labs     06/19/21  0526 06/20/21  0712 06/21/21  0549   * 132* 136   K 3.9 3.6 3.6    98* 100   CO2 25 27 27   BUN 3* 4* 4*   CREATININE <0.5* <0.5* <0.5*   CALCIUM 8.2* 8.1* 8.3   PHOS 2.9 2.9 2.9     Recent Labs     06/19/21  0526 06/20/21  0712 06/21/21  0549   AST 35 23 22   ALT 22 18 16   BILIDIR <0.2 <0.2 <0.2   BILITOT 0.5 0.5 0.4   ALKPHOS 116 113 114     No results for input(s): INR in the last 72 hours. No results for input(s): Ethelene Soulier in the last 72 hours. Urinalysis:      Lab Results   Component Value Date    NITRU Negative 06/21/2021    WBCUA >900 12/19/2019    BACTERIA 2+ 12/19/2019    RBCUA 20-50 12/19/2019    BLOODU Negative 06/21/2021    SPECGRAV <1.005 06/21/2021    GLUCOSEU Negative 06/21/2021       Radiology:  XR ABDOMEN (KUB) (SINGLE AP VIEW)   Final Result   Unremarkable appearing abdomen         CT ABDOMEN PELVIS W IV CONTRAST Additional Contrast? None   Final Result   Emphysema. No central pulmonary embolus identified. De pendant opacity is   seen at the lung bases, likely atelectasis. Ill-defined peripancreatic fluid and edema, compatible with acute   pancreatitis. .  Findings are new compared to most recent chest CT      Scattered areas of colonic wall thickening are seen, likely due to the   partially contracted state of the colon in the absence of clinical signs of   colitis      Increased density is seen within the bladder on the right, compatible with   small bladder stones. No significant change from prior. Scattered anastomotic staple lines are seen within the bowel. No bowel   obstruction         CT CHEST PULMONARY EMBOLISM W CONTRAST   Final Result   Emphysema. No central pulmonary embolus identified. De pendant opacity is   seen at the lung bases, likely atelectasis. Ill-defined peripancreatic fluid and edema, compatible with acute   pancreatitis. .  Findings are new compared to most recent chest CT      Scattered areas of colonic wall thickening are seen, likely due to the   partially contracted state of the colon in the absence of clinical signs of   colitis      Increased density is seen within the bladder on the right, compatible with   small bladder stones. No significant change from prior. Scattered anastomotic staple lines are seen within the bowel. No bowel   obstruction         XR CHEST PORTABLE   Final Result   Negative portable chest.         US ABDOMEN LIMITED Specify organ? GALLBLADDER, PANCREAS   Final Result   Surgically absent gallbladder. No biliary ductal dilatation. Heterogeneous echotexture throughout the liver, compatible with diffuse   hepatocellular disease         NM Cardiac Stress Test Nuclear Imaging   Final Result      CT CHEST PULMONARY EMBOLISM W CONTRAST   Final Result   1. No CT evidence of a pulmonary embolism. 2. Mild consolidative opacity within the lingula, most likely representing   either atelectasis or parenchymal scar. Pneumonia is considered less likely. XR CHEST PORTABLE   Final Result   No acute cardiopulmonary disease.                      Electronically signed by Claudia Martin MD on 6/21/2021 at 1:38 PM

## 2021-06-21 NOTE — PROGRESS NOTES
and chest pain with radiation to the right upper quadrant and associated nausea and vomiting. ACS was ruled out. The patient had lab work revealing an elevated lipase. CT scan was done and revealed acute pancreatitis. The patient has had a previous cholecystectomy. She admits to drinking heavily. \"  Family / Caregiver Present: No  Referring Practitioner: Rivas Peters MD  Diagnosis: Chest pain, pancreatitis  Subjective  Subjective: Pt met BS, in bed. Pt declining OOB to chair, or sitting to EOB. Pt noted to be incontinent thru purewick. RN present. General Comment  Comments: okay for therapy per RN. Orientation  Orientation  Overall Orientation Status: Within Functional Limits  Objective    ADL  Toileting: Dependent/Total (purewick. Incontinent thru purewick and brief, pad changed, new purewick applied)      Bed mobility  Rolling to Left: Contact guard assistance  Rolling to Right: Contact guard assistance  Scooting: Maximal assistance;2 Person assistance;Dependent/Total (to scoot up in bed)         Cognition  Overall Cognitive Status: Exceptions  Following Commands:  Follows one step commands with increased time  Safety Judgement: Decreased awareness of need for safety;Decreased awareness of need for assistance  Insights: Decreased awareness of deficits  Cognition Comment: Anxious with all mobility; fear of falls      Plan   Plan  Times per week: 3-5x  Current Treatment Recommendations: Strengthening, Functional Mobility Training, Gait Training, Balance Training, Self-Care / ADL, Safety Education & Training, Endurance Training, Patient/Caregiver Education & Training    AM-PAC Score        [facility] Inpatient Daily Activity Raw Score: 16 (06/21/21 1552)  AM-PAC Inpatient ADL T-Scale Score : 35.96 (06/21/21 1552)  ADL Inpatient CMS 0-100% Score: 53.32 (06/21/21 1552)  ADL Inpatient CMS G-Code Modifier : CK (06/21/21 1552)    Goals  Short term goals  Time Frame for Short term goals: prior to D/C:   status: goals ongoing  Short term goal 1: complete functional mobility and transfers with SBA  Short term goal 2: complete bathing and dressing with SBA  Short term goal 3: complete toileting with SBA  Short term goal 4: complete grooming with setup  Short term goal 5: tolerate B UE exercises x10 reps for increased strength and endurance with ADLs  Long term goals  Time Frame for Long term goals : STG=LTG  Patient Goals   Patient goals : go somewhere       Therapy Time   Individual Concurrent Group Co-treatment   Time In 1535         Time Out 1552         Minutes 17              Esteban MATA/CARI,[phone]

## 2021-06-21 NOTE — PROGRESS NOTES
Wt 128 lb 15.5 oz (58.5 kg)   SpO2 92%   BMI 23.59 kg/m²   TEMPERATURE:  Current - Temp: 97.9 °F (36.6 °C); Max - Temp  Av °F (36.7 °C)  Min: 97.8 °F (36.6 °C)  Max: 98.7 °F (37.1 °C)    Physical Exam:  General appearance: alert, cooperative, no distress, appears stated age  Eyes: Anicteric  Head: Normocephalic, without obvious abnormality  Lungs: clear to auscultation bilaterally, Normal Effort  Heart: regular rate and rhythm, normal S1 and S2, no murmurs or rubs  Abdomen: soft, non-distended, non-tender. Bowel sounds normal. No masses,  no organomegaly. Extremities: atraumatic, no cyanosis or edema  Skin: warm and dry, no jaundice  Neuro: Grossly intact, A&OX3    LABS AND IMAGING   Laboratory   Recent Labs     21  0521  0721  0549   WBC 6.8 5.3 6.4   HGB 12.3 11.0* 11.9*   HCT 36.0 32.5* 34.5*   .0* 105.7* 105.3*    260 321     Recent Labs     21  0526 21  0712 21  0549   * 132* 136   K 3.9 3.6 3.6    98* 100   CO2 25 27 27   PHOS 2.9 2.9 2.9   BUN 3* 4* 4*   CREATININE <0.5* <0.5* <0.5*     Recent Labs     21  0526 21  0712 21  0549   AST 35 23 22   ALT 22 18 16   BILIDIR <0.2 <0.2 <0.2   BILITOT 0.5 0.5 0.4   ALKPHOS 116 113 114     No results for input(s): LIPASE, AMYLASE in the last 72 hours. No results for input(s): PROTIME, INR in the last 72 hours. Imaging  XR ABDOMEN (KUB) (SINGLE AP VIEW)   Final Result   Unremarkable appearing abdomen         CT ABDOMEN PELVIS W IV CONTRAST Additional Contrast? None   Final Result   Emphysema. No central pulmonary embolus identified. De pendant opacity is   seen at the lung bases, likely atelectasis. Ill-defined peripancreatic fluid and edema, compatible with acute   pancreatitis. .  Findings are new compared to most recent chest CT      Scattered areas of colonic wall thickening are seen, likely due to the   partially contracted state of the colon in the absence reversal, cholecystectomy who presented on 6/14/2021 with     1. Acute interstitial pancreatitis 2/2 alcohol.    -No evidence of gallstone pancreatitis and patient has had previous cholecystectomy. LFTs normal.  -Ca normal  -still with abdominal pain but tolerating low fat diet  2. Constipation likely 2/2 #1 and opioid use. She had BM yesterday after enema. KUB with no obstruction or fecal impaction. 3. Alcohol abuse- active. No s/s of withdrawal  4. Diastolic CHF- stable  5. HTN    RECOMMENDATIONS:    Low fat diet as tolerated  Pain management per Hospitalist  Will order Mag Citrate to promote BMs  Encourage ETOH abstinence    If you have any questions or need any further information, please feel free to contact us [phone]. Thank you for allowing us to participate in the care of Ghada Cordero. The note was completed using Dragon voice recognition transcription. Every effort was made to ensure accuracy; however, inadvertent transcription errors may be present despite my best efforts to edit errors. Gerardo SOLANO    Attending physician addendum:      I have personally seen and examined the patient, reviewed the patient's medical record and pertinent labs and clinical imaging. I have personally staffed the case with Gerardo SOLANO. I agree with her consultation note, exam findings, assessment and plans  as written above. I have made appropriate modifications and edited her assessment and plan where needed to reflect my impression and plans for this patient. Ghada Cordero is a 78 y.o. female with PMH of COPD, nicotine abuse, depression/anxiety, h/o PUD, alcohol abuse, HTN, Parkinson's disease, TIA, bowel obstruction s/p colectomy and colostomy reversal, cholecystectomy who presented on 6/14/2021 with Abdominal pain/Pancreatitis. Pancreatitis-Improved. Tolerating a low fat diet. She continues to have some generalized abdominal pain. No significant BM in 5 days.  Minimal output with enema. KUB with no obstruction or impaction. Recommend magnesium citrate. Ok to DC once has a BM from a GI standpoint. Thank you for allowing me to participate in this patient's care. If there are any questions or concerns regarding this patient, or the plan we have set in place, please feel free to contact me at [phone].      Judge Stephie MD

## 2021-06-22 PROCEDURE — 6370000000 HC RX 637 (ALT 250 FOR IP): Performed by: INTERNAL MEDICINE

## 2021-06-22 PROCEDURE — 1200000000 HC SEMI PRIVATE

## 2021-06-22 PROCEDURE — 6370000000 HC RX 637 (ALT 250 FOR IP): Performed by: HOSPITALIST

## 2021-06-22 PROCEDURE — 97530 THERAPEUTIC ACTIVITIES: CPT

## 2021-06-22 PROCEDURE — 6360000002 HC RX W HCPCS: Performed by: HOSPITALIST

## 2021-06-22 PROCEDURE — 2580000003 HC RX 258: Performed by: INTERNAL MEDICINE

## 2021-06-22 PROCEDURE — 94640 AIRWAY INHALATION TREATMENT: CPT

## 2021-06-22 PROCEDURE — 94761 N-INVAS EAR/PLS OXIMETRY MLT: CPT

## 2021-06-22 PROCEDURE — 9990000010 HC NO CHARGE VISIT: Performed by: PHYSICAL THERAPIST

## 2021-06-22 PROCEDURE — 97535 SELF CARE MNGMENT TRAINING: CPT

## 2021-06-22 RX ORDER — OXYCODONE HYDROCHLORIDE 10 MG/1
10 TABLET ORAL EVERY 4 HOURS PRN
Status: DISCONTINUED | OUTPATIENT
Start: 2021-06-22 | End: 2021-06-23 | Stop reason: HOSPADM

## 2021-06-22 RX ORDER — OXYCODONE HYDROCHLORIDE 5 MG/1
5 TABLET ORAL EVERY 4 HOURS PRN
Status: DISCONTINUED | OUTPATIENT
Start: 2021-06-22 | End: 2021-06-23 | Stop reason: HOSPADM

## 2021-06-22 RX ADMIN — TIOTROPIUM BROMIDE INHALATION SPRAY 2 PUFF: 3.12 SPRAY, METERED RESPIRATORY (INHALATION) at 08:28

## 2021-06-22 RX ADMIN — OXYCODONE 5 MG: 5 TABLET ORAL at 08:57

## 2021-06-22 RX ADMIN — PANTOPRAZOLE SODIUM 40 MG: 40 TABLET, DELAYED RELEASE ORAL at 15:10

## 2021-06-22 RX ADMIN — FOLIC ACID 1 MG: 1 TABLET ORAL at 08:42

## 2021-06-22 RX ADMIN — THERA TABS 1 TABLET: TAB at 08:43

## 2021-06-22 RX ADMIN — BISACODYL 10 MG: 5 TABLET, COATED ORAL at 08:57

## 2021-06-22 RX ADMIN — OXYCODONE HYDROCHLORIDE 5 MG: 5 TABLET ORAL at 17:04

## 2021-06-22 RX ADMIN — ATORVASTATIN CALCIUM 20 MG: 20 TABLET, FILM COATED ORAL at 08:43

## 2021-06-22 RX ADMIN — SODIUM CHLORIDE, PRESERVATIVE FREE 10 ML: 5 INJECTION INTRAVENOUS at 08:43

## 2021-06-22 RX ADMIN — SODIUM PHOSPHATE, DIBASIC AND SODIUM PHOSPHATE, MONOBASIC 1 ENEMA: 7; 19 ENEMA RECTAL at 12:17

## 2021-06-22 RX ADMIN — ENOXAPARIN SODIUM 40 MG: 40 INJECTION SUBCUTANEOUS at 08:43

## 2021-06-22 RX ADMIN — DULOXETINE HYDROCHLORIDE 30 MG: 30 CAPSULE, DELAYED RELEASE ORAL at 08:43

## 2021-06-22 RX ADMIN — Medication 100 MG: at 08:43

## 2021-06-22 RX ADMIN — PANTOPRAZOLE SODIUM 40 MG: 40 TABLET, DELAYED RELEASE ORAL at 06:52

## 2021-06-22 RX ADMIN — OXYCODONE HYDROCHLORIDE 10 MG: 10 TABLET ORAL at 13:07

## 2021-06-22 RX ADMIN — METOPROLOL SUCCINATE 25 MG: 25 TABLET, EXTENDED RELEASE ORAL at 08:43

## 2021-06-22 RX ADMIN — Medication 1 CAPSULE: at 08:42

## 2021-06-22 RX ADMIN — FERROUS SULFATE TAB EC 324 MG (65 MG FE EQUIVALENT) 325 MG: 324 (65 FE) TABLET DELAYED RESPONSE at 08:42

## 2021-06-22 RX ADMIN — TRAZODONE HYDROCHLORIDE 50 MG: 50 TABLET ORAL at 22:49

## 2021-06-22 ASSESSMENT — PAIN DESCRIPTION - PROGRESSION: CLINICAL_PROGRESSION: NOT CHANGED

## 2021-06-22 ASSESSMENT — PAIN DESCRIPTION - ORIENTATION: ORIENTATION: RIGHT;LEFT

## 2021-06-22 ASSESSMENT — PAIN SCALES - GENERAL
PAINLEVEL_OUTOF10: 7
PAINLEVEL_OUTOF10: 7
PAINLEVEL_OUTOF10: 6
PAINLEVEL_OUTOF10: 6
PAINLEVEL_OUTOF10: 0

## 2021-06-22 ASSESSMENT — PAIN DESCRIPTION - ONSET: ONSET: ON-GOING

## 2021-06-22 ASSESSMENT — PAIN DESCRIPTION - FREQUENCY: FREQUENCY: CONTINUOUS

## 2021-06-22 ASSESSMENT — PAIN SCALES - WONG BAKER
WONGBAKER_NUMERICALRESPONSE: 0
WONGBAKER_NUMERICALRESPONSE: 0

## 2021-06-22 ASSESSMENT — PAIN DESCRIPTION - LOCATION: LOCATION: ABDOMEN

## 2021-06-22 ASSESSMENT — PAIN - FUNCTIONAL ASSESSMENT: PAIN_FUNCTIONAL_ASSESSMENT: ACTIVITIES ARE NOT PREVENTED

## 2021-06-22 ASSESSMENT — PAIN DESCRIPTION - PAIN TYPE: TYPE: ACUTE PAIN

## 2021-06-22 ASSESSMENT — PAIN DESCRIPTION - DESCRIPTORS: DESCRIPTORS: ACHING

## 2021-06-22 NOTE — PROGRESS NOTES
Occupational Therapy  Facility/Department: [facility] PROGRESSIVE CARE  Daily Treatment Note  NAME: Tere Solomon  : 1945  MRN: [id number]    Date of Service: 2021    Discharge Recommendations:  Patient would benefit from continued therapy after discharge, 3-5 sessions per week     Tere Solomon scored a 16/24 on the AM-PAC ADL Inpatient form. Current research shows that an AM-PAC score of 17 or less is typically not associated with a discharge to the patient's home setting. Based on the patient's AM-PAC score and their current ADL deficits, it is recommended that the patient have 3-5 sessions per week of Occupational Therapy at d/c to increase the patient's independence. Please see assessment section for further patient specific details. If patient discharges prior to next session this note will serve as a discharge summary. Please see below for the latest assessment towards goals. Assessment   Performance deficits / Impairments: Decreased functional mobility ; Decreased strength;Decreased endurance;Decreased ADL status; Decreased high-level IADLs;Decreased safe awareness;Decreased cognition;Decreased balance  Assessment: Discussed with OTR: Pt today agreeing to sit up to EOB, with Mod/Min A overall for bed mob. Pt stood from EOB, CG/July cues for hand placement, to walker and took a few side steps to [facility] with walker and Min A. Pt dependent for hygiene needs after incontinence episode. Anticipate pt would be Max A for ADL's. Pt will benefit from cont OT at [address] at d/c. Cont POC while in hospital.  Prognosis: Fair  History: Pt is \"78year-old female with history of peptic ulcer disease, alcohol abuse, hypertension, Parkinson's disease, prior bowel obstruction status post colectomy, COPD, who presented with cute onset of epigastric and chest pain with radiation to the right upper quadrant and associated nausea and vomiting. ACS was ruled out.  The patient had lab work revealing an elevated lipase. CT scan was done and revealed acute pancreatitis. The patient has had a previous cholecystectomy. She admits to drinking heavily. \" PTA pt from home with friend and mother in law where pt was Ind with mobility and ADLs with history of falls. OT Education: OT Role;Plan of Care;ADL Adaptive Strategies;Transfer Training  REQUIRES OT FOLLOW UP: Yes  Activity Tolerance  Activity Tolerance: Patient limited by fatigue;Treatment limited secondary to decreased cognition  Safety Devices  Safety Devices in place: Yes  Type of devices: Call light within reach;Nurse notified; Bed alarm in place; Left in bed;Gait belt         Patient Diagnosis(es): The encounter diagnosis was Chest pain, unspecified type. has a past medical history of Anemia, Arthritis, Clostridium difficile colitis, Clostridium difficile diarrhea, Colostomy in place [facility]), COPD (chronic obstructive pulmonary disease) ([facility] [address] [facility] Depression, DANIELLE (generalized anxiety disorder), Gastric ulcer, unspecified as acute or chronic, without mention of hemorrhage, perforation, or obstruction, H/O ETOH abuse, Hiatal hernia, History of blood transfusion, Hyperlipidemia, Hypertension, Insomnia, Intestinal obstruction ([facility] [address] Parkinson's disease ([facility] [address] Tobacco abuse, and Vitamin D deficiency. has a past surgical history that includes Hysterectomy; Cholecystectomy; Tubal ligation; colostomy (2018); Upper gastrointestinal endoscopy (12/13/2018); Upper gastrointestinal endoscopy (N/A, 12/13/2018); Colonoscopy (12/14/2018); Colonoscopy (N/A, 12/14/2018); Small intestine surgery (N/A, 4/10/2019); Endoscopy, colon, diagnostic; and Abdomen surgery.     Restrictions  Restrictions/Precautions  Restrictions/Precautions: Fall Risk  Position Activity Restriction  Other position/activity restrictions: pako  Subjective   General  Chart Reviewed: Yes, Orders, Progress Notes  Patient assessed for rehabilitation services?: Yes  Additional Pertinent Hx: Per Dr. Gemini Staples: Pt is \"78year-old female with history of peptic ulcer disease, alcohol abuse, hypertension, Parkinson's disease, prior bowel obstruction status post colectomy, COPD, who presented with cute onset of epigastric and chest pain with radiation to the right upper quadrant and associated nausea and vomiting. ACS was ruled out. The patient had lab work revealing an elevated lipase. CT scan was done and revealed acute pancreatitis. The patient has had a previous cholecystectomy. She admits to drinking heavily. \"  Family / Caregiver Present: No  Referring Practitioner: Randi Salinas MD  Diagnosis: Chest pain, pancreatitis  Subjective  Subjective: Pt met BS, in bed. Pt agreeable to OT with mild encouragement. Pt c/o R ankle pain with wt bearing and L rib pain. General Comment  Comments: okay for therapy per RN. Orientation  Orientation  Overall Orientation Status: Within Functional Limits  Objective    ADL  LE Dressing: Maximum assistance (depends soiled. Cleaned and changed to pull up brief. Stance to manage over hips at walker. Dep to don footies.)  Toileting: Dependent/Total (purewick. Incontinent of urine onto depends.)        Standing Balance  Activity: Brief stance at walker at EOB for ADL's, CGA and pt taking side steps to [facility], Min/CGA  Bed mobility  Rolling to Right: Contact guard assistance  Supine to Sit: Moderate assistance  Sit to Supine: Contact guard assistance;Minimal assistance  Scooting: Contact guard assistance (to EOB)  Transfers  Sit to stand: Minimal assistance;Contact guard assistance  Stand to sit: Minimal assistance;Contact guard assistance  Transfer Comments: to/from RW; cues for hand placement                       Cognition  Overall Cognitive Status: Exceptions  Following Commands: Follows one step commands with increased time; Follows one step commands with repetition  Attention Span: Attends with cues to redirect  Memory: Decreased recall of recent events  Safety Judgement: Decreased

## 2021-06-22 NOTE — PROGRESS NOTES
INPATIENT PROGRESS NOTE        IDENTIFYING DATA/REASON FOR CONSULTATION   PATIENT:  Errol Braxton  MRN:  [id number]  ADMIT DATE: 6/14/2021  TIME OF EVALUATION: 6/22/2021 8:20 AM  HOSPITAL STAY:   LOS: 7 days   CONSULTING PHYSICIAN: Tony Hussein MD   REASON FOR CONSULTATION: pancreatitis      Subjective:    Patient seen in follow up for pancreatitis. She denies abdominal pain. She has not had a bowel movement yesterday. She refused mag citrate yesterday.     MEDICATIONS   SCHEDULED:  magnesium citrate, 296 mL, Once  bisacodyl, 10 mg, Daily  traZODone, 50 mg, Nightly  metoprolol succinate, 25 mg, Daily  folic acid, 1 mg, Daily  sodium chloride flush, 5-40 mL, 2 times per day  multivitamin, 1 tablet, Daily  pantoprazole, 40 mg, BID AC  atorvastatin, 20 mg, Daily  DULoxetine, 30 mg, Daily  ferrous sulfate, 325 mg, Daily with breakfast  lactobacillus, 1 capsule, Daily with breakfast  nicotine, 1 patch, Daily  tiotropium, 2 puff, Daily  thiamine mononitrate, 100 mg, Daily  enoxaparin, 40 mg, Daily      FLUIDS/DRIPS:     sodium chloride       PRNs: fleet, 1 enema, Daily PRN  HYDROmorphone, 0.5 mg, Q4H PRN   Or  oxyCODONE, 5 mg, Q4H PRN  lactulose, 20 g, TID PRN  hydrALAZINE, 5 mg, Q6H PRN  melatonin, 6 mg, Nightly PRN  sodium chloride flush, 5-40 mL, PRN  sodium chloride, 25 mL, PRN  albuterol sulfate HFA, 2 puff, Q6H PRN  prochlorperazine, 10 mg, Q6H PRN  ondansetron, 4 mg, Q8H PRN   Or  ondansetron, 4 mg, Q6H PRN  polyethylene glycol, 17 g, Daily PRN  acetaminophen, 650 mg, Q6H PRN   Or  acetaminophen, 650 mg, Q6H PRN      ALLERGIES:    Allergies   Allergen Reactions    Aspirin Other (See Comments)     Ulcers in stomach    Fentanyl Other (See Comments)     Hallucinations     Morphine      Feels like she will have a heart attack         PHYSICAL EXAM   VITALS:  BP (!) 143/83   Pulse 71   Temp 97.8 °F (36.6 °C) (Axillary)   Resp 16   Ht 5' 2\" (1.575 m)   Wt 128 lb 8.5 oz (58.3 kg)   SpO2 95%   BMI 23.51 kg/m²   TEMPERATURE:  Current - Temp: 97.8 °F (36.6 °C); Max - Temp  Av.2 °F (36.8 °C)  Min: 97.8 °F (36.6 °C)  Max: 98.8 °F (37.1 °C)    Physical Exam:  General appearance: alert, cooperative, no distress, appears stated age  Eyes: Anicteric  Head: Normocephalic, without obvious abnormality  Lungs: clear to auscultation bilaterally, Normal Effort  Heart: regular rate and rhythm, normal S1 and S2, no murmurs or rubs  Abdomen: soft, non-distended, non-tender. Bowel sounds normal. No masses,  no organomegaly. Extremities: atraumatic, no cyanosis or edema  Skin: warm and dry, no jaundice  Neuro: Grossly intact, A&OX3    LABS AND IMAGING   Laboratory   Recent Labs     21  0712 21  0549   WBC 5.3 6.4   HGB 11.0* 11.9*   HCT 32.5* 34.5*   .7* 105.3*    321     Recent Labs     21  0712 21  0549   * 136   K 3.6 3.6   CL 98* 100   CO2 27 27   PHOS 2.9 2.9   BUN 4* 4*   CREATININE <0.5* <0.5*     Recent Labs     21  0712 21  0549   AST 23 22   ALT 18 16   BILIDIR <0.2 <0.2   BILITOT 0.5 0.4   ALKPHOS 113 114     No results for input(s): LIPASE, AMYLASE in the last 72 hours. No results for input(s): PROTIME, INR in the last 72 hours. Imaging  XR ABDOMEN (KUB) (SINGLE AP VIEW)   Final Result   Unremarkable appearing abdomen         CT ABDOMEN PELVIS W IV CONTRAST Additional Contrast? None   Final Result   Emphysema. No central pulmonary embolus identified. De pendant opacity is   seen at the lung bases, likely atelectasis. Ill-defined peripancreatic fluid and edema, compatible with acute   pancreatitis. .  Findings are new compared to most recent chest CT      Scattered areas of colonic wall thickening are seen, likely due to the   partially contracted state of the colon in the absence of clinical signs of   colitis      Increased density is seen within the bladder on the right, compatible with   small bladder stones. No significant change from prior. Scattered anastomotic staple lines are seen within the bowel. No bowel   obstruction         CT CHEST PULMONARY EMBOLISM W CONTRAST   Final Result   Emphysema. No central pulmonary embolus identified. De pendant opacity is   seen at the lung bases, likely atelectasis. Ill-defined peripancreatic fluid and edema, compatible with acute   pancreatitis. .  Findings are new compared to most recent chest CT      Scattered areas of colonic wall thickening are seen, likely due to the   partially contracted state of the colon in the absence of clinical signs of   colitis      Increased density is seen within the bladder on the right, compatible with   small bladder stones. No significant change from prior. Scattered anastomotic staple lines are seen within the bowel. No bowel   obstruction         XR CHEST PORTABLE   Final Result   Negative portable chest.         US ABDOMEN LIMITED Specify organ? GALLBLADDER, PANCREAS   Final Result   Surgically absent gallbladder. No biliary ductal dilatation. Heterogeneous echotexture throughout the liver, compatible with diffuse   hepatocellular disease         NM Cardiac Stress Test Nuclear Imaging   Final Result      CT CHEST PULMONARY EMBOLISM W CONTRAST   Final Result   1. No CT evidence of a pulmonary embolism. 2. Mild consolidative opacity within the lingula, most likely representing   either atelectasis or parenchymal scar. Pneumonia is considered less likely. XR CHEST PORTABLE   Final Result   No acute cardiopulmonary disease. Endoscopy      ASSESSMENT AND RECOMMENDATIONS   Ginger Ku is a 78 y.o. female with PMH of COPD, nicotine abuse, depression/anxiety, h/o PUD, alcohol abuse, HTN, Parkinson's disease, TIA, bowel obstruction s/p colectomy and colostomy reversal, cholecystectomy who presented on 6/14/2021 with     1.  Acute interstitial pancreatitis 2/2 alcohol.    -No evidence of gallstone pancreatitis and patient has had previous cholecystectomy. LFTs normal.  -Ca normal  - tolerating low fat diet  2. Constipation likely 2/2 #1 and opioid use. She had BM after enema. KUB with no obstruction or fecal impaction. She refused mag citrate yesterday  3. Alcohol abuse- active. No s/s of withdrawal  4. Diastolic CHF- stable  5. HTN    RECOMMENDATIONS:    Low fat diet as tolerated  Pain management per Hospitalist  Encourage ETOH abstinence  We will sign off. Please call with questions    If you have any questions or need any further information, please feel free to contact us [phone]. Thank you for allowing us to participate in the care of Priscila Montilla. The note was completed using Dragon voice recognition transcription. Every effort was made to ensure accuracy; however, inadvertent transcription errors may be present despite my best efforts to edit errors. Marco SOLANO    Attending physician addendum:    I have personally seen and examined the patient, reviewed the patient's medical record and pertinent labs and clinical imaging. I have personally staffed the case with Marco SOLANO. I agree with her consultation note, exam findings, assessment and plans  as written above. I have made appropriate modifications and edited her assessment and plan where needed to reflect my impression and plans for this patient. Priscila Montilla is a 78 y.o. female with PMH of COPD, nicotine abuse, depression/anxiety, h/o PUD, alcohol abuse, HTN, Parkinson's disease, TIA, bowel obstruction s/p colectomy and colostomy reversal, cholecystectomy who presented on 6/14/2021 with Abdominal pain/Pancreatitis. Pancreatitis-Improved. Tolerating a low fat diet. Patient was ordered magnesium citrate for her constipation but she refused. Patient not answering my questions this morning and covering face with her sheet. Labs stable. Recommend discharge at this point. Will sign off.      Thank you for allowing me to participate in this patient's care. If there are any questions or concerns regarding this patient, or the plan we have set in place, please feel free to contact me at [phone].      Ree Osorio MD

## 2021-06-22 NOTE — PROGRESS NOTES
Physical Therapy  Shawanda Camarillo  [id number]  [id number]    Attempted to see for PT session however pt refused; pt reports she was already up (with OT) and she was not getting up again. When asked where pt was planning on going at discharge she said \"that [facility] place or whatever you call it\". Clarified that she means rehab and she said yes. Educated pt that she needs to participate in therapy to go to rehab, but she said \"I'll participate when I get there, but no now. I'm just going to rest\". Attempted to further encourage pt however she started to get irritated.   Will continue to follow  Electronically signed by BIA SHEFFIELD, PT on 6/22/2021 at 1:30 PM

## 2021-06-22 NOTE — PROGRESS NOTES
(58.3 kg)   SpO2 91%   BMI 23.51 kg/m²     General appearance: No apparent distress, appears stated age and cooperative. HEENT: Pupils equal, round, and reactive to light. Conjunctivae/corneas clear. Neck: Supple, with full range of motion. Trachea midline. Respiratory:  Normal respiratory effort. Clear to auscultation, bilaterally without Rales/Wheezes/Rhonchi. Cardiovascular: Regular rate and rhythm with normal S1/S2 without murmurs, rubs or gallops. Abdomen: Soft, non-tender, non-distended with normal bowel sounds. Musculoskeletal: No clubbing, cyanosis or edema bilaterally. Full range of motion without deformity. Skin: Skin color, texture, turgor normal.  No rashes or lesions. Neurologic:  Neurovascularly intact without any focal sensory/motor deficits. Cranial nerves: II-XII intact, grossly non-focal.  Psychiatric: Alert and oriented, thought content appropriate, normal insight  Capillary Refill: Brisk,< 3 seconds   Peripheral Pulses: +2 palpable, equal bilaterally       Labs:   Recent Labs     06/20/21  0712 06/21/21  0549   WBC 5.3 6.4   HGB 11.0* 11.9*   HCT 32.5* 34.5*    321     Recent Labs     06/20/21  0712 06/21/21  0549   * 136   K 3.6 3.6   CL 98* 100   CO2 27 27   BUN 4* 4*   CREATININE <0.5* <0.5*   CALCIUM 8.1* 8.3   PHOS 2.9 2.9     Recent Labs     06/20/21  0712 06/21/21  0549   AST 23 22   ALT 18 16   BILIDIR <0.2 <0.2   BILITOT 0.5 0.4   ALKPHOS 113 114     No results for input(s): INR in the last 72 hours. No results for input(s): Gelene Re in the last 72 hours.     Urinalysis:      Lab Results   Component Value Date    NITRU Negative 06/21/2021    WBCUA >900 12/19/2019    BACTERIA 2+ 12/19/2019    RBCUA 20-50 12/19/2019    BLOODU Negative 06/21/2021    SPECGRAV <1.005 06/21/2021    GLUCOSEU Negative 06/21/2021       Radiology:  XR ABDOMEN (KUB) (SINGLE AP VIEW)   Final Result   Unremarkable appearing abdomen         CT ABDOMEN PELVIS W IV CONTRAST Additional

## 2021-06-23 VITALS
DIASTOLIC BLOOD PRESSURE: 68 MMHG | WEIGHT: 128.53 LBS | HEIGHT: 62 IN | OXYGEN SATURATION: 98 % | SYSTOLIC BLOOD PRESSURE: 115 MMHG | TEMPERATURE: 98 F | HEART RATE: 78 BPM | BODY MASS INDEX: 23.65 KG/M2 | RESPIRATION RATE: 16 BRPM

## 2021-06-23 LAB
ALBUMIN SERPL-MCNC: 2.8 G/DL (ref 3.4–5)
ALP BLD-CCNC: 110 U/L (ref 40–129)
ALT SERPL-CCNC: 13 U/L (ref 10–40)
ANION GAP SERPL CALCULATED.3IONS-SCNC: 10 MMOL/L (ref 3–16)
AST SERPL-CCNC: 19 U/L (ref 15–37)
BASOPHILS ABSOLUTE: 0.1 K/UL (ref 0–0.2)
BASOPHILS RELATIVE PERCENT: 0.8 %
BILIRUB SERPL-MCNC: 0.4 MG/DL (ref 0–1)
BILIRUBIN DIRECT: <0.2 MG/DL (ref 0–0.3)
BILIRUBIN, INDIRECT: ABNORMAL MG/DL (ref 0–1)
BUN BLDV-MCNC: 7 MG/DL (ref 7–20)
CALCIUM SERPL-MCNC: 8.6 MG/DL (ref 8.3–10.6)
CHLORIDE BLD-SCNC: 98 MMOL/L (ref 99–110)
CO2: 27 MMOL/L (ref 21–32)
CREAT SERPL-MCNC: <0.5 MG/DL (ref 0.6–1.2)
EOSINOPHILS ABSOLUTE: 0.3 K/UL (ref 0–0.6)
EOSINOPHILS RELATIVE PERCENT: 4.1 %
GFR AFRICAN AMERICAN: >60
GFR NON-AFRICAN AMERICAN: >60
GLUCOSE BLD-MCNC: 102 MG/DL (ref 70–99)
HCT VFR BLD CALC: 34.9 % (ref 36–48)
HEMOGLOBIN: 12.1 G/DL (ref 12–16)
LYMPHOCYTES ABSOLUTE: 1.2 K/UL (ref 1–5.1)
LYMPHOCYTES RELATIVE PERCENT: 17.4 %
MCH RBC QN AUTO: 35.9 PG (ref 26–34)
MCHC RBC AUTO-ENTMCNC: 34.7 G/DL (ref 31–36)
MCV RBC AUTO: 103.6 FL (ref 80–100)
MONOCYTES ABSOLUTE: 0.7 K/UL (ref 0–1.3)
MONOCYTES RELATIVE PERCENT: 10.7 %
NEUTROPHILS ABSOLUTE: 4.7 K/UL (ref 1.7–7.7)
NEUTROPHILS RELATIVE PERCENT: 67 %
PDW BLD-RTO: 16.6 % (ref 12.4–15.4)
PHOSPHORUS: 3.3 MG/DL (ref 2.5–4.9)
PLATELET # BLD: 405 K/UL (ref 135–450)
PMV BLD AUTO: 7.5 FL (ref 5–10.5)
POTASSIUM SERPL-SCNC: 3.4 MMOL/L (ref 3.5–5.1)
RBC # BLD: 3.36 M/UL (ref 4–5.2)
SARS-COV-2, NAAT: NOT DETECTED
SODIUM BLD-SCNC: 135 MMOL/L (ref 136–145)
TOTAL PROTEIN: 5.8 G/DL (ref 6.4–8.2)
WBC # BLD: 6.9 K/UL (ref 4–11)

## 2021-06-23 PROCEDURE — 85025 COMPLETE CBC W/AUTO DIFF WBC: CPT

## 2021-06-23 PROCEDURE — 6370000000 HC RX 637 (ALT 250 FOR IP): Performed by: HOSPITALIST

## 2021-06-23 PROCEDURE — 87635 SARS-COV-2 COVID-19 AMP PRB: CPT

## 2021-06-23 PROCEDURE — 80069 RENAL FUNCTION PANEL: CPT

## 2021-06-23 PROCEDURE — 36415 COLL VENOUS BLD VENIPUNCTURE: CPT

## 2021-06-23 PROCEDURE — 94640 AIRWAY INHALATION TREATMENT: CPT

## 2021-06-23 PROCEDURE — 6370000000 HC RX 637 (ALT 250 FOR IP): Performed by: INTERNAL MEDICINE

## 2021-06-23 PROCEDURE — 94760 N-INVAS EAR/PLS OXIMETRY 1: CPT

## 2021-06-23 PROCEDURE — 80076 HEPATIC FUNCTION PANEL: CPT

## 2021-06-23 PROCEDURE — 6360000002 HC RX W HCPCS: Performed by: HOSPITALIST

## 2021-06-23 RX ORDER — MULTIVITAMIN WITH IRON
1 TABLET ORAL DAILY
Qty: 30 TABLET | Refills: 1 | Status: SHIPPED | OUTPATIENT
Start: 2021-06-24

## 2021-06-23 RX ADMIN — OXYCODONE HYDROCHLORIDE 5 MG: 5 TABLET ORAL at 08:52

## 2021-06-23 RX ADMIN — FOLIC ACID 1 MG: 1 TABLET ORAL at 08:24

## 2021-06-23 RX ADMIN — OXYCODONE HYDROCHLORIDE 10 MG: 10 TABLET ORAL at 13:10

## 2021-06-23 RX ADMIN — METOPROLOL SUCCINATE 25 MG: 25 TABLET, EXTENDED RELEASE ORAL at 08:24

## 2021-06-23 RX ADMIN — THERA TABS 1 TABLET: TAB at 08:24

## 2021-06-23 RX ADMIN — TIOTROPIUM BROMIDE INHALATION SPRAY 2 PUFF: 3.12 SPRAY, METERED RESPIRATORY (INHALATION) at 07:57

## 2021-06-23 RX ADMIN — ATORVASTATIN CALCIUM 20 MG: 20 TABLET, FILM COATED ORAL at 08:24

## 2021-06-23 RX ADMIN — PANTOPRAZOLE SODIUM 40 MG: 40 TABLET, DELAYED RELEASE ORAL at 05:19

## 2021-06-23 RX ADMIN — FERROUS SULFATE TAB EC 324 MG (65 MG FE EQUIVALENT) 325 MG: 324 (65 FE) TABLET DELAYED RESPONSE at 08:24

## 2021-06-23 RX ADMIN — OXYCODONE HYDROCHLORIDE 5 MG: 5 TABLET ORAL at 18:16

## 2021-06-23 RX ADMIN — DULOXETINE HYDROCHLORIDE 30 MG: 30 CAPSULE, DELAYED RELEASE ORAL at 08:24

## 2021-06-23 RX ADMIN — BISACODYL 10 MG: 5 TABLET, COATED ORAL at 08:24

## 2021-06-23 RX ADMIN — Medication 100 MG: at 08:24

## 2021-06-23 RX ADMIN — ENOXAPARIN SODIUM 40 MG: 40 INJECTION SUBCUTANEOUS at 08:25

## 2021-06-23 RX ADMIN — Medication 1 CAPSULE: at 08:24

## 2021-06-23 ASSESSMENT — PAIN SCALES - GENERAL
PAINLEVEL_OUTOF10: 0
PAINLEVEL_OUTOF10: 3
PAINLEVEL_OUTOF10: 0
PAINLEVEL_OUTOF10: 7
PAINLEVEL_OUTOF10: 7
PAINLEVEL_OUTOF10: 0
PAINLEVEL_OUTOF10: 7
PAINLEVEL_OUTOF10: 0
PAINLEVEL_OUTOF10: 0

## 2021-06-23 ASSESSMENT — PAIN SCALES - WONG BAKER
WONGBAKER_NUMERICALRESPONSE: 0
WONGBAKER_NUMERICALRESPONSE: 0

## 2021-06-23 ASSESSMENT — PAIN DESCRIPTION - PAIN TYPE
TYPE: ACUTE PAIN

## 2021-06-23 ASSESSMENT — PAIN DESCRIPTION - DESCRIPTORS
DESCRIPTORS: ACHING

## 2021-06-23 ASSESSMENT — PAIN DESCRIPTION - LOCATION
LOCATION: ABDOMEN

## 2021-06-23 ASSESSMENT — PAIN - FUNCTIONAL ASSESSMENT
PAIN_FUNCTIONAL_ASSESSMENT: ACTIVITIES ARE NOT PREVENTED

## 2021-06-23 ASSESSMENT — PAIN DESCRIPTION - ONSET
ONSET: ON-GOING

## 2021-06-23 ASSESSMENT — PAIN DESCRIPTION - ORIENTATION
ORIENTATION: RIGHT;LEFT

## 2021-06-23 ASSESSMENT — PAIN DESCRIPTION - FREQUENCY
FREQUENCY: CONTINUOUS

## 2021-06-23 ASSESSMENT — PAIN DESCRIPTION - PROGRESSION
CLINICAL_PROGRESSION: NOT CHANGED
CLINICAL_PROGRESSION: GRADUALLY IMPROVING

## 2021-06-23 NOTE — DISCHARGE INSTR - COC
Continuity of Care Form    Patient Name: Errol Braxton   :  1945  MRN:  [id number]    Admit date:  2021  Discharge date:  2021    Code Status Order: Full Code   Advance Directives:   [facility] Documentation       Date/Time Healthcare Directive Type of Healthcare Directive Copy in [address] [facility] [address] Agent's Name Healthcare Agent's Phone Number    06/15/21 1524  No, patient does not have an advance directive for healthcare treatment -- -- -- -- --    21 1855  No, patient does not have an advance directive for healthcare treatment -- -- -- -- --            Admitting Physician:  Jose Martin Paniagua MD  PCP: Michelle Valadez MD    Discharging Nurse: Deepa Norris Unit/Room#: F9R-1466/8904-61  Discharging Unit Phone Number: [phone]    Emergency Contact:   Extended Emergency Contact Information  Primary Emergency Contact:  [address] [place] [address] Phone: [phone]  Relation: Child    Past Surgical History:  Past Surgical History:   Procedure Laterality Date    ABDOMEN SURGERY      CHOLECYSTECTOMY      COLONOSCOPY  2018    COLONOSCOPY N/A 2018    COLONOSCOPY POLYPECTOMY SNARE/COLD BIOPSY performed by Eboni Eduardo MD at [address] [place] [address]  2018    ENDOSCOPY, COLON, DIAGNOSTIC      HYSTERECTOMY      SMALL INTESTINE SURGERY N/A 4/10/2019    LAPAROSCOPIC LYSISI OF ADHESIONS FOR GREATER THAN 3 HOURS,LAPORSCOPIC CONVERTED TO OPEN COLOSTOMY REVERSAL, SMALL BOWEL RESECTION performed by Erica Goldberg MD at [address] [place] [address] ENDOSCOPY  2018    with biopsies    UPPER GASTROINTESTINAL ENDOSCOPY N/A 2018    EGD BIOPSY performed by Eboni Eduardo MD at [address] [place] [address]       Immunization History:   Immunization History   Administered Date(s) Administered    Influenza, High Dose (Fluzone 65 yrs and older) 09/10/2018    Pneumococcal Conjugate 13-valent ([id number]) 09/24/2018    Tdap (Boostrix, Adacel) 11/29/2018    Zoster Live (Zostavax) 09/24/2016    Zoster Recombinant (Shingrix) 03/24/2018       Active Problems:  Patient Active Problem List   Diagnosis Code    COPD with acute exacerbation ([facility] J44.1    Essential hypertension I10    Tobacco abuse Z72.0    Alcohol abuse F10.10    Colostomy care ([facility] Z43.3    Recurrent major depressive disorder, in partial remission ([facility] F33.41    Personal history of nicotine dependence  Z87.891    Colitis K52.9    Colitis due to Clostridium difficile A04.72    Alcohol-induced acute pancreatitis without infection or necrosis K85.20    Gastrointestinal hemorrhage associated with duodenal ulcer K26.4    Small bowel obstruction (HCC) K56.609    Ileus (HCC) K56.7    Acute cystitis without hematuria N30.00    Non-intractable cyclical vomiting with nausea R11.15    Parastomal hernia without obstruction or gangrene K43.5    Abdominal pain R10.9    C. difficile diarrhea A04.72    Colostomy present (HCC) Z93.3    Postoperative intra-abdominal abscess T81.43XA    COPD exacerbation (HCC) J44.1    Atrial fibrillation (HCC) I48.91    Difficulty speaking R47.9    TIA (transient ischemic attack) G45.9    Chest pain F96.4    Alcoholic liver disease (HCC) K70.9       Isolation/Infection:   Isolation            No Isolation          Patient Infection Status       Infection Onset Added Last Indicated Last Indicated By Review Planned Expiration Resolved Resolved By    None active    Resolved    C-diff Rule Out  12/19/19 12/19/19 Clostridium Difficile Toxin/Antigen (Ordered)   06/15/21 Thomas Wise RN            Nurse Assessment:  Last Vital Signs: /66   Pulse 71   Temp 98 °F (36.7 °C) (Oral)   Resp 16   Ht 5' 2\" (1.575 m)   Wt 128 lb 8.5 oz (58.3 kg)   SpO2 95%   BMI 23.51 kg/m²     Last documented pain score (0-10 scale): Pain Level: 3  Last Weight:   Wt Readings from Last 1 Encounters:   06/23/21 128 lb 8.5 oz (58.3 kg)     Mental Status:  oriented and alert    IV Access:  - None    Nursing Mobility/ADLs:  Walking   Assisted  Transfer  Assisted  Bathing  Assisted  Dressing  Assisted  Toileting  Assisted  Feeding  Assisted  Med Admin  Assisted  Med Delivery   whole    Wound Care Documentation and Therapy:        Elimination:  Continence:   · Bowel: No  · Bladder: No  Urinary Catheter: None   Colostomy/Ileostomy/Ileal Conduit: No       Date of Last BM: 6/22/2021    Intake/Output Summary (Last 24 hours) at 6/23/2021 1425  Last data filed at 6/23/2021 0604  Gross per 24 hour   Intake 560 ml   Output 1900 ml   Net -1340 ml     I/O last 3 completed shifts: In: 920 [P.O.:920]  Out: 1900 [Urine:1900]    Safety Concerns: At Risk for Falls    Impairments/Disabilities:      Vision and Hearing    Nutrition Therapy:  Current Nutrition Therapy:   - Oral Diet:  Low Fat    Routes of Feeding: Oral  Liquids: Thin Liquids  Daily Fluid Restriction: no  Last Modified Barium Swallow with Video (Video Swallowing Test): not done    Treatments at the Time of Hospital Discharge:   Respiratory Treatments:   Oxygen Therapy:  is not on home oxygen therapy.   Ventilator:    - No ventilator support    Rehab Therapies: Physical Therapy and Occupational Therapy  Weight Bearing Status/Restrictions: No weight bearing restirctions  Other Medical Equipment (for information only, NOT a DME order):  wheelchair and stedy x1  Other Treatments:     Patient's personal belongings (please select all that are sent with patient):  None    RN SIGNATURE:  Electronically signed by Sushma Salazar RN on 6/23/21 at 4:20 PM EDT    CASE MANAGEMENT/SOCIAL WORK SECTION    Inpatient Status Date:  6/15/2021     Readmission Risk Assessment Score:  Readmission Risk              Risk of Unplanned Readmission:  21           Discharging to Facility/ Agency   · Name:  [facility])  · Address:  [address] [address]  · Phone:  [phone]  · Fax: [phone]    Electronically signed by Ajay Moore on 6/23/2021 at 2:27 PM    PHYSICIAN SECTION    Prognosis: Fair    Condition at Discharge: Stable    Rehab Potential (if transferring to Rehab): Fair    Recommended Labs or Other Treatments After Discharge: PT/OT    Physician Certification: I certify the above information and transfer of Lily Matson  is necessary for the continuing treatment of the diagnosis listed and that she requires [facility] for less 30 days.      Update Admission H&P: No change in H&P    PHYSICIAN SIGNATURE:  Electronically signed by Toan Segundo MD on 6/23/21 at 3:10 PM EDT

## 2021-06-23 NOTE — DISCHARGE SUMMARY
needed for Wheezing  Qty: 1 Inhaler, Refills: 3      Umeclidinium Bromide 62.5 MCG/INH AEPB Inhale 1 puff into the lungs daily  Qty: 30 each, Refills: 5    Associated Diagnoses: Chronic obstructive pulmonary disease, unspecified COPD type (HCC)      atorvastatin (LIPITOR) 20 MG tablet TAKE 1 TABLET BY MOUTH EVERY DAY  Qty: 90 tablet, Refills: 3    Comments: **Patient requests 90 days supply**  Associated Diagnoses: Hyperlipidemia, unspecified hyperlipidemia type      nicotine (NICODERM CQ) 21 MG/24HR Place 1 patch onto the skin every 24 hours  Qty: 30 patch, Refills: 3    Associated Diagnoses: Tobacco use      metoprolol succinate (TOPROL XL) 25 MG extended release tablet Take 1 tablet by mouth daily  Qty: 30 tablet, Refills: 3      vitamin B-1 100 MG tablet Take 1 tablet by mouth daily  Qty: 30 tablet, Refills: 3           Current Discharge Medication List          Procedures: None     Assessment on Discharge: Stable, improved     Discharge Exam:  /66   Pulse 76   Temp 98 °F (36.7 °C) (Oral)   Resp 16   Ht 5' 2\" (1.575 m)   Wt 128 lb 8.5 oz (58.3 kg)   SpO2 95%   BMI 23.51 kg/m²     General appearance: No apparent distress, appears stated age and cooperative. HEENT: Pupils equal, round, and reactive to light. Conjunctivae/corneas clear. Neck: Supple, with full range of motion. Trachea midline. Respiratory:  Normal respiratory effort. Clear to auscultation, bilaterally without Rales/Wheezes/Rhonchi. Cardiovascular: Regular rate and rhythm with normal S1/S2 without murmurs, rubs or gallops. Abdomen: Soft, non-tender, non-distended with normal bowel sounds. Musculoskelatal: No clubbing, cyanosis or edema bilaterally. Full range of motion without deformity. Skin: Skin color, texture, turgor normal.  No rashes or lesions. Neurologic:  Neurovascularly intact without any focal sensory/motor deficits.  Cranial nerves: II-XII intact, grossly non-focal. Tremors, chronic  Psychiatric: Alert and oriented, thought content appropriate, normal insight    Pertinent Studies During Hospital Stay:    Radiology:  XR ANKLE RIGHT (MIN 3 VIEWS)    Result Date: 6/7/2021  EXAMINATION: 3 XRAY VIEWS OF THE RIGHT FOOT; THREE XRAY VIEWS OF THE LEFT FOOT; THREE XRAY VIEWS OF THE RIGHT ANKLE 6/7/2021 9:49 am COMPARISON: None HISTORY: ORDERING SYSTEM PROVIDED HISTORY: INJURY TECHNOLOGIST PROVIDED HISTORY: Reason for exam:->INJURY Acuity: Acute Type of Exam: Initial; ORDERING SYSTEM PROVIDED HISTORY: Injury TECHNOLOGIST PROVIDED HISTORY: Reason for exam:->Injury Reason for Exam: bruised 2nd toe Acuity: Acute Type of Exam: Initial; ORDERING SYSTEM PROVIDED HISTORY: injury TECHNOLOGIST PROVIDED HISTORY: Reason for exam:->injury Reason for Exam: pain from fall Acuity: Acute Type of Exam: Initial FINDINGS: The studies are limited by diffuse osteopenia. Right foot: Severe osteopenia limits detection of subtle abnormalities. There appears to be irregularity of the proximal phalanx of the great toe. This is age indeterminate. Mild overlying soft tissue swelling is questioned. No definite acute osseous abnormality is otherwise noted. Mild degenerative changes noted diffusely. There is spurring at the plantar fascial attachment to the calcaneus. Left foot: The study is severely limited by diffuse osteopenia. Advanced degenerative changes are noted at the 1st metatarsophalangeal joint with mild bunion formation. Lytic change within the proximal phalanx of the 2nd digit noted. No definite acute fracture or dislocation noted. Soft tissues demonstrate no obvious acute osseous abnormality. Spurring noted at the plantar fascial attachment to the calcaneus. Right ankle:  No acute fracture or dislocation is noted given limitations of the exam. . Soft tissues demonstrate a small joint effusion. Spurring noted at the plantar fascial attachment to the calcaneus. Mild soft tissue swelling noted anteriorly at the ankle.      Severe osteopenia limits obvious acute osseous abnormality. Spurring noted at the plantar fascial attachment to the calcaneus. Right ankle:  No acute fracture or dislocation is noted given limitations of the exam. . Soft tissues demonstrate a small joint effusion. Spurring noted at the plantar fascial attachment to the calcaneus. Mild soft tissue swelling noted anteriorly at the ankle. Severe osteopenia limits detection of subtle abnormalities. Right foot demonstrates an age-indeterminate irregularity of the proximal phalanx of the 1st digit. Please correlate with any focal pain for potential of acute fracture. Lytic changes at the proximal phalanx of the 2nd digit left foot are age indeterminate and may represent sequela of prior trauma. Please correlate with history and clinical exam.  If there is clinical concern for infection or an underlying lesion MRI would be more sensitive for evaluation. XR FOOT RIGHT (MIN 3 VIEWS)    Result Date: 6/7/2021  EXAMINATION: 3 XRAY VIEWS OF THE RIGHT FOOT; THREE XRAY VIEWS OF THE LEFT FOOT; THREE XRAY VIEWS OF THE RIGHT ANKLE 6/7/2021 9:49 am COMPARISON: None HISTORY: ORDERING SYSTEM PROVIDED HISTORY: INJURY TECHNOLOGIST PROVIDED HISTORY: Reason for exam:->INJURY Acuity: Acute Type of Exam: Initial; ORDERING SYSTEM PROVIDED HISTORY: Injury TECHNOLOGIST PROVIDED HISTORY: Reason for exam:->Injury Reason for Exam: bruised 2nd toe Acuity: Acute Type of Exam: Initial; ORDERING SYSTEM PROVIDED HISTORY: injury TECHNOLOGIST PROVIDED HISTORY: Reason for exam:->injury Reason for Exam: pain from fall Acuity: Acute Type of Exam: Initial FINDINGS: The studies are limited by diffuse osteopenia. Right foot: Severe osteopenia limits detection of subtle abnormalities. There appears to be irregularity of the proximal phalanx of the great toe. This is age indeterminate. Mild overlying soft tissue swelling is questioned. No definite acute osseous abnormality is otherwise noted.   Mild degenerative changes noted diffusely. There is spurring at the plantar fascial attachment to the calcaneus. Left foot: The study is severely limited by diffuse osteopenia. Advanced degenerative changes are noted at the 1st metatarsophalangeal joint with mild bunion formation. Lytic change within the proximal phalanx of the 2nd digit noted. No definite acute fracture or dislocation noted. Soft tissues demonstrate no obvious acute osseous abnormality. Spurring noted at the plantar fascial attachment to the calcaneus. Right ankle:  No acute fracture or dislocation is noted given limitations of the exam. . Soft tissues demonstrate a small joint effusion. Spurring noted at the plantar fascial attachment to the calcaneus. Mild soft tissue swelling noted anteriorly at the ankle. Severe osteopenia limits detection of subtle abnormalities. Right foot demonstrates an age-indeterminate irregularity of the proximal phalanx of the 1st digit. Please correlate with any focal pain for potential of acute fracture. Lytic changes at the proximal phalanx of the 2nd digit left foot are age indeterminate and may represent sequela of prior trauma. Please correlate with history and clinical exam.  If there is clinical concern for infection or an underlying lesion MRI would be more sensitive for evaluation. XR ABDOMEN (KUB) (SINGLE AP VIEW)    Result Date: 6/20/2021  EXAMINATION: ONE SUPINE XRAY VIEW(S) OF THE ABDOMEN 6/20/2021 5:30 pm COMPARISON: 06/03/2020 HISTORY: ORDERING SYSTEM PROVIDED HISTORY: end cap of enema in pt's rectum TECHNOLOGIST PROVIDED HISTORY: Reason for exam:->end cap of enema in pt's rectum Reason for Exam: Pt passed cap before x ray was done. Acuity: Chronic Type of Exam: Initial FINDINGS: Gas seen in nondilated colon. Minimal small bowel gas. No extraluminal gas. No foreign body.      Unremarkable appearing abdomen     CT ABDOMEN PELVIS W IV CONTRAST Additional Contrast? None    Result Date: 6/17/2021  EXAMINATION: CT OF THE ABDOMEN AND PELVIS WITH CONTRAST; CTA OF THE CHEST 6/17/2021 5:05 am; 6/17/2021 5:06 am TECHNIQUE: CT of the abdomen and pelvis was performed with the administration of intravenous contrast. Multiplanar reformatted images are provided for review. Dose modulation, iterative reconstruction, and/or weight based adjustment of the mA/kV was utilized to reduce the radiation dose to as low as reasonably achievable.; CTA of the chest was performed after the administration of intravenous contrast.  Multiplanar reformatted images are provided for review. MIP images are provided for review. Dose modulation, iterative reconstruction, and/or weight based adjustment of the mA/kV was utilized to reduce the radiation dose to as low as reasonably achievable. COMPARISON: Chest CT 06/14/2021 Abdominal CT 04/17/2021 HISTORY: ORDERING SYSTEM PROVIDED HISTORY: Abdominal pain, obstipation, prior abdominal surgery, rule out bowel obstruction TECHNOLOGIST PROVIDED HISTORY: Reason for exam:->Abdominal pain, obstipation, prior abdominal surgery, rule out bowel obstruction Additional Contrast?->None Reason for Exam: Abdominal pain, obstipation, prior abdominal surgery, rule out bowel obstruction Acuity: Acute Type of Exam: Initial; ORDERING SYSTEM PROVIDED HISTORY: SOB TECHNOLOGIST PROVIDED HISTORY: Reason for exam:->SOB Reason for Exam: SOB Acuity: Acute Type of Exam: Initial FINDINGS: Chest: Mediastinum: No intimal flap is seen in the aorta. 5 mm hypodense nodule seen in the right lobe of thyroid. , no specific imaging follow-up recommended. Prominent fat is seen between the right and left atrium, incidentally noted. Coronary artery calcification is seen. Small hiatal hernia seen. There is nonspecific thickening at the GE junction. No embolus is seen in the central, right, or left main pulmonary artery. No embolus is seen in the segmental branches. Subsegmental branches are not well evaluated secondary to motion.  Lungs/pleura: Mild seen at the lung bases, likely atelectasis. Ill-defined peripancreatic fluid and edema, compatible with acute pancreatitis. .  Findings are new compared to most recent chest CT Scattered areas of colonic wall thickening are seen, likely due to the partially contracted state of the colon in the absence of clinical signs of colitis Increased density is seen within the bladder on the right, compatible with small bladder stones. No significant change from prior. Scattered anastomotic staple lines are seen within the bowel. No bowel obstruction     XR CHEST PORTABLE    Result Date: 6/17/2021  EXAMINATION: ONE XRAY VIEW OF THE CHEST 6/17/2021 4:42 am COMPARISON: 06/14/2021 HISTORY: ORDERING SYSTEM PROVIDED HISTORY: increased O2 demand; diminished lung sounds TECHNOLOGIST PROVIDED HISTORY: Reason for exam:->increased O2 demand; diminished lung sounds Reason for Exam: increased O2 demand; diminished lung sounds FINDINGS: The lungs are clear. The costophrenic angles are sharp. The cardiomediastinal silhouette is within normal limits. There is no discernible pneumothorax. Negative portable chest.     XR CHEST PORTABLE    Result Date: 6/14/2021  EXAMINATION: ONE XRAY VIEW OF THE CHEST 6/14/2021 2:53 pm COMPARISON: 04/17/2021 HISTORY: ORDERING SYSTEM PROVIDED HISTORY: SOB TECHNOLOGIST PROVIDED HISTORY: Reason for exam:->SOB Reason for Exam: Chest Pain Acuity: Acute Type of Exam: Initial FINDINGS: The patient is rotated. The cardiac silhouette is normal.  Tracheobronchial and thoracic aortic calcification is present. Hilar contours are normal.  There is minimal scarring in the left base. No consolidation or pleural effusion is identified. No acute cardiopulmonary disease.      CT CHEST PULMONARY EMBOLISM W CONTRAST    Result Date: 6/17/2021  EXAMINATION: CT OF THE ABDOMEN AND PELVIS WITH CONTRAST; CTA OF THE CHEST 6/17/2021 5:05 am; 6/17/2021 5:06 am TECHNIQUE: CT of the abdomen and pelvis was performed with the administration of intravenous contrast. Multiplanar reformatted images are provided for review. Dose modulation, iterative reconstruction, and/or weight based adjustment of the mA/kV was utilized to reduce the radiation dose to as low as reasonably achievable.; CTA of the chest was performed after the administration of intravenous contrast.  Multiplanar reformatted images are provided for review. MIP images are provided for review. Dose modulation, iterative reconstruction, and/or weight based adjustment of the mA/kV was utilized to reduce the radiation dose to as low as reasonably achievable. COMPARISON: Chest CT 06/14/2021 Abdominal CT 04/17/2021 HISTORY: ORDERING SYSTEM PROVIDED HISTORY: Abdominal pain, obstipation, prior abdominal surgery, rule out bowel obstruction TECHNOLOGIST PROVIDED HISTORY: Reason for exam:->Abdominal pain, obstipation, prior abdominal surgery, rule out bowel obstruction Additional Contrast?->None Reason for Exam: Abdominal pain, obstipation, prior abdominal surgery, rule out bowel obstruction Acuity: Acute Type of Exam: Initial; ORDERING SYSTEM PROVIDED HISTORY: SOB TECHNOLOGIST PROVIDED HISTORY: Reason for exam:->SOB Reason for Exam: SOB Acuity: Acute Type of Exam: Initial FINDINGS: Chest: Mediastinum: No intimal flap is seen in the aorta. 5 mm hypodense nodule seen in the right lobe of thyroid. , no specific imaging follow-up recommended. Prominent fat is seen between the right and left atrium, incidentally noted. Coronary artery calcification is seen. Small hiatal hernia seen. There is nonspecific thickening at the GE junction. No embolus is seen in the central, right, or left main pulmonary artery. No embolus is seen in the segmental branches. Subsegmental branches are not well evaluated secondary to motion. Lungs/pleura: Mild underlying emphysema is seen. Respiratory motion artifact limits evaluation of fine pulmonary parenchymal change. No pulmonary edema noted.   Bandlike to most recent chest CT Scattered areas of colonic wall thickening are seen, likely due to the partially contracted state of the colon in the absence of clinical signs of colitis Increased density is seen within the bladder on the right, compatible with small bladder stones. No significant change from prior. Scattered anastomotic staple lines are seen within the bowel. No bowel obstruction     CT CHEST PULMONARY EMBOLISM W CONTRAST    Result Date: 6/15/2021  EXAMINATION: CTA OF THE CHEST, 6/14/2021 4:55 pm TECHNIQUE: CTA of the chest was performed after the administration of intravenous contrast.  Multiplanar reformatted images are provided for review. MIP images are provided for review. Dose modulation, iterative reconstruction, and/or weight based adjustment of the mA/kV was utilized to reduce the radiation dose to as low as reasonably achievable. COMPARISON: 04/17/2021, 05/13/2019 HISTORY: ORDERING SYSTEM PROVIDED HISTORY:  LUCAS, MARCY, tachangi TECHNOLOGIST PROVIDED HISTORY: Reason for exam:  SOB, CP, tachy Decision Support Exception - unselect if not a suspected or confirmed emergency medical condition->Emergency Medical Condition (MA) Reason for Exam:  SOB, CP, tachy Acuity:  Acute Type of Exam: Initial FINDINGS: Pulmonary Arteries: Pulmonary arteries are adequately opacified for evaluation. No evidence of intraluminal filling defect to suggest pulmonary embolism. Main pulmonary artery is normal in caliber. Mediastinum: The thyroid is unremarkable. There is no pathologic lymphadenopathy. There is atherosclerotic disease of the thoracic aorta without evidence of aneurysm or dissection. There is coronary atherosclerosis. No pericardial abnormality is identified. Lungs/pleura: The central airways are patent. There is a background of mild emphysema. There is mild pleural and parenchymal scarring within the left lung apex. There is mild dependent atelectasis within both lungs, left greater than right.   There is new mild consolidative opacity within the inferior lingula. No additional focus of airspace consolidation is identified. There is no evidence of a pneumothorax or pleural effusion. No suspicious pulmonary nodule or mass is evident. There are stable scattered noncalcified granulomata throughout both lungs, unchanged from 05/13/2019. Upper Abdomen: The patient is status post cholecystectomy. The visualized portions of the adrenal glands are unremarkable. The remainder of the upper abdomen is unremarkable. Soft Tissues/Bones: There is mild degenerative change throughout the thoracic spine. There is a chronic wedge compression deformity at L1. No osteolytic or osteoblastic lesion is identified. 1. No CT evidence of a pulmonary embolism. 2. Mild consolidative opacity within the lingula, most likely representing either atelectasis or parenchymal scar. Pneumonia is considered less likely. US ABDOMEN LIMITED Specify organ? GALLBLADDER, PANCREAS    Result Date: 6/16/2021  EXAMINATION: RIGHT UPPER QUADRANT ULTRASOUND 6/16/2021 8:07 am COMPARISON: None. HISTORY: ORDERING SYSTEM PROVIDED HISTORY: RUQ TECHNOLOGIST PROVIDED HISTORY: Reason for exam:->RUQ Specify organ?->GALLBLADDER Specify organ?->PANCREAS FINDINGS: LIVER:  Heterogeneous echotexture is seen throughout the liver. No intrahepatic ductal dilatation. No perihepatic fluid BILIARY SYSTEM:  Gallbladder is absent Common bile duct is within normal limits measuring 5 mm. RIGHT KIDNEY: The right kidney is grossly unremarkable without evidence of hydronephrosis. PANCREAS:  Visualized portions of the pancreas are unremarkable. OTHER: No evidence of right upper quadrant ascites. Surgically absent gallbladder. No biliary ductal dilatation.  Heterogeneous echotexture throughout the liver, compatible with diffuse hepatocellular disease     NM Cardiac Stress Test Nuclear Imaging    Result Date: 6/15/2021  Cardiac Perfusion Imaging  Demographics   Patient HR: 71  Test duration: 1 min and 40 sec  Reason for termination:Completed   ECG Findings  Normal response to lexiscan . Arrhythmias  Premature atrial contractions, atrial couplet. Symptoms  Lexiscan 0.4 mg IV given. Pt noted to have  resolution of chest pain 7/10 during stress and  chest pain returned to 7/10 in recovery. Pt had SOB  noted at peak that resolved in recovery. O2  saturation 99% on room air. Complications  Procedure complication was none. Procedure Medications   - Lexiscan I.V. 0.4 mg.  Imaging Protocols   - One Day   Rest                   Stress   Isotope:Myoview        Isotope: Myoview  Isotope dose:12.7 mCi  Isotope dose:29.8 mCi  Date:06/15/2021 00:00  Date:06/15/2021 00:00                          Technique:   Gated  Imaging Results   Applied corrections   - Attenuation correction  applied   - Scatter correction applied     Summed scores     - Summed stress score: 1     - Summed rest score: 4     - Summed difference score:    0     Stress ejection    Ejection fraction:90 %    EDV :31 ml    ESV :3 ml    Stroke volume :28 ml  Medical History   Additional Medical History   A fib, history of TIA. Positive family history of CAD.    Signatures   ------------------------------------------------------------------  Electronically signed by Emma Lira MD (Interpreting  physician) on 06/15/2021 at 12:44  ------------------------------------------------------------------        Last Labs on Discharge:     Recent Results (from the past 24 hour(s))   CBC Auto Differential    Collection Time: 06/23/21  4:39 AM   Result Value Ref Range    WBC 6.9 4.0 - 11.0 K/uL    RBC 3.36 (L) 4.00 - 5.20 M/uL    Hemoglobin 12.1 12.0 - 16.0 g/dL    Hematocrit 34.9 (L) 36.0 - 48.0 %    .6 (H) 80.0 - 100.0 fL    MCH 35.9 (H) 26.0 - 34.0 pg    MCHC 34.7 31.0 - 36.0 g/dL    RDW 16.6 (H) 12.4 - 15.4 %    Platelets 631 689 - 581 K/uL    MPV 7.5 5.0 - 10.5 fL    Neutrophils % 67.0 %    Lymphocytes % 17.4 %

## 2021-06-23 NOTE — PLAN OF CARE
Problem: Skin Integrity:  Goal: Will show no infection signs and symptoms  Description: Will show no infection signs and symptoms  6/23/2021 1015 by Chase Alvarez RN  Outcome: Ongoing     Problem: Falls - Risk of:  Goal: Will remain free from falls  Description: Will remain free from falls  6/23/2021 1015 by Chase Alvarez RN  Outcome: Ongoing

## 2021-06-23 NOTE — PLAN OF CARE
Problem: Pain:  Goal: Pain level will decrease  Description: Pain level will decrease  Outcome: Ongoing  Note: Pain /discomfort being managed with PRN analgesics per MD orders. Patient able to express presence and absence of pain and rate pain appropriately using numerical scale. Goal: Control of acute pain  Description: Control of acute pain  Outcome: Ongoing  Goal: Control of chronic pain  Description: Control of chronic pain  Outcome: Ongoing     Problem: Skin Integrity:  Goal: Will show no infection signs and symptoms  Description: Will show no infection signs and symptoms  Outcome: Ongoing  Note: Pt assessed for infection, No signs or symptoms of surgical site noted. VVS, WBC WNL. Reviewed information with pt and family, pt verbalized understanding       Goal: Absence of new skin breakdown  Description: Absence of new skin breakdown  Outcome: Ongoing     Problem: Falls - Risk of:  Goal: Will remain free from falls  Description: Will remain free from falls  Outcome: Ongoing  Note: Fall risk assessment completed . Fall precautions in place, bed/ chair alarm on, side rails 2/4 up, call light in reach, educated pt on calling for assistance when needed, room clear of clutter. Pt verbalized understanding. Goal: Absence of physical injury  Description: Absence of physical injury  Outcome: Ongoing  Note: No falls noted this shift. Patient ambulates with x1 staff assistance without difficulty. Family member at bedside, spent the night. Bed kept in low position. Safe environment maintained. Bedside table & call light in reach. Uses call light appropriately when needing assistance.

## 2021-06-23 NOTE — PROGRESS NOTES
Data- discharge order received, pt verbalized agreement to discharge, disposition to [address]. Action- discharge instructions prepared and given to transport, pt verbalized understanding. Medication information packet given r/t NEW and/or CHANGED prescriptions emphasizing name/purpose/side effects, pt verbalized understanding. Discharge instruction summary: Diet- regular low fat, Activity- as tolerated, Primary Care Physician as follows: Augie Soni -[phone] . Response- Pt belongings gathered, Disposition is [facility], [facility], no complications.  Electronically signed by Philipp Lau RN on 6/23/2021 at 7:28 PM

## 2021-06-23 NOTE — CARE COORDINATION
Case Management:  Left message for [facility] to follow up on referral sent on Friday, await call back.   Electronically signed by Meena Cardona on 6/20/2021 at 10:30 AM
Case Management:  [facility] can accept patient for admit and they will start pre cert today.   Electronically signed by Flores Bradshaw on 6/21/2021 at 1:22 PM
DISCHARGE SUMMARY     DATE OF DISCHARGE:  6/23/21    DISCHARGE DESTINATION: SNF     [facility]     Level of Care: Skilled      Report Number: [phone]  Fax Number:  [phone]    Hens completed  6/23/2021     TRANSPORTATION: [address] [place] [address] Janiya Name:   Manjeet Atkinson [place] up Time:  7 pm     Phone Number: [phone]    COMMENTS:  Notified patient and she is agreeable , left message for son with transport time. Bedside nurse made aware of transport time and need for COVID test.  Verified facility location due to two facilities with same name. Case Management:  [facility] has insurance authorization and can admit today, message sent to doctor.   Electronically signed by Pamela Laguerre on 6/23/2021 at 2:02 PM
has been in pain due to her ankle. Discussed option for rehab assistance but has declined help and tells cm she will quit on her own. Declined resources. Patient is agreeable to plan. SW/CM provided contact information for patient or family to call with any questions. SW/CM will follow and assist as needed.     Electronically signed by Claudean Latch on 6/18/2021 at 4:03 PM

## 2021-06-23 NOTE — PROGRESS NOTES
Patient is alert and oriented x4, up with stedy x1 assist, call light within reach, bed/chair alarm on. AM meds complete, patient tolerated well. VSS and WDL. No s/s of distress, no further needs noted at this time.  Electronically signed by Lyndsey Szymanski RN on 6/23/2021 at 10:17 AM

## 2021-06-23 NOTE — PROGRESS NOTES
Nutrition Assessment     Type and Reason for Visit: Initial    Nutrition Recommendations/Plan:   Low Fat diet   Will monitor nutritional adequacy, nutrition-related labs, weights, BMs, and clinical progress     Nutrition Assessment:  LOS. Pt admitted originally with chest pain, then found to have acute pancreatitis. Pt reports tolerating diet. Appetite is baseline but pt does not like food here. Wt appears to be trending up. Continue current diet. Malnutrition Assessment:  Malnutrition Status: No malnutrition    Nutrition Related Findings: BM 6/22      Current Nutrition Therapies:    ADULT DIET; Regular; Low Fat (less than or equal to 50 gm/day)    Anthropometric Measures:  · Height: 5' 2\" (157.5 cm)  · Current Body Wt: 128 lb (58.1 kg)   · BMI: 23.4    Nutrition Diagnosis:   No nutrition diagnosis at this time     Nutrition Interventions:   Food and/or Nutrient Delivery:  Continue Current Diet  Nutrition Education/Counseling:  No recommendation at this time   Coordination of Nutrition Care:  Continue to monitor while inpatient    Goals:   Tolerate diet consume greater than 50% of meals this admission       Nutrition Monitoring and Evaluation:   Behavioral-Environmental Outcomes:  None Identified   Food/Nutrient Intake Outcomes:  Food and Nutrient Intake  Physical Signs/Symptoms Outcomes:  Biochemical Data, Nutrition Focused Physical Findings, Skin, Weight     Discharge Planning:    No discharge needs at this time     Electronically signed by Cecily Dill RD, LD on 6/23/21 at 2:28 PM EDT    Contact: [phone]

## 2021-08-27 ENCOUNTER — APPOINTMENT (OUTPATIENT)
Dept: CT IMAGING | Age: 76
DRG: 987 | End: 2021-08-27
Payer: MEDICARE

## 2021-08-27 ENCOUNTER — HOSPITAL ENCOUNTER (INPATIENT)
Age: 76
LOS: 6 days | Discharge: HOME | DRG: 987 | End: 2021-09-02
Attending: STUDENT IN AN ORGANIZED HEALTH CARE EDUCATION/TRAINING PROGRAM | Admitting: STUDENT IN AN ORGANIZED HEALTH CARE EDUCATION/TRAINING PROGRAM
Payer: MEDICARE

## 2021-08-27 ENCOUNTER — APPOINTMENT (OUTPATIENT)
Dept: INTERVENTIONAL RADIOLOGY/VASCULAR | Age: 76
DRG: 987 | End: 2021-08-27
Payer: MEDICARE

## 2021-08-27 DIAGNOSIS — S36.029A SPLEEN HEMATOMA, INITIAL ENCOUNTER: Primary | ICD-10-CM

## 2021-08-27 DIAGNOSIS — D50.0 BLOOD LOSS ANEMIA: ICD-10-CM

## 2021-08-27 DIAGNOSIS — F10.10 ALCOHOL ABUSE: ICD-10-CM

## 2021-08-27 PROBLEM — K85.90 PANCREATITIS: Status: ACTIVE | Noted: 2021-08-27

## 2021-08-27 PROBLEM — D73.5 SPLENIC INFARCT: Status: ACTIVE | Noted: 2021-08-27

## 2021-08-27 LAB
A/G RATIO: 0.9 (ref 1.1–2.2)
ABO/RH: NORMAL
ALBUMIN SERPL-MCNC: 3.1 G/DL (ref 3.4–5)
ALP BLD-CCNC: 156 U/L (ref 40–129)
ALT SERPL-CCNC: 13 U/L (ref 10–40)
ANION GAP SERPL CALCULATED.3IONS-SCNC: 14 MMOL/L (ref 3–16)
ANTIBODY SCREEN: NORMAL
APTT: 30.5 SEC (ref 26.2–38.6)
AST SERPL-CCNC: 33 U/L (ref 15–37)
BASOPHILS ABSOLUTE: 0.1 K/UL (ref 0–0.2)
BASOPHILS RELATIVE PERCENT: 0.7 %
BILIRUB SERPL-MCNC: 0.8 MG/DL (ref 0–1)
BILIRUBIN URINE: ABNORMAL
BLOOD, URINE: NEGATIVE
BUN BLDV-MCNC: 8 MG/DL (ref 7–20)
CALCIUM SERPL-MCNC: 8.6 MG/DL (ref 8.3–10.6)
CHLORIDE BLD-SCNC: 89 MMOL/L (ref 99–110)
CLARITY: CLEAR
CO2: 29 MMOL/L (ref 21–32)
COLOR: ABNORMAL
CREAT SERPL-MCNC: <0.5 MG/DL (ref 0.6–1.2)
EOSINOPHILS ABSOLUTE: 0.1 K/UL (ref 0–0.6)
EOSINOPHILS RELATIVE PERCENT: 0.5 %
GFR AFRICAN AMERICAN: >60
GFR NON-AFRICAN AMERICAN: >60
GLOBULIN: 3.3 G/DL
GLUCOSE BLD-MCNC: 173 MG/DL (ref 70–99)
GLUCOSE URINE: NEGATIVE MG/DL
HCT VFR BLD CALC: 36.6 % (ref 36–48)
HEMOGLOBIN: 12.7 G/DL (ref 12–16)
INR BLD: 1.11 (ref 0.88–1.12)
KETONES, URINE: ABNORMAL MG/DL
LEUKOCYTE ESTERASE, URINE: NEGATIVE
LIPASE: 188 U/L (ref 13–60)
LYMPHOCYTES ABSOLUTE: 1.5 K/UL (ref 1–5.1)
LYMPHOCYTES RELATIVE PERCENT: 12 %
MAGNESIUM: 1.5 MG/DL (ref 1.8–2.4)
MCH RBC QN AUTO: 34.4 PG (ref 26–34)
MCHC RBC AUTO-ENTMCNC: 34.7 G/DL (ref 31–36)
MCV RBC AUTO: 99 FL (ref 80–100)
MICROSCOPIC EXAMINATION: ABNORMAL
MONOCYTES ABSOLUTE: 0.8 K/UL (ref 0–1.3)
MONOCYTES RELATIVE PERCENT: 6.4 %
NEUTROPHILS ABSOLUTE: 9.8 K/UL (ref 1.7–7.7)
NEUTROPHILS RELATIVE PERCENT: 80.4 %
NITRITE, URINE: NEGATIVE
PDW BLD-RTO: 20.2 % (ref 12.4–15.4)
PH UA: 6.5 (ref 5–8)
PLATELET # BLD: 375 K/UL (ref 135–450)
PMV BLD AUTO: 7.2 FL (ref 5–10.5)
POTASSIUM REFLEX MAGNESIUM: 3.2 MMOL/L (ref 3.5–5.1)
PROTEIN UA: NEGATIVE MG/DL
PROTHROMBIN TIME: 12.6 SEC (ref 9.9–12.7)
RBC # BLD: 3.7 M/UL (ref 4–5.2)
SARS-COV-2, NAAT: NOT DETECTED
SODIUM BLD-SCNC: 132 MMOL/L (ref 136–145)
SPECIFIC GRAVITY UA: 1.01 (ref 1–1.03)
TOTAL PROTEIN: 6.4 G/DL (ref 6.4–8.2)
URINE TYPE: ABNORMAL
UROBILINOGEN, URINE: 4 E.U./DL
WBC # BLD: 12.3 K/UL (ref 4–11)

## 2021-08-27 PROCEDURE — 94761 N-INVAS EAR/PLS OXIMETRY MLT: CPT

## 2021-08-27 PROCEDURE — 6360000002 HC RX W HCPCS: Performed by: STUDENT IN AN ORGANIZED HEALTH CARE EDUCATION/TRAINING PROGRAM

## 2021-08-27 PROCEDURE — 2500000003 HC RX 250 WO HCPCS: Performed by: STUDENT IN AN ORGANIZED HEALTH CARE EDUCATION/TRAINING PROGRAM

## 2021-08-27 PROCEDURE — 96374 THER/PROPH/DIAG INJ IV PUSH: CPT

## 2021-08-27 PROCEDURE — 87635 SARS-COV-2 COVID-19 AMP PRB: CPT

## 2021-08-27 PROCEDURE — 2580000003 HC RX 258: Performed by: STUDENT IN AN ORGANIZED HEALTH CARE EDUCATION/TRAINING PROGRAM

## 2021-08-27 PROCEDURE — 74177 CT ABD & PELVIS W/CONTRAST: CPT

## 2021-08-27 PROCEDURE — 96361 HYDRATE IV INFUSION ADD-ON: CPT

## 2021-08-27 PROCEDURE — 96375 TX/PRO/DX INJ NEW DRUG ADDON: CPT

## 2021-08-27 PROCEDURE — 99285 EMERGENCY DEPT VISIT HI MDM: CPT

## 2021-08-27 PROCEDURE — C1894 INTRO/SHEATH, NON-LASER: HCPCS

## 2021-08-27 PROCEDURE — 86901 BLOOD TYPING SEROLOGIC RH(D): CPT

## 2021-08-27 PROCEDURE — 36246 INS CATH ABD/L-EXT ART 2ND: CPT

## 2021-08-27 PROCEDURE — P9016 RBC LEUKOCYTES REDUCED: HCPCS

## 2021-08-27 PROCEDURE — 2000000000 HC ICU R&B

## 2021-08-27 PROCEDURE — 83735 ASSAY OF MAGNESIUM: CPT

## 2021-08-27 PROCEDURE — 85025 COMPLETE CBC W/AUTO DIFF WBC: CPT

## 2021-08-27 PROCEDURE — 04L43DZ OCCLUSION OF SPLENIC ARTERY WITH INTRALUMINAL DEVICE, PERCUTANEOUS APPROACH: ICD-10-PCS | Performed by: STUDENT IN AN ORGANIZED HEALTH CARE EDUCATION/TRAINING PROGRAM

## 2021-08-27 PROCEDURE — 99153 MOD SED SAME PHYS/QHP EA: CPT

## 2021-08-27 PROCEDURE — 99152 MOD SED SAME PHYS/QHP 5/>YRS: CPT

## 2021-08-27 PROCEDURE — B31N1ZZ FLUOROSCOPY OF OTHER UPPER ARTERIES USING LOW OSMOLAR CONTRAST: ICD-10-PCS | Performed by: STUDENT IN AN ORGANIZED HEALTH CARE EDUCATION/TRAINING PROGRAM

## 2021-08-27 PROCEDURE — 86850 RBC ANTIBODY SCREEN: CPT

## 2021-08-27 PROCEDURE — 6360000004 HC RX CONTRAST MEDICATION: Performed by: STUDENT IN AN ORGANIZED HEALTH CARE EDUCATION/TRAINING PROGRAM

## 2021-08-27 PROCEDURE — 85730 THROMBOPLASTIN TIME PARTIAL: CPT

## 2021-08-27 PROCEDURE — 37244 VASC EMBOLIZE/OCCLUDE BLEED: CPT

## 2021-08-27 PROCEDURE — 86900 BLOOD TYPING SEROLOGIC ABO: CPT

## 2021-08-27 PROCEDURE — 80053 COMPREHEN METABOLIC PANEL: CPT

## 2021-08-27 PROCEDURE — 86923 COMPATIBILITY TEST ELECTRIC: CPT

## 2021-08-27 PROCEDURE — 81003 URINALYSIS AUTO W/O SCOPE: CPT

## 2021-08-27 PROCEDURE — 85610 PROTHROMBIN TIME: CPT

## 2021-08-27 PROCEDURE — 83690 ASSAY OF LIPASE: CPT

## 2021-08-27 RX ORDER — FENTANYL CITRATE 50 UG/ML
50 INJECTION, SOLUTION INTRAMUSCULAR; INTRAVENOUS ONCE
Status: COMPLETED | OUTPATIENT
Start: 2021-08-27 | End: 2021-08-27

## 2021-08-27 RX ORDER — ONDANSETRON 2 MG/ML
4 INJECTION INTRAMUSCULAR; INTRAVENOUS ONCE
Status: COMPLETED | OUTPATIENT
Start: 2021-08-27 | End: 2021-08-27

## 2021-08-27 RX ORDER — ONDANSETRON 2 MG/ML
4 INJECTION INTRAMUSCULAR; INTRAVENOUS EVERY 6 HOURS PRN
Status: DISCONTINUED | OUTPATIENT
Start: 2021-08-27 | End: 2021-09-02 | Stop reason: HOSPADM

## 2021-08-27 RX ORDER — SODIUM CHLORIDE 9 MG/ML
INJECTION, SOLUTION INTRAVENOUS CONTINUOUS
Status: DISCONTINUED | OUTPATIENT
Start: 2021-08-27 | End: 2021-09-02 | Stop reason: HOSPADM

## 2021-08-27 RX ORDER — 0.9 % SODIUM CHLORIDE 0.9 %
1000 INTRAVENOUS SOLUTION INTRAVENOUS ONCE
Status: COMPLETED | OUTPATIENT
Start: 2021-08-27 | End: 2021-08-27

## 2021-08-27 RX ORDER — MIDAZOLAM HYDROCHLORIDE 1 MG/ML
INJECTION INTRAMUSCULAR; INTRAVENOUS DAILY PRN
Status: COMPLETED | OUTPATIENT
Start: 2021-08-27 | End: 2021-08-27

## 2021-08-27 RX ORDER — LIDOCAINE HYDROCHLORIDE 10 MG/ML
5 INJECTION, SOLUTION EPIDURAL; INFILTRATION; INTRACAUDAL; PERINEURAL ONCE
Status: DISCONTINUED | OUTPATIENT
Start: 2021-08-27 | End: 2021-09-02 | Stop reason: HOSPADM

## 2021-08-27 RX ORDER — SODIUM CHLORIDE 0.9 % (FLUSH) 0.9 %
5-40 SYRINGE (ML) INJECTION EVERY 12 HOURS SCHEDULED
Status: DISCONTINUED | OUTPATIENT
Start: 2021-08-27 | End: 2021-09-02 | Stop reason: HOSPADM

## 2021-08-27 RX ORDER — ONDANSETRON 4 MG/1
4 TABLET, ORALLY DISINTEGRATING ORAL EVERY 8 HOURS PRN
Status: DISCONTINUED | OUTPATIENT
Start: 2021-08-27 | End: 2021-09-02 | Stop reason: HOSPADM

## 2021-08-27 RX ORDER — SODIUM CHLORIDE 9 MG/ML
25 INJECTION, SOLUTION INTRAVENOUS PRN
Status: DISCONTINUED | OUTPATIENT
Start: 2021-08-27 | End: 2021-09-02 | Stop reason: HOSPADM

## 2021-08-27 RX ORDER — UMECLIDINIUM BROMIDE AND VILANTEROL TRIFENATATE 62.5; 25 UG/1; UG/1
1 POWDER RESPIRATORY (INHALATION) DAILY
Status: ON HOLD | COMMUNITY
Start: 2021-07-25 | End: 2021-08-31 | Stop reason: HOSPADM

## 2021-08-27 RX ORDER — SODIUM CHLORIDE 9 MG/ML
INJECTION, SOLUTION INTRAVENOUS PRN
Status: DISCONTINUED | OUTPATIENT
Start: 2021-08-27 | End: 2021-09-02 | Stop reason: HOSPADM

## 2021-08-27 RX ORDER — SODIUM CHLORIDE 0.9 % (FLUSH) 0.9 %
5-40 SYRINGE (ML) INJECTION PRN
Status: DISCONTINUED | OUTPATIENT
Start: 2021-08-27 | End: 2021-09-02 | Stop reason: HOSPADM

## 2021-08-27 RX ORDER — LORAZEPAM 2 MG/ML
INJECTION INTRAMUSCULAR DAILY PRN
Status: COMPLETED | OUTPATIENT
Start: 2021-08-27 | End: 2021-08-27

## 2021-08-27 RX ORDER — ACETAMINOPHEN 325 MG/1
650 TABLET ORAL EVERY 6 HOURS PRN
Status: DISCONTINUED | OUTPATIENT
Start: 2021-08-27 | End: 2021-09-01 | Stop reason: SDUPTHER

## 2021-08-27 RX ORDER — ACETAMINOPHEN 650 MG/1
650 SUPPOSITORY RECTAL EVERY 6 HOURS PRN
Status: DISCONTINUED | OUTPATIENT
Start: 2021-08-27 | End: 2021-09-02 | Stop reason: HOSPADM

## 2021-08-27 RX ORDER — KETOROLAC TROMETHAMINE 30 MG/ML
15 INJECTION, SOLUTION INTRAMUSCULAR; INTRAVENOUS ONCE
Status: COMPLETED | OUTPATIENT
Start: 2021-08-27 | End: 2021-08-27

## 2021-08-27 RX ADMIN — LORAZEPAM 1 MG: 2 INJECTION INTRAMUSCULAR; INTRAVENOUS at 21:18

## 2021-08-27 RX ADMIN — ONDANSETRON 4 MG: 2 INJECTION INTRAMUSCULAR; INTRAVENOUS at 17:28

## 2021-08-27 RX ADMIN — MIDAZOLAM 1 MG: 1 INJECTION INTRAMUSCULAR; INTRAVENOUS at 22:19

## 2021-08-27 RX ADMIN — SODIUM CHLORIDE 1000 ML: 9 INJECTION, SOLUTION INTRAVENOUS at 19:24

## 2021-08-27 RX ADMIN — MIDAZOLAM 1 MG: 1 INJECTION INTRAMUSCULAR; INTRAVENOUS at 21:26

## 2021-08-27 RX ADMIN — FENTANYL CITRATE 50 MCG: 50 INJECTION, SOLUTION INTRAMUSCULAR; INTRAVENOUS at 19:42

## 2021-08-27 RX ADMIN — KETOROLAC TROMETHAMINE 15 MG: 30 INJECTION, SOLUTION INTRAMUSCULAR at 17:52

## 2021-08-27 RX ADMIN — IOPAMIDOL 75 ML: 755 INJECTION, SOLUTION INTRAVENOUS at 18:35

## 2021-08-27 RX ADMIN — Medication 2 MCG/MIN: at 23:26

## 2021-08-27 RX ADMIN — SODIUM CHLORIDE 1000 ML: 9 INJECTION, SOLUTION INTRAVENOUS at 17:26

## 2021-08-27 RX ADMIN — SODIUM CHLORIDE: 9 INJECTION, SOLUTION INTRAVENOUS at 23:31

## 2021-08-27 RX ADMIN — IOPAMIDOL 220 ML: 612 INJECTION, SOLUTION INTRAVENOUS at 22:52

## 2021-08-27 ASSESSMENT — PAIN DESCRIPTION - LOCATION
LOCATION: ABDOMEN

## 2021-08-27 ASSESSMENT — PAIN SCALES - GENERAL
PAINLEVEL_OUTOF10: 6
PAINLEVEL_OUTOF10: 8
PAINLEVEL_OUTOF10: 10
PAINLEVEL_OUTOF10: 10
PAINLEVEL_OUTOF10: 8
PAINLEVEL_OUTOF10: 2

## 2021-08-27 ASSESSMENT — PAIN DESCRIPTION - ORIENTATION
ORIENTATION: LEFT

## 2021-08-27 ASSESSMENT — PAIN DESCRIPTION - PAIN TYPE
TYPE: ACUTE PAIN

## 2021-08-27 ASSESSMENT — PAIN DESCRIPTION - DESCRIPTORS: DESCRIPTORS: SHARP

## 2021-08-27 NOTE — ED NOTES
Pt in and out cathed for urine sample due to pt verbalizing that she doesn't know when she has to void     Tee Livingston RN  08/27/21 [id number]

## 2021-08-27 NOTE — ED PROVIDER NOTES
Primary Care Physician: Kellie Nelson MD   Attending Physician: Rikki Wallace MD     History   Chief Complaint   Patient presents with    Abdominal Pain        HPI   Yanelis Honeycutt is a 78 y.o. female with extensive medical history well-known to the medical staff here at [facility] with history of COPD, coronary artery disease, hyperlipidemia, hypertension, Parkinson's disease, alcohol abuse, tobacco use presenting complaining of abdominal pain started shortly before she presented. She denies any fevers chills nausea vomiting but admits that her pain is very severe. He denies any URI symptoms. No exposure to COVID-19. Patient stated that she has been unable to tolerate p.o. because of her symptoms. She also stated that she has been drinking alcohol.     Past Medical History:   Diagnosis Date    Anemia     Arthritis     Clostridium difficile colitis     December 2018    Clostridium difficile diarrhea 10/27/2019    Colostomy in place [facility])     SBO/diverticulosis     COPD (chronic obstructive pulmonary disease) (HCC)     Depression     DANIELLE (generalized anxiety disorder)     Gastric ulcer, unspecified as acute or chronic, without mention of hemorrhage, perforation, or obstruction     H/O ETOH abuse     Hiatal hernia     History of blood transfusion     Hyperlipidemia     Hypertension     Insomnia     [facility]testinal obstruction ([facility] [address]     Parkinson's disease ([facility] [address]     Tobacco abuse     Vitamin D deficiency         Past Surgical History:   Procedure Laterality Date    ABDOMEN SURGERY      CHOLECYSTECTOMY      COLONOSCOPY  12/14/2018    COLONOSCOPY N/A 12/14/2018    COLONOSCOPY POLYPECTOMY SNARE/COLD BIOPSY performed by Cristy Clark MD at [facility]  2018    ENDOSCOPY, COLON, DIAGNOSTIC      HYSTERECTOMY      IR EMBOLIZATION HEMORRHAGE  8/27/2021    IR EMBOLIZATION HEMORRHAGE 8/27/2021 [facility]    SMALL INTESTINE SURGERY N/A 4/10/2019    LAPAROSCOPIC LYSISI OF ADHESIONS FOR GREATER THAN 3 HOURS,LAPORSCOPIC CONVERTED TO OPEN COLOSTOMY REVERSAL, SMALL BOWEL RESECTION performed by Monique Owens MD at [facility] ENDOSCOPY  12/13/2018    with biopsies    UPPER GASTROINTESTINAL ENDOSCOPY N/A 12/13/2018    EGD BIOPSY performed by Eugene Rahman MD at [facility] History   Problem Relation Age of Onset    Cancer Mother     Diabetes Mother     Heart Disease Father         Social History     Socioeconomic History    Marital status:      Spouse name: None    Number of children: 3    Years of education: None    Highest education level: None   Occupational History    None   Tobacco Use    Smoking status: Current Every Day Smoker     Packs/day: 1.00     Years: 60.00     Pack years: 60.00     Types: Cigarettes     Start date: 1/10/1960    Smokeless tobacco: Never Used   Vaping Use    Vaping Use: Never used   Substance and Sexual Activity    Alcohol use: Yes     Alcohol/week: 4.0 standard drinks     Types: 4 Cans of beer per week     Comment: states drinks beer and whisky daily, unsure of amount    Drug use: No    Sexual activity: Not Currently   Other Topics Concern    None   Social History Narrative    Moved to the area from [place], [place] with family    Lives with ex- mother in law and friend     Social Determinants of Health     Financial Resource Strain:     Difficulty of Paying Living Expenses:    Food Insecurity:     Worried About [address] in the Last Year:     [address] in the Last Year:    Transportation Needs:     Lack of Transportation (Medical):      Lack of Transportation (Non-Medical):    Physical Activity:     Days of Exercise per Week:     Minutes of Exercise per Session:    Stress:     Feeling of Stress :    Social Connections:     Frequency of Communication with Friends and Family:     Frequency of Social Gatherings with Friends and Family:     Attends [employer] Services:     Active Member of Clubs or Organizations:     Attends Club or Organization Meetings:     Marital Status:    Intimate Partner Violence:     Fear of Current or Ex-Partner:     Emotionally Abused:     Physically Abused:     Sexually Abused:         Review of Systems   10 total systems reviewed and found to be negative unless otherwise noted in HPI     Physical Exam   BP (!) 130/53   Pulse 100   Temp 98.7 °F (37.1 °C) (Oral)   Resp 22   Ht 5' 2\" (1.575 m)   Wt 121 lb 14.6 oz (55.3 kg)   SpO2 95%   BMI 22.30 kg/m²      CONSTITUTIONAL: ill appearing, in acute distress   HEAD: atraumatic, normocephalic   EYES: PERRL, No injection, discharge or scleral icterus. ENT: Moist mucous membranes. NECK: Normal ROM, NO LAD   CARDIOVASCULAR: Regular rate and rhythm. No murmurs or gallop. PULMONARY/CHEST: Airway patent. No retractions. Breath sounds clear with good air entry bilaterally. ABDOMEN: Soft, Non-distended but tender, without guarding or rebound. SKIN: Acyanotic, warm, dry   MUSCULOSKELETAL: No swelling, tenderness or deformity   NEUROLOGICAL: Awake and oriented x 3. Pulses intact. Grossly nonfocal   Nursing note and vitals reviewed.      ED Course & Medical Decision Making   Medications   0.9 % sodium chloride infusion (has no administration in time range)   0.9 % sodium chloride infusion (has no administration in time range)   sodium chloride flush 0.9 % injection 5-40 mL ( IntraVENous Canceled Entry 8/28/21 0809)   sodium chloride flush 0.9 % injection 5-40 mL (has no administration in time range)   0.9 % sodium chloride infusion (has no administration in time range)   ondansetron (ZOFRAN-ODT) disintegrating tablet 4 mg ( Oral See Alternative 8/28/21 1009)     Or   ondansetron (ZOFRAN) injection 4 mg (4 mg IntraVENous Given 8/28/21 1009)   acetaminophen (TYLENOL) tablet 650 mg (has no administration in time range)     Or   acetaminophen (TYLENOL) suppository 650 mg (has no administration in time range)   0.9 % sodium chloride infusion ( IntraVENous New Bag 8/28/21 1727)   lidocaine PF 1 % injection 5 mL (5 mLs Intradermal Not Given 8/28/21 0716)   sodium chloride flush 0.9 % injection 5-40 mL (10 mLs IntraVENous Given 8/28/21 0813)   sodium chloride flush 0.9 % injection 5-40 mL (has no administration in time range)   0.9 % sodium chloride infusion (has no administration in time range)   0.9 % sodium chloride infusion ( IntraVENous New Bag 8/28/21 0402)   0.9 % sodium chloride infusion ( IntraVENous New Bag 8/28/21 0724)   0.9 % sodium chloride bolus (500 mLs IntraVENous Bolus from Bag 8/28/21 0458)   DULoxetine (CYMBALTA) extended release capsule 30 mg (30 mg Oral Given 8/28/21 1230)   oxyCODONE-acetaminophen (PERCOCET) 5-325 MG per tablet 1 tablet (1 tablet Oral Given 8/28/21 1340)   nicotine (NICODERM CQ) 14 MG/24HR 1 patch (1 patch Transdermal Patch Applied 8/28/21 1340)   chlordiazePOXIDE (LIBRIUM) capsule 25 mg (25 mg Oral Given 8/28/21 1718)   0.9 % sodium chloride bolus (0 mLs IntraVENous Stopped 8/27/21 1831)   ondansetron (ZOFRAN) injection 4 mg (4 mg IntraVENous Given 8/27/21 1728)   ketorolac (TORADOL) injection 15 mg (15 mg IntraVENous Given 8/27/21 1752)   iopamidol (ISOVUE-370) 76 % injection 75 mL (75 mLs IntraVENous Given 8/27/21 1835)   0.9 % sodium chloride bolus (0 mLs IntraVENous Stopped 8/27/21 2042)   fentaNYL (SUBLIMAZE) injection 50 mcg (50 mcg IntraVENous Given 8/27/21 1942)   LORazepam (ATIVAN) injection (1 mg IntraVENous Given 8/27/21 2118)   midazolam (VERSED) injection (1 mg IntraVENous Given 8/27/21 2219)   iopamidol (ISOVUE-300) 61 % injection 250 mL (220 mLs IntraVENous Given 8/27/21 2252)   magnesium sulfate 4000 mg in 100 mL IVPB premix ( IntraVENous Stopped 8/28/21 0639)   potassium chloride 10 mEq/100 mL IVPB (Peripheral Line) ( IntraVENous Rate/Dose Verify 8/28/21 0702)      Labs Reviewed   CBC WITH AUTO DIFFERENTIAL - Abnormal; Notable for the following components:       Result Value    WBC 12.3 (*)     RBC 3.70 (*)     MCH 34.4 (*)     RDW 20.2 (*)     Neutrophils Absolute 9.8 (*)     All other components within normal limits    Narrative:     Performed at:  [address] [employer]   Phone [phone]   COMPREHENSIVE METABOLIC PANEL W/ REFLEX TO MG FOR LOW K - Abnormal; Notable for the following components:    Sodium 132 (*)     Potassium reflex Magnesium 3.2 (*)     Chloride 89 (*)     Glucose 173 (*)     CREATININE <0.5 (*)     Albumin 3.1 (*)     Albumin/Globulin Ratio 0.9 (*)     Alkaline Phosphatase 156 (*)     All other components within normal limits    Narrative:     Performed at:  [address] [employer]   Phone [phone]   URINALYSIS - Abnormal; Notable for the following components:    Bilirubin Urine MODERATE (*)     Ketones, Urine TRACE (*)     Urobilinogen, Urine 4.0 (*)     All other components within normal limits    Narrative:     Performed at:  [address] [facility]   Phone [phone]   LIPASE - Abnormal; Notable for the following components:    Lipase 188.0 (*)     All other components within normal limits    Narrative:     Performed at:  [address] [employer]   Phone [phone]   MAGNESIUM - Abnormal; Notable for the following components:    Magnesium 1.50 (*)     All other components within normal limits    Narrative:     Performed at:  [address] [employer]   Phone [phone]   BASIC METABOLIC PANEL - Abnormal; Notable for the following components:    Sodium 133 (*)     Potassium 3.4 (*)     CO2 20 (*)     Glucose 196 (*)     CREATININE <0.5 (*)     Calcium 7.1 (*) All other components within normal limits    Narrative:     Performed at:  [address] [employer] [facility] [address]   Phone [phone]   CBC - Abnormal; Notable for the following components:    WBC 14.2 (*)     RBC 3.52 (*)     Hemoglobin 11.8 (*)     Hematocrit 34.1 (*)     RDW 19.1 (*)     All other components within normal limits    Narrative:     Performed at:  [address] [facility] [address]   Phone [phone]   MAGNESIUM - Abnormal; Notable for the following components:    Magnesium 1.50 (*)     All other components within normal limits    Narrative:     Performed at:  [address] [facility] [address]   Phone [phone]   CBC WITH AUTO DIFFERENTIAL - Abnormal; Notable for the following components:    WBC 14.2 (*)     RBC 3.30 (*)     Hemoglobin 10.8 (*)     Hematocrit 32.8 (*)     RDW 19.8 (*)     Neutrophils Absolute 11.1 (*)     All other components within normal limits    Narrative:     Performed at:  [address] [employer] [address]   Phone [phone]   BASIC METABOLIC PANEL - Abnormal; Notable for the following components:    Sodium 130 (*)     Potassium 6.4 (*)     Glucose 148 (*)     CREATININE <0.5 (*)     Calcium 7.4 (*)     All other components within normal limits    Narrative:     [employer]  [id number] tel. [phone],  Chemistry results called to and read back by JOANA Iglesias, 08/28/2021 09:00,  by Jayla Alvarez  Performed at:  [address] [employer] [facility] [address]   Phone [phone]   MAGNESIUM - Abnormal; Notable for the following components:    Magnesium 4.00 (*)     All other components within normal limits    Narrative:     Lou Streeter tel. [phone],  Chemistry results called to and read back by JOANA Iglesias, 08/28/2021 09:00,  by Rustam Caro  Performed at:  [address] [address]   Phone [phone]   COMPREHENSIVE METABOLIC PANEL - Abnormal; Notable for the following components:    Sodium 132 (*)     Glucose 133 (*)     CREATININE <0.5 (*)     Calcium 7.4 (*)     Total Protein 5.0 (*)     Albumin 2.3 (*)     Albumin/Globulin Ratio 0.9 (*)     All other components within normal limits    Narrative:     Collection has been rescheduled by Brigida Camarillo at 08/28/2021 13:21 Reason:   Failed attempt at venipuncture Patient refuse venipuncture  Performed at:  [address] [address]   Phone [phone]   LIPASE - Abnormal; Notable for the following components:    Lipase 89.0 (*)     All other components within normal limits    Narrative:     Collection has been rescheduled by Brigida Camarillo at 08/28/2021 13:21 Reason:   Failed attempt at venipuncture Patient refuse venipuncture  Performed at:  [address] [address]   Phone [phone], RAPID    Narrative:     Performed at:  [address] [address]   Phone [phone]   PROTIME-INR    Narrative:     Performed at:  [facility]  [address] [place] [address]   Phone [phone]   APTT    Narrative:     Performed at:  [facility]  [address] [place] [address]   Phone [phone]    Narrative:     [place]  SKK2W tel. [phone],  Rejected Test Name Adolfo Pino /Called to: Carole Laguerre RN, 08/28/2021 11:35, by  [facility]  Performed at:  [address] [address]   Phone [phone]   CBC WITH AUTO DIFFERENTIAL   BASIC METABOLIC PANEL   MAGNESIUM   TYPE AND SCREEN    Narrative:     Performed at:  [facility]  [address] [facility] [place]   Phone [phone]   PREPARE RBC (CROSSMATCH)      IR EMBOLIZATION HEMORRHAGE   Final Result   SUCCESSFUL PROXIMAL SPLENIC ARTERY EMBOLIZATION. CT ABDOMEN PELVIS W IV CONTRAST Additional Contrast? None   Final Result   1. Large perisplenic hematoma. Findings concerning for active bleeding in   the upper pole of the spleen extending below the left hemidiaphragm. There   is moderate intraperitoneal blood extending into the lesser sac and   surrounding the stomach. 2. No other acute findings within the abdomen or pelvis. Resolution of   pancreatitis noted on the previous study. 3. Hepatic steatosis. 4. Previous small bowel and colonic resection. No acute bowel pathology. 5. Small left pleural effusion with dependent lower lobe atelectasis. Findings were discussed with Maria M Anderson at 6:57 pm on 8/27/2021. No results found. EKG INTERPRETATION:  EKG by my preliminary interpretation shows sinus tach with a rate of 130 normal axis, normal intervals, with no ST changes indicative of ischemia at this time. PROCEDURES:   Procedures    ASSESSMENT AND PLAN:  Monet Bustos is a 78 y.o. female with extensive medical history well-known to the medical staff here at [facility] with history of COPD, coronary artery disease, hyperlipidemia, hypertension, Parkinson's disease, alcohol abuse, tobacco use presenting complaining of abdominal pain started shortly before she presented. On exam she is hemodynamic stable but tender to palpation abdomen and nontoxic. Labs obtained with no significant findings including normal hemoglobin. CT of the abdomen and pelvis was obtained and concerning for hematoma around the spleen secondary to splenic laceration.   Because of her splenic laceration at this time is unknown most likely secondary to her chronic pancreatitis. Patient has been given IV fluids pain management surgery has been consulted and recommended no intervention given her comorbidities at this time. They recommending the IR should do embolization. IR has been consulted pending recommendation. Surgery is also recommending that medicine admit given her multiple comorbidities. Hospitalist has been consulted pending admission. ClINICAL IMPRESSION:  1. Spleen hematoma, initial encounter    2. Blood loss anemia        DISPOSITION Admitted 08/27/2021 08:39:22 PM   -Findings and recommendations explained to patient. She expressed understanding and agreed with the plan. Sean Calix MD (electronically signed)  8/28/2021  _________________________________________________________________________________________  Amount and/or Complexity of Data Reviewed:  Clinical lab tests: ordered and reviewed   Tests in the radiology section of CPT®: ordered and reviewed   Tests in the medicine section of CPT®: ordered and reviewed   Decide to obtain previous medical records or to obtain history from someone other than the patient  Obtain history from someone other than the patient no  Review and summarize past medical records:yes  I looked up the patient in our electronic medical record:yes  Discuss the patient with other providers:yes  Independent visualization of images, tracings, or specimens:yes  Risk of Complications, Morbidity, and/or Mortality:Moderate  Presenting problems: moderate Diagnostic procedures: moderate yes Management options: moderate     Critical Care Attestation   Total critical care time: 35 minutes minimum   Critical care time does not include separately billable procedures and treating other patients. Critical care was necessary to treat or prevent imminent or life-threatening deterioration of the following conditions: Blood clot anemia from ruptured spleen. Patient transfused and taken to IR for embolization. . Case discussed with consultants.       _________________________________________________________________________________________  This record is transcribed utilizing voice recognition technology. There are inherent limitations in this technology. In addition, there may be limitations in editing of this report. If there are any discrepancies, please contact me directly.         Tisha Morgan MD  08/28/21 3345

## 2021-08-27 NOTE — ED TRIAGE NOTES
Pt here with left sided abd pain starting 3 days ago.  Pt denies any nausea or vomiting, pts last bm yesterday

## 2021-08-28 PROBLEM — J44.9 COPD (CHRONIC OBSTRUCTIVE PULMONARY DISEASE) (HCC): Status: ACTIVE | Noted: 2021-08-28

## 2021-08-28 LAB
A/G RATIO: 0.9 (ref 1.1–2.2)
ALBUMIN SERPL-MCNC: 2.3 G/DL (ref 3.4–5)
ALP BLD-CCNC: 102 U/L (ref 40–129)
ALT SERPL-CCNC: 12 U/L (ref 10–40)
ANION GAP SERPL CALCULATED.3IONS-SCNC: 10 MMOL/L (ref 3–16)
ANION GAP SERPL CALCULATED.3IONS-SCNC: 14 MMOL/L (ref 3–16)
ANION GAP SERPL CALCULATED.3IONS-SCNC: 9 MMOL/L (ref 3–16)
AST SERPL-CCNC: 35 U/L (ref 15–37)
BASOPHILS ABSOLUTE: 0.1 K/UL (ref 0–0.2)
BASOPHILS RELATIVE PERCENT: 0.6 %
BILIRUB SERPL-MCNC: 0.8 MG/DL (ref 0–1)
BLOOD BANK DISPENSE STATUS: NORMAL
BLOOD BANK DISPENSE STATUS: NORMAL
BLOOD BANK PRODUCT CODE: NORMAL
BLOOD BANK PRODUCT CODE: NORMAL
BPU ID: NORMAL
BPU ID: NORMAL
BUN BLDV-MCNC: 8 MG/DL (ref 7–20)
BUN BLDV-MCNC: 8 MG/DL (ref 7–20)
BUN BLDV-MCNC: 9 MG/DL (ref 7–20)
CALCIUM SERPL-MCNC: 7.1 MG/DL (ref 8.3–10.6)
CALCIUM SERPL-MCNC: 7.4 MG/DL (ref 8.3–10.6)
CALCIUM SERPL-MCNC: 7.4 MG/DL (ref 8.3–10.6)
CHLORIDE BLD-SCNC: 101 MMOL/L (ref 99–110)
CHLORIDE BLD-SCNC: 99 MMOL/L (ref 99–110)
CHLORIDE BLD-SCNC: 99 MMOL/L (ref 99–110)
CO2: 20 MMOL/L (ref 21–32)
CO2: 21 MMOL/L (ref 21–32)
CO2: 22 MMOL/L (ref 21–32)
CREAT SERPL-MCNC: <0.5 MG/DL (ref 0.6–1.2)
DESCRIPTION BLOOD BANK: NORMAL
DESCRIPTION BLOOD BANK: NORMAL
EKG ATRIAL RATE: 133 BPM
EKG DIAGNOSIS: NORMAL
EKG P AXIS: 70 DEGREES
EKG P-R INTERVAL: 206 MS
EKG Q-T INTERVAL: 336 MS
EKG QRS DURATION: 70 MS
EKG QTC CALCULATION (BAZETT): 500 MS
EKG R AXIS: 53 DEGREES
EKG T AXIS: 245 DEGREES
EKG VENTRICULAR RATE: 133 BPM
EOSINOPHILS ABSOLUTE: 0 K/UL (ref 0–0.6)
EOSINOPHILS RELATIVE PERCENT: 0 %
GFR AFRICAN AMERICAN: >60
GFR NON-AFRICAN AMERICAN: >60
GLOBULIN: 2.7 G/DL
GLUCOSE BLD-MCNC: 133 MG/DL (ref 70–99)
GLUCOSE BLD-MCNC: 148 MG/DL (ref 70–99)
GLUCOSE BLD-MCNC: 196 MG/DL (ref 70–99)
HCT VFR BLD CALC: 32.8 % (ref 36–48)
HCT VFR BLD CALC: 34.1 % (ref 36–48)
HEMOGLOBIN: 10.8 G/DL (ref 12–16)
HEMOGLOBIN: 11.8 G/DL (ref 12–16)
LIPASE: 89 U/L (ref 13–60)
LYMPHOCYTES ABSOLUTE: 1.8 K/UL (ref 1–5.1)
LYMPHOCYTES RELATIVE PERCENT: 12.4 %
MAGNESIUM: 1.5 MG/DL (ref 1.8–2.4)
MAGNESIUM: 4 MG/DL (ref 1.8–2.4)
MCH RBC QN AUTO: 32.7 PG (ref 26–34)
MCH RBC QN AUTO: 33.4 PG (ref 26–34)
MCHC RBC AUTO-ENTMCNC: 32.9 G/DL (ref 31–36)
MCHC RBC AUTO-ENTMCNC: 34.4 G/DL (ref 31–36)
MCV RBC AUTO: 97 FL (ref 80–100)
MCV RBC AUTO: 99.3 FL (ref 80–100)
MONOCYTES ABSOLUTE: 1.2 K/UL (ref 0–1.3)
MONOCYTES RELATIVE PERCENT: 8.3 %
NEUTROPHILS ABSOLUTE: 11.1 K/UL (ref 1.7–7.7)
NEUTROPHILS RELATIVE PERCENT: 78.7 %
PDW BLD-RTO: 19.1 % (ref 12.4–15.4)
PDW BLD-RTO: 19.8 % (ref 12.4–15.4)
PLATELET # BLD: 306 K/UL (ref 135–450)
PLATELET # BLD: 316 K/UL (ref 135–450)
PMV BLD AUTO: 7.6 FL (ref 5–10.5)
PMV BLD AUTO: 7.8 FL (ref 5–10.5)
POTASSIUM SERPL-SCNC: 3.4 MMOL/L (ref 3.5–5.1)
POTASSIUM SERPL-SCNC: 4.2 MMOL/L (ref 3.5–5.1)
POTASSIUM SERPL-SCNC: 6.4 MMOL/L (ref 3.5–5.1)
RBC # BLD: 3.3 M/UL (ref 4–5.2)
RBC # BLD: 3.52 M/UL (ref 4–5.2)
REASON FOR REJECTION: NORMAL
REJECTED TEST: NORMAL
SODIUM BLD-SCNC: 130 MMOL/L (ref 136–145)
SODIUM BLD-SCNC: 132 MMOL/L (ref 136–145)
SODIUM BLD-SCNC: 133 MMOL/L (ref 136–145)
TOTAL PROTEIN: 5 G/DL (ref 6.4–8.2)
WBC # BLD: 14.2 K/UL (ref 4–11)
WBC # BLD: 14.2 K/UL (ref 4–11)

## 2021-08-28 PROCEDURE — 51702 INSERT TEMP BLADDER CATH: CPT

## 2021-08-28 PROCEDURE — 2580000003 HC RX 258: Performed by: STUDENT IN AN ORGANIZED HEALTH CARE EDUCATION/TRAINING PROGRAM

## 2021-08-28 PROCEDURE — 83735 ASSAY OF MAGNESIUM: CPT

## 2021-08-28 PROCEDURE — 83690 ASSAY OF LIPASE: CPT

## 2021-08-28 PROCEDURE — 2060000000 HC ICU INTERMEDIATE R&B

## 2021-08-28 PROCEDURE — 6370000000 HC RX 637 (ALT 250 FOR IP): Performed by: INTERNAL MEDICINE

## 2021-08-28 PROCEDURE — 85027 COMPLETE CBC AUTOMATED: CPT

## 2021-08-28 PROCEDURE — 85025 COMPLETE CBC W/AUTO DIFF WBC: CPT

## 2021-08-28 PROCEDURE — 93010 ELECTROCARDIOGRAM REPORT: CPT | Performed by: INTERNAL MEDICINE

## 2021-08-28 PROCEDURE — 93005 ELECTROCARDIOGRAM TRACING: CPT | Performed by: STUDENT IN AN ORGANIZED HEALTH CARE EDUCATION/TRAINING PROGRAM

## 2021-08-28 PROCEDURE — 99222 1ST HOSP IP/OBS MODERATE 55: CPT | Performed by: SURGERY

## 2021-08-28 PROCEDURE — APPNB180 APP NON BILLABLE TIME > 60 MINS: Performed by: PHYSICIAN ASSISTANT

## 2021-08-28 PROCEDURE — 94761 N-INVAS EAR/PLS OXIMETRY MLT: CPT

## 2021-08-28 PROCEDURE — 36415 COLL VENOUS BLD VENIPUNCTURE: CPT

## 2021-08-28 PROCEDURE — 6360000002 HC RX W HCPCS: Performed by: STUDENT IN AN ORGANIZED HEALTH CARE EDUCATION/TRAINING PROGRAM

## 2021-08-28 PROCEDURE — APPSS180 APP SPLIT SHARED TIME > 60 MINUTES: Performed by: PHYSICIAN ASSISTANT

## 2021-08-28 PROCEDURE — 80053 COMPREHEN METABOLIC PANEL: CPT

## 2021-08-28 RX ORDER — NICOTINE 21 MG/24HR
1 PATCH, TRANSDERMAL 24 HOURS TRANSDERMAL DAILY
Status: DISCONTINUED | OUTPATIENT
Start: 2021-08-28 | End: 2021-09-02 | Stop reason: HOSPADM

## 2021-08-28 RX ORDER — OXYCODONE HYDROCHLORIDE AND ACETAMINOPHEN 5; 325 MG/1; MG/1
1 TABLET ORAL EVERY 6 HOURS PRN
Status: DISCONTINUED | OUTPATIENT
Start: 2021-08-28 | End: 2021-09-01

## 2021-08-28 RX ORDER — SODIUM CHLORIDE 9 MG/ML
INJECTION, SOLUTION INTRAVENOUS CONTINUOUS
Status: DISCONTINUED | OUTPATIENT
Start: 2021-08-28 | End: 2021-09-02 | Stop reason: HOSPADM

## 2021-08-28 RX ORDER — POTASSIUM CHLORIDE 7.45 MG/ML
10 INJECTION INTRAVENOUS
Status: COMPLETED | OUTPATIENT
Start: 2021-08-28 | End: 2021-08-28

## 2021-08-28 RX ORDER — CHLORDIAZEPOXIDE HYDROCHLORIDE 25 MG/1
25 CAPSULE, GELATIN COATED ORAL 4 TIMES DAILY
Status: DISCONTINUED | OUTPATIENT
Start: 2021-08-28 | End: 2021-08-31

## 2021-08-28 RX ORDER — 0.9 % SODIUM CHLORIDE 0.9 %
500 INTRAVENOUS SOLUTION INTRAVENOUS ONCE
Status: DISCONTINUED | OUTPATIENT
Start: 2021-08-28 | End: 2021-09-02 | Stop reason: HOSPADM

## 2021-08-28 RX ORDER — DULOXETIN HYDROCHLORIDE 30 MG/1
30 CAPSULE, DELAYED RELEASE ORAL DAILY
Status: DISCONTINUED | OUTPATIENT
Start: 2021-08-28 | End: 2021-09-02 | Stop reason: HOSPADM

## 2021-08-28 RX ORDER — MAGNESIUM SULFATE IN WATER 40 MG/ML
4000 INJECTION, SOLUTION INTRAVENOUS ONCE
Status: COMPLETED | OUTPATIENT
Start: 2021-08-28 | End: 2021-08-28

## 2021-08-28 RX ADMIN — POTASSIUM CHLORIDE 10 MEQ: 7.46 INJECTION, SOLUTION INTRAVENOUS at 02:24

## 2021-08-28 RX ADMIN — POTASSIUM CHLORIDE 10 MEQ: 7.46 INJECTION, SOLUTION INTRAVENOUS at 04:03

## 2021-08-28 RX ADMIN — MAGNESIUM SULFATE HEPTAHYDRATE 4000 MG: 40 INJECTION, SOLUTION INTRAVENOUS at 02:39

## 2021-08-28 RX ADMIN — OXYCODONE HYDROCHLORIDE AND ACETAMINOPHEN 1 TABLET: 5; 325 TABLET ORAL at 13:40

## 2021-08-28 RX ADMIN — CHLORDIAZEPOXIDE HYDROCHLORIDE 25 MG: 25 CAPSULE ORAL at 17:18

## 2021-08-28 RX ADMIN — SODIUM CHLORIDE: 9 INJECTION, SOLUTION INTRAVENOUS at 20:36

## 2021-08-28 RX ADMIN — SODIUM CHLORIDE, PRESERVATIVE FREE 10 ML: 5 INJECTION INTRAVENOUS at 20:52

## 2021-08-28 RX ADMIN — SODIUM CHLORIDE: 9 INJECTION, SOLUTION INTRAVENOUS at 04:02

## 2021-08-28 RX ADMIN — CHLORDIAZEPOXIDE HYDROCHLORIDE 25 MG: 25 CAPSULE ORAL at 20:52

## 2021-08-28 RX ADMIN — POTASSIUM CHLORIDE 10 MEQ: 7.46 INJECTION, SOLUTION INTRAVENOUS at 05:09

## 2021-08-28 RX ADMIN — SODIUM CHLORIDE, PRESERVATIVE FREE 10 ML: 5 INJECTION INTRAVENOUS at 04:03

## 2021-08-28 RX ADMIN — SODIUM CHLORIDE: 9 INJECTION, SOLUTION INTRAVENOUS at 07:02

## 2021-08-28 RX ADMIN — SODIUM CHLORIDE, PRESERVATIVE FREE 10 ML: 5 INJECTION INTRAVENOUS at 08:13

## 2021-08-28 RX ADMIN — SODIUM CHLORIDE: 9 INJECTION, SOLUTION INTRAVENOUS at 17:27

## 2021-08-28 RX ADMIN — Medication 500 ML: at 04:58

## 2021-08-28 RX ADMIN — SODIUM CHLORIDE: 9 INJECTION, SOLUTION INTRAVENOUS at 02:37

## 2021-08-28 RX ADMIN — SODIUM CHLORIDE: 9 INJECTION, SOLUTION INTRAVENOUS at 07:24

## 2021-08-28 RX ADMIN — POTASSIUM CHLORIDE 10 MEQ: 7.46 INJECTION, SOLUTION INTRAVENOUS at 06:31

## 2021-08-28 RX ADMIN — OXYCODONE HYDROCHLORIDE AND ACETAMINOPHEN 1 TABLET: 5; 325 TABLET ORAL at 19:33

## 2021-08-28 RX ADMIN — SODIUM CHLORIDE, PRESERVATIVE FREE 10 ML: 5 INJECTION INTRAVENOUS at 20:34

## 2021-08-28 RX ADMIN — DULOXETINE HYDROCHLORIDE 30 MG: 30 CAPSULE, DELAYED RELEASE ORAL at 12:30

## 2021-08-28 RX ADMIN — ONDANSETRON 4 MG: 2 INJECTION INTRAMUSCULAR; INTRAVENOUS at 10:09

## 2021-08-28 ASSESSMENT — PAIN DESCRIPTION - DESCRIPTORS
DESCRIPTORS: SHARP
DESCRIPTORS: SHARP

## 2021-08-28 ASSESSMENT — PAIN DESCRIPTION - FREQUENCY
FREQUENCY: CONTINUOUS
FREQUENCY: CONTINUOUS

## 2021-08-28 ASSESSMENT — PAIN DESCRIPTION - LOCATION
LOCATION: ABDOMEN
LOCATION: ABDOMEN

## 2021-08-28 ASSESSMENT — PAIN DESCRIPTION - ONSET
ONSET: ON-GOING
ONSET: ON-GOING

## 2021-08-28 ASSESSMENT — PAIN DESCRIPTION - ORIENTATION
ORIENTATION: LEFT
ORIENTATION: LEFT

## 2021-08-28 ASSESSMENT — PAIN DESCRIPTION - PAIN TYPE
TYPE: ACUTE PAIN
TYPE: ACUTE PAIN

## 2021-08-28 ASSESSMENT — PAIN SCALES - GENERAL
PAINLEVEL_OUTOF10: 0
PAINLEVEL_OUTOF10: 8
PAINLEVEL_OUTOF10: 8

## 2021-08-28 ASSESSMENT — PAIN DESCRIPTION - PROGRESSION
CLINICAL_PROGRESSION: NOT CHANGED
CLINICAL_PROGRESSION: NOT CHANGED

## 2021-08-28 NOTE — PROGRESS NOTES
Hospitalist Progress Note      PCP: Elise Fleming MD    Date of Admission: 8/27/2021    Chief Complaint: Abdominal pain    Hospital Course: Patient is a 74-year-old female with a past medical history of COPD, hyperlipidemia, hypertension, atrial fibrillation with previous bowel obstructions who presented to the hospital with acute onset sharp central abdominal pain. In the emergency department she was noted to have large perisplenic hematoma concerning for active bleeding in the upper pole of the spleen extending below the left diaphragm. IR was then consulted and underwent successful proximal splenic artery embolization. Subjective: Patient seen and examined. Patient states her abdominal pain is much better today than it was yesterday. Patient's hemoglobin remains stable. Potassium markedly elevated at 6.4. Magnesium 4 as well.   We will redraw blood as appears abnormal.  Patient has been off pressors for a few hours, can transfer out of the ICU      Medications:  Reviewed    Infusion Medications    sodium chloride 5 mL/hr at 08/28/21 0402    sodium chloride 5 mL/hr at 08/28/21 0724    sodium chloride      sodium chloride      sodium chloride      sodium chloride 100 mL/hr at 08/28/21 0702    sodium chloride      norepinephrine Stopped (08/28/21 0002)     Scheduled Medications    sodium chloride  500 mL IntraVENous Once    sodium chloride flush  5-40 mL IntraVENous 2 times per day    lidocaine 1 % injection  5 mL Intradermal Once    sodium chloride flush  5-40 mL IntraVENous 2 times per day     PRN Meds: sodium chloride, sodium chloride, sodium chloride flush, sodium chloride, ondansetron **OR** ondansetron, acetaminophen **OR** acetaminophen, sodium chloride flush, sodium chloride      Intake/Output Summary (Last 24 hours) at 8/28/2021 0741  Last data filed at 8/28/2021 8508  Gross per 24 hour   Intake 3423.74 ml   Output 390 ml   Net 3033.74 ml       Physical Exam Performed:    BP 123/63   Pulse 114   Temp 97.5 °F (36.4 °C) (Axillary)   Resp 23   Ht 5' 2\" (1.575 m)   Wt 121 lb 14.6 oz (55.3 kg)   SpO2 93%   BMI 22.30 kg/m²     General appearance: No apparent distress, appears stated age and cooperative. HEENT: Pupils equal, round, and reactive to light. Conjunctivae/corneas clear. Neck: Supple, with full range of motion. No jugular venous distention. Trachea midline. Respiratory:  Normal respiratory effort. Clear to auscultation  Cardiovascular: Regular rate and rhythm with normal S1/S2   Abdomen: Soft, non-tender, non-distended with normal bowel sounds. Musculoskeletal: No clubbing, cyanosis or edema bilaterally. Skin: Skin color, texture, turgor normal.  No rashes or lesions. Neurologic:  Neurovascularly intact without any focal sensory/motor deficits. Cranial nerves: II-XII intact, grossly non-focal.  Psychiatric: Alert and oriented, thought content appropriate, normal insight  Capillary Refill: Brisk,< 3 seconds   Peripheral Pulses: +2 palpable, equal bilaterally       Labs:   Recent Labs     08/27/21  1646 08/28/21  0102   WBC 12.3* 14.2*   HGB 12.7 11.8*   HCT 36.6 34.1*    306     Recent Labs     08/27/21  1646 08/28/21  0102   * 133*   K 3.2* 3.4*   CL 89* 99   CO2 29 20*   BUN 8 8   CREATININE <0.5* <0.5*   CALCIUM 8.6 7.1*     Recent Labs     08/27/21  1646   AST 33   ALT 13   BILITOT 0.8   ALKPHOS 156*     Recent Labs     08/27/21  1919   INR 1.11     No results for input(s): Angie Alvares in the last 72 hours. Urinalysis:      Lab Results   Component Value Date    NITRU Negative 08/27/2021    WBCUA >900 12/19/2019    BACTERIA 2+ 12/19/2019    RBCUA 20-50 12/19/2019    BLOODU Negative 08/27/2021    SPECGRAV 1.012 08/27/2021    GLUCOSEU Negative 08/27/2021       Radiology:  IR EMBOLIZATION HEMORRHAGE   Final Result   SUCCESSFUL PROXIMAL SPLENIC ARTERY EMBOLIZATION.          CT ABDOMEN PELVIS W IV CONTRAST Additional Contrast? None   Preliminary Result   1. Large perisplenic hematoma. Findings concerning for active bleeding in   the upper pole of the spleen extending below the left hemidiaphragm. There   is moderate intraperitoneal blood extending into the lesser sac and   surrounding the stomach. 2. No other acute findings within the abdomen or pelvis. Resolution of   pancreatitis noted on the previous study. 3. Hepatic steatosis. 4. Previous small bowel and colonic resection. No acute bowel pathology. 5. Small left pleural effusion with dependent lower lobe atelectasis. Findings were discussed with Maria M Anderson at 6:57 pm on 8/27/2021.                  Assessment/Plan:    Splenic hematoma  Status post IR embolization of splenic artery  Continue to monitor  General surgery consulted    Acute blood loss anemia  Secondary to splenic hematoma  Continue to monitor hemoglobin    Hypokalemia  Replete and recheck    Hypomagnesemia  Replete and recheck        DVT Prophylaxis: SCD  Diet: Diet NPO Exceptions are: Ice Chips  Code Status: Full Code    PT/OT Eval Status: Not applicable    Dispo -inpatient, transfer to U    Venita Mendoza MD

## 2021-08-28 NOTE — BRIEF OP NOTE
Brief Postoperative Note    Faraz An  YOB: 1945  [id number]    Pre-operative Diagnosis: splenic arterial bleeding    Post-operative Diagnosis: Same    Procedure: splenic angiogram and embolization    Anesthesia: Moderate Sedation    Surgeons/Assistants: Elana Hoyos MD    Estimated Blood Loss: less than 5    Complications: None    Specimens: Was Not Obtained    Findings: R CFA access. Successful proximal splenic embolization as no active extravasation of contrast seen on angiograms. R CFA closure with manual compression.     Electronically signed by Elana Hoyos MD on 8/27/2021 at 10:49 PM

## 2021-08-28 NOTE — PRE SEDATION
Sedation Pre-Procedure Note    Patient Name: Faraz An   YOB: 1945  Room/Bed: ANGELA/NONE  Medical Record Number: [id number]  Date: 8/27/2021   Time: 9:09 PM       Indication:  Pt is a 81y/o F with active splenic arterial bleed here for embolization. Consent: I have discussed with the patient and/or the patient representative the indication, alternatives, and the possible risks and/or complications of the planned procedure and the anesthesia methods. The patient and/or patient representative appear to understand and agree to proceed. Vital Signs:   Vitals:    08/27/21 2053   BP: 129/84   Pulse: 113   Resp: 27   Temp: 98.6 °F (37 °C)   SpO2: 96%       Past Medical History:   has a past medical history of Anemia, Arthritis, Clostridium difficile colitis, Clostridium difficile diarrhea, Colostomy in place [facility]), COPD (chronic obstructive pulmonary disease) ([facility] [address] Depression, DANIELLE (generalized anxiety disorder), Gastric ulcer, unspecified as acute or chronic, without mention of hemorrhage, perforation, or obstruction, H/O ETOH abuse, Hiatal hernia, History of blood transfusion, Hyperlipidemia, Hypertension, Insomnia, Intestinal obstruction ([address] Parkinson's disease ([facility] [address] Tobacco abuse, and Vitamin D deficiency. Past Surgical History:   has a past surgical history that includes Hysterectomy; Cholecystectomy; Tubal ligation; colostomy (2018); Upper gastrointestinal endoscopy (12/13/2018); Upper gastrointestinal endoscopy (N/A, 12/13/2018); Colonoscopy (12/14/2018); Colonoscopy (N/A, 12/14/2018); Small intestine surgery (N/A, 4/10/2019); Endoscopy, colon, diagnostic; and Abdomen surgery. Medications:   Scheduled Meds:   Continuous Infusions:    sodium chloride      sodium chloride       PRN Meds: sodium chloride, sodium chloride  Home Meds:   Prior to Admission medications    Medication Sig Start Date End Date Taking?  Authorizing Provider   ANORO ELLIPTA 62.5-25 MCG/INH AEPB inhaler Inhale 1 puff into the lungs daily 7/25/21  Yes Historical Provider, MD   DULoxetine (CYMBALTA) 30 MG extended release capsule Take 1 capsule by mouth daily 12/19/19  Yes Kirti Norwood   Multiple Vitamin (MULTIVITAMIN) TABS tablet Take 1 tablet by mouth daily 6/24/21   Monalisa Zavala MD   acetaminophen (TYLENOL) 500 MG tablet Take 1 tablet by mouth 4 times daily as needed for Pain 6/7/21   XIOMARA Norwood   ondansetron (ZOFRAN ODT) 4 MG disintegrating tablet Take 1-2 tablets by mouth every 12 hours as needed for Nausea May Sub regular tablet (non-ODT) if insurance does not cover ODT. 4/17/21   ABIGAIL Jaramillo CNP   ferrous sulfate 325 (65 Fe) MG tablet Take 1 tablet by mouth daily (with breakfast) 10/29/19   Monalisa Zavala MD   albuterol sulfate HFA (PROAIR HFA) 108 (90 Base) MCG/ACT inhaler Inhale 2 puffs into the lungs every 6 hours as needed for Wheezing 7/1/19   ABIGAIL Chappell CNP   Umeclidinium Bromide 62.5 MCG/INH AEPB Inhale 1 puff into the lungs daily 6/28/19   ABIGAIL Chappell CNP   atorvastatin (LIPITOR) 20 MG tablet TAKE 1 TABLET BY MOUTH EVERY DAY 6/17/19   ABIGAIL Chappell CNP   nicotine (NICODERM CQ) 21 MG/24HR Place 1 patch onto the skin every 24 hours 5/23/19   ABIGAIL Chappell CNP   metoprolol succinate (TOPROL XL) 25 MG extended release tablet Take 1 tablet by mouth daily 5/17/19   Cristin Yanez DO   vitamin B-1 100 MG tablet Take 1 tablet by mouth daily 12/17/18   Bashir Lam MD     Coumadin Use Last 7 Days:  no  Antiplatelet drug therapy use last 7 days: no  Other anticoagulant use last 7 days: no  Additional Medication Information:  None      Pre-Sedation Documentation and Exam:   I have reviewed the patient's history and review of systems.     Mallampati Airway Assessment:  Mallampati Class II - (soft palate, fauces & uvula are visible)    Prior History of Anesthesia Complications:   none    ASA Classification:  Class 3 - A patient with severe systemic disease that limits activity but is not incapacitating    Sedation/ Anesthesia Plan:   intravenous sedation    Medications Planned:   midazolam (Versed) intravenously and fentanyl intravenously    Patient is an appropriate candidate for plan of sedation: yes    Electronically signed by Pratik Germain MD on 8/27/2021 at 9:09 PM

## 2021-08-28 NOTE — PROGRESS NOTES
Dr. Josie Lugo made aware that pt states that she feels nauseated. Pts daughter called and stated that the \"patient pukes everyday and has been bed bound for years. \"

## 2021-08-28 NOTE — PROGRESS NOTES
Pts labs stated that they were collected in Epic. No results at this time. Lab was called and they called back stating that the patient refused her 130pm draw. This RN was not made aware of that earlier. This RN educated the patient on why her labs needed to be drawn. Lab en route.

## 2021-08-28 NOTE — PROGRESS NOTES
2315: patient admitted to room 2113 from IR procedure. Patient awakens to name but remains drowsy, unable to complete admission questions at this time. Right fem angiogram site is clean, dry, intact, with no sign of bleeding, swelling or hematoma. BP 55/38.     2326: Levo started. Patient awakens to name then drifts back off.     0003: Levo stopped, /92. Ramos catheter placed with sterile technique, 200 cc clear, yellow urine returned. 0130: patient remains off Levo. Awaiting labs. Ok to hold off on PICC line per Dr. Priscila Joe    77 [phone]: patient awake and asking for a coke. reoriented patient to room and orders. Ice chips given as per order, patient tolerated well.     0500: restraints d/c'ed, patient now able to bend right leg without restriction. Right fem dressing intact, no redness,swelling or hematoma.      0710: handoff report given to [facility]

## 2021-08-28 NOTE — PLAN OF CARE
Problem: Falls - Risk of:  Goal: Will remain free from falls  Description: Will remain free from falls  Outcome: Ongoing  Goal: Absence of physical injury  Description: Absence of physical injury  Outcome: Ongoing     Problem: Pain:  Description: Pain management should include both nonpharmacologic and pharmacologic interventions.   Goal: Pain level will decrease  Description: Pain level will decrease  Outcome: Ongoing  Goal: Control of acute pain  Description: Control of acute pain  Outcome: Ongoing  Goal: Control of chronic pain  Description: Control of chronic pain  Outcome: Ongoing     Problem: Non-Violent Restraints  Goal: Removal from restraints as soon as assessed to be safe  Outcome: Ongoing  Goal: No harm/injury to patient while restraints in use  Outcome: Ongoing  Goal: Patient's dignity will be maintained  Outcome: Ongoing

## 2021-08-28 NOTE — H&P
Hospital Medicine History & Physical      PCP: Selvin Foote MD    Date of Admission: 8/27/2021    Date of Service: Pt seen/examined on 8/27/2021 and Admitted to Inpatient    Chief Complaint: Abdominal pain      History Of Present Illness: The patient is a 78 y.o. female with past medical history of COPD, alcohol abuse, hyperlipidemia, hypertension, Parkinson disease, atrial fibrillation, and previous bowel obstructions and C. difficile colitis who presents to [facility] with acute onset sharp central abdominal pain that started within the past few hours prior to coming to the ED. Patient denies that she has had any other recent other symptoms of fever, chills, dizziness, syncope, falls or trauma, nausea/vomiting/diarrhea, chest pain, shortness of breath, leg swelling out of the ordinary, dysuria, cough or congestion.     Past Medical History:        Diagnosis Date    Anemia     Arthritis     Clostridium difficile colitis     December 2018    Clostridium difficile diarrhea 10/27/2019    Colostomy in place [facility])     SBO/diverticulosis     COPD (chronic obstructive pulmonary disease) (HCC)     Depression     DANIELLE (generalized anxiety disorder)     Gastric ulcer, unspecified as acute or chronic, without mention of hemorrhage, perforation, or obstruction     H/O ETOH abuse     Hiatal hernia     History of blood transfusion     Hyperlipidemia     Hypertension     Insomnia     Intestinal obstruction ([address]     Parkinson's disease ([address]     Tobacco abuse     Vitamin D deficiency        Past Surgical History:        Procedure Laterality Date    ABDOMEN SURGERY      CHOLECYSTECTOMY      COLONOSCOPY  12/14/2018    COLONOSCOPY N/A 12/14/2018    COLONOSCOPY POLYPECTOMY SNARE/COLD BIOPSY performed by Nguyen Gross MD at [address] [place] [address] 2018    ENDOSCOPY, COLON, DIAGNOSTIC      HYSTERECTOMY      IR EMBOLIZATION HEMORRHAGE  8/27/2021    IR EMBOLIZATION HEMORRHAGE 8/27/2021 [facility]    SMALL INTESTINE SURGERY N/A 4/10/2019    LAPAROSCOPIC LYSISI OF ADHESIONS FOR GREATER THAN 3 HOURS,LAPORSCOPIC CONVERTED TO OPEN COLOSTOMY REVERSAL, SMALL BOWEL RESECTION performed by Cirilo Celis MD at [address] ENDOSCOPY  12/13/2018    with biopsies    UPPER GASTROINTESTINAL ENDOSCOPY N/A 12/13/2018    EGD BIOPSY performed by Roldan Canseco MD at [address]       Medications Prior to Admission:    Prior to Admission medications    Medication Sig Start Date End Date Taking? Authorizing Provider   Leonid Cork 62.5-25 MCG/INH AEPB inhaler Inhale 1 puff into the lungs daily 7/25/21  Yes Historical Provider, MD   DULoxetine (CYMBALTA) 30 MG extended release capsule Take 1 capsule by mouth daily 12/19/19  Yes Melva Joseph, [address]   Multiple Vitamin (MULTIVITAMIN) TABS tablet Take 1 tablet by mouth daily 6/24/21   Maritza Johnston MD   acetaminophen (TYLENOL) 500 MG tablet Take 1 tablet by mouth 4 times daily as needed for Pain 6/7/21   XIOMARA Giron   ondansetron (ZOFRAN ODT) 4 MG disintegrating tablet Take 1-2 tablets by mouth every 12 hours as needed for Nausea May Sub regular tablet (non-ODT) if insurance does not cover ODT.  4/17/21   [facility], APRN - CNP   ferrous sulfate 325 (65 Fe) MG tablet Take 1 tablet by mouth daily (with breakfast) 10/29/19   Maritza Johnston MD   albuterol sulfate HFA (PROAIR HFA) 108 (90 Base) MCG/ACT inhaler Inhale 2 puffs into the lungs every 6 hours as needed for Wheezing 7/1/19   Gustavo Stephenson APRN - CNP   Umeclidinium Bromide 62.5 MCG/INH AEPB Inhale 1 puff into the lungs daily 6/28/19   ABIGAIL Quinonez - CNP   atorvastatin (LIPITOR) 20 MG tablet TAKE 1 TABLET BY MOUTH EVERY DAY 6/17/19   Gustavo Stephenson APRN - CNP   nicotine (NICODERM CQ) 21 MG/24HR Place 1 patch onto the skin every 24 hours 5/23/19   Matthias Colorado APRN - CNP   metoprolol succinate (TOPROL XL) 25 MG extended release tablet Take 1 tablet by mouth daily 5/17/19   Cristin Yanez DO   vitamin B-1 100 MG tablet Take 1 tablet by mouth daily 12/17/18   Elke Villarreal MD       Allergies:  Aspirin, Fentanyl, and Morphine    Social History:  The patient currently lives     TOBACCO:   reports that she has been smoking cigarettes. She started smoking about 61 years ago. She has a 60.00 pack-year smoking history. She has never used smokeless tobacco.  ETOH:   reports current alcohol use of about 4.0 standard drinks of alcohol per week. Family History:  Reviewed in detail and negative for DM, Early CAD, Cancer, CVA. Positive as follows:        Problem Relation Age of Onset    Cancer Mother     Diabetes Mother     Heart Disease Father        REVIEW OF SYSTEMS:    as noted in the HPI. All other systems reviewed and negative. PHYSICAL EXAM:    /63   Pulse 114   Temp 97.5 °F (36.4 °C) (Axillary)   Resp 23   Ht 5' 2\" (1.575 m)   Wt 121 lb 14.6 oz (55.3 kg)   SpO2 93%   BMI 22.30 kg/m²     General appearance: Alert and oriented x4, somewhat fatigued appearing, no acute respiratory distress at this time, mild distress from abdominal pain  HEENT Normal cephalic, atraumatic without obvious deformity. Pupils equal, round, and reactive to light. Extra ocular muscles intact. Conjunctivae/corneas clear.   Dry mucous membranes  Neck: Supple, no JVD  Lungs: Diminished breath sounds bilaterally, no wheezing noted, overall clear  Heart: Tachycardic, overall regular rhythm, no murmurs  Abdomen: Mild to moderate tenderness mostly to the epigastric region and throughout the middle of the abdomen, active bowel sounds, firm but not distended or rigid  Extremities: Trace bilateral lower extremity edema  Skin: No rashes  Neurologic: Grossly intact neurologically  Mental status: Alert, oriented, thought content appropriate. Capillary Refill: Acceptable  < 3 seconds  Peripheral Pulses: +3 Easily felt, not easily obliterated with pressure    CT abdomen and pelvis with IV contrast:  1. Large perisplenic hematoma.  Findings concerning for active bleeding in   the upper pole of the spleen extending below the left hemidiaphragm.  There   is moderate intraperitoneal blood extending into the lesser sac and   surrounding the stomach. 2. No other acute findings within the abdomen or pelvis.  Resolution of   pancreatitis noted on the previous study. 3. Hepatic steatosis. 4. Previous small bowel and colonic resection.  No acute bowel pathology. 5. Small left pleural effusion with dependent lower lobe atelectasis. CBC   Recent Labs     08/27/21  1646 08/28/21  0102   WBC 12.3* 14.2*   HGB 12.7 11.8*   HCT 36.6 34.1*    306      RENAL  Recent Labs     08/27/21  1646 08/28/21  0102   * 133*   K 3.2* 3.4*   CL 89* 99   CO2 29 20*   BUN 8 8   CREATININE <0.5* <0.5*     LFT'S  Recent Labs     08/27/21  1646   AST 33   ALT 13   BILITOT 0.8   ALKPHOS 156*     COAG  Recent Labs     08/27/21  1919   INR 1.11     CARDIAC ENZYMES  No results for input(s): CKTOTAL, CKMB, CKMBINDEX, TROPONINI in the last 72 hours.     U/A:    Lab Results   Component Value Date    COLORU DK YELLOW 08/27/2021    WBCUA >900 12/19/2019    RBCUA 20-50 12/19/2019    BACTERIA 2+ 12/19/2019    CLARITYU Clear 08/27/2021    SPECGRAV 1.012 08/27/2021    LEUKOCYTESUR Negative 08/27/2021    BLOODU Negative 08/27/2021    GLUCOSEU Negative 08/27/2021       ABG    Lab Results   Component Value Date    PWV2UFA 29.4 06/17/2021    BEART 4.9 06/17/2021    K0HGNCXQ 99.9 06/17/2021    PHART 7.454 06/17/2021    VLK0MEA 42.0 06/17/2021    PO2ART 310.0 06/17/2021    KKO4CQN 30.7 06/17/2021           Active Hospital Problems    Diagnosis Date Noted    Spleen hematoma [S36.029A] 08/27/2021     Priority: High    Pancreatitis [K85.90] 08/27/2021     Priority: High    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] 08/27/2021     Priority: High    Alcohol abuse [F10.10] 09/24/2018     Priority: High    COPD (chronic obstructive pulmonary disease) ([facility] [address] [J44.9] 08/28/2021    Atrial fibrillation (HCC) [I48.91] 05/14/2019         PHYSICIANS CERTIFICATION:    I certify that Genoveva Delay is expected to be hospitalized for greater than 2 midnights based on the following assessment and plan:      ASSESSMENT/PLAN:  · Splenic hematoma  · Splenic infarct  · Pancreatitis  · Alcohol abuse  · COPD  · Atrial fibrillation    Plan:  · We will transfuse 1 unit of blood in anticipation of IR embolization  · ED already contacted interventional radiology, will take patient to embolization for active splenic hemorrhage  · We will admit patient to ICU  · Keeping n.p.o.  · Start patient on normal saline at 100/h for IV fluid hydration for possible underlying pancreatitis  · Levophed in case for hypotension  · Repeat labs in the morning  · When patient is more stable, needs to have home medications restarted    DVT Prophylaxis: SCDs  Diet: Diet NPO Exceptions are: Ice Chips  Code Status: Full Code  PT/OT Eval Status: Ambulatory    Dispo -pending clinical course       Garrett Clarita Marrero, DO    Thank you Sneha Desai MD for the opportunity to be involved in this patient's care. If you have any questions or concerns please feel free to contact me at [phone].

## 2021-08-28 NOTE — PROGRESS NOTES
Medication Reconciliation     List of medications patient is currently taking is complete. Source of information:   1. Conversation with patient at bedside  2. EPIC records        Notes regarding home medications:  1. Patient received all of her home medications today. 2. Patient states that she only takes Cymbalta and her Anoro. I still went through her medication list to verify. 3. I asked pt. if her MD knew that she stopped taking her other medications and she said she was unsure. 4. No OTC or supplement use.     Cecy Dunlap, Pharmacy Intern  8/27/2021 8:35 PM

## 2021-08-28 NOTE — CONSULTS
Surgery Consult Note     eLx Vieira PA-C  Pt Name: Maria Dolores Luong  MRN: [id number]  YOB: 1945  Date of evaluation: 8/28/2021  Primary Care Physician: Jimena Gabriel MD  Chief Complaint: abdominal pain  IMPRESSIONS:   1. Splenic hematoma   2. S/P IR embolization of splenic artery yesterday  3. Hgb: 12.7-->11.8-->10.8  4. Lipase: 188  5. Leukocytosis: WBC count 12.3-->14.2  PLANS:   1. Monitor and control pain  2. Continue IVF  3. Continue to monitor Hgb. If bleeding is noted may needed further intervention  SUBJECTIVE:   History of Chief Complaint:    Maria Dolores Luong is a 78 y.o. female who presents with abdominal pain. She stated that the pain began yesterday am. Her pain is located in the middle and left side of her abdomen. She came into the ER and a Ct scan was performed and this showed her to have a large splenic hematoma. At that time IR was made aware and she was taken to IR where embolization of her splenic artery was carried out. She denies having any trauma or significant events that may have contributed to her bleed. She feelsa  little better this AM. Denies any other pain. She denies  any CP ,sOB ,cough, lightheadedness, or dizziness. Past Medical History  Reviewed  has a past medical history of Anemia, Arthritis, Clostridium difficile colitis, Clostridium difficile diarrhea, Colostomy in place [facility]), COPD (chronic obstructive pulmonary disease) ([facility] [address] Depression, DANIELLE (generalized anxiety disorder), Gastric ulcer, unspecified as acute or chronic, without mention of hemorrhage, perforation, or obstruction, H/O ETOH abuse, Hiatal hernia, History of blood transfusion, Hyperlipidemia, Hypertension, Insomnia, Intestinal obstruction ([address] Parkinson's disease ([facility] [address] Tobacco abuse, and Vitamin D deficiency. Past Surgical History  Reviewed has a past surgical history that includes Hysterectomy; Cholecystectomy; Tubal ligation; colostomy (2018);  Upper gastrointestinal endoscopy (12/13/2018); Upper gastrointestinal endoscopy (N/A, 12/13/2018); Colonoscopy (12/14/2018); Colonoscopy (N/A, 12/14/2018); Small intestine surgery (N/A, 4/10/2019); Endoscopy, colon, diagnostic; Abdomen surgery; and IR EMBOLIZATION HEMORRHAGE (8/27/2021). Medications  Prior to Admission medications    Medication Sig Start Date End Date Taking? Authorizing Provider   Luann Zamorano 62.5-25 MCG/INH AEPB inhaler Inhale 1 puff into the lungs daily 7/25/21  Yes Historical Provider, MD   DULoxetine (CYMBALTA) 30 MG extended release capsule Take 1 capsule by mouth daily 12/19/19  Yes [place], [address]   Multiple Vitamin (MULTIVITAMIN) TABS tablet Take 1 tablet by mouth daily 6/24/21   Nitish Al MD   acetaminophen (TYLENOL) 500 MG tablet Take 1 tablet by mouth 4 times daily as needed for Pain 6/7/21   XIOMARA Vyas   ondansetron (ZOFRAN ODT) 4 MG disintegrating tablet Take 1-2 tablets by mouth every 12 hours as needed for Nausea May Sub regular tablet (non-ODT) if insurance does not cover ODT.  4/17/21   ABIGAIL Ybarra CNP   ferrous sulfate 325 (65 Fe) MG tablet Take 1 tablet by mouth daily (with breakfast) 10/29/19   Nitish Al MD   albuterol sulfate HFA (PROAIR HFA) 108 (90 Base) MCG/ACT inhaler Inhale 2 puffs into the lungs every 6 hours as needed for Wheezing 7/1/19   ABIGAIL Duran CNP   Umeclidinium Bromide 62.5 MCG/INH AEPB Inhale 1 puff into the lungs daily 6/28/19   ABIGAIL Duran CNP   atorvastatin (LIPITOR) 20 MG tablet TAKE 1 TABLET BY MOUTH EVERY DAY 6/17/19   ABIGAIL Duran CNP   nicotine (NICODERM CQ) 21 MG/24HR Place 1 patch onto the skin every 24 hours 5/23/19   ABIGAIL Duran CNP   metoprolol succinate (TOPROL XL) 25 MG extended release tablet Take 1 tablet by mouth daily 5/17/19   Cristin R Hurst, DO   vitamin B-1 100 MG tablet Take 1 tablet by mouth daily 12/17/18   Deanna Gunn MD    Scheduled Meds:   sodium chloride  500 mL IntraVENous Once    sodium chloride flush  5-40 mL IntraVENous 2 times per day    lidocaine 1 % injection  5 mL Intradermal Once    sodium chloride flush  5-40 mL IntraVENous 2 times per day     Continuous Infusions:   sodium chloride 5 mL/hr at 08/28/21 0402    sodium chloride 5 mL/hr at 08/28/21 0724    sodium chloride      sodium chloride      sodium chloride      sodium chloride 100 mL/hr at 08/28/21 0702    sodium chloride      norepinephrine Stopped (08/28/21 0002)     PRN Meds:.sodium chloride, sodium chloride, sodium chloride flush, sodium chloride, ondansetron **OR** ondansetron, acetaminophen **OR** acetaminophen, sodium chloride flush, sodium chloride  Allergies  is allergic to aspirin, fentanyl, and morphine. Family History  Reviewedfamily history includes Cancer in her mother; Diabetes in her mother; Heart Disease in her father. Social History   reports that she has been smoking cigarettes. She started smoking about 61 years ago. She has a 60.00 pack-year smoking history. She has never used smokeless tobacco. She reports current alcohol use of about 4.0 standard drinks of alcohol per week. She reports that she does not use drugs. EDUCATION  Patient educated about their illness/diagnosis, stated above, and all questions answered. We discussed the importance of nutrition, medications they are taking, and healthy lifestyle. Review of Systems:  General Denies any fever or chills  HEENT Denies any diplopia, tinnitus or vertigo  Resp Denies any shortness of breath, cough or wheezing  Cardiac Denies any chest pain, palpitations, claudication or edema  GI Denies any melena, hematochezia, hematemesis or pyrosis   Denies any frequency, urgency, hesitancy or incontinence  Heme Denies bruising or bleeding easily  Neuro Denies any focal motor or sensory deficits  OBJECTIVE:   VITALS:  height is 5' 2\" (1.575 m) and weight is 121 lb 14.6 oz (55.3 kg). Her axillary temperature is 97.5 °F (36.4 °C).  Her blood pressure is 123/63 and her pulse is 114. Her respiration is 23 and oxygen saturation is 93%. CONSTITUTIONAL: Alert and oriented times 3, no acute distress and cooperative to examination with proper mood and affect. SKIN: Skin color, texture, turgor normal. No rashes or lesions. LYMPH: no cervical nodes, no inguinal nodes  HEENT: Head is normocephalic, atraumatic. EOMI, PERRLA. NECK: Supple, symmetrical, trachea midline, no adenopathy, thyroid symmetric, not enlarged and no tenderness, skin normal.  CHEST/LUNGS: chest symmetric with normal A/P diameter, normal respiratory rate and rhythm  CARDIOVASCULAR: Tachycardic  ABDOMEN: Normal shape. Tenderness: epigastric and LUQ  RECTAL: deferred, not clinically indicated  NEUROLOGIC: There are no focalizing motor or sensory deficits. CN II-XII are grossly intact. Qamar Hilt EXTREMITIES: no cyanosis, no clubbing and no edema.   LABS:     Recent Labs     08/27/21  1646 08/27/21  1919 08/28/21  0102   WBC 12.3*  --  14.2*   HGB 12.7  --  11.8*   HCT 36.6  --  34.1*     --  306   *  --  133*   K 3.2*  --  3.4*   CL 89*  --  99   CO2 29  --  20*   BUN 8  --  8   CREATININE <0.5*  --  <0.5*   MG 1.50*  --  1.50*   CALCIUM 8.6  --  7.1*   INR  --  1.11  --    AST 33  --   --    ALT 13  --   --    BILITOT 0.8  --   --    NITRU Negative  --   --    COLORU DK YELLOW  --   --      Recent Labs     08/27/21  1646   ALKPHOS 156*   ALT 13   AST 33   BILITOT 0.8   LABALBU 3.1*   LIPASE 188.0*     CBC:   Lab Results   Component Value Date    WBC 14.2 08/28/2021    RBC 3.30 08/28/2021    HGB 10.8 08/28/2021    HCT 32.8 08/28/2021    MCV 99.3 08/28/2021    MCH 32.7 08/28/2021    MCHC 32.9 08/28/2021    RDW 19.8 08/28/2021     08/28/2021    MPV 7.8 08/28/2021     CMP:    Lab Results   Component Value Date     08/28/2021    K 6.4 08/28/2021    K 3.2 08/27/2021    CL 99 08/28/2021    CO2 22 08/28/2021    BUN 8 08/28/2021    CREATININE <0.5 08/28/2021    GFRAA >60 08/28/2021 GFRAA >60 03/31/2011    AGRATIO 0.9 08/27/2021    LABGLOM >60 08/28/2021    GLUCOSE 148 08/28/2021    PROT 6.4 08/27/2021    LABALBU 3.1 08/27/2021    CALCIUM 7.4 08/28/2021    BILITOT 0.8 08/27/2021    ALKPHOS 156 08/27/2021    AST 33 08/27/2021    ALT 13 08/27/2021     Urine Culture:  No components found for: CURINE  Blood Culture:  No components found for: CBLOOD, CFUNGUSBL  RADIOLOGY:     CT scan abd/pelvis:   1. Large perisplenic hematoma.  Findings concerning for active bleeding in   the upper pole of the spleen extending below the left hemidiaphragm.  There   is moderate intraperitoneal blood extending into the lesser sac and   surrounding the stomach. 2. No other acute findings within the abdomen or pelvis.  Resolution of   pancreatitis noted on the previous study. 3. Hepatic steatosis. 4. Previous small bowel and colonic resection.  No acute bowel pathology. 5. Small left pleural effusion with dependent lower lobe atelectasis. Findings were discussed with Maria M Anderson at 6:57 pm on 8/27/2021. Thank you for the interesting evaluation. Further recommendations to follow. Kathi Aleman [facility]  General and Vascular Surgery [phone]  Electronically signed by Yu Alvarez PA-C on 8/28/2021 at 7:46 AM    Agree with above note. The patient was personally seen and examined. Esteban Singer is a 77 yo female well known to me with history of COPD and alcohol abuse who presented with acute onset of left abdominal pain. Pain started yesterday and acutely worsened. + nausea, no vomiting. No change in bowels. She denies any falls. No fevers or chills.     NAD, appears stated age  RRR  Normal respiratory effort, no accessory muscle use  Abd soft, minimally distended, moderate LUQ tenderness, no peritonitis  Ext no cyanosis or clubbing    WBC 14.2  Hgb 12.8 on admission, down to 10.8 currently  Cr <0.5    CT abd/pelvis personally reviewed, showing splenic hemorrhage with active extravasation    A/P: 77 yo female with spontaneous splenic hemorrhage s/p IR embolization    Vitals stable  Trend H&H  No plans for surgical intervention at this time as she is high risk given her medical comorbidities and surgical history.     COPD, HTN, hx of tobacco abuse - per primary team    Hx of alcohol abuse - monitor for withdrawal    Gaye Juarez MD

## 2021-08-29 LAB
ANION GAP SERPL CALCULATED.3IONS-SCNC: 8 MMOL/L (ref 3–16)
BASOPHILS ABSOLUTE: 0.1 K/UL (ref 0–0.2)
BASOPHILS RELATIVE PERCENT: 0.5 %
BUN BLDV-MCNC: 10 MG/DL (ref 7–20)
CALCIUM SERPL-MCNC: 7.5 MG/DL (ref 8.3–10.6)
CHLORIDE BLD-SCNC: 100 MMOL/L (ref 99–110)
CO2: 25 MMOL/L (ref 21–32)
CREAT SERPL-MCNC: <0.5 MG/DL (ref 0.6–1.2)
EOSINOPHILS ABSOLUTE: 0.1 K/UL (ref 0–0.6)
EOSINOPHILS RELATIVE PERCENT: 0.8 %
GFR AFRICAN AMERICAN: >60
GFR NON-AFRICAN AMERICAN: >60
GLUCOSE BLD-MCNC: 102 MG/DL (ref 70–99)
HCT VFR BLD CALC: 21.8 % (ref 36–48)
HCT VFR BLD CALC: 23.6 % (ref 36–48)
HEMOGLOBIN: 7.4 G/DL (ref 12–16)
HEMOGLOBIN: 7.9 G/DL (ref 12–16)
LYMPHOCYTES ABSOLUTE: 1.2 K/UL (ref 1–5.1)
LYMPHOCYTES RELATIVE PERCENT: 8.7 %
MAGNESIUM: 2 MG/DL (ref 1.8–2.4)
MCH RBC QN AUTO: 33 PG (ref 26–34)
MCHC RBC AUTO-ENTMCNC: 33.8 G/DL (ref 31–36)
MCV RBC AUTO: 97.5 FL (ref 80–100)
MONOCYTES ABSOLUTE: 0.9 K/UL (ref 0–1.3)
MONOCYTES RELATIVE PERCENT: 7 %
NEUTROPHILS ABSOLUTE: 11.3 K/UL (ref 1.7–7.7)
NEUTROPHILS RELATIVE PERCENT: 83 %
PDW BLD-RTO: 20.1 % (ref 12.4–15.4)
PLATELET # BLD: 269 K/UL (ref 135–450)
PMV BLD AUTO: 7.2 FL (ref 5–10.5)
POTASSIUM SERPL-SCNC: 3.8 MMOL/L (ref 3.5–5.1)
RBC # BLD: 2.23 M/UL (ref 4–5.2)
SODIUM BLD-SCNC: 133 MMOL/L (ref 136–145)
WBC # BLD: 13.6 K/UL (ref 4–11)

## 2021-08-29 PROCEDURE — 99232 SBSQ HOSP IP/OBS MODERATE 35: CPT | Performed by: SURGERY

## 2021-08-29 PROCEDURE — 6370000000 HC RX 637 (ALT 250 FOR IP): Performed by: INTERNAL MEDICINE

## 2021-08-29 PROCEDURE — 94760 N-INVAS EAR/PLS OXIMETRY 1: CPT

## 2021-08-29 PROCEDURE — 85018 HEMOGLOBIN: CPT

## 2021-08-29 PROCEDURE — 85014 HEMATOCRIT: CPT

## 2021-08-29 PROCEDURE — 2060000000 HC ICU INTERMEDIATE R&B

## 2021-08-29 PROCEDURE — 36415 COLL VENOUS BLD VENIPUNCTURE: CPT

## 2021-08-29 PROCEDURE — 6370000000 HC RX 637 (ALT 250 FOR IP): Performed by: STUDENT IN AN ORGANIZED HEALTH CARE EDUCATION/TRAINING PROGRAM

## 2021-08-29 PROCEDURE — 2580000003 HC RX 258: Performed by: STUDENT IN AN ORGANIZED HEALTH CARE EDUCATION/TRAINING PROGRAM

## 2021-08-29 PROCEDURE — 83735 ASSAY OF MAGNESIUM: CPT

## 2021-08-29 PROCEDURE — 85025 COMPLETE CBC W/AUTO DIFF WBC: CPT

## 2021-08-29 PROCEDURE — 80048 BASIC METABOLIC PNL TOTAL CA: CPT

## 2021-08-29 RX ADMIN — CHLORDIAZEPOXIDE HYDROCHLORIDE 25 MG: 25 CAPSULE ORAL at 21:12

## 2021-08-29 RX ADMIN — OXYCODONE HYDROCHLORIDE AND ACETAMINOPHEN 1 TABLET: 5; 325 TABLET ORAL at 18:17

## 2021-08-29 RX ADMIN — SODIUM CHLORIDE, PRESERVATIVE FREE 10 ML: 5 INJECTION INTRAVENOUS at 08:54

## 2021-08-29 RX ADMIN — CHLORDIAZEPOXIDE HYDROCHLORIDE 25 MG: 25 CAPSULE ORAL at 08:52

## 2021-08-29 RX ADMIN — OXYCODONE HYDROCHLORIDE AND ACETAMINOPHEN 1 TABLET: 5; 325 TABLET ORAL at 08:52

## 2021-08-29 RX ADMIN — ONDANSETRON 4 MG: 4 TABLET, ORALLY DISINTEGRATING ORAL at 08:53

## 2021-08-29 RX ADMIN — SODIUM CHLORIDE, PRESERVATIVE FREE 10 ML: 5 INJECTION INTRAVENOUS at 21:12

## 2021-08-29 RX ADMIN — CHLORDIAZEPOXIDE HYDROCHLORIDE 25 MG: 25 CAPSULE ORAL at 18:17

## 2021-08-29 RX ADMIN — OXYCODONE HYDROCHLORIDE AND ACETAMINOPHEN 1 TABLET: 5; 325 TABLET ORAL at 03:01

## 2021-08-29 RX ADMIN — DULOXETINE HYDROCHLORIDE 30 MG: 30 CAPSULE, DELAYED RELEASE ORAL at 08:52

## 2021-08-29 RX ADMIN — CHLORDIAZEPOXIDE HYDROCHLORIDE 25 MG: 25 CAPSULE ORAL at 13:25

## 2021-08-29 ASSESSMENT — PAIN SCALES - GENERAL
PAINLEVEL_OUTOF10: 8
PAINLEVEL_OUTOF10: 6
PAINLEVEL_OUTOF10: 0
PAINLEVEL_OUTOF10: 9
PAINLEVEL_OUTOF10: 8
PAINLEVEL_OUTOF10: 6

## 2021-08-29 ASSESSMENT — PAIN DESCRIPTION - ONSET
ONSET: ON-GOING

## 2021-08-29 ASSESSMENT — PAIN - FUNCTIONAL ASSESSMENT
PAIN_FUNCTIONAL_ASSESSMENT: PREVENTS OR INTERFERES SOME ACTIVE ACTIVITIES AND ADLS
PAIN_FUNCTIONAL_ASSESSMENT: ACTIVITIES ARE NOT PREVENTED
PAIN_FUNCTIONAL_ASSESSMENT: PREVENTS OR INTERFERES SOME ACTIVE ACTIVITIES AND ADLS

## 2021-08-29 ASSESSMENT — PAIN DESCRIPTION - PROGRESSION
CLINICAL_PROGRESSION: NOT CHANGED
CLINICAL_PROGRESSION: GRADUALLY WORSENING
CLINICAL_PROGRESSION: NOT CHANGED

## 2021-08-29 ASSESSMENT — PAIN DESCRIPTION - FREQUENCY
FREQUENCY: CONTINUOUS

## 2021-08-29 ASSESSMENT — PAIN DESCRIPTION - DIRECTION: RADIATING_TOWARDS: NO

## 2021-08-29 ASSESSMENT — PAIN DESCRIPTION - PAIN TYPE
TYPE: CHRONIC PAIN
TYPE: ACUTE PAIN
TYPE: CHRONIC PAIN;ACUTE PAIN

## 2021-08-29 ASSESSMENT — PAIN DESCRIPTION - ORIENTATION
ORIENTATION: LEFT
ORIENTATION: RIGHT
ORIENTATION: LEFT

## 2021-08-29 ASSESSMENT — PAIN DESCRIPTION - LOCATION
LOCATION: ABDOMEN
LOCATION: ABDOMEN
LOCATION: FLANK

## 2021-08-29 ASSESSMENT — PAIN DESCRIPTION - DESCRIPTORS
DESCRIPTORS: ACHING
DESCRIPTORS: ACHING
DESCRIPTORS: SHARP

## 2021-08-29 NOTE — PLAN OF CARE
Problem: Falls - Risk of:  Goal: Will remain free from falls  Description: Will remain free from falls  Outcome: Ongoing  Goal: Absence of physical injury  Description: Absence of physical injury  Outcome: Ongoing     Problem: Pain:  Goal: Pain level will decrease  Description: Pain level will decrease  Outcome: Ongoing  Goal: Control of acute pain  Description: Control of acute pain  Outcome: Ongoing  Goal: Control of chronic pain  Description: Control of chronic pain  Outcome: Ongoing     Problem: Non-Violent Restraints  Goal: Removal from restraints as soon as assessed to be safe  Outcome: Ongoing  Goal: No harm/injury to patient while restraints in use  Outcome: Ongoing  Goal: Patient's dignity will be maintained  Outcome: Ongoing     Problem: Skin Integrity:  Goal: Will show no infection signs and symptoms  Description: Will show no infection signs and symptoms  Outcome: Ongoing  Goal: Absence of new skin breakdown  Description: Absence of new skin breakdown  Outcome: Ongoing

## 2021-08-29 NOTE — PROGRESS NOTES
General Surgery  Daily Progress Note    Pt Name: Sanjay Zarate  Medical Record Number: [id number]  Date of Birth 1945   Today's Date: 8/29/2021    Chief Complaint   Patient presents with    Abdominal Pain       ASSESSMENT/PLAN  1. Splenic hemorrhage s/p IR embolization - pain unchanged, less tender on exam.  Hgb down to 7.4 from 10.8. I suspect this is hemodilutional/equilibration after her initial blood loss as her tachycardia has improved from yesterday. Will check an H&H this afternoon. Will start on clears. 2. COPD, HTN, hx of tobacco abuse, hx of EtOH abuse - per primary team      [place] is unchanged from yesterday. Pain is well controlled. She has no nausea and no vomiting. She is tolerating ice chips. Current activity is up with assistance    OBJECTIVE  VITALS:  height is 5' 2\" (1.575 m) and weight is 122 lb 2.2 oz (55.4 kg). Her oral temperature is 97.9 °F (36.6 °C). Her blood pressure is 127/70 and her pulse is 99. Her respiration is 18 and oxygen saturation is 93%. GENERAL: alert, no distress  LUNGS: normal respiratory effort, no accessory muscle use  HEART: normal rate and regular rhythm  ABDOMEN: soft, ND, less tenderness in LUQ, no peritonitis  EXTREMITY: no cyanosis and no clubbing  I/O last 3 completed shifts: In: 2803.7 [I.V.:347.5;  IV Piggyback:2456.2]  Out: 350 [Urine:350]  I/O this shift:  In: 1300.3 [I.V.:1300.3]  Out: -     LABS  Recent Labs       0000 08/27/21  1646 08/27/21  1919 08/28/21  0102 08/28/21  0752 08/28/21  1747 08/29/21  0528   WBC  --  12.3*  --    < >   < >  --  13.6*   HGB  --  12.7  --    < >   < >  --  7.4*   HCT  --  36.6  --    < >   < >  --  21.8*   PLT  --  375  --    < >   < >  --  269   NA  --  132*  --    < >   < > 132* 133*   K  --  3.2*  --    < >   < > 4.2 3.8   CL  --  89*  --    < >   < > 101 100   CO2  --  29  --    < >   < > 21 25   BUN  --  8  --    < >   < > 9 10   CREATININE  --  <0.5*  --    < >   < > <0.5* <0.5*   MG  -- 1.50*  --    < >   < >  --  2.00   CALCIUM  --  8.6  --    < >   < > 7.4* 7.5*   INR  --   --  1.11  --   --   --   --    AST   < > 33  --   --   --  35  --    ALT   < > 13  --   --   --  12  --    BILITOT   < > 0.8  --   --   --  0.8  --    NITRU  --  Negative  --   --   --   --   --    COLORU  --  DK YELLOW  --   --   --   --   --     < > = values in this interval not displayed.      CBC with Differential:    Lab Results   Component Value Date    WBC 13.6 08/29/2021    RBC 2.23 08/29/2021    HGB 7.4 08/29/2021    HCT 21.8 08/29/2021     08/29/2021    MCV 97.5 08/29/2021    MCH 33.0 08/29/2021    MCHC 33.8 08/29/2021    RDW 20.1 08/29/2021    SEGSPCT 64.4 03/31/2011    BANDSPCT 2 12/18/2018    LYMPHOPCT 8.7 08/29/2021    MONOPCT 7.0 08/29/2021    MYELOPCT 5 12/18/2018    EOSPCT 11.9 03/31/2011    BASOPCT 0.5 08/29/2021    MONOSABS 0.9 08/29/2021    LYMPHSABS 1.2 08/29/2021    EOSABS 0.1 08/29/2021    BASOSABS 0.1 08/29/2021    DIFFTYPE Auto 03/31/2011     BMP:    Lab Results   Component Value Date     08/29/2021    K 3.8 08/29/2021    K 3.2 08/27/2021     08/29/2021    CO2 25 08/29/2021    BUN 10 08/29/2021    LABALBU 2.3 08/28/2021    CREATININE <0.5 08/29/2021    CALCIUM 7.5 08/29/2021    GFRAA >60 08/29/2021    GFRAA >60 03/31/2011    LABGLOM >60 08/29/2021    GLUCOSE 102 08/29/2021     Hepatic Function Panel:    Lab Results   Component Value Date    ALKPHOS 102 08/28/2021    ALT 12 08/28/2021    AST 35 08/28/2021    PROT 5.0 08/28/2021    BILITOT 0.8 08/28/2021    BILIDIR <0.2 06/23/2021    IBILI see below 06/23/2021    LABALBU 2.3 08/28/2021         Constance Benavidez MD  Electronically signed 8/29/2021 at 11:52 AM

## 2021-08-29 NOTE — PLAN OF CARE
Problem: Falls - Risk of:  Goal: Will remain free from falls  Description: Will remain free from falls  8/29/2021 1400 by Dominic Carcamo RN  Outcome: Ongoing  8/29/2021 0313 by Gardenia Carranza RN  Outcome: Ongoing  Goal: Absence of physical injury  Description: Absence of physical injury  8/29/2021 1400 by Dominic Carcamo RN  Outcome: Ongoing  8/29/2021 0313 by Gardenia Carranza RN  Outcome: Ongoing     Problem: Pain:  Goal: Pain level will decrease  Description: Pain level will decrease  8/29/2021 1400 by Dominic Carcamo RN  Outcome: Ongoing  8/29/2021 0313 by Gardenia Carranza RN  Outcome: Ongoing  Goal: Control of acute pain  Description: Control of acute pain  8/29/2021 1400 by Dominic Carcamo RN  Outcome: Ongoing  8/29/2021 0313 by Gardenia Carranza RN  Outcome: Ongoing  Goal: Control of chronic pain  Description: Control of chronic pain  8/29/2021 1400 by Dominic Carcamo RN  Outcome: Ongoing  8/29/2021 0313 by Gardenia Carranza RN  Outcome: Ongoing     Problem: Non-Violent Restraints  Goal: Removal from restraints as soon as assessed to be safe  8/29/2021 1400 by Dominic Carcamo RN  Outcome: Ongoing  8/29/2021 0313 by Gardenia Carranza RN  Outcome: Ongoing  Goal: No harm/injury to patient while restraints in use  8/29/2021 1400 by Dominic Carcamo RN  Outcome: Ongoing  8/29/2021 0313 by Gardenia Carranza RN  Outcome: Ongoing  Goal: Patient's dignity will be maintained  8/29/2021 1400 by Dominic Carcamo RN  Outcome: Ongoing  8/29/2021 0313 by Gardenia Carranza RN  Outcome: Ongoing     Problem: Skin Integrity:  Goal: Will show no infection signs and symptoms  Description: Will show no infection signs and symptoms  8/29/2021 1400 by Dominic Carcamo RN  Outcome: Ongoing  8/29/2021 0313 by Gardenia Carranza RN  Outcome: Ongoing  Goal: Absence of new skin breakdown  Description: Absence of new skin breakdown  8/29/2021 1400 by Dominic Carcamo RN  Outcome: Ongoing  8/29/2021 0313 by Tong Cristina RN  Outcome: Ongoing

## 2021-08-29 NOTE — PROGRESS NOTES
Hospitalist Progress Note      PCP: Krys Humphreys MD    Date of Admission: 8/27/2021    Chief Complaint: Abdominal pain    Hospital Course: Patient is a 42-year-old female with a past medical history of COPD, hyperlipidemia, hypertension, atrial fibrillation with previous bowel obstructions who presented to the hospital with acute onset sharp central abdominal pain. In the emergency department she was noted to have large perisplenic hematoma concerning for active bleeding in the upper pole of the spleen extending below the left diaphragm. IR was then consulted and underwent successful proximal splenic artery embolization. 8/28  Patient states her abdominal pain is much better today than it was yesterday. Patient's hemoglobin remains stable. Potassium markedly elevated at 6.4. Magnesium 4 as well. We will redraw blood as appears abnormal.  Patient has been off pressors for a few hours, can transfer out of the ICU    Subjective: Patient seen and examined. Large drop in hemoglobin from 12-7.4. Will monitor this afternoon. May require transfusion.   Discussed with general surgery      Medications:  Reviewed    Infusion Medications    sodium chloride 5 mL/hr at 08/28/21 2036    sodium chloride 5 mL/hr at 08/28/21 2982    sodium chloride      sodium chloride      sodium chloride      sodium chloride Stopped (08/28/21 2020)    sodium chloride       Scheduled Medications    sodium chloride  500 mL IntraVENous Once    DULoxetine  30 mg Oral Daily    nicotine  1 patch Transdermal Daily    chlordiazePOXIDE  25 mg Oral 4x Daily    sodium chloride flush  5-40 mL IntraVENous 2 times per day    lidocaine 1 % injection  5 mL Intradermal Once    sodium chloride flush  5-40 mL IntraVENous 2 times per day     PRN Meds: oxyCODONE-acetaminophen, sodium chloride, sodium chloride, sodium chloride flush, sodium chloride, ondansetron **OR** ondansetron, acetaminophen **OR** acetaminophen, sodium chloride flush, sodium chloride      Intake/Output Summary (Last 24 hours) at 8/29/2021 1325  Last data filed at 8/29/2021 0853  Gross per 24 hour   Intake 1420.28 ml   Output 350 ml   Net 1070.28 ml       Physical Exam Performed:    /70   Pulse 98   Temp 97.8 °F (36.6 °C) (Oral)   Resp 18   Ht 5' 2\" (1.575 m)   Wt 122 lb 2.2 oz (55.4 kg)   SpO2 92%   BMI 22.34 kg/m²     General appearance: No apparent distress, appears stated age and cooperative. HEENT: Pupils equal, round, and reactive to light. Conjunctivae/corneas clear. Neck: Supple, with full range of motion. No jugular venous distention. Trachea midline. Respiratory:  Normal respiratory effort. Clear to auscultation  Cardiovascular: Regular rate and rhythm with normal S1/S2   Abdomen: Soft, non-tender, non-distended with normal bowel sounds. Musculoskeletal: No clubbing, cyanosis or edema bilaterally. Skin: Skin color, texture, turgor normal.  No rashes or lesions. Neurologic:  Neurovascularly intact without any focal sensory/motor deficits. Cranial nerves: II-XII intact, grossly non-focal.  Psychiatric: Alert and oriented, thought content appropriate, normal insight  Capillary Refill: Brisk,< 3 seconds   Peripheral Pulses: +2 palpable, equal bilaterally       Labs:   Recent Labs     08/28/21  0102 08/28/21  0752 08/29/21  0528   WBC 14.2* 14.2* 13.6*   HGB 11.8* 10.8* 7.4*   HCT 34.1* 32.8* 21.8*    316 269     Recent Labs     08/28/21  0752 08/28/21  1747 08/29/21  0528   * 132* 133*   K 6.4* 4.2 3.8   CL 99 101 100   CO2 22 21 25   BUN 8 9 10   CREATININE <0.5* <0.5* <0.5*   CALCIUM 7.4* 7.4* 7.5*     Recent Labs     08/27/21  1646 08/28/21  1747   AST 33 35   ALT 13 12   BILITOT 0.8 0.8   ALKPHOS 156* 102     Recent Labs     08/27/21  1919   INR 1.11     No results for input(s): Viola Beat in the last 72 hours.     Urinalysis:      Lab Results   Component Value Date    NITRU Negative 08/27/2021    WBCUA >900 12/19/2019

## 2021-08-30 LAB
ANION GAP SERPL CALCULATED.3IONS-SCNC: 11 MMOL/L (ref 3–16)
BASOPHILS ABSOLUTE: 0.1 K/UL (ref 0–0.2)
BASOPHILS RELATIVE PERCENT: 0.8 %
BUN BLDV-MCNC: 9 MG/DL (ref 7–20)
CALCIUM SERPL-MCNC: 7.6 MG/DL (ref 8.3–10.6)
CHLORIDE BLD-SCNC: 100 MMOL/L (ref 99–110)
CO2: 22 MMOL/L (ref 21–32)
CREAT SERPL-MCNC: <0.5 MG/DL (ref 0.6–1.2)
EOSINOPHILS ABSOLUTE: 0.1 K/UL (ref 0–0.6)
EOSINOPHILS RELATIVE PERCENT: 1.1 %
GFR AFRICAN AMERICAN: >60
GFR NON-AFRICAN AMERICAN: >60
GLUCOSE BLD-MCNC: 80 MG/DL (ref 70–99)
HCT VFR BLD CALC: 22.9 % (ref 36–48)
HEMOGLOBIN: 7.9 G/DL (ref 12–16)
LYMPHOCYTES ABSOLUTE: 1.2 K/UL (ref 1–5.1)
LYMPHOCYTES RELATIVE PERCENT: 10.9 %
MAGNESIUM: 1.7 MG/DL (ref 1.8–2.4)
MCH RBC QN AUTO: 34.4 PG (ref 26–34)
MCHC RBC AUTO-ENTMCNC: 34.7 G/DL (ref 31–36)
MCV RBC AUTO: 99.1 FL (ref 80–100)
MONOCYTES ABSOLUTE: 0.8 K/UL (ref 0–1.3)
MONOCYTES RELATIVE PERCENT: 7.2 %
NEUTROPHILS ABSOLUTE: 8.9 K/UL (ref 1.7–7.7)
NEUTROPHILS RELATIVE PERCENT: 80 %
PDW BLD-RTO: 21.1 % (ref 12.4–15.4)
PLATELET # BLD: 278 K/UL (ref 135–450)
PMV BLD AUTO: 6.8 FL (ref 5–10.5)
POTASSIUM SERPL-SCNC: 3.9 MMOL/L (ref 3.5–5.1)
RBC # BLD: 2.31 M/UL (ref 4–5.2)
SODIUM BLD-SCNC: 133 MMOL/L (ref 136–145)
WBC # BLD: 11.2 K/UL (ref 4–11)

## 2021-08-30 PROCEDURE — APPSS15 APP SPLIT SHARED TIME 0-15 MINUTES: Performed by: PHYSICIAN ASSISTANT

## 2021-08-30 PROCEDURE — 94760 N-INVAS EAR/PLS OXIMETRY 1: CPT

## 2021-08-30 PROCEDURE — 6370000000 HC RX 637 (ALT 250 FOR IP): Performed by: STUDENT IN AN ORGANIZED HEALTH CARE EDUCATION/TRAINING PROGRAM

## 2021-08-30 PROCEDURE — 2700000000 HC OXYGEN THERAPY PER DAY

## 2021-08-30 PROCEDURE — APPNB30 APP NON BILLABLE TIME 0-30 MINS: Performed by: PHYSICIAN ASSISTANT

## 2021-08-30 PROCEDURE — 6370000000 HC RX 637 (ALT 250 FOR IP): Performed by: INTERNAL MEDICINE

## 2021-08-30 PROCEDURE — 6360000002 HC RX W HCPCS: Performed by: INTERNAL MEDICINE

## 2021-08-30 PROCEDURE — 85025 COMPLETE CBC W/AUTO DIFF WBC: CPT

## 2021-08-30 PROCEDURE — 99232 SBSQ HOSP IP/OBS MODERATE 35: CPT | Performed by: SURGERY

## 2021-08-30 PROCEDURE — 36415 COLL VENOUS BLD VENIPUNCTURE: CPT

## 2021-08-30 PROCEDURE — 97161 PT EVAL LOW COMPLEX 20 MIN: CPT | Performed by: PHYSICAL THERAPIST

## 2021-08-30 PROCEDURE — 83735 ASSAY OF MAGNESIUM: CPT

## 2021-08-30 PROCEDURE — 2060000000 HC ICU INTERMEDIATE R&B

## 2021-08-30 PROCEDURE — 2580000003 HC RX 258: Performed by: STUDENT IN AN ORGANIZED HEALTH CARE EDUCATION/TRAINING PROGRAM

## 2021-08-30 PROCEDURE — 97166 OT EVAL MOD COMPLEX 45 MIN: CPT

## 2021-08-30 PROCEDURE — 97530 THERAPEUTIC ACTIVITIES: CPT

## 2021-08-30 PROCEDURE — 80048 BASIC METABOLIC PNL TOTAL CA: CPT

## 2021-08-30 PROCEDURE — 97530 THERAPEUTIC ACTIVITIES: CPT | Performed by: PHYSICAL THERAPIST

## 2021-08-30 RX ORDER — MAGNESIUM SULFATE 1 G/100ML
1000 INJECTION INTRAVENOUS ONCE
Status: COMPLETED | OUTPATIENT
Start: 2021-08-30 | End: 2021-08-30

## 2021-08-30 RX ADMIN — SODIUM CHLORIDE, PRESERVATIVE FREE 10 ML: 5 INJECTION INTRAVENOUS at 20:41

## 2021-08-30 RX ADMIN — OXYCODONE HYDROCHLORIDE AND ACETAMINOPHEN 1 TABLET: 5; 325 TABLET ORAL at 13:55

## 2021-08-30 RX ADMIN — DULOXETINE HYDROCHLORIDE 30 MG: 30 CAPSULE, DELAYED RELEASE ORAL at 08:31

## 2021-08-30 RX ADMIN — CHLORDIAZEPOXIDE HYDROCHLORIDE 25 MG: 25 CAPSULE ORAL at 08:31

## 2021-08-30 RX ADMIN — CHLORDIAZEPOXIDE HYDROCHLORIDE 25 MG: 25 CAPSULE ORAL at 16:55

## 2021-08-30 RX ADMIN — OXYCODONE HYDROCHLORIDE AND ACETAMINOPHEN 1 TABLET: 5; 325 TABLET ORAL at 06:00

## 2021-08-30 RX ADMIN — OXYCODONE HYDROCHLORIDE AND ACETAMINOPHEN 1 TABLET: 5; 325 TABLET ORAL at 20:41

## 2021-08-30 RX ADMIN — SODIUM CHLORIDE, PRESERVATIVE FREE 10 ML: 5 INJECTION INTRAVENOUS at 08:30

## 2021-08-30 RX ADMIN — CHLORDIAZEPOXIDE HYDROCHLORIDE 25 MG: 25 CAPSULE ORAL at 13:53

## 2021-08-30 RX ADMIN — CHLORDIAZEPOXIDE HYDROCHLORIDE 25 MG: 25 CAPSULE ORAL at 20:41

## 2021-08-30 RX ADMIN — MAGNESIUM SULFATE HEPTAHYDRATE 1000 MG: 1 INJECTION, SOLUTION INTRAVENOUS at 17:00

## 2021-08-30 RX ADMIN — SODIUM CHLORIDE, PRESERVATIVE FREE 10 ML: 5 INJECTION INTRAVENOUS at 08:31

## 2021-08-30 ASSESSMENT — PAIN DESCRIPTION - LOCATION: LOCATION: FLANK

## 2021-08-30 ASSESSMENT — PAIN SCALES - GENERAL
PAINLEVEL_OUTOF10: 9
PAINLEVEL_OUTOF10: 2
PAINLEVEL_OUTOF10: 8
PAINLEVEL_OUTOF10: 9
PAINLEVEL_OUTOF10: 0
PAINLEVEL_OUTOF10: 8
PAINLEVEL_OUTOF10: 0
PAINLEVEL_OUTOF10: 9

## 2021-08-30 ASSESSMENT — PAIN DESCRIPTION - FREQUENCY: FREQUENCY: CONTINUOUS

## 2021-08-30 ASSESSMENT — PAIN DESCRIPTION - ORIENTATION: ORIENTATION: LEFT

## 2021-08-30 ASSESSMENT — PAIN DESCRIPTION - PROGRESSION: CLINICAL_PROGRESSION: GRADUALLY WORSENING

## 2021-08-30 ASSESSMENT — PAIN DESCRIPTION - DESCRIPTORS: DESCRIPTORS: ACHING

## 2021-08-30 ASSESSMENT — PAIN DESCRIPTION - PAIN TYPE: TYPE: CHRONIC PAIN

## 2021-08-30 ASSESSMENT — PAIN DESCRIPTION - ONSET: ONSET: ON-GOING

## 2021-08-30 NOTE — PROGRESS NOTES
General Surgery  Daily Progress Note    Pt Name: Merline Cai  Medical Record Number: [id number]  Date of Birth 1945   Today's Date: 8/30/2021    Chief Complaint   Patient presents with    Abdominal Pain       ASSESSMENT/PLAN  1. Splenic hemorrhage s/p IR embolization - pain unchanged per patient    2. Hgb stable. Will continue to monitor  Tolerating clears  3. COPD, HTN, hx of tobacco abuse, hx of EtOH abuse - per primary team    Luis Enrique Crate is unchanged from yesterday. Pain is well controlled. She has no nausea and no vomiting. She is tolerating clear lquids. Current activity is up with assistance    OBJECTIVE  VITALS:  height is 5' 2\" (1.575 m) and weight is 126 lb 1.7 oz (57.2 kg). Her oral temperature is 98.2 °F (36.8 °C). Her blood pressure is 114/72 and her pulse is 99. Her respiration is 18 and oxygen saturation is 98%. GENERAL: alert, no distress  LUNGS: normal respiratory effort, no accessory muscle use  HEART: normal rate and regular rhythm  ABDOMEN: soft, ND, less tenderness in LUQ, no peritonitis  EXTREMITY: no cyanosis and no clubbing  I/O last 3 completed shifts: In: 2444.6 [P.O.:750;  I.V.:1694.6]  Out: 525 [Urine:525]  I/O this shift:  In: -   Out: [address] [facility],[address]       0000 08/27/21  1646 08/27/21  1919 08/28/21  0102 08/28/21  1747 08/29/21  0528 08/30/21  0601   WBC  --  12.3*  --    < >  --    < > 11.2*   HGB  --  12.7  --    < >  --    < > 7.9*   HCT  --  36.6  --    < >  --    < > 22.9*   PLT  --  375  --    < >  --    < > 278   NA  --  132*  --    < > 132*   < > 133*   K  --  3.2*  --    < > 4.2   < > 3.9   CL  --  89*  --    < > 101   < > 100   CO2  --  29  --    < > 21   < > 22   BUN  --  8  --    < > 9   < > 9   CREATININE  --  <0.5*  --    < > <0.5*   < > <0.5*   MG  --  1.50*  --    < >  --    < > 1.70*   CALCIUM  --  8.6  --    < > 7.4*   < > 7.6*   INR  --   --  1.11  --   --   --   --    AST   < > 33  --   --  35  --   --    ALT   < > 13 --   --  12  --   --    BILITOT   < > 0.8  --   --  0.8  --   --    NITRU  --  Negative  --   --   --   --   --    COLORU  --  DK YELLOW  --   --   --   --   --     < > = values in this interval not displayed. CBC with Differential:    Lab Results   Component Value Date    WBC 11.2 08/30/2021    RBC 2.31 08/30/2021    HGB 7.9 08/30/2021    HCT 22.9 08/30/2021     08/30/2021    MCV 99.1 08/30/2021    MCH 34.4 08/30/2021    MCHC 34.7 08/30/2021    RDW 21.1 08/30/2021    SEGSPCT 64.4 03/31/2011    BANDSPCT 2 12/18/2018    LYMPHOPCT 10.9 08/30/2021    MONOPCT 7.2 08/30/2021    MYELOPCT 5 12/18/2018    EOSPCT 11.9 03/31/2011    BASOPCT 0.8 08/30/2021    MONOSABS 0.8 08/30/2021    LYMPHSABS 1.2 08/30/2021    EOSABS 0.1 08/30/2021    BASOSABS 0.1 08/30/2021    DIFFTYPE Auto 03/31/2011     BMP:    Lab Results   Component Value Date     08/30/2021    K 3.9 08/30/2021    K 3.2 08/27/2021     08/30/2021    CO2 22 08/30/2021    BUN 9 08/30/2021    LABALBU 2.3 08/28/2021    CREATININE <0.5 08/30/2021    CALCIUM 7.6 08/30/2021    GFRAA >60 08/30/2021    GFRAA >60 03/31/2011    LABGLOM >60 08/30/2021    GLUCOSE 80 08/30/2021     Hepatic Function Panel:    Lab Results   Component Value Date    ALKPHOS 102 08/28/2021    ALT 12 08/28/2021    AST 35 08/28/2021    PROT 5.0 08/28/2021    BILITOT 0.8 08/28/2021    BILIDIR <0.2 06/23/2021    IBILI see below 06/23/2021    LABALBU 2.3 08/28/2021         Marito Murillo PA-C  Electronically signed 8/30/2021 at 12:13 PM    Agree with above note. The patient was personally seen and examined. Kait Barrow is doing OK. Pain slightly improved. Tolerating clears. Denies nausea. Abd soft, ND, minimal LUQ tenderness, no peritonitis    WBC 11.2  Hgb stable at 7.9  Cr <0.5    A/P: 79 yo female with splenic hematoma    H&H stable.   Pain improving  Advance to regular diet  COPD, HTN per primary team    Arteimo La MD

## 2021-08-30 NOTE — PROGRESS NOTES
Physical Therapy    Facility/Department: [address] PROGRESSIVE CARE  Initial Assessment    NAME: Rigo Villalta  : 1945  MRN: [id number]    Date of Service: 2021    Discharge Recommendations:  24 hour supervision or assist     Rigo Villalta scored a 8/24 on the AM-PAC short mobility form. At this time, no further PT is recommended upon discharge due to pt refusing any further therapy. Recommend patient returns to prior setting with prior services. Assessment   Assessment: pt is a 79 yo female who was adm to hosp with abdominal pain and found to have Splenic hemorrhage s/p IR embolization. Pt reports she stays in bed and  provides total care assist (she uses adult briefs to urinate and defecate and  changes her. Pt not interested in any further therapy and appears to be at her baseline per pt report. Recommend home with prior assist and no further therapy  Prognosis: Guarded  Decision Making: Low Complexity  PT Education: PT Role  Barriers to Learning: resistive to therapy  No Skilled PT: Patient refusal  REQUIRES PT FOLLOW UP: No  Activity Tolerance  Activity Tolerance: gopi therapy poorly; pt reports \"I don't want any more therapy\"       Patient Diagnosis(es): The primary encounter diagnosis was Spleen hematoma, initial encounter. A diagnosis of Blood loss anemia was also pertinent to this visit. has a past medical history of Anemia, Arthritis, Clostridium difficile colitis, Clostridium difficile diarrhea, Colostomy in place [facility]), COPD (chronic obstructive pulmonary disease) ([facility] [address] Depression, DANIELLE (generalized anxiety disorder), Gastric ulcer, unspecified as acute or chronic, without mention of hemorrhage, perforation, or obstruction, H/O ETOH abuse, Hiatal hernia, History of blood transfusion, Hyperlipidemia, Hypertension, Insomnia, Intestinal obstruction ([address] Parkinson's disease ([facility] [address] Tobacco abuse, and Vitamin D deficiency.    has a past surgical history that includes Hysterectomy; Cholecystectomy; Tubal ligation; colostomy (2018); Upper gastrointestinal endoscopy (12/13/2018); Upper gastrointestinal endoscopy (N/A, 12/13/2018); Colonoscopy (12/14/2018); Colonoscopy (N/A, 12/14/2018); Small intestine surgery (N/A, 4/10/2019); Endoscopy, colon, diagnostic; Abdomen surgery; and IR EMBOLIZATION HEMORRHAGE (8/27/2021). Restrictions  Restrictions/Precautions  Restrictions/Precautions: Fall Risk  Position Activity Restriction  Other position/activity restrictions: pt resisitve to therapy  Vision/Hearing  Vision: Within Functional Limits (for purpose of eval)  Hearing: Exceptions to [facility]  Hearing Exceptions: Hard of hearing/hearing concerns     Subjective  General  Chart Reviewed: Yes  Patient assessed for rehabilitation services?: Yes  Additional Pertinent Hx: pt is a 77 yo female who was adm to [facility] with abdominal pain; pt had Splenic hemorrhage s/p IR embolization.   Response To Previous Treatment: Not applicable  Family / Caregiver Present: No  Follows Commands: Within Functional Limits (pt resisitve to therapy)  Subjective  Subjective: pt agreed to sit up with therapy but then stated she does not want any further therapy; pt stated \"I want to go home and get in bed\"; pt states she stays in bed and her  changes her diapers          Orientation  Orientation  Overall Orientation Status: Within Functional Limits (delayed processing but able to answer questions with increased time)  Social/Functional History  Social/Functional History  Lives With: Family (81 y/o ex mother in law and spouse)  Type of Home: House  Home Layout: Able to Live on Main level with bedroom/bathroom  Home Access: Stairs to enter with rails  Entrance Stairs - Number of Steps: 8 steps to enter  Bathroom Shower/Tub:  (pt only sponge bathes with family assist)  Bathroom Toilet:  (voids via depends- does not use toilet or BSC)  ADL Assistance: Needs assistance (family assist with sponge bathes and changing depends)  Homemaking Assistance: Needs assistance (family assist with IADLs)  Ambulation Assistance: Needs assistance (pt reports she has been bedbound x 2 years)  Transfer Assistance: Needs assistance  Active : No (family drives)  Additional Comments: Social history obtained from patient interview and previous therapy encounter 6/2021. Cognition   Cognition  Overall Cognitive Status: Exceptions  Arousal/Alertness:  (easily irritated)  Safety Judgement: Decreased awareness of need for safety  Cognition Comment: self limiting    Objective          AROM RLE (degrees)  RLE AROM: WFL  AROM LLE (degrees)  LLE AROM : WFL  Strength RLE  Comment: unable to test as pt resisitve to therapy; able to stand on stedy with mod/max A of 2  Strength LLE  Comment: unable to test as pt resisitve to therapy; able to stand on stedy with mod/max A of 2        Bed mobility  Supine to Sit: Maximum assistance; Moderate assistance;2 Person assistance  Transfers  Sit to Stand:  Moderate Assistance;Maximum Assistance;2 Person Assistance (with stedy)  Stand to sit: Moderate Assistance;2 Person Assistance (with stedy)  Bed to Chair: Dependent/Total (with stedy)  Comment: recommend nursing use maxi move        Balance  Comments: sitting balance varied from min to max A depending on effort; pt unable to fully stand        Plan   Plan  Plan Comment: no further therapy as pt resistive to therapy and reports she does not want any more  Safety Devices  Type of devices: Call light within reach, Left in chair, Nurse notified, All fall risk precautions in place, Chair alarm in place (maxi move lift pad under pt)    G-Code       OutComes Score                                                  AM-PAC Score  AM-PAC Inpatient Mobility Raw Score : 8 (08/30/21 1346)  AM-PAC Inpatient T-Scale Score : 28.52 (08/30/21 1346)  Mobility Inpatient CMS 0-100% Score: 86.62 (08/30/21 1346)  Mobility Inpatient CMS G-Code Modifier : CM (08/30/21 1346) Goals  Patient Goals   Patient goals : I want to go home and lay in bed       Therapy Time   Individual Concurrent Group Co-treatment   Time In 1300         Time Out 1345         Minutes 45                 BIA SHEFFIELD PT    Electronically signed by BIA SHEFFIELD PT on 8/30/2021 at 1:47 PM

## 2021-08-30 NOTE — PLAN OF CARE
Problem: Falls - Risk of:  Goal: Will remain free from falls  Description: Will remain free from falls  8/30/2021 0800 by Candida Harvey RN  Outcome: Ongoing  8/30/2021 0212 by Trish Monge RN  Outcome: Met This Shift  Goal: Absence of physical injury  Description: Absence of physical injury  8/30/2021 0800 by Candida Harvey RN  Outcome: Ongoing  8/30/2021 0212 by Trish Monge RN  Outcome: Met This Shift     Problem: Pain:  Goal: Pain level will decrease  Description: Pain level will decrease  8/30/2021 0800 by Candida Harvey RN  Outcome: Ongoing  8/30/2021 0212 by Trish Monge RN  Outcome: Ongoing  Goal: Control of acute pain  Description: Control of acute pain  8/30/2021 0800 by Candida Harvey RN  Outcome: Ongoing  8/30/2021 0212 by Trish Monge RN  Outcome: Ongoing  Goal: Control of chronic pain  Description: Control of chronic pain  8/30/2021 0800 by Candida Harvey RN  Outcome: Ongoing  8/30/2021 0212 by rTish Monge RN  Outcome: Ongoing     Problem: Non-Violent Restraints  Goal: Removal from restraints as soon as assessed to be safe  8/30/2021 0212 by Trish Monge RN  Outcome: Completed  Goal: No harm/injury to patient while restraints in use  8/30/2021 0212 by Trish Monge RN  Outcome: Completed  Goal: Patient's dignity will be maintained  8/30/2021 0212 by Trish Monge RN  Outcome: Completed     Problem: Skin Integrity:  Goal: Will show no infection signs and symptoms  Description: Will show no infection signs and symptoms  8/30/2021 0800 by Candida Harvey RN  Outcome: Ongoing  8/30/2021 0212 by Trish Monge RN  Outcome: Ongoing  Goal: Absence of new skin breakdown  Description: Absence of new skin breakdown  8/30/2021 0800 by Candida Harvey RN  Outcome: Ongoing  8/30/2021 0212 by Trish Monge RN  Outcome: Ongoing

## 2021-08-30 NOTE — PROGRESS NOTES
Patient placed in specialty bed d/t refusing to turn when we moved patient she was very wet and has a f/c I think it may have been kinked will monitor

## 2021-08-30 NOTE — PROGRESS NOTES
Hospitalist Progress Note      PCP: Naima Gordon MD    Date of Admission: 8/27/2021    Chief Complaint: Abdominal pain    Hospital Course: Patient is a 53-year-old female with a past medical history of COPD, hyperlipidemia, hypertension, atrial fibrillation with previous bowel obstructions who presented to the hospital with acute onset sharp central abdominal pain. In the emergency department she was noted to have large perisplenic hematoma concerning for active bleeding in the upper pole of the spleen extending below the left diaphragm. IR was then consulted and underwent successful proximal splenic artery embolization. 8/28  Patient states her abdominal pain is much better today than it was yesterday. Patient's hemoglobin remains stable. Potassium markedly elevated at 6.4. Magnesium 4 as well. We will redraw blood as appears abnormal.  Patient has been off pressors for a few hours, can transfer out of the ICU    8/29 Large drop in hemoglobin from 12-7.4. Will monitor this afternoon. May require transfusion. Discussed with general surgery    Subjective: Patient seen and examined. Hemoglobin now stable. Hold off on further surgical intervention. Continue clear liquid diet per surgery.       Medications:  Reviewed    Infusion Medications    sodium chloride 5 mL/hr at 08/28/21 2036    sodium chloride 5 mL/hr at 08/28/21 9394    sodium chloride      sodium chloride      sodium chloride      sodium chloride Stopped (08/30/21 0246)    sodium chloride       Scheduled Medications    sodium chloride  500 mL IntraVENous Once    DULoxetine  30 mg Oral Daily    nicotine  1 patch Transdermal Daily    chlordiazePOXIDE  25 mg Oral 4x Daily    sodium chloride flush  5-40 mL IntraVENous 2 times per day    lidocaine 1 % injection  5 mL IntraDERmal Once    sodium chloride flush  5-40 mL IntraVENous 2 times per day     PRN Meds: oxyCODONE-acetaminophen, sodium chloride, sodium chloride, sodium chloride flush, sodium chloride, ondansetron **OR** ondansetron, acetaminophen **OR** acetaminophen, sodium chloride flush, sodium chloride      Intake/Output Summary (Last 24 hours) at 8/30/2021 0922  Last data filed at 8/30/2021 0458  Gross per 24 hour   Intake 934.33 ml   Output 525 ml   Net 409.33 ml       Physical Exam Performed:    /72   Pulse 99   Temp 98.2 °F (36.8 °C) (Oral)   Resp 18   Ht 5' 2\" (1.575 m)   Wt 126 lb 1.7 oz (57.2 kg)   SpO2 98%   BMI 23.06 kg/m²     General appearance: No apparent distress, appears stated age and cooperative. HEENT: Pupils equal, round, and reactive to light. Conjunctivae/corneas clear. Neck: Supple, with full range of motion. No jugular venous distention. Trachea midline. Respiratory:  Normal respiratory effort. Clear to auscultation  Cardiovascular: Regular rate and rhythm with normal S1/S2   Abdomen: Soft, non-tender, non-distended with normal bowel sounds. Musculoskeletal: No clubbing, cyanosis or edema bilaterally. Skin: Skin color, texture, turgor normal.  No rashes or lesions. Neurologic:  Neurovascularly intact without any focal sensory/motor deficits. Cranial nerves: II-XII intact, grossly non-focal.  Psychiatric: Alert and oriented, thought content appropriate, normal insight  Capillary Refill: Brisk,< 3 seconds   Peripheral Pulses: +2 palpable, equal bilaterally       Labs:   Recent Labs     08/28/21  0752 08/28/21  0752 08/29/21  0528 08/29/21  1541 08/30/21  0601   WBC 14.2*  --  13.6*  --  11.2*   HGB 10.8*   < > 7.4* 7.9* 7.9*   HCT 32.8*   < > 21.8* 23.6* 22.9*     --  269  --  278    < > = values in this interval not displayed.      Recent Labs     08/28/21  1747 08/29/21  0528 08/30/21  0601   * 133* 133*   K 4.2 3.8 3.9    100 100   CO2 21 25 22   BUN 9 10 9   CREATININE <0.5* <0.5* <0.5*   CALCIUM 7.4* 7.5* 7.6*     Recent Labs     08/27/21  1646 08/28/21  1747   AST 33 35   ALT 13 12   BILITOT 0.8 0.8   ALKPHOS 156* 102     Recent Labs     08/27/21 1919   INR 1.11     No results for input(s): Yas Turner in the last 72 hours. Urinalysis:      Lab Results   Component Value Date    NITRU Negative 08/27/2021    WBCUA >900 12/19/2019    BACTERIA 2+ 12/19/2019    RBCUA 20-50 12/19/2019    BLOODU Negative 08/27/2021    SPECGRAV 1.012 08/27/2021    GLUCOSEU Negative 08/27/2021       Radiology:  IR EMBOLIZATION HEMORRHAGE   Final Result   SUCCESSFUL PROXIMAL SPLENIC ARTERY EMBOLIZATION. CT ABDOMEN PELVIS W IV CONTRAST Additional Contrast? None   Final Result   1. Large perisplenic hematoma. Findings concerning for active bleeding in   the upper pole of the spleen extending below the left hemidiaphragm. There   is moderate intraperitoneal blood extending into the lesser sac and   surrounding the stomach. 2. No other acute findings within the abdomen or pelvis. Resolution of   pancreatitis noted on the previous study. 3. Hepatic steatosis. 4. Previous small bowel and colonic resection. No acute bowel pathology. 5. Small left pleural effusion with dependent lower lobe atelectasis. Findings were discussed with Maria M Anderson at 6:57 pm on 8/27/2021. Assessment/Plan:    Splenic hematoma  Status post IR embolization of splenic artery  Continue to monitor  General surgery consulted  Continue to monitor    Acute blood loss anemia  Secondary to splenic hematoma  Hemoglobin remained stable  Large drop from 12-7, likely some element of hemodilution/equilibration    Hypokalemia  Replete and recheck    Hypomagnesemia  Replete and recheck        DVT Prophylaxis: SCD  Diet: ADULT DIET;  Clear Liquid  Code Status: Full Code    PT/OT Eval Status: Not applicable    Dispo -inpatient    Cara Pop MD

## 2021-08-30 NOTE — PLAN OF CARE
Problem: Falls - Risk of:  Goal: Will remain free from falls  Description: Will remain free from falls  8/30/2021 0212 by Messi Harrison RN  Outcome: Met This Shift  8/29/2021 1400 by Elizabeth Robles RN  Outcome: Ongoing  Goal: Absence of physical injury  Description: Absence of physical injury  8/30/2021 0212 by Messi Harrison RN  Outcome: Met This Shift  8/29/2021 1400 by Elizabeth Robles RN  Outcome: Ongoing   Alert and oriented x4 Resp. Easy and even Sats. WNL on RA Lungs diminished in bases Cough and deep breathing exercises encouraged No c/o pain f/c patent draining ashli colored urine Cath. Care php Refuses to turn Specialty bed rxd. And placed patient in.  Free from fall/injury

## 2021-08-30 NOTE — PROGRESS NOTES
Occupational Therapy   Occupational Therapy Initial Assessment  Date: 2021   Patient Name: Kolby Harrison  MRN: [id number]     : 1945    Date of Service: 2021    Discharge Recommendations:  24 hour supervision or assist  OT Equipment Recommendations  Equipment Needed: No    Kolby Harrison scored a 9/24 on the AM-PAC ADL Inpatient form. At this time, no further OT is recommended upon discharge as pt declining further therapy. Recommend patient returns to prior setting with prior services. Assessment   Performance deficits / Impairments: Decreased functional mobility ; Decreased strength;Decreased endurance;Decreased ADL status; Decreased safe awareness;Decreased high-level IADLs;Decreased balance;Decreased cognition  Assessment: 79 y/o female admitted 2021 with splenic hematoma S/P IR embolization of splenic artery. PTA pt reports she lives at home, is primarily bedbound x 2 years and voids via depends, however per chart, pt was able to stand with min A x 2 and discharged to [facility] in 2021. Today, pt required max A x2 to stand to armani stedy and transfer to chair. Pt with functional UE ROM for self care and anticipate will require up to max A for ADLs based on balance and cognition observed. Pt declining further therapy and states, \"I just want to go home and get into bed. \" No further OT warranted at this time. Prognosis: Fair  Decision Making: Medium Complexity  OT Education: OT Role;Plan of Care;Transfer Training  Barriers to Learning: self limiting  No Skilled OT: Patient refusal  REQUIRES OT FOLLOW UP: No  Activity Tolerance  Activity Tolerance: self limiting, easily irritated  Safety Devices  Safety Devices in place: Yes  Type of devices: Left in chair;Nurse notified;Gait belt;Call light within reach; Chair alarm in place           Patient Diagnosis(es): The primary encounter diagnosis was Spleen hematoma, initial encounter.  A diagnosis of Blood loss anemia was also pertinent to this visit. has a past medical history of Anemia, Arthritis, Clostridium difficile colitis, Clostridium difficile diarrhea, Colostomy in place [facility]), COPD (chronic obstructive pulmonary disease) ([facility] [address] Depression, DANIELLE (generalized anxiety disorder), Gastric ulcer, unspecified as acute or chronic, without mention of hemorrhage, perforation, or obstruction, H/O ETOH abuse, Hiatal hernia, History of blood transfusion, Hyperlipidemia, Hypertension, Insomnia, Intestinal obstruction ([address] Parkinson's disease ([facility] [address] Tobacco abuse, and Vitamin D deficiency. has a past surgical history that includes Hysterectomy; Cholecystectomy; Tubal ligation; colostomy (2018); Upper gastrointestinal endoscopy (12/13/2018); Upper gastrointestinal endoscopy (N/A, 12/13/2018); Colonoscopy (12/14/2018); Colonoscopy (N/A, 12/14/2018); Small intestine surgery (N/A, 4/10/2019); Endoscopy, colon, diagnostic; Abdomen surgery; and IR EMBOLIZATION HEMORRHAGE (8/27/2021). Restrictions  Restrictions/Precautions  Restrictions/Precautions: Fall Risk  Position Activity Restriction  Other position/activity restrictions: pt resisitve to therapy    Subjective   General  Chart Reviewed: Yes  Patient assessed for rehabilitation services?: Yes  Additional Pertinent Hx: Per Dr. Kelly Grissom note: \"78year-old female with a past medical history of COPD, hyperlipidemia, hypertension, atrial fibrillation with previous bowel obstructions who presented to the hospital with acute onset sharp central abdominal pain. In the emergency department she was noted to have large perisplenic hematoma concerning for active bleeding in the upper pole of the spleen extending below the left diaphragm. IR was then consulted and underwent successful proximal splenic artery embolization. \"  Family / Caregiver Present: No  Referring Practitioner: Dr. Alissa Amanda  Subjective: Pt seen bedside. Pt resistive to therapy.  Pt stating, \"I just want to go home and get in bed.\"  General Comment  Comments: Per RN ok for therapy. Social/Functional History  Social/Functional History  Lives With: Family (81 y/o ex mother in law and spouse)  Type of Home: House  Home Layout: Able to Live on Main level with bedroom/bathroom  Home Access: Stairs to enter with rails  Entrance Stairs - Number of Steps: 8 steps to enter  Bathroom Shower/Tub:  (pt only sponge bathes with family assist)  Bathroom Toilet:  (voids via depends- does not use toilet or BSC)  ADL Assistance: Needs assistance (family assist with sponge bathes and changing depends)  Homemaking Assistance: Needs assistance (family assist with IADLs)  Ambulation Assistance: Needs assistance (pt reports she has been bedbound x 2 years)  Transfer Assistance: Needs assistance  Active : No (family drives)  Additional Comments: Social history obtained from patient interview and previous therapy encounter 6/2021. Objective        Orientation  Overall Orientation Status:  (initially disoriented to year but able to correct with cuing; somewhat confused to recent situation)  Orientation Level: Oriented to place;Oriented to person     Balance  Sitting Balance:  (EOB ~ 5-7 min, ranged from min-max A pendning pt effort)  Standing Balance  Time: stood prn with armani osmeldy support to transfer to chair  Functional Mobility  Functional Mobility Comments: bed > chair via armani stedy; recommend maxi move to transfer back to bed     ADL  Toileting: Dependent/Total (carissa)  Additional Comments: Anticipate pt will be max A for all ADLs based on balance and ROM observed        Bed mobility  Supine to Sit: Maximum assistance; Moderate assistance;2 Person assistance  Sit to Supine:  (pt in chair at end of session)  Transfers  Sit to stand: Maximum assistance; Moderate assistance;2 Person assistance  Stand to sit: Maximum assistance;2 Person assistance; Moderate assistance  Transfer Comments: to/from armani holland     Cognition  Overall Cognitive Status: Exceptions  Arousal/Alertness:  (easily irritated)  Memory: Decreased recall of recent events  Safety Judgement: Decreased awareness of need for safety  Cognition Comment: self limiting         LUE AROM (degrees)  LUE AROM : WFL  Left Hand AROM (degrees)  Left Hand AROM: WFL  RUE AROM (degrees)  RUE AROM : WFL  Right Hand AROM (degrees)  Right Hand AROM: WFL     Plan   Plan  Times per week: DC acute OT      AM-PAC Score  AM-PAC Inpatient Daily Activity Raw Score: 9 (08/30/21 1347)  AM-PAC Inpatient ADL T-Scale Score : 25.33 (08/30/21 1347)  ADL Inpatient CMS 0-100% Score: 79.59 (08/30/21 1347)  ADL Inpatient CMS G-Code Modifier : CL (08/30/21 1347)    Goals  Short term goals  Time Frame for Short term goals: no acute OT goals identifed as pt refusing further therapy. Patient Goals   Patient goals : to go home       Therapy Time   Individual Concurrent Group Co-treatment   Time In 1300         Time Out 1345         Minutes 45         Timed Code Treatment Minutes: 30 Minutes     This note to serve as OT d/c summary if pt is d/c-ed prior to next therapy session.     Hakan Blanco, OTR/L

## 2021-08-31 LAB
ANION GAP SERPL CALCULATED.3IONS-SCNC: 11 MMOL/L (ref 3–16)
BASOPHILS ABSOLUTE: 0 K/UL (ref 0–0.2)
BASOPHILS RELATIVE PERCENT: 0.5 %
BUN BLDV-MCNC: 8 MG/DL (ref 7–20)
CALCIUM SERPL-MCNC: 8.1 MG/DL (ref 8.3–10.6)
CHLORIDE BLD-SCNC: 98 MMOL/L (ref 99–110)
CO2: 23 MMOL/L (ref 21–32)
CREAT SERPL-MCNC: <0.5 MG/DL (ref 0.6–1.2)
EOSINOPHILS ABSOLUTE: 0.3 K/UL (ref 0–0.6)
EOSINOPHILS RELATIVE PERCENT: 2.9 %
GFR AFRICAN AMERICAN: >60
GFR NON-AFRICAN AMERICAN: >60
GLUCOSE BLD-MCNC: 98 MG/DL (ref 70–99)
HCT VFR BLD CALC: 23.7 % (ref 36–48)
HEMOGLOBIN: 7.9 G/DL (ref 12–16)
LYMPHOCYTES ABSOLUTE: 1.4 K/UL (ref 1–5.1)
LYMPHOCYTES RELATIVE PERCENT: 14.7 %
MAGNESIUM: 1.9 MG/DL (ref 1.8–2.4)
MCH RBC QN AUTO: 33.6 PG (ref 26–34)
MCHC RBC AUTO-ENTMCNC: 33.5 G/DL (ref 31–36)
MCV RBC AUTO: 100.2 FL (ref 80–100)
MONOCYTES ABSOLUTE: 0.8 K/UL (ref 0–1.3)
MONOCYTES RELATIVE PERCENT: 8.3 %
NEUTROPHILS ABSOLUTE: 6.9 K/UL (ref 1.7–7.7)
NEUTROPHILS RELATIVE PERCENT: 73.6 %
PDW BLD-RTO: 21.6 % (ref 12.4–15.4)
PLATELET # BLD: 343 K/UL (ref 135–450)
PMV BLD AUTO: 6.8 FL (ref 5–10.5)
POTASSIUM SERPL-SCNC: 3.7 MMOL/L (ref 3.5–5.1)
RBC # BLD: 2.36 M/UL (ref 4–5.2)
SODIUM BLD-SCNC: 132 MMOL/L (ref 136–145)
WBC # BLD: 9.4 K/UL (ref 4–11)

## 2021-08-31 PROCEDURE — 80048 BASIC METABOLIC PNL TOTAL CA: CPT

## 2021-08-31 PROCEDURE — 94761 N-INVAS EAR/PLS OXIMETRY MLT: CPT

## 2021-08-31 PROCEDURE — APPSS15 APP SPLIT SHARED TIME 0-15 MINUTES: Performed by: NURSE PRACTITIONER

## 2021-08-31 PROCEDURE — 6360000002 HC RX W HCPCS: Performed by: SURGERY

## 2021-08-31 PROCEDURE — 99232 SBSQ HOSP IP/OBS MODERATE 35: CPT | Performed by: SURGERY

## 2021-08-31 PROCEDURE — 90732 PPSV23 VACC 2 YRS+ SUBQ/IM: CPT | Performed by: SURGERY

## 2021-08-31 PROCEDURE — APPNB30 APP NON BILLABLE TIME 0-30 MINS: Performed by: PHYSICIAN ASSISTANT

## 2021-08-31 PROCEDURE — 2060000000 HC ICU INTERMEDIATE R&B

## 2021-08-31 PROCEDURE — 83735 ASSAY OF MAGNESIUM: CPT

## 2021-08-31 PROCEDURE — 6360000002 HC RX W HCPCS: Performed by: INTERNAL MEDICINE

## 2021-08-31 PROCEDURE — 2700000000 HC OXYGEN THERAPY PER DAY

## 2021-08-31 PROCEDURE — 2580000003 HC RX 258: Performed by: STUDENT IN AN ORGANIZED HEALTH CARE EDUCATION/TRAINING PROGRAM

## 2021-08-31 PROCEDURE — 90648 HIB PRP-T VACCINE 4 DOSE IM: CPT | Performed by: SURGERY

## 2021-08-31 PROCEDURE — 85025 COMPLETE CBC W/AUTO DIFF WBC: CPT

## 2021-08-31 PROCEDURE — 6370000000 HC RX 637 (ALT 250 FOR IP): Performed by: INTERNAL MEDICINE

## 2021-08-31 PROCEDURE — 36415 COLL VENOUS BLD VENIPUNCTURE: CPT

## 2021-08-31 PROCEDURE — G0009 ADMIN PNEUMOCOCCAL VACCINE: HCPCS | Performed by: SURGERY

## 2021-08-31 PROCEDURE — 90471 IMMUNIZATION ADMIN: CPT | Performed by: SURGERY

## 2021-08-31 PROCEDURE — APPNB15 APP NON BILLABLE TIME 0-15 MINS: Performed by: NURSE PRACTITIONER

## 2021-08-31 RX ORDER — FUROSEMIDE 10 MG/ML
20 INJECTION INTRAMUSCULAR; INTRAVENOUS ONCE
Status: COMPLETED | OUTPATIENT
Start: 2021-08-31 | End: 2021-08-31

## 2021-08-31 RX ORDER — CHLORDIAZEPOXIDE HYDROCHLORIDE 5 MG/1
10 CAPSULE, GELATIN COATED ORAL 4 TIMES DAILY
Status: DISCONTINUED | OUTPATIENT
Start: 2021-08-31 | End: 2021-08-31

## 2021-08-31 RX ORDER — CHLORDIAZEPOXIDE HYDROCHLORIDE 5 MG/1
10 CAPSULE, GELATIN COATED ORAL 4 TIMES DAILY PRN
Status: DISCONTINUED | OUTPATIENT
Start: 2021-08-31 | End: 2021-09-02 | Stop reason: HOSPADM

## 2021-08-31 RX ORDER — CHLORDIAZEPOXIDE HYDROCHLORIDE 5 MG/1
CAPSULE, GELATIN COATED ORAL
Qty: 6 CAPSULE | Refills: 0 | Status: SHIPPED | OUTPATIENT
Start: 2021-08-31 | End: 2021-09-02 | Stop reason: HOSPADM

## 2021-08-31 RX ADMIN — CHLORDIAZEPOXIDE HYDROCHLORIDE 25 MG: 25 CAPSULE ORAL at 09:17

## 2021-08-31 RX ADMIN — SODIUM CHLORIDE, PRESERVATIVE FREE 10 ML: 5 INJECTION INTRAVENOUS at 22:09

## 2021-08-31 RX ADMIN — PNEUMOCOCCAL VACCINE POLYVALENT 0.5 ML
25; 25; 25; 25; 25; 25; 25; 25; 25; 25; 25; 25; 25; 25; 25; 25; 25; 25; 25; 25; 25; 25; 25 INJECTION, SOLUTION INTRAMUSCULAR; SUBCUTANEOUS at 18:18

## 2021-08-31 RX ADMIN — OXYCODONE HYDROCHLORIDE AND ACETAMINOPHEN 1 TABLET: 5; 325 TABLET ORAL at 05:21

## 2021-08-31 RX ADMIN — OXYCODONE HYDROCHLORIDE AND ACETAMINOPHEN 1 TABLET: 5; 325 TABLET ORAL at 17:00

## 2021-08-31 RX ADMIN — HAEMOPHILUS B POLYSACCHARIDE CONJUGATE VACCINE FOR INJ 0.5 ML: RECON SOLN at 18:13

## 2021-08-31 RX ADMIN — DULOXETINE HYDROCHLORIDE 30 MG: 30 CAPSULE, DELAYED RELEASE ORAL at 09:17

## 2021-08-31 RX ADMIN — SODIUM CHLORIDE, PRESERVATIVE FREE 10 ML: 5 INJECTION INTRAVENOUS at 09:15

## 2021-08-31 RX ADMIN — FUROSEMIDE 20 MG: 10 INJECTION, SOLUTION INTRAMUSCULAR; INTRAVENOUS at 12:27

## 2021-08-31 ASSESSMENT — PAIN SCALES - GENERAL
PAINLEVEL_OUTOF10: 0
PAINLEVEL_OUTOF10: 8
PAINLEVEL_OUTOF10: 0
PAINLEVEL_OUTOF10: 10
PAINLEVEL_OUTOF10: 0
PAINLEVEL_OUTOF10: 2

## 2021-08-31 NOTE — ACP (ADVANCE CARE PLANNING)
Advance Care Planning     Advance Care Planning Activator (Inpatient)  Conversation Note      Date of ACP Conversation: 8/31/2021     Conversation Conducted with: Patient with Decision Making Capacity    ACP Activator: BETO Schuster Res, LSW    Health Care Decision Maker:     Current Designated Health Care Decision Maker:     Primary Decision Maker: James Juan Francisco - Child - [phone]    Today we documented Decision Maker(s) consistent with ACP documents on file.     Care Preferences - Patient declined discussion at this time       BETO Schuster Res, LSW, Social Work/Case Management   [phone]  Electronically signed by BETO Schuster Res, LSW on 8/31/2021 at 1:25 PM

## 2021-08-31 NOTE — PROGRESS NOTES
General Surgery  Daily Progress Note    Pt Name: Garry John  Medical Record Number: [id number]  Date of Birth 1945   Today's Date: 8/31/2021    Chief Complaint   Patient presents with    Abdominal Pain       ASSESSMENT/PLAN  1. Splenic hematoma s/p IR embolization - pain slowly improving   2. Hgb stable. Will continue to monitor  Tolerating general diet. 3. COPD, HTN, hx of tobacco abuse, hx of EtOH abuse - per primary team    Opal Vasquez is improved from yesterday. Pain is well controlled. She has no nausea and no vomiting. She is tolerating general diet. Current activity is up with assistance    OBJECTIVE  VITALS:  height is 5' 2\" (1.575 m) and weight is 126 lb 8.7 oz (57.4 kg). Her oral temperature is 98.3 °F (36.8 °C). Her blood pressure is 103/62 and her pulse is 97. Her respiration is 20 and oxygen saturation is 96%. GENERAL: alert, no distress  LUNGS: normal respiratory effort, no accessory muscle use  HEART: normal rate and regular rhythm  ABDOMEN: soft, ND, less tenderness in LUQ, no peritonitis  EXTREMITY: no cyanosis and no clubbing  I/O last 3 completed shifts: In: 600 [P.O.:600]  Out: 1050 [Urine:1050]  I/O this shift:  In: 120 [P.O.:120]  Out: -     LABS  Recent Labs     08/28/21  1747 08/29/21  0528 08/31/21  0517   WBC  --    < > 9.4   HGB  --    < > 7.9*   HCT  --    < > 23.7*   PLT  --    < > 343   *   < > 132*   K 4.2   < > 3.7      < > 98*   CO2 21   < > 23   BUN 9   < > 8   CREATININE <0.5*   < > <0.5*   MG  --    < > 1.90   CALCIUM 7.4*   < > 8.1*   AST 35  --   --    ALT 12  --   --    BILITOT 0.8  --   --     < > = values in this interval not displayed.      CBC with Differential:    Lab Results   Component Value Date    WBC 9.4 08/31/2021    RBC 2.36 08/31/2021    HGB 7.9 08/31/2021    HCT 23.7 08/31/2021     08/31/2021    .2 08/31/2021    MCH 33.6 08/31/2021    MCHC 33.5 08/31/2021    RDW 21.6 08/31/2021    SEGSPCT 64.4 03/31/2011 BANDSPCT 2 12/18/2018    LYMPHOPCT 14.7 08/31/2021    MONOPCT 8.3 08/31/2021    MYELOPCT 5 12/18/2018    EOSPCT 11.9 03/31/2011    BASOPCT 0.5 08/31/2021    MONOSABS 0.8 08/31/2021    LYMPHSABS 1.4 08/31/2021    EOSABS 0.3 08/31/2021    BASOSABS 0.0 08/31/2021    DIFFTYPE Auto 03/31/2011     BMP:    Lab Results   Component Value Date     08/31/2021    K 3.7 08/31/2021    K 3.2 08/27/2021    CL 98 08/31/2021    CO2 23 08/31/2021    BUN 8 08/31/2021    LABALBU 2.3 08/28/2021    CREATININE <0.5 08/31/2021    CALCIUM 8.1 08/31/2021    GFRAA >60 08/31/2021    GFRAA >60 03/31/2011    LABGLOM >60 08/31/2021    GLUCOSE 98 08/31/2021     Hepatic Function Panel:    Lab Results   Component Value Date    ALKPHOS 102 08/28/2021    ALT 12 08/28/2021    AST 35 08/28/2021    PROT 5.0 08/28/2021    BILITOT 0.8 08/28/2021    BILIDIR <0.2 06/23/2021    IBILI see below 06/23/2021    LABALBU 2.3 08/28/2021         ABIGAIL Regan CNP  Electronically signed 8/31/2021 at 10:08 AM    Agree with above note. The patient was personally seen and examined. Artist Renetta is doing slightly worse today. Pain present but controlled. Seems confused. Disoriented, no acute distress  Normal respiratory effort, no accessory muscle use  Abd soft, minimal LUQ tenderness, ND, no peritonitis  Ext no cyanosis or clubbing    WBC 9.4  Hgb 7.9  Cr <0.5    A/P: 77 yo female with splenic hemorrhage s/p IR embolization, COPD, HTN, now with altered mental status    Splenic hemorrhage - H&H stable. Continue regular diet. Will order pneumovax, meningococcal, HIB vaccines    COPD, HTN -per hospitalist    Altered mental status - discussed with Dr. Vaishali Watts. Possibly secondary to lithium dose. Monitor for EtOH withdrawal symptoms    Hold on discharge for now due to mental status.     Torri Clay MD

## 2021-08-31 NOTE — DISCHARGE SUMMARY
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 Hospital Course:   Patient is a 79-year-old female with a past medical history of COPD, hyperlipidemia, hypertension, atrial fibrillation with previous bowel obstructions who presented to the hospital with acute onset sharp central abdominal pain. In the emergency department she was noted to have large perisplenic hematoma concerning for active bleeding in the upper pole of the spleen extending below the left diaphragm. IR was then consulted and underwent successful proximal splenic artery embolization. Patient's abdominal pain continued to improve throughout admission. There was a large drop in the patient's hemoglobin from 12-7.4 but it then stabilized and she did not require a transfusion. Patient's diet was advanced from clear liquid regular and she tolerated that without issue. Patient continued to have some elements of confusion so CT of the head, urinalysis, ammonia level and ABG were ordered and all were negative. Patient son came to the hospital and states this is the patient's baseline.   There was some concern that the scheduled Librium was causing confusion problem but that was stopped and it appears as if whenever the patient receives pain medication she becomes very drowsy. Of note at baseline patient is bedbound or wheelchair-bound. She is cared for by her son and significant other who do all of her ADLs. Just 3 months ago patient was up and walking around. Patient son states that he would like for the patient to go home and not to a rehab facility. He states that she would do much better there than at her rehab. Splenic hematoma  Status post IR embolization of splenic artery  Continue to monitor  General surgery consulted, stable for DC  Continue to monitor     Acute blood loss anemia  Secondary to splenic hematoma  Hemoglobin remained stable  Large drop from 12-7, likely some element of hemodilution/equilibration     Alcohol abuse with dependence  Hold on Librium due to sedation  Continue to monitor    UTI due to Ramos Catheter  UA neg on admit  UA + on 9/1  Rocephin while inpatient, Macrobid at DC  E coli on previous UTI     Acute respiratory failure with hypoxia  Likely due to IV fluids along with narcotic use  Lasix IV twice daily with good urine output  Titrate to keep saturations above 90%     Hypokalemia  Replete and recheck     Hypomagnesemia  Replete and recheck         Exam:     /69   Pulse 108   Temp 98 °F (36.7 °C) (Axillary)   Resp 18   Ht 5' 2\" (1.575 m)   Wt 122 lb 2.2 oz (55.4 kg)   SpO2 93%   BMI 22.34 kg/m²     General appearance: No apparent distress, appears stated age  HEENT: Pupils equal, round, and reactive to light. Conjunctivae/corneas clear. Neck: Supple, with full range of motion. No jugular venous distention. Trachea midline. Respiratory:  Normal respiratory effort. Cardiovascular: Regular rate and rhythm with normal S1/S2  Abdomen: Soft, non-tender, non-distended with normal bowel sounds. Musculoskeletal: No clubbing, cyanosis or edema bilaterally. Full range of motion without deformity. Skin: Skin color, texture, turgor normal.  No rashes or lesions.   Neurologic:  Neurovascularly intact without any focal sensory/motor deficits. Cranial nerves: II-XII intact, grossly non-focal.  Psychiatric: Alert and oriented      Consults:     IP CONSULT TO GENERAL SURGERY  IP CONSULT TO HOSPITALIST  IP CONSULT TO HOME CARE NEEDS  IP CONSULT TO HOME CARE NEEDS    Significant Diagnostic Studies:     CT HEAD WO CONTRAST   Final Result   No acute intracranial abnormality. IR EMBOLIZATION HEMORRHAGE   Final Result   SUCCESSFUL PROXIMAL SPLENIC ARTERY EMBOLIZATION. CT ABDOMEN PELVIS W IV CONTRAST Additional Contrast? None   Final Result   1. Large perisplenic hematoma. Findings concerning for active bleeding in   the upper pole of the spleen extending below the left hemidiaphragm. There   is moderate intraperitoneal blood extending into the lesser sac and   surrounding the stomach. 2. No other acute findings within the abdomen or pelvis. Resolution of   pancreatitis noted on the previous study. 3. Hepatic steatosis. 4. Previous small bowel and colonic resection. No acute bowel pathology. 5. Small left pleural effusion with dependent lower lobe atelectasis. Findings were discussed with Maria M Anderson at 6:57 pm on 8/27/2021. Disposition:  Home with HH     Condition at Discharge: Stable    Discharge Instructions/Follow-up:  PCP    Code Status:  Full Code     Activity: activity as tolerated    Diet: regular diet      Labs:  For convenience and continuity at follow-up the following most recent labs are provided:      CBC:    Lab Results   Component Value Date    WBC 9.3 09/02/2021    HGB 8.8 09/02/2021    HCT 25.7 09/02/2021     09/02/2021       Renal:    Lab Results   Component Value Date     09/02/2021    K 3.5 09/02/2021    K 3.2 08/27/2021    CL 93 09/02/2021    CO2 33 09/02/2021    BUN 9 09/02/2021    CREATININE <0.5 09/02/2021    CALCIUM 8.7 09/02/2021    PHOS 3.3 06/23/2021       Discharge Medications:     Current Discharge Medication List           Details nitrofurantoin, macrocrystal-monohydrate, (MACROBID) 100 MG capsule Take 1 capsule by mouth 2 times daily for 5 days  Qty: 10 capsule, Refills: 0              Details   DULoxetine (CYMBALTA) 30 MG extended release capsule Take 1 capsule by mouth daily  Qty: 30 capsule, Refills: 0      Multiple Vitamin (MULTIVITAMIN) TABS tablet Take 1 tablet by mouth daily  Qty: 30 tablet, Refills: 1      acetaminophen (TYLENOL) 500 MG tablet Take 1 tablet by mouth 4 times daily as needed for Pain  Qty: 20 tablet, Refills: 0      ondansetron (ZOFRAN ODT) 4 MG disintegrating tablet Take 1-2 tablets by mouth every 12 hours as needed for Nausea May Sub regular tablet (non-ODT) if insurance does not cover ODT. Qty: 12 tablet, Refills: 0      nicotine (NICODERM CQ) 21 MG/24HR Place 1 patch onto the skin every 24 hours  Qty: 30 patch, Refills: 3    Associated Diagnoses: Tobacco use      vitamin B-1 100 MG tablet Take 1 tablet by mouth daily  Qty: 30 tablet, Refills: 3             No future appointments. Time Spent on discharge is more than 30 minutes in the examination, evaluation, counseling and review of medications and discharge plan. Signed:    Sushma Herrera MD   9/2/2021      Thank you Eleanor Barrett MD for the opportunity to be involved in this patient's care. If you have any questions or concerns please feel free to contact me at [phone].

## 2021-08-31 NOTE — CARE COORDINATION
INITIAL CASE MANAGEMENT ASSESSMENT     Reviewed chart, spoke with patient to assess possible discharge needs. Explained Case Management role/services. Living Situation: Verified home address. Patient lives with mother and ; [address]. ADLs: Independent in feeding, dependent in dressing and bathing  Assistance as needed from , Cheyenne Bernal, and son, Walt Beyer does the house work      DME: walker    PT/OT Recs: Patient refused to work with PT/OT  [facility]HC ordered - [facility]. Patient reported no preferrance and requested SW contact her son. Spoke with son, Danilo Alfaro. Danilo Alfaro reported no preferance. Family agreeable to referral with [facility]. Referral placed to Wayne Corley at [facility]. Active Services: NP Medical House Calls      Transportation: Need stretcher transport at discharge due to steps to enter house     Medications: Latha    PCP: Taran with [facility] - appointment arranged for this week, per son. HD/PD: n/a    PLAN/COMMENTS: Home with HHC and assistance as needed from family. ACP verified with patient. 1) HHC ordered, need BREANN  2) Medical transport home    SW/CM provided contact information for patient or family to call with any questions. SW/CM will follow and assist as needed. BETO Mcmanus, LOLAW, Social Work  [phone]  Electronically signed by BETO Mcmanus LSW on 8/31/2021 at 1:11 PM    [facility] unable to service patient until Saturday. Patient agreeable to [address] in network with insurance. [facility] can accept patient.    Electronically signed by BETO Mcmanus LSW on 8/31/2021 at 2:55 PM

## 2021-08-31 NOTE — PROGRESS NOTES
Hospitalist Progress Note      PCP: Hernando Delatorre MD    Date of Admission: 8/27/2021    Chief Complaint: Abdominal pain    Hospital Course: Patient is a 33-year-old female with a past medical history of COPD, hyperlipidemia, hypertension, atrial fibrillation with previous bowel obstructions who presented to the hospital with acute onset sharp central abdominal pain. In the emergency department she was noted to have large perisplenic hematoma concerning for active bleeding in the upper pole of the spleen extending below the left diaphragm. IR was then consulted and underwent successful proximal splenic artery embolization. 8/28  Patient states her abdominal pain is much better today than it was yesterday. Patient's hemoglobin remains stable. Potassium markedly elevated at 6.4. Magnesium 4 as well. We will redraw blood as appears abnormal.  Patient has been off pressors for a few hours, can transfer out of the ICU    8/29 Large drop in hemoglobin from 12-7.4. Will monitor this afternoon. May require transfusion. Discussed with general surgery    Subjective: Patient seen and examined. Hemoglobin now stable. Hold off on further surgical intervention. Continue clear liquid diet per surgery.       Medications:  Reviewed    Infusion Medications    sodium chloride 5 mL/hr at 08/28/21 2036    sodium chloride 5 mL/hr at 08/28/21 0005    sodium chloride      sodium chloride      sodium chloride      sodium chloride Stopped (08/30/21 0246)    sodium chloride       Scheduled Medications    sodium chloride  500 mL IntraVENous Once    DULoxetine  30 mg Oral Daily    nicotine  1 patch Transdermal Daily    sodium chloride flush  5-40 mL IntraVENous 2 times per day    lidocaine 1 % injection  5 mL IntraDERmal Once    sodium chloride flush  5-40 mL IntraVENous 2 times per day     PRN Meds: chlordiazePOXIDE, oxyCODONE-acetaminophen, sodium chloride, sodium chloride, sodium chloride flush, sodium chloride, ondansetron **OR** ondansetron, acetaminophen **OR** acetaminophen, sodium chloride flush, sodium chloride      Intake/Output Summary (Last 24 hours) at 8/31/2021 1356  Last data filed at 8/31/2021 1043  Gross per 24 hour   Intake 360 ml   Output 1050 ml   Net -690 ml       Physical Exam Performed:    /66   Pulse 103   Temp 99.2 °F (37.3 °C) (Oral)   Resp 20   Ht 5' 2\" (1.575 m)   Wt 126 lb 8.7 oz (57.4 kg)   SpO2 96%   BMI 23.15 kg/m²     General appearance: No apparent distress, appears stated age and cooperative. HEENT: Pupils equal, round, and reactive to light. Conjunctivae/corneas clear. Neck: Supple, with full range of motion. No jugular venous distention. Trachea midline. Respiratory:  Normal respiratory effort. Clear to auscultation  Cardiovascular: Regular rate and rhythm with normal S1/S2   Abdomen: Soft, non-tender, non-distended with normal bowel sounds. Musculoskeletal: No clubbing, cyanosis or edema bilaterally. Skin: Skin color, texture, turgor normal.  No rashes or lesions. Neurologic:  Neurovascularly intact without any focal sensory/motor deficits. Cranial nerves: II-XII intact, grossly non-focal.  Psychiatric: Alert and oriented, thought content appropriate, normal insight  Capillary Refill: Brisk,< 3 seconds   Peripheral Pulses: +2 palpable, equal bilaterally       Labs:   Recent Labs     08/29/21  0528 08/29/21 0528 08/29/21  1541 08/30/21  0601 08/31/21  0517   WBC 13.6*  --   --  11.2* 9.4   HGB 7.4*   < > 7.9* 7.9* 7.9*   HCT 21.8*   < > 23.6* 22.9* 23.7*     --   --  278 343    < > = values in this interval not displayed.      Recent Labs     08/29/21 0528 08/30/21  0601 08/31/21  0517   * 133* 132*   K 3.8 3.9 3.7    100 98*   CO2 25 22 23   BUN 10 9 8   CREATININE <0.5* <0.5* <0.5*   CALCIUM 7.5* 7.6* 8.1*     Recent Labs     08/28/21  1747   AST 35   ALT 12   BILITOT 0.8   ALKPHOS 102     No results for input(s): INR in the last 72 hours. No results for input(s): Jaylan Miller in the last 72 hours. Urinalysis:      Lab Results   Component Value Date    NITRU Negative 08/27/2021    WBCUA >900 12/19/2019    BACTERIA 2+ 12/19/2019    RBCUA 20-50 12/19/2019    BLOODU Negative 08/27/2021    SPECGRAV 1.012 08/27/2021    GLUCOSEU Negative 08/27/2021       Radiology:  IR EMBOLIZATION HEMORRHAGE   Final Result   SUCCESSFUL PROXIMAL SPLENIC ARTERY EMBOLIZATION. CT ABDOMEN PELVIS W IV CONTRAST Additional Contrast? None   Final Result   1. Large perisplenic hematoma. Findings concerning for active bleeding in   the upper pole of the spleen extending below the left hemidiaphragm. There   is moderate intraperitoneal blood extending into the lesser sac and   surrounding the stomach. 2. No other acute findings within the abdomen or pelvis. Resolution of   pancreatitis noted on the previous study. 3. Hepatic steatosis. 4. Previous small bowel and colonic resection. No acute bowel pathology. 5. Small left pleural effusion with dependent lower lobe atelectasis. Findings were discussed with Maria M Anderson at 6:57 pm on 8/27/2021. Assessment/Plan:    Splenic hematoma  Status post IR embolization of splenic artery  Continue to monitor  General surgery consulted, stable for DC  Continue to monitor    Acute blood loss anemia  Secondary to splenic hematoma  Hemoglobin remained stable  Large drop from 12-7, likely some element of hemodilution/equilibration    Alcohol abuse with dependence  Hold on Librium due to sedation  Continue to monitor    Acute respiratory failure with hypoxia  Likely due to IV fluids  One-time dose of Lasix  Titrate to keep saturations above 90%    Hypokalemia  Replete and recheck    Hypomagnesemia  Replete and recheck      DVT Prophylaxis: SCD  Diet: ADULT DIET;  Regular  Code Status: Full Code    PT/OT Eval Status: Patient refused PT and OT    Dispo -inpatient    Jamshid Ray MD

## 2021-08-31 NOTE — CARE COORDINATION
[facility]    Received referral for homecare services. [facility] unable to staff referral . [facility] has accepted referral and willpull documents needed. DC planner made aware. Electronically signed by Lacy Blanco LPN on 3/45/33 at 7:14 PM JOYCET             Lacy Blanco LPN  [facility] [address] Transition Nurse  [phone]

## 2021-08-31 NOTE — DISCHARGE INSTR - COC
Continuity of Care Form    Patient Name: Danni Daly   :  1945  MRN:  [id number]    Admit date:  2021  Discharge date:  2021    Code Status Order: Full Code   Advance Directives:      Admitting Physician:  Gege Montgomery DO  PCP: Kleber Bianchi MD    Discharging Nurse: [id number] Unit/Room#: [id number]/0074-75  Discharging Unit Phone Number: [phone]    Emergency Contact:   Extended Emergency Contact Information  Primary Emergency Contact: [address] [place] [address] [address] Phone: [phone]  Relation: Child  Secondary Emergency Contact: Sarita Body  Home Phone: [phone]  Relation: Child  Preferred language: English   needed?  No    Past Surgical History:  Past Surgical History:   Procedure Laterality Date    ABDOMEN SURGERY      CHOLECYSTECTOMY      COLONOSCOPY  2018    COLONOSCOPY N/A 2018    COLONOSCOPY POLYPECTOMY SNARE/COLD BIOPSY performed by Bernarda Mills MD at [address] [place] [address]    ENDOSCOPY, COLON, DIAGNOSTIC      HYSTERECTOMY      IR EMBOLIZATION HEMORRHAGE  2021    IR EMBOLIZATION HEMORRHAGE 2021 [facility]    SMALL INTESTINE SURGERY N/A 4/10/2019    LAPAROSCOPIC LYSISI OF ADHESIONS FOR GREATER THAN 3 HOURS,LAPORSCOPIC CONVERTED TO OPEN COLOSTOMY REVERSAL, SMALL BOWEL RESECTION performed by Brad Morley MD at [address] [place] [address] ENDOSCOPY  2018    with biopsies    UPPER GASTROINTESTINAL ENDOSCOPY N/A 2018    EGD BIOPSY performed by Bernarda Mills MD at [facility]       Immunization History:   Immunization History   Administered Date(s) Administered    Influenza, High Dose (Fluzone 65 yrs and older) 09/10/2018    Pneumococcal Conjugate 13-valent (Saqjnqu39) 2018    Tdap (Boostrix, Adacel) 2018    Zoster Live (Zostavax) 2016    Zoster Recombinant (Shingrix) 03/24/2018       Active Problems:  Patient Active Problem List   Diagnosis Code    COPD with acute exacerbation ([email or web address] J44.1    Essential hypertension I10    Tobacco abuse Z72.0    Alcohol abuse F10.10    Colostomy care ([email or web address] 75.) Z43.3    Recurrent major depressive disorder, in partial remission ([email or web address] 75.) F33.41    Personal history of nicotine dependence  Z87.891    Colitis K52.9    Colitis due to Clostridium difficile A04.72    Alcohol-induced acute pancreatitis without infection or necrosis K85.20    Gastrointestinal hemorrhage associated with duodenal ulcer K26.4    Small bowel obstruction (HCC) K56.609    Ileus (HCC) K56.7    Acute cystitis without hematuria N30.00    Non-intractable cyclical vomiting with nausea R11.15    Parastomal hernia without obstruction or gangrene K43.5    Abdominal pain R10.9    C. 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    COPD (chronic obstructive pulmonary disease) (HCC) J44.9       Isolation/Infection:   Isolation            No Isolation          Patient Infection Status       Infection Onset Added Last Indicated Last Indicated By Review Planned Expiration Resolved Resolved By    None active    Resolved    COVID-19 Rule Out 08/27/21 08/27/21 08/27/21 SARS-CoV-2 NAAT (Rapid) (Ordered)   08/27/21 Rule-Out Test Resulted    C-diff Rule Out  12/19/19 12/19/19 Clostridium Difficile Toxin/Antigen (Ordered)   06/15/21 Tamar Mayo, RN            Nurse Assessment:  Last Vital Signs: /66   Pulse 103   Temp 99.2 °F (37.3 °C) (Oral)   Resp 20   Ht 5' 2\" (1.575 m)   Wt 126 lb 8.7 oz (57.4 kg)   SpO2 96%   BMI 23.15 kg/m² Last documented pain score (0-10 scale): Pain Level: 0  Last Weight:   Wt Readings from Last 1 Encounters:   08/31/21 126 lb 8.7 oz (57.4 kg)     Mental Status:  oriented, alert and pt is oriented to person only. IV Access:  - None    Nursing Mobility/ADLs:  Walking   Dependent  Transfer  Dependent  Bathing  Dependent  Dressing  Dependent  Toileting  Dependent  Feeding  Assisted  Med Admin  Assisted  Med Delivery   crushed in applesauce    Wound Care Documentation and Therapy:  Puncture 08/27/21 Groin Anterior;Right (Active)   Wound Assessment Clean;Dry; Intact 08/28/21 0400   Christie-wound Assessment Intact 08/28/21 0400   Closure Other (Comment) 08/28/21 0400   Drainage Amount None 08/28/21 0400   Dressing/Treatment Tegaderm/transparent film dressing 08/28/21 0400   Dressing Status Clean;Dry; Intact 08/28/21 0400   Number of days: 3        Elimination:  Continence:   · Bowel: No  · Bladder: No  Urinary Catheter: None   Colostomy/Ileostomy/Ileal Conduit: No       Date of Last BM:     Intake/Output Summary (Last 24 hours) at 8/31/2021 1152  Last data filed at 8/31/2021 1043  Gross per 24 hour   Intake 360 ml   Output 1050 ml   Net -690 ml     I/O last 3 completed shifts: In: 600 [P.O.:600]  Out: 1050 [Urine:1050]    Safety Concerns: At Risk for Falls and Aspiration Risk    Impairments/Disabilities:      Vision    Nutrition Therapy:  Current Nutrition Therapy:   - Oral Diet:  General    Routes of Feeding: Oral  Liquids: Thin Liquids  Daily Fluid Restriction: no  Last Modified Barium Swallow with Video (Video Swallowing Test): not done    Treatments at the Time of Hospital Discharge:   Respiratory Treatments: pt desats on room air  Oxygen Therapy:  is on oxygen at 2-3 L/min per nasal cannula.   Ventilator:    - No ventilator support    Rehab Therapies: Physical Therapy, Occupational Therapy  Weight Bearing Status/Restrictions: No weight bearing restirctions  Other Medical Equipment (for information only, NOT a DME order):  wheelchair, walker, bath bench and hospital bed  Other Treatments:     Patient's personal belongings (please select all that are sent with patient):  Glasses    RN SIGNATURE:  Electronically signed by Maria Fernanda Le RN on 9/2/21 at 2:11 PM EDT    CASE MANAGEMENT/SOCIAL WORK SECTION    Inpatient Status Date: 8/27/2021    Readmission Risk Assessment Score:  Readmission Risk              Risk of Unplanned Readmission:  21           Discharging to Facility/ Agency   · Name: [address]  · Address:   [address] [place] [address], [address] [place] [address]  · Phone:  [phone]  · Fax:  [phone]    / signature: Electronically signed by BETO Gutierrez, LOLAW on 9/1/21 at 8:29 AM EDT    PHYSICIAN SECTION    Prognosis: Fair    Condition at Discharge: Stable    Rehab Potential (if transferring to Rehab): Fair    Recommended Labs or Other Treatments After Discharge: PT, OT, HHA, VN    Physician Certification: I certify the above information and transfer of Kait Barrow  is necessary for the continuing treatment of the diagnosis listed and that she requires Home Care for less 30 days.      Update Admission H&P: No change in H&P    PHYSICIAN SIGNATURE:  Electronically signed by Gila Artis MD on 9/2/21 at 11:52 AM EDT

## 2021-09-01 ENCOUNTER — APPOINTMENT (OUTPATIENT)
Dept: CT IMAGING | Age: 76
DRG: 987 | End: 2021-09-01
Payer: MEDICARE

## 2021-09-01 LAB
AMMONIA: 31 UMOL/L (ref 11–51)
ANION GAP SERPL CALCULATED.3IONS-SCNC: 8 MMOL/L (ref 3–16)
BACTERIA: ABNORMAL /HPF
BASE EXCESS ARTERIAL: 8.7 MMOL/L (ref -3–3)
BASOPHILS ABSOLUTE: 0.1 K/UL (ref 0–0.2)
BASOPHILS RELATIVE PERCENT: 1.4 %
BILIRUBIN URINE: NEGATIVE
BLOOD, URINE: ABNORMAL
BUN BLDV-MCNC: 7 MG/DL (ref 7–20)
CALCIUM SERPL-MCNC: 8.1 MG/DL (ref 8.3–10.6)
CARBOXYHEMOGLOBIN ARTERIAL: 1.6 % (ref 0–1.5)
CHLORIDE BLD-SCNC: 98 MMOL/L (ref 99–110)
CLARITY: ABNORMAL
CO2: 27 MMOL/L (ref 21–32)
COLOR: YELLOW
CREAT SERPL-MCNC: <0.5 MG/DL (ref 0.6–1.2)
EOSINOPHILS ABSOLUTE: 0.2 K/UL (ref 0–0.6)
EOSINOPHILS RELATIVE PERCENT: 2.4 %
EPITHELIAL CELLS, UA: 0 /HPF (ref 0–5)
GFR AFRICAN AMERICAN: >60
GFR NON-AFRICAN AMERICAN: >60
GLUCOSE BLD-MCNC: 87 MG/DL (ref 70–99)
GLUCOSE URINE: NEGATIVE MG/DL
HCO3 ARTERIAL: 33.9 MMOL/L (ref 21–29)
HCT VFR BLD CALC: 25.2 % (ref 36–48)
HEMOGLOBIN, ART, EXTENDED: 8.2 G/DL (ref 12–16)
HEMOGLOBIN: 8.5 G/DL (ref 12–16)
HYALINE CASTS: 7 /LPF (ref 0–8)
KETONES, URINE: NEGATIVE MG/DL
LEUKOCYTE ESTERASE, URINE: ABNORMAL
LYMPHOCYTES ABSOLUTE: 1.7 K/UL (ref 1–5.1)
LYMPHOCYTES RELATIVE PERCENT: 17.4 %
MAGNESIUM: 1.8 MG/DL (ref 1.8–2.4)
MCH RBC QN AUTO: 33.9 PG (ref 26–34)
MCHC RBC AUTO-ENTMCNC: 33.8 G/DL (ref 31–36)
MCV RBC AUTO: 100.1 FL (ref 80–100)
METHEMOGLOBIN ARTERIAL: 0.4 %
MICROSCOPIC EXAMINATION: YES
MONOCYTES ABSOLUTE: 1.1 K/UL (ref 0–1.3)
MONOCYTES RELATIVE PERCENT: 11.5 %
NEUTROPHILS ABSOLUTE: 6.6 K/UL (ref 1.7–7.7)
NEUTROPHILS RELATIVE PERCENT: 67.3 %
NITRITE, URINE: NEGATIVE
O2 SAT, ARTERIAL: 99.1 %
O2 THERAPY: ABNORMAL
PCO2 ARTERIAL: 50.5 MMHG (ref 35–45)
PDW BLD-RTO: 21.1 % (ref 12.4–15.4)
PH ARTERIAL: 7.44 (ref 7.35–7.45)
PH UA: 6 (ref 5–8)
PLATELET # BLD: 370 K/UL (ref 135–450)
PMV BLD AUTO: 6.4 FL (ref 5–10.5)
PO2 ARTERIAL: 102 MMHG (ref 75–108)
POTASSIUM SERPL-SCNC: 3.8 MMOL/L (ref 3.5–5.1)
PROTEIN UA: NEGATIVE MG/DL
RBC # BLD: 2.52 M/UL (ref 4–5.2)
RBC UA: 2 /HPF (ref 0–4)
SODIUM BLD-SCNC: 133 MMOL/L (ref 136–145)
SPECIFIC GRAVITY UA: 1 (ref 1–1.03)
TCO2 ARTERIAL: 35.5 MMOL/L
URINE TYPE: ABNORMAL
UROBILINOGEN, URINE: 1 E.U./DL
WBC # BLD: 9.8 K/UL (ref 4–11)
WBC UA: 81 /HPF (ref 0–5)

## 2021-09-01 PROCEDURE — 6370000000 HC RX 637 (ALT 250 FOR IP): Performed by: INTERNAL MEDICINE

## 2021-09-01 PROCEDURE — 6360000002 HC RX W HCPCS: Performed by: INTERNAL MEDICINE

## 2021-09-01 PROCEDURE — 83735 ASSAY OF MAGNESIUM: CPT

## 2021-09-01 PROCEDURE — 80048 BASIC METABOLIC PNL TOTAL CA: CPT

## 2021-09-01 PROCEDURE — 94760 N-INVAS EAR/PLS OXIMETRY 1: CPT

## 2021-09-01 PROCEDURE — 99232 SBSQ HOSP IP/OBS MODERATE 35: CPT | Performed by: SURGERY

## 2021-09-01 PROCEDURE — 6370000000 HC RX 637 (ALT 250 FOR IP): Performed by: SURGERY

## 2021-09-01 PROCEDURE — 81001 URINALYSIS AUTO W/SCOPE: CPT

## 2021-09-01 PROCEDURE — 2060000000 HC ICU INTERMEDIATE R&B

## 2021-09-01 PROCEDURE — 70450 CT HEAD/BRAIN W/O DYE: CPT

## 2021-09-01 PROCEDURE — 51701 INSERT BLADDER CATHETER: CPT

## 2021-09-01 PROCEDURE — 82803 BLOOD GASES ANY COMBINATION: CPT

## 2021-09-01 PROCEDURE — 36415 COLL VENOUS BLD VENIPUNCTURE: CPT

## 2021-09-01 PROCEDURE — 85025 COMPLETE CBC W/AUTO DIFF WBC: CPT

## 2021-09-01 PROCEDURE — 2700000000 HC OXYGEN THERAPY PER DAY

## 2021-09-01 PROCEDURE — 36600 WITHDRAWAL OF ARTERIAL BLOOD: CPT

## 2021-09-01 PROCEDURE — 82140 ASSAY OF AMMONIA: CPT

## 2021-09-01 PROCEDURE — 2580000003 HC RX 258: Performed by: STUDENT IN AN ORGANIZED HEALTH CARE EDUCATION/TRAINING PROGRAM

## 2021-09-01 RX ORDER — TRAMADOL HYDROCHLORIDE 50 MG/1
50 TABLET ORAL EVERY 6 HOURS PRN
Status: DISCONTINUED | OUTPATIENT
Start: 2021-09-01 | End: 2021-09-02 | Stop reason: HOSPADM

## 2021-09-01 RX ORDER — FUROSEMIDE 10 MG/ML
20 INJECTION INTRAMUSCULAR; INTRAVENOUS 2 TIMES DAILY
Status: COMPLETED | OUTPATIENT
Start: 2021-09-01 | End: 2021-09-01

## 2021-09-01 RX ORDER — ACETAMINOPHEN 325 MG/1
650 TABLET ORAL EVERY 6 HOURS PRN
Status: DISCONTINUED | OUTPATIENT
Start: 2021-09-01 | End: 2021-09-02 | Stop reason: HOSPADM

## 2021-09-01 RX ORDER — TRAMADOL HYDROCHLORIDE 50 MG/1
100 TABLET ORAL EVERY 6 HOURS PRN
Status: DISCONTINUED | OUTPATIENT
Start: 2021-09-01 | End: 2021-09-02 | Stop reason: HOSPADM

## 2021-09-01 RX ADMIN — TRAMADOL HYDROCHLORIDE 50 MG: 50 TABLET, FILM COATED ORAL at 20:14

## 2021-09-01 RX ADMIN — FUROSEMIDE 20 MG: 10 INJECTION, SOLUTION INTRAMUSCULAR; INTRAVENOUS at 09:03

## 2021-09-01 RX ADMIN — SODIUM CHLORIDE, PRESERVATIVE FREE 10 ML: 5 INJECTION INTRAVENOUS at 10:22

## 2021-09-01 RX ADMIN — DULOXETINE HYDROCHLORIDE 30 MG: 30 CAPSULE, DELAYED RELEASE ORAL at 09:03

## 2021-09-01 RX ADMIN — OXYCODONE HYDROCHLORIDE AND ACETAMINOPHEN 1 TABLET: 5; 325 TABLET ORAL at 09:21

## 2021-09-01 RX ADMIN — SODIUM CHLORIDE, PRESERVATIVE FREE 10 ML: 5 INJECTION INTRAVENOUS at 23:51

## 2021-09-01 RX ADMIN — FUROSEMIDE 20 MG: 10 INJECTION, SOLUTION INTRAMUSCULAR; INTRAVENOUS at 18:06

## 2021-09-01 ASSESSMENT — PAIN DESCRIPTION - PROGRESSION: CLINICAL_PROGRESSION: GRADUALLY WORSENING

## 2021-09-01 ASSESSMENT — PAIN DESCRIPTION - ONSET: ONSET: ON-GOING

## 2021-09-01 ASSESSMENT — PAIN DESCRIPTION - ORIENTATION: ORIENTATION: LEFT

## 2021-09-01 ASSESSMENT — PAIN - FUNCTIONAL ASSESSMENT: PAIN_FUNCTIONAL_ASSESSMENT: ACTIVITIES ARE NOT PREVENTED

## 2021-09-01 ASSESSMENT — PAIN SCALES - GENERAL
PAINLEVEL_OUTOF10: 9
PAINLEVEL_OUTOF10: 0
PAINLEVEL_OUTOF10: 7
PAINLEVEL_OUTOF10: 9
PAINLEVEL_OUTOF10: 9

## 2021-09-01 ASSESSMENT — PAIN DESCRIPTION - DESCRIPTORS: DESCRIPTORS: SHARP

## 2021-09-01 ASSESSMENT — PAIN DESCRIPTION - LOCATION
LOCATION: ABDOMEN
LOCATION: ABDOMEN

## 2021-09-01 ASSESSMENT — PAIN DESCRIPTION - PAIN TYPE: TYPE: ACUTE PAIN

## 2021-09-01 ASSESSMENT — PAIN DESCRIPTION - FREQUENCY: FREQUENCY: CONTINUOUS

## 2021-09-01 NOTE — PROGRESS NOTES
General Surgery  Daily Progress Note    Pt Name: Danni Daly  Medical Record Number: [id number]  Date of Birth 1945   Today's Date: 9/1/2021    Chief Complaint   Patient presents with    Abdominal Pain       ASSESSMENT/PLAN  1. Splenic hematoma s/p IR embolization - pain slowly improving. H&H stable. Vaccines given before discharge. Continue regular diet. 2. COPD, HTN, hx of tobacco abuse, hx of EtOH abuse - per primary team    3. Altered mental status - slightly improved today. Possibly secondary to librium. Will check UA given ferrer catheter placed on admission. Will remove ferrer today. Remigio Doing is improved from yesterday. More alert today than yesterday, seems less confused. She is complaining of some abdominal pain. She has no nausea and no vomiting. She is tolerating general diet. Current activity is up with assistance    OBJECTIVE  VITALS:  height is 5' 2\" (1.575 m) and weight is 130 lb 8.2 oz (59.2 kg). Her axillary temperature is 98.2 °F (36.8 °C). Her blood pressure is 117/70 and her pulse is 99. Her respiration is 24 and oxygen saturation is 93%. GENERAL: alert, no distress  LUNGS: normal respiratory effort, no accessory muscle use  HEART: normal rate and regular rhythm  ABDOMEN: soft, ND, NT  EXTREMITY: no cyanosis and no clubbing  I/O last 3 completed shifts:   In: 240 [P.O.:240]  Out: 2660 [Urine:2660]  I/O this shift:  In: -   Out: 325 [Urine:325]    LABS  Recent Labs     09/01/21  0437   WBC 9.8   HGB 8.5*   HCT 25.2*      *   K 3.8   CL 98*   CO2 27   BUN 7   CREATININE <0.5*   MG 1.80   CALCIUM 8.1*     CBC with Differential:    Lab Results   Component Value Date    WBC 9.8 09/01/2021    RBC 2.52 09/01/2021    HGB 8.5 09/01/2021    HCT 25.2 09/01/2021     09/01/2021    .1 09/01/2021    MCH 33.9 09/01/2021    MCHC 33.8 09/01/2021    RDW 21.1 09/01/2021    SEGSPCT 64.4 03/31/2011    BANDSPCT 2 12/18/2018    LYMPHOPCT 17.4 09/01/2021 MONOPCT 11.5 09/01/2021    MYELOPCT 5 12/18/2018    EOSPCT 11.9 03/31/2011    BASOPCT 1.4 09/01/2021    MONOSABS 1.1 09/01/2021    LYMPHSABS 1.7 09/01/2021    EOSABS 0.2 09/01/2021    BASOSABS 0.1 09/01/2021    DIFFTYPE Auto 03/31/2011     BMP:    Lab Results   Component Value Date     09/01/2021    K 3.8 09/01/2021    K 3.2 08/27/2021    CL 98 09/01/2021    CO2 27 09/01/2021    BUN 7 09/01/2021    LABALBU 2.3 08/28/2021    CREATININE <0.5 09/01/2021    CALCIUM 8.1 09/01/2021    GFRAA >60 09/01/2021    GFRAA >60 03/31/2011    LABGLOM >60 09/01/2021    GLUCOSE 87 09/01/2021     Hepatic Function Panel:    Lab Results   Component Value Date    ALKPHOS 102 08/28/2021    ALT 12 08/28/2021    AST 35 08/28/2021    PROT 5.0 08/28/2021    BILITOT 0.8 08/28/2021    BILIDIR <0.2 06/23/2021    IBILI see below 06/23/2021    LABALBU 2.3 08/28/2021         Gaye Juarez MD  Electronically signed 9/1/2021 at 9:46 AM

## 2021-09-01 NOTE — PROGRESS NOTES
Hospitalist Progress Note      PCP: Selvin Foote MD    Date of Admission: 8/27/2021    Chief Complaint: Abdominal pain    Hospital Course: Patient is a 68-year-old female with a past medical history of COPD, hyperlipidemia, hypertension, atrial fibrillation with previous bowel obstructions who presented to the hospital with acute onset sharp central abdominal pain. In the emergency department she was noted to have large perisplenic hematoma concerning for active bleeding in the upper pole of the spleen extending below the left diaphragm. IR was then consulted and underwent successful proximal splenic artery embolization. 8/28  Patient states her abdominal pain is much better today than it was yesterday. Patient's hemoglobin remains stable. Potassium markedly elevated at 6.4. Magnesium 4 as well. We will redraw blood as appears abnormal.  Patient has been off pressors for a few hours, can transfer out of the ICU    8/29 Large drop in hemoglobin from 12-7.4. Will monitor this afternoon. May require transfusion. Discussed with general surgery    8/31 Hemoglobin now stable. Hold off on further surgical intervention. Continue clear liquid diet per surgery. Subjective: Patient seen and examined. Patient just received pain pill and is now sleepy. Son was at bedside earlier today stating this is the patient's baseline. CT of the head negative, urinalysis pending, ammonia level negative ABG is normal.  We will plan for discharge home with home care tomorrow.     Medications:  Reviewed    Infusion Medications    sodium chloride 5 mL/hr at 08/28/21 2036    sodium chloride 5 mL/hr at 08/28/21 0724    sodium chloride      sodium chloride      sodium chloride      sodium chloride Stopped (08/30/21 0246)    sodium chloride       Scheduled Medications    furosemide  20 mg IntraVENous BID    meningococcal group B  0.5 mL IntraMUSCular Prior to discharge    sodium chloride  500 mL IntraVENous Once    DULoxetine  30 mg Oral Daily    nicotine  1 patch TransDERmal Daily    sodium chloride flush  5-40 mL IntraVENous 2 times per day    lidocaine 1 % injection  5 mL IntraDERmal Once    sodium chloride flush  5-40 mL IntraVENous 2 times per day     PRN Meds: chlordiazePOXIDE, oxyCODONE-acetaminophen, sodium chloride, sodium chloride, sodium chloride flush, sodium chloride, ondansetron **OR** ondansetron, acetaminophen **OR** acetaminophen, sodium chloride flush, sodium chloride      Intake/Output Summary (Last 24 hours) at 9/1/2021 0851  Last data filed at 9/1/2021 0341  Gross per 24 hour   Intake 240 ml   Output 2660 ml   Net -2420 ml       Physical Exam Performed:    /70   Pulse 99   Temp 98.2 °F (36.8 °C) (Axillary)   Resp 24   Ht 5' 2\" (1.575 m)   Wt 130 lb 8.2 oz (59.2 kg)   SpO2 93%   BMI 23.87 kg/m²     General appearance: No apparent distress, appears stated age and cooperative. HEENT: Pupils equal, round, and reactive to light. Conjunctivae/corneas clear. Neck: Supple, with full range of motion. No jugular venous distention. Trachea midline. Respiratory:  Normal respiratory effort. Clear to auscultation  Cardiovascular: Regular rate and rhythm with normal S1/S2   Abdomen: Soft, non-tender, non-distended with normal bowel sounds. Musculoskeletal: No clubbing, cyanosis or edema bilaterally. Skin: Skin color, texture, turgor normal.  No rashes or lesions. Neurologic:  Neurovascularly intact without any focal sensory/motor deficits.  Cranial nerves: II-XII intact, grossly non-focal.  Psychiatric: Alert and oriented, thought content appropriate, normal insight  Capillary Refill: Brisk,< 3 seconds   Peripheral Pulses: +2 palpable, equal bilaterally       Labs:   Recent Labs     08/30/21  0601 08/31/21  0517 09/01/21  0437   WBC 11.2* 9.4 9.8   HGB 7.9* 7.9* 8.5*   HCT 22.9* 23.7* 25.2*    343 370     Recent Labs     08/30/21  0601 08/31/21  0517 09/01/21  0437   NA to keep saturations above 90%    Hypokalemia  Replete and recheck    Hypomagnesemia  Replete and recheck      DVT Prophylaxis: SCD  Diet: ADULT DIET;  Regular  Code Status: Full Code    PT/OT Eval Status: Patient refused PT and OT    Dispo -inpatient    Nathaniel Brown MD

## 2021-09-01 NOTE — PLAN OF CARE
Problem: Falls - Risk of:  Goal: Will remain free from falls  Description: Will remain free from falls  Outcome: Ongoing  Goal: Absence of physical injury  Description: Absence of physical injury  Outcome: Ongoing     Problem: Pain:  Goal: Pain level will decrease  Description: Pain level will decrease  Outcome: Ongoing  Goal: Control of acute pain  Description: Control of acute pain  Outcome: Ongoing  Goal: Control of chronic pain  Description: Control of chronic pain  Outcome: Ongoing  Goal: Patient's pain/discomfort is manageable  Description: Patient's pain/discomfort is manageable  Outcome: Ongoing     Problem: Skin Integrity:  Goal: Will show no infection signs and symptoms  Description: Will show no infection signs and symptoms  Outcome: Ongoing  Goal: Absence of new skin breakdown  Description: Absence of new skin breakdown  Outcome: Ongoing     Problem: Infection:  Goal: Will remain free from infection  Description: Will remain free from infection  Outcome: Ongoing     Problem: Safety:  Goal: Free from accidental physical injury  Description: Free from accidental physical injury  Outcome: Ongoing  Goal: Free from intentional harm  Description: Free from intentional harm  Outcome: Ongoing     Problem: Daily Care:  Goal: Daily care needs are met  Description: Daily care needs are met  Outcome: Ongoing     Problem: Skin Integrity:  Goal: Skin integrity will stabilize  Description: Skin integrity will stabilize  Outcome: Ongoing     Problem: Discharge Planning:  Goal: Patients continuum of care needs are met  Description: Patients continuum of care needs are met  Outcome: Ongoing     Problem: Discharge Planning:  Goal: Discharged to appropriate level of care  Description: Discharged to appropriate level of care  Outcome: Ongoing     Problem:  Activity Intolerance:  Goal: Ability to tolerate increased activity will improve  Description: Ability to tolerate increased activity will improve  Outcome: Ongoing Problem: Airway Clearance - Ineffective:  Goal: Ability to maintain a clear airway will improve  Description: Ability to maintain a clear airway will improve  Outcome: Ongoing     Problem: Breathing Pattern - Ineffective:  Goal: Ability to achieve and maintain a regular respiratory rate will improve  Description: Ability to achieve and maintain a regular respiratory rate will improve  Outcome: Ongoing     Problem: Gas Exchange - Impaired:  Goal: Levels of oxygenation will improve  Description: Levels of oxygenation will improve  Outcome: Ongoing

## 2021-09-01 NOTE — PLAN OF CARE
Problem: Falls - Risk of:  Goal: Will remain free from falls  Description: Will remain free from falls  9/1/2021 1222 by Ele Padilla RN  Outcome: Ongoing     Problem: Pain:  Goal: Pain level will decrease  Description: Pain level will decrease  9/1/2021 1222 by Ele Padilla RN  Outcome: Ongoing     Problem: Skin Integrity:  Goal: Will show no infection signs and symptoms  Description: Will show no infection signs and symptoms  9/1/2021 1222 by Ele Padilla RN  Outcome: Ongoing

## 2021-09-02 VITALS
HEIGHT: 62 IN | OXYGEN SATURATION: 97 % | SYSTOLIC BLOOD PRESSURE: 109 MMHG | HEART RATE: 106 BPM | WEIGHT: 122.14 LBS | BODY MASS INDEX: 22.48 KG/M2 | DIASTOLIC BLOOD PRESSURE: 62 MMHG | TEMPERATURE: 97.8 F | RESPIRATION RATE: 18 BRPM

## 2021-09-02 LAB
ANION GAP SERPL CALCULATED.3IONS-SCNC: 10 MMOL/L (ref 3–16)
BASOPHILS ABSOLUTE: 0.1 K/UL (ref 0–0.2)
BASOPHILS RELATIVE PERCENT: 0.6 %
BUN BLDV-MCNC: 9 MG/DL (ref 7–20)
CALCIUM SERPL-MCNC: 8.7 MG/DL (ref 8.3–10.6)
CHLORIDE BLD-SCNC: 93 MMOL/L (ref 99–110)
CO2: 33 MMOL/L (ref 21–32)
CREAT SERPL-MCNC: <0.5 MG/DL (ref 0.6–1.2)
EOSINOPHILS ABSOLUTE: 0.2 K/UL (ref 0–0.6)
EOSINOPHILS RELATIVE PERCENT: 1.8 %
GFR AFRICAN AMERICAN: >60
GFR NON-AFRICAN AMERICAN: >60
GLUCOSE BLD-MCNC: 83 MG/DL (ref 70–99)
HCT VFR BLD CALC: 25.7 % (ref 36–48)
HEMOGLOBIN: 8.8 G/DL (ref 12–16)
LYMPHOCYTES ABSOLUTE: 1.2 K/UL (ref 1–5.1)
LYMPHOCYTES RELATIVE PERCENT: 13.4 %
MAGNESIUM: 1.7 MG/DL (ref 1.8–2.4)
MCH RBC QN AUTO: 34.4 PG (ref 26–34)
MCHC RBC AUTO-ENTMCNC: 34.4 G/DL (ref 31–36)
MCV RBC AUTO: 100.2 FL (ref 80–100)
MONOCYTES ABSOLUTE: 1 K/UL (ref 0–1.3)
MONOCYTES RELATIVE PERCENT: 10.3 %
NEUTROPHILS ABSOLUTE: 6.9 K/UL (ref 1.7–7.7)
NEUTROPHILS RELATIVE PERCENT: 73.9 %
PDW BLD-RTO: 20.6 % (ref 12.4–15.4)
PLATELET # BLD: 496 K/UL (ref 135–450)
PMV BLD AUTO: 6.2 FL (ref 5–10.5)
POTASSIUM SERPL-SCNC: 3.5 MMOL/L (ref 3.5–5.1)
RBC # BLD: 2.56 M/UL (ref 4–5.2)
SODIUM BLD-SCNC: 136 MMOL/L (ref 136–145)
WBC # BLD: 9.3 K/UL (ref 4–11)

## 2021-09-02 PROCEDURE — 2700000000 HC OXYGEN THERAPY PER DAY

## 2021-09-02 PROCEDURE — 80048 BASIC METABOLIC PNL TOTAL CA: CPT

## 2021-09-02 PROCEDURE — 99232 SBSQ HOSP IP/OBS MODERATE 35: CPT | Performed by: SURGERY

## 2021-09-02 PROCEDURE — 2580000003 HC RX 258: Performed by: INTERNAL MEDICINE

## 2021-09-02 PROCEDURE — 83735 ASSAY OF MAGNESIUM: CPT

## 2021-09-02 PROCEDURE — 36415 COLL VENOUS BLD VENIPUNCTURE: CPT

## 2021-09-02 PROCEDURE — 6370000000 HC RX 637 (ALT 250 FOR IP): Performed by: INTERNAL MEDICINE

## 2021-09-02 PROCEDURE — 6370000000 HC RX 637 (ALT 250 FOR IP): Performed by: SURGERY

## 2021-09-02 PROCEDURE — 94760 N-INVAS EAR/PLS OXIMETRY 1: CPT

## 2021-09-02 PROCEDURE — APPSS15 APP SPLIT SHARED TIME 0-15 MINUTES: Performed by: NURSE PRACTITIONER

## 2021-09-02 PROCEDURE — APPNB15 APP NON BILLABLE TIME 0-15 MINS: Performed by: NURSE PRACTITIONER

## 2021-09-02 PROCEDURE — 90620 MENB-4C VACCINE IM: CPT | Performed by: SURGERY

## 2021-09-02 PROCEDURE — 85025 COMPLETE CBC W/AUTO DIFF WBC: CPT

## 2021-09-02 PROCEDURE — 6360000002 HC RX W HCPCS: Performed by: SURGERY

## 2021-09-02 PROCEDURE — 2580000003 HC RX 258: Performed by: STUDENT IN AN ORGANIZED HEALTH CARE EDUCATION/TRAINING PROGRAM

## 2021-09-02 PROCEDURE — 90471 IMMUNIZATION ADMIN: CPT | Performed by: SURGERY

## 2021-09-02 PROCEDURE — 6360000002 HC RX W HCPCS: Performed by: INTERNAL MEDICINE

## 2021-09-02 RX ORDER — FUROSEMIDE 10 MG/ML
40 INJECTION INTRAMUSCULAR; INTRAVENOUS ONCE
Status: COMPLETED | OUTPATIENT
Start: 2021-09-02 | End: 2021-09-02

## 2021-09-02 RX ORDER — NITROFURANTOIN 25; 75 MG/1; MG/1
100 CAPSULE ORAL 2 TIMES DAILY
Qty: 10 CAPSULE | Refills: 0 | Status: SHIPPED | OUTPATIENT
Start: 2021-09-02 | End: 2021-09-07

## 2021-09-02 RX ORDER — MAGNESIUM SULFATE 1 G/100ML
1000 INJECTION INTRAVENOUS ONCE
Status: COMPLETED | OUTPATIENT
Start: 2021-09-02 | End: 2021-09-02

## 2021-09-02 RX ADMIN — MAGNESIUM SULFATE HEPTAHYDRATE 1000 MG: 1 INJECTION, SOLUTION INTRAVENOUS at 09:03

## 2021-09-02 RX ADMIN — FUROSEMIDE 40 MG: 10 INJECTION, SOLUTION INTRAMUSCULAR; INTRAVENOUS at 08:46

## 2021-09-02 RX ADMIN — SODIUM CHLORIDE, PRESERVATIVE FREE 10 ML: 5 INJECTION INTRAVENOUS at 08:53

## 2021-09-02 RX ADMIN — NEISSERIA MENINGITIDIS SEROGROUP B NHBA FUSION PROTEIN ANTIGEN, NEISSERIA MENINGITIDIS SEROGROUP B FHBP FUSION PROTEIN ANTIGEN AND NEISSERIA MENINGITIDIS SEROGROUP B NADA PROTEIN ANTIGEN 0.5 ML: 50; 50; 50; 25 INJECTION, SUSPENSION INTRAMUSCULAR at 14:31

## 2021-09-02 RX ADMIN — CEFTRIAXONE 1000 MG: 1 INJECTION, POWDER, FOR SOLUTION INTRAMUSCULAR; INTRAVENOUS at 10:53

## 2021-09-02 RX ADMIN — TRAMADOL HYDROCHLORIDE 50 MG: 50 TABLET, FILM COATED ORAL at 08:46

## 2021-09-02 RX ADMIN — DULOXETINE HYDROCHLORIDE 30 MG: 30 CAPSULE, DELAYED RELEASE ORAL at 09:11

## 2021-09-02 ASSESSMENT — PAIN SCALES - GENERAL
PAINLEVEL_OUTOF10: 0
PAINLEVEL_OUTOF10: 8

## 2021-09-02 ASSESSMENT — PAIN DESCRIPTION - PAIN TYPE: TYPE: ACUTE PAIN

## 2021-09-02 ASSESSMENT — PAIN DESCRIPTION - DESCRIPTORS: DESCRIPTORS: PATIENT UNABLE TO DESCRIBE

## 2021-09-02 ASSESSMENT — PAIN DESCRIPTION - PROGRESSION: CLINICAL_PROGRESSION: OTHER (COMMENT)

## 2021-09-02 ASSESSMENT — PAIN DESCRIPTION - LOCATION: LOCATION: ABDOMEN

## 2021-09-02 ASSESSMENT — PAIN DESCRIPTION - ORIENTATION: ORIENTATION: OTHER (COMMENT)

## 2021-09-02 ASSESSMENT — PAIN DESCRIPTION - ONSET: ONSET: ON-GOING

## 2021-09-02 ASSESSMENT — PAIN DESCRIPTION - FREQUENCY: FREQUENCY: CONTINUOUS

## 2021-09-02 ASSESSMENT — PAIN SCALES - WONG BAKER
WONGBAKER_NUMERICALRESPONSE: 8
WONGBAKER_NUMERICALRESPONSE: 0

## 2021-09-02 ASSESSMENT — PAIN - FUNCTIONAL ASSESSMENT: PAIN_FUNCTIONAL_ASSESSMENT: ACTIVITIES ARE NOT PREVENTED

## 2021-09-02 NOTE — PROGRESS NOTES
General Surgery  Daily Progress Note    Pt Name: Raudel Dubois  Medical Record Number: [id number]  Date of Birth 1945   Today's Date: 9/2/2021    Chief Complaint   Patient presents with    Abdominal Pain       ASSESSMENT/PLAN  1. Splenic hematoma s/p IR embolization - pain slowly improving. H&H stable. Vaccines given before discharge. Continue regular diet. 2. COPD, HTN, hx of tobacco abuse, hx of EtOH abuse - per primary team    3. Altered mental status - slightly improved today. Possibly secondary to librium. UTI-rocephin. Ariella Terry is improved from yesterday. More alert today than yesterday, seems less confused. She is complaining of some abdominal pain. She has no nausea and no vomiting. She is tolerating general diet. Current activity is up with assistance    OBJECTIVE  VITALS:  height is 5' 2\" (1.575 m) and weight is 122 lb 2.2 oz (55.4 kg). Her axillary temperature is 98 °F (36.7 °C). Her blood pressure is 124/69 and her pulse is 108. Her respiration is 18 and oxygen saturation is 94%. GENERAL: alert, no distress  LUNGS: normal respiratory effort, no accessory muscle use  HEART: normal rate and regular rhythm  ABDOMEN: soft, ND, NT  EXTREMITY: no cyanosis and no clubbing  I/O last 3 completed shifts: In: 300 [P.O.:300]  Out: 1195 [[id number]:[id number]]  I/O this shift:  In: 355.9 [P.O.:120; I.V.:235.9; IV Piggyback:0]  Out: -     LABS  Recent Labs     09/01/21  0437 09/01/21  1321 09/02/21  0517   WBC   < >  --  9.3   HGB   < >  --  8.8*   HCT   < >  --  25.7*   PLT   < >  --  496*   NA   < >  --  136   K   < >  --  3.5   CL   < >  --  93*   CO2   < >  --  33*   BUN   < >  --  9   CREATININE   < >  --  <0.5*   MG   < >  --  1.70*   CALCIUM   < >  --  8.7   NITRU  --  Negative  --    COLORU  --  YELLOW  --    BACTERIA  --  4+*  --     < > = values in this interval not displayed.      CBC with Differential:    Lab Results   Component Value Date    WBC 9.3 09/02/2021    RBC 2.56 09/02/2021    HGB 8.8 09/02/2021    HCT 25.7 09/02/2021     09/02/2021    .2 09/02/2021    MCH 34.4 09/02/2021    MCHC 34.4 09/02/2021    RDW 20.6 09/02/2021    SEGSPCT 64.4 03/31/2011    BANDSPCT 2 12/18/2018    LYMPHOPCT 13.4 09/02/2021    MONOPCT 10.3 09/02/2021    MYELOPCT 5 12/18/2018    EOSPCT 11.9 03/31/2011    BASOPCT 0.6 09/02/2021    MONOSABS 1.0 09/02/2021    LYMPHSABS 1.2 09/02/2021    EOSABS 0.2 09/02/2021    BASOSABS 0.1 09/02/2021    DIFFTYPE Auto 03/31/2011     BMP:    Lab Results   Component Value Date     09/02/2021    K 3.5 09/02/2021    K 3.2 08/27/2021    CL 93 09/02/2021    CO2 33 09/02/2021    BUN 9 09/02/2021    LABALBU 2.3 08/28/2021    CREATININE <0.5 09/02/2021    CALCIUM 8.7 09/02/2021    GFRAA >60 09/02/2021    GFRAA >60 03/31/2011    LABGLOM >60 09/02/2021    GLUCOSE 83 09/02/2021     Hepatic Function Panel:    Lab Results   Component Value Date    ALKPHOS 102 08/28/2021    ALT 12 08/28/2021    AST 35 08/28/2021    PROT 5.0 08/28/2021    BILITOT 0.8 08/28/2021    BILIDIR <0.2 06/23/2021    IBILI see below 06/23/2021    LABALBU 2.3 08/28/2021         ABIGAIL Sanderson CNP  Electronically signed 9/2/2021 at 1:21 PM    Agree with above note. The patient was personally seen and examined. Sara Mario is doing about the same. Still confused, complaining of abdominal pain. Abd soft, ND, minimal LUQ tenderness, no peritonitis    WBC 9.3  Hgb 8.8  Cr <0.5    UA shows 81 WBCs, large leuk esterase    A/P: splenic hemorrhage, UTI, altered mental status, hx of alcohol abuse    H&H stable. Vaccines given. UTI - rocephin started    Altered mental status - multifactorial, possibly librium and UTI. Management per hospitalist    Hx of alcohol abuse - monitor.   No signs of withdrawal    Gabriel Cox MD

## 2021-09-02 NOTE — PROGRESS NOTES
Physician Progress Note      PATIENT:               Celeste Ramos  CSN #:                  [id number]  :                       1945  ADMIT DATE:       2021 4:20 PM  [address] [place] DATE:  Carmen Driscoll  PROVIDER #:        Suzan Desai MD          QUERY TEXT:    Dear Dr Irena Joel,    Pt admitted with splenic hematoma. Pt noted to have AMS If possible, please   document in the progress notes and discharge summary if you are evaluating and   / or treating any of the following: The medical record reflects the following:  Risk Factors: Current admission for splenic hematoma. Hx- alcohol abuse. Clinical Indicators: U/A on 21- cloudy, moderate blood, large leuk, wbc   81, 4+ bacteria. Ernie Confer Ernie Confer Per surgery pn on 21-  Altered mental status - slightly   improved today. Possibly secondary to librium. Will check UA given ferrer   catheter placed on admission  Treatment: Hold Librium, Check U/A. Thank Sharri Benoit RN BSN CDS CRCR  [email or web address]  Options provided:  -- Metabolic encephalopathy  -- Alcoholic encephalopathy  -- Drug-induced encephalopathy due to Librium  -- Drug-induced encephalopathy due to, Please specify substance. -- Drug-induced encephalopathy, substance(s) unknown  -- Toxic encephalopathy  -- Toxic metabolic encephalopathy  -- Other - I will add my own diagnosis  -- Disagree - Not applicable / Not valid  -- Disagree - Clinically unable to determine / Unknown  -- Refer to Clinical Documentation Reviewer    PROVIDER RESPONSE TEXT:    This patient has metabolic encephalopathy.     Query created by: Melissa Salas on 2021 8:39 AM      Electronically signed by:  Suzan Desai MD 2021 9:39 AM

## 2021-09-02 NOTE — PROGRESS NOTES
Case Management and interdisciplinary rounding team made aware of conversation that this nurse had with Cristin the palliative care nurse practitioner.

## 2021-09-02 NOTE — PROGRESS NOTES
Pt resting in bed awake confused, oriented to person and place. When asked if she is having pt pain pt states \"I hurt\" \"give me a shot\". Pt says her \"stomach hurts\" but doesn't elaborate on it, just continues to yell \"I hurt give me a shot\". Pt given oral pain medication. Discharge orders noted and new orders also noted the order for IV lasix and IV magnesium replacement. Dr. Linda Yarbrough in to see pt. He was updated that discharge orders are in place. Will discuss with Dr. Jackelyn Schneider. Tiarra in case management called regarding discharge time. Receiving IV magnesium replacement. She said she would set up transportation for later and will discuss with Dr. Jackelyn Schneider in rounds.

## 2021-09-02 NOTE — PLAN OF CARE
Problem: Falls - Risk of:  Goal: Will remain free from falls  Description: Will remain free from falls  9/2/2021 0028 by Deo Justin RN  Outcome: Ongoing     Problem: Falls - Risk of:  Goal: Absence of physical injury  Description: Absence of physical injury  9/2/2021 0028 by Deo Justin RN  Outcome: Ongoing     Problem: Pain:  Goal: Pain level will decrease  Description: Pain level will decrease  9/2/2021 0028 by Deo Justin RN  Outcome: Ongoing     Problem: Pain:  Goal: Control of acute pain  Description: Control of acute pain  9/2/2021 0028 by Deo Justin RN  Outcome: Ongoing     Problem: Pain:  Goal: Control of chronic pain  Description: Control of chronic pain  9/2/2021 0028 by Deo Justin RN  Outcome: Ongoing     Problem: Pain:  Goal: Patient's pain/discomfort is manageable  Description: Patient's pain/discomfort is manageable  9/2/2021 0028 by Deo Justin RN  Outcome: Ongoing     Problem: Skin Integrity:  Goal: Will show no infection signs and symptoms  Description: Will show no infection signs and symptoms  9/2/2021 0028 by Deo Justin RN  Outcome: Ongoing     Problem: Skin Integrity:  Goal: Absence of new skin breakdown  Description: Absence of new skin breakdown  9/2/2021 0028 by Deo Justin RN  Outcome: Ongoing     Problem: Infection:  Goal: Will remain free from infection  Description: Will remain free from infection  9/2/2021 0028 by Deo Justin RN  Outcome: Ongoing     Problem: Safety:  Goal: Free from accidental physical injury  Description: Free from accidental physical injury  9/2/2021 0028 by Deo Justin RN  Outcome: Ongoing     Problem: Safety:  Goal: Free from intentional harm  Description: Free from intentional harm  9/2/2021 0028 by Deo Justin RN  Outcome: Ongoing     Problem: Daily Care:  Goal: Daily care needs are met  Description: Daily care needs are met  9/2/2021 0028 by Deo Justin RN  Outcome: Ongoing     Problem: Skin Integrity:  Goal: Skin integrity will stabilize  Description: Skin integrity will stabilize  9/2/2021 0028 by Rabia Nunez RN  Outcome: Ongoing     Problem: Discharge Planning:  Goal: Patients continuum of care needs are met  Description: Patients continuum of care needs are met  9/2/2021 0028 by Rabia Nunez RN  Outcome: Ongoing     Problem: Discharge Planning:  Goal: Discharged to appropriate level of care  Description: Discharged to appropriate level of care  9/2/2021 0028 by Rabia Nunez RN  Outcome: Ongoing     Problem:  Activity Intolerance:  Goal: Ability to tolerate increased activity will improve  Description: Ability to tolerate increased activity will improve  9/2/2021 0028 by Rabia Nunez RN  Outcome: Ongoing     Problem: Airway Clearance - Ineffective:  Goal: Ability to maintain a clear airway will improve  Description: Ability to maintain a clear airway will improve  9/2/2021 0028 by Rabia Nunez RN  Outcome: Ongoing     Problem: Breathing Pattern - Ineffective:  Goal: Ability to achieve and maintain a regular respiratory rate will improve  Description: Ability to achieve and maintain a regular respiratory rate will improve  9/2/2021 0028 by Rabia Nunez RN  Outcome: Ongoing     Problem: Gas Exchange - Impaired:  Goal: Levels of oxygenation will improve  Description: Levels of oxygenation will improve  9/2/2021 0028 by Rabia Nunez RN  Outcome: Ongoing

## 2021-09-02 NOTE — CARE COORDINATION
Nessa/Ankit received referral from RT for HOME OXYGEN      Will need DME ORDERS     [employer] rep will verify patient's insurance and once DME Orders are received, Nessa/Ankit rep will follow up with patient prior to discharge to deliver Oxygen Equipment.     Thank you for the referral.  Electronically signed by Ross Sarkar on 9/2/2021 at 10:30 AM  Cell ph# [phone]

## 2021-09-02 NOTE — PROGRESS NOTES
Discharge instructions reviewed with pt's son Mabel Dutton, emphasized the importance of pt no longer smoking and drinking. Reviewed the portable oxygen tank with him, he verbalized understanding.

## 2021-09-02 NOTE — CARE COORDINATION
DISCHARGE SUMMARY     DATE OF DISCHARGE:  9/2/2021     DISCHARGE DESTINATION:  Home with Danielle Prado [address]    Discharging to Facility/ Agency   · Name: [address]  · Address:   [address] [place] [address], [address] [place] [address]  · Phone:  [phone]  Fax:  [phone]    Level of Care:  Skilled     Home Oxygen:    [address]   Phone:  [phone]      TRANSPORTATION:  [facility] Name:   Awilda Agustín up Time:  4:45 pm     Phone Number:  [phone]     COMMENTS:  Discharge order noted discussed with bedside nurse, doctor. Discussed with son who agrees and tells cm he thinks she will do better at home. [employer] notified of discharge and they will retrieve discharge paperwork and home care orders from [facility]. Informed [facility] of transport time. Informed patient of transport time. Electronically signed by Lisa Jacobson on 9/2/2021 at 11:33 AM     Case Management:  Transport called to inform cm that they will be a little late to pick patient up and new time is 5:30 -5:45 pm , bedside nurse made aware.   Electronically signed by Lisa Jacobson on 9/2/2021 at 5:00 PM

## 2021-09-02 NOTE — CARE COORDINATION
[facility] rep delivered an Oxygen Concentrator to the patient's room and reviewed insurance coverage, copays, oxygen safety, and equipment setup with the patient's son Idalia Banks who arrived to the patient's room. [facility] rep stressed NO SMOKING to the patient's son and he agreed to notify the patient's caregivers. RN aware that the POC was delivered.     Thank you for the referral.  Electronically signed by Jolanta Nunez on 9/2/2021 at 4:25 PM Cell ph# [phone]

## 2021-09-02 NOTE — PROGRESS NOTES
Patient's home Palliative Care Nurse Practitioner called this nurse to update on pt's home situation. Cristin ([phone]) NP said that pt is a heavy alcoholic and she drinks \"a bottle of bourbon and a 12 pack of beer a day\". She questioned if pt is going through withdrawal and nurse said that she is not. Cristin said she has been working with the patient's family to try and get pt into an intensive in patient alcohol rehab facility. Nurse questioned if family is agreeable and Cristin said that she was going to have an intervention type meeting with the family today. Cristin said that this pt was ambulating a month ago, that she is failure to thrive from alcoholism and severe depression from her son's death earlier this year. Nurse said that Case Management would be made aware of above.

## 2021-09-02 NOTE — PROGRESS NOTES
[facility]    Respiratory Therapy   Home Oxygen Evaluation        Name: LACEY Wood Record Number: [id number]  Age: 78 y.o. Gender:  female   : 1945  Today's date: 2021  [address]      Assessment        /68   Pulse 106   Temp 98 °F (36.7 °C) (Axillary)   Resp 16   Ht 5' 2\" (1.575 m)   Wt 122 lb 2.2 oz (55.4 kg)   SpO2 91%   BMI 22.34 kg/m²     Patient Active Problem List   Diagnosis    COPD with acute exacerbation (HCC)    Essential hypertension    Tobacco abuse    Alcohol abuse    [facility]    Recurrent major depressive disorder, in partial remission ([facility]    Personal history of nicotine dependence     Colitis    Colitis due to Clostridium difficile    Alcohol-induced acute pancreatitis without infection or necrosis    Gastrointestinal hemorrhage associated with duodenal ulcer    Small bowel obstruction (HCC)    Ileus (HCC)    Acute cystitis without hematuria    Non-intractable cyclical vomiting with nausea    Parastomal hernia without obstruction or gangrene    Abdominal pain    C. difficile diarrhea    Colostomy present ([facility]    Postoperative intra-abdominal abscess    COPD exacerbation (HCC)    Atrial fibrillation (HCC)    Difficulty speaking    TIA (transient ischemic attack)    Chest pain    Alcoholic liver disease (HCC)    Spleen hematoma    Pancreatitis    Splenic infarct    COPD (chronic obstructive pulmonary disease) (HCC)       Social History:  Social History     Tobacco Use    Smoking status: Current Every Day Smoker     Packs/day: 1.00     Years: 60.00     Pack years: 60.00     Types: Cigarettes     Start date: 1/10/1960    Smokeless tobacco: Never Used   Vaping Use    Vaping Use: Never used   Substance Use Topics    Alcohol use:  Yes     Alcohol/week: 4.0 standard drinks     Types: 4 Cans of beer per week     Comment: states drinks beer and whisky daily, unsure of amount    Drug use: No       Patient Room Air saturation at rest 86  %    Oxygen saturations of 88% or less on RA qualifies patient for Home Oxygen    Patient resting on 3  lmp  with an oxygen saturation of  90 %     Qualifying patient for home oxygen with ambulation and continuous flow  @ 3 lpm.      In your clinical assessment does the Patient Require Portable Oxygen Tanks?     yes             [employer] contacted to follow care of patients home oxygen needs on 9/2/2021 at 10:23 AM    Patient/caregiver was educated on Home Oxygen process:  No: no care giver available     Level of patient/caregiver understanding able to:   [] Verbalize understanding   [] Demonstrate understanding       [] Teach back        [] Needs reinforcement        [x]  No available caregiver               []  Other:     Response to education:  unable  no caregiver at bedside    Time Spent with Home O2 Set Up:  10  minutes     Laura Mays RCP on 9/2/2021 at 10:23 AM

## 2021-09-02 NOTE — PROGRESS NOTES
Transport here to take pt home. Patient's son Yobani Alvarez was here when transport arrived. Yobani  took the home oxygen with him. Pt taken via ambulance to home.

## 2021-09-02 NOTE — PROGRESS NOTES
Patient has had a large amount of urine output since receiving IV lasix. Her lungs sound much better, just diminished now no crackles auscultated. Call light within reach.

## 2021-09-02 NOTE — PLAN OF CARE
Plan is for patient discharged to home with family. Patient has made no attempts to get out of bed this shift. She did complain of abdominal pain early this shift, she could not elaborate on pain just kept repeating \"I Hurt Give me a shot\". Pt did receive pain medication which was effective. Essentially pt has been drowsy since receiving pain medication. Pt does not have narcotic pain medication ordered at discharge, she does have tylenol. Pt requires the assistance of staff for turning and repositioning and all personal care. Pt did receive rocephin today for possible UTI. She is being discharged home on PO antibiotics. Patient did have crackles in her lung fields and received IV lasix and her lungs are presently clear to auscultation. Pt does desat on room air and is going home on oxygen via nasal canula.

## 2021-09-04 ENCOUNTER — APPOINTMENT (OUTPATIENT)
Dept: GENERAL RADIOLOGY | Age: 76
End: 2021-09-04
Payer: MEDICARE

## 2021-09-04 ENCOUNTER — HOSPITAL ENCOUNTER (EMERGENCY)
Age: 76
Discharge: HOSPICE | End: 2021-09-04
Attending: EMERGENCY MEDICINE
Payer: MEDICARE

## 2021-09-04 VITALS
SYSTOLIC BLOOD PRESSURE: 119 MMHG | RESPIRATION RATE: 31 BRPM | WEIGHT: 124.56 LBS | TEMPERATURE: 98.3 F | HEART RATE: 156 BPM | BODY MASS INDEX: 22.78 KG/M2 | OXYGEN SATURATION: 92 % | DIASTOLIC BLOOD PRESSURE: 60 MMHG

## 2021-09-04 DIAGNOSIS — R41.82 ALTERED MENTAL STATUS, UNSPECIFIED ALTERED MENTAL STATUS TYPE: ICD-10-CM

## 2021-09-04 DIAGNOSIS — A41.9 SEPTICEMIA (HCC): Primary | ICD-10-CM

## 2021-09-04 DIAGNOSIS — R09.02 HYPOXIA: ICD-10-CM

## 2021-09-04 DIAGNOSIS — J18.9 PNEUMONIA DUE TO INFECTIOUS ORGANISM, UNSPECIFIED LATERALITY, UNSPECIFIED PART OF LUNG: ICD-10-CM

## 2021-09-04 PROCEDURE — 96375 TX/PRO/DX INJ NEW DRUG ADDON: CPT

## 2021-09-04 PROCEDURE — 6360000002 HC RX W HCPCS: Performed by: EMERGENCY MEDICINE

## 2021-09-04 PROCEDURE — 93005 ELECTROCARDIOGRAM TRACING: CPT | Performed by: EMERGENCY MEDICINE

## 2021-09-04 PROCEDURE — 2580000003 HC RX 258: Performed by: EMERGENCY MEDICINE

## 2021-09-04 PROCEDURE — 99284 EMERGENCY DEPT VISIT MOD MDM: CPT

## 2021-09-04 PROCEDURE — 96374 THER/PROPH/DIAG INJ IV PUSH: CPT

## 2021-09-04 RX ORDER — SODIUM CHLORIDE 9 MG/ML
1000 INJECTION, SOLUTION INTRAVENOUS CONTINUOUS
Status: DISCONTINUED | OUTPATIENT
Start: 2021-09-04 | End: 2021-09-04 | Stop reason: HOSPADM

## 2021-09-04 RX ORDER — ONDANSETRON 2 MG/ML
4 INJECTION INTRAMUSCULAR; INTRAVENOUS ONCE
Status: COMPLETED | OUTPATIENT
Start: 2021-09-04 | End: 2021-09-04

## 2021-09-04 RX ADMIN — HYDROMORPHONE HYDROCHLORIDE 0.5 MG: 1 INJECTION, SOLUTION INTRAMUSCULAR; INTRAVENOUS; SUBCUTANEOUS at 10:24

## 2021-09-04 RX ADMIN — ONDANSETRON 4 MG: 2 INJECTION INTRAMUSCULAR; INTRAVENOUS at 10:25

## 2021-09-04 RX ADMIN — SODIUM CHLORIDE 1000 ML: 9 INJECTION, SOLUTION INTRAVENOUS at 10:24

## 2021-09-04 ASSESSMENT — PAIN SCALES - GENERAL: PAINLEVEL_OUTOF10: 6

## 2021-09-04 NOTE — FLOWSHEET NOTE
provided ministerial presence, comfort, and prayer with patient and her only living child, [place].

## 2021-09-04 NOTE — ED NOTES
[facility] notified of [facility] [address] Arpan Gibson, RN  09/04/21 [address] [facility] [place]  09/04/21 1021

## 2021-09-04 NOTE — ED NOTES
Bed: A16  Expected date: 9/4/21  Expected time: 9:06 AM  Means of arrival: [facility]  Comments:  76F unconscious, sepsis alert, fever, [address] RN  09/04/21 0442

## 2021-09-04 NOTE — ED NOTES
Pt arrived to the ED via EMS, pt is unresponsive and a DNR CC, pt's son is at bedside and declined any testing and treatment and request Hospice at this time, Dr Ruby Matamoros is aware and at bedside speaking with pt's son, Alejandrojabari Schneider notified      [facility]  09/04/21 8970

## 2021-09-04 NOTE — ED PROVIDER NOTES
[facility]      Pt Name: Yanelis Honeycutt  MRN: [id number]  [place] 1945  Date of evaluation: 9/4/2021  Provider: Rukhsana Max DO    CHIEF COMPLAINT       Chief Complaint   Patient presents with    Altered Mental Status     pt is a [facility] pt is unresponsive, and pt's son stated \" why didn't they just call Hospice i want her to be comfortable\"     Respiratory Distress         HISTORY OF PRESENT ILLNESS   (Location/Symptom, Timing/Onset, Context/Setting, Quality, Duration, Modifying Factors, Severity)  Note limiting factors. Yanelis Honeycutt is a 78 y.o. female who presents to the emergency department with a complaint of altered mental status and respiratory distress. The patient's son is at the bedside who is her primary caregiver. The patient is DNR CC, comfort care only. The patient is unable to provide any historical information. She is unresponsive and in respiratory distress. Her son reports that she was recently hospitalized on August 27 to September 2 with abdominal pain. She was found to have a large splenic hematoma with active bleeding from the spleen. There was no report of any traumatic injury. She underwent embolization of the spleen by interventional radiology. Initial hemoglobin was 12 but settled out at 7.4. She did not require transfusion. It was reported that she had some confusion in the hospital but also had a urinary tract infection and was treated with antibiotics. She was discharged home on antibiotics. She underwent CT head without contrast which was negative. It was thought that some of her altered mental status was secondary to Librium. Discharge to an extended care facility was discussed with the family but they wished to take her home because they felt that they could provide better care for her at home.   However, her son reports that she is not been eating or drinking over the last few days and her condition has markedly deteriorated over the last 48 hours. He reports that prior to her recent illness she had been \"talking a lot about dying and that she was ready to go\". She has been DNR CC, comfort care only and her son reports that she has a signed DNR. He states that she has suffered a lot recently and does not want any type of aggressive care or intervention, life-saving measures, intubation, invasive procedures, CPR, or resuscitation. Her son is requesting hospice care and does not wish for her to have any testing but only for her to be made comfortable. There is no report of any fall or trauma. No report of any vomiting or diarrhea. There is a report of a cough with respiratory distress, shortness of breath. No report of any chest pain. No report of any headache. Appetite has been poor and she has not been eating or drinking. Urine output has been decreased. Does have a history of COPD. She normally does not wear oxygen but was recently discharged from the hospital on oxygen 2 L per nasal cannula. Nursing Notes were reviewed. HPI        REVIEW OF SYSTEMS    (2-9 systems for level 4, 10 or more for level 5)     Review of systems are unobtainable due to the patient's condition. She is unresponsive.           PAST MEDICAL HISTORY     Past Medical History:   Diagnosis Date    Anemia     Arthritis     Clostridium difficile colitis     December 2018    Clostridium difficile diarrhea 10/27/2019    Colostomy in place [facility])     SBO/diverticulosis     COPD [facility] obstructive [facility] disease) [facility]     Depression     DANIELLE (generalized anxiety disorder)     Gastric ulcer, unspecified as acute or chronic, without mention of hemorrhage, perforation, or obstruction     H/O ETOH abuse     Hiatal hernia     History of blood transfusion     Hyperlipidemia     Hypertension     Insomnia     Intestinal obstruction ([address]     Parkinson's disease ([facility] [address] [facility]     Tobacco abuse     Vitamin D deficiency          SURGICAL HISTORY       Past Surgical History:   Procedure Laterality Date    ABDOMEN SURGERY      CHOLECYSTECTOMY      COLONOSCOPY  12/14/2018    COLONOSCOPY N/A 12/14/2018    COLONOSCOPY POLYPECTOMY SNARE/COLD BIOPSY performed by Ruth Rose MD at [facility]  2018    ENDOSCOPY, COLON, DIAGNOSTIC      HYSTERECTOMY      IR EMBOLIZATION HEMORRHAGE  8/27/2021    IR EMBOLIZATION HEMORRHAGE 8/27/2021 [facility]    SMALL INTESTINE SURGERY N/A 4/10/2019    LAPAROSCOPIC LYSISI OF ADHESIONS FOR GREATER THAN 3 HOURS,LAPORSCOPIC CONVERTED TO OPEN COLOSTOMY REVERSAL, SMALL BOWEL RESECTION performed by Farhan Baker MD at [facility] ENDOSCOPY  12/13/2018    with biopsies    UPPER GASTROINTESTINAL ENDOSCOPY N/A 12/13/2018    EGD BIOPSY performed by Ruth Rose MD at [facility]       Previous Medications    ACETAMINOPHEN (TYLENOL) 500 MG TABLET    Take 1 tablet by mouth 4 times daily as needed for Pain    DULOXETINE (CYMBALTA) 30 MG EXTENDED RELEASE CAPSULE    Take 1 capsule by mouth daily    MULTIPLE VITAMIN (MULTIVITAMIN) TABS TABLET    Take 1 tablet by mouth daily    NICOTINE (NICODERM CQ) 21 MG/24HR    Place 1 patch onto the skin every 24 hours    NITROFURANTOIN, MACROCRYSTAL-MONOHYDRATE, (MACROBID) 100 MG CAPSULE    Take 1 capsule by mouth 2 times daily for 5 days    ONDANSETRON (ZOFRAN ODT) 4 MG DISINTEGRATING TABLET    Take 1-2 tablets by mouth every 12 hours as needed for Nausea May Sub regular tablet (non-ODT) if insurance does not cover ODT.     VITAMIN B-1 100 MG TABLET    Take 1 tablet by mouth daily       ALLERGIES     Aspirin, Fentanyl, and Morphine    FAMILY HISTORY       Family History   Problem Relation Age of Onset    Cancer Mother     Diabetes Mother     Heart Disease Father           SOCIAL HISTORY       Social History     Socioeconomic History    Marital status:      Spouse name: None    Number of children: 3    Years of education: None    Highest education level: None   Occupational History    None   Tobacco Use    Smoking status: Current Every Day Smoker     Packs/day: 1.00     Years: 60.00     Pack years: 60.00     Types: Cigarettes     Start date: 1/10/1960    Smokeless tobacco: Never Used   Vaping Use    Vaping Use: Never used   Substance and Sexual Activity    Alcohol use: Yes     Alcohol/week: 4.0 standard drinks     Types: 4 Cans of beer per week     Comment: states drinks beer and whisky daily, unsure of amount    Drug use: No    Sexual activity: Not Currently   Other Topics Concern    None   Social History Narrative    Moved to the area from [place], [place] with family    Lives with ex- mother in law and friend     Social Determinants of Health     Financial Resource Strain:     Difficulty of Paying Living Expenses:    Food Insecurity:     Worried About [address] in the Last Year:     [address] [address] [employer] in the Last Year:    Transportation Needs:     Lack of Transportation (Medical):      Lack of Transportation (Non-Medical):    Physical Activity:     Days of Exercise per Week:     Minutes of Exercise per Session:    Stress:     Feeling of Stress :    Social Connections:     Frequency of Communication with Friends and Family:     Frequency of Social Gatherings with Friends and Family:     Attends [employer] Services:     Active Member of Clubs or Organizations:     Attends Club or Organization Meetings:     Marital Status:    Intimate Partner Violence:     Fear of Current or Ex-Partner:     Emotionally Abused:     Physically Abused:     Sexually Abused:        SCREENINGS    K[place] Coma Scale  Eye Opening: None  Best Verbal Response: None  Best Motor Response: None  Kelso Coma Scale Score: 3  OPO Notified: Yes        PHYSICAL EXAM    (up to 7 for level 4, 8 or more for level 5)     ED Triage time of this note:    No orders to display         ED BEDSIDE ULTRASOUND:   Performed by ED Physician - none    LABS:  Labs Reviewed   CULTURE, BLOOD 1   CULTURE, BLOOD 2   LACTATE, SEPSIS   LACTATE, SEPSIS   CBC WITH AUTO DIFFERENTIAL   COMPREHENSIVE METABOLIC PANEL W/ REFLEX TO MG FOR LOW K   TROPONIN   BRAIN NATRIURETIC PEPTIDE   BLOOD GAS, VENOUS   URINE RT REFLEX TO CULTURE       All other labs were within normal range or not returned as of this dictation. EMERGENCY DEPARTMENT COURSE and DIFFERENTIAL DIAGNOSIS/MDM:   Vitals:    Vitals:    09/04/21 1115 09/04/21 1130 09/04/21 1145 09/04/21 1200   BP: 113/64 110/60 108/61    Pulse: 157 153 131 128   Resp:       Temp:       TempSrc:       SpO2: (!) 88% 90% 93% 95%   Weight:             MDM      Patient presents with respiratory distress. She is unresponsive to verbal or painful stimuli. Her eyes are open but she has a fixed upward gaze. No evidence of seizure activity. She is tachypneic with a respiratory rate in the 40s and very shallow respirations. Breath sounds are diminished with bibasilar rales and rhonchi. She is tachycardic with a heart rate in the 160s. On nonrebreather her oxygen saturation is 93 to 94%. No evidence of hypotension. Sepsis is suspected. There was evidence of recent urinary tract infection. Based on the patient's current appearance and clinical examination, without intubation, airway management, and aggressive resuscitation, death appears imminent. Her son is at the bedside and we had a extensive conversation regarding end-of-life care and the patient's wishes. She is DNR CC, comfort care only. He states that she has had a recent gradual decline over the last several weeks but also has had a very rapid decline in the last few days. He states that she would not want to have any further care or interventions and certainly would not want to have any type of intubation, resuscitation, or aggressive treatment or work-up.   He states that she wanted to die peacefully. He is requesting hospice care. Hospice was consulted. As result of the patient's wishes, her son requested no testing including chest x-ray, laboratory studies, etc.  He did recommend anything to make her comfortable. IV fluids were initiated at 125 mL/h, she was given Dilaudid 0.5 mg IV for pain or discomfort and Zofran 4 mg IV for nausea. Oxygen was maintained with nonrebreather. 100% oxygen FiO2. She is allergic to fentanyl and morphine. Hospice was consulted. REASSESSMENT          12:35 PM: The patient has been evaluated by hospice and has been accepted for inpatient [facility] care [facility]. She will be transferred there by [facility] ambulance and hospice will arrange transport. The accepting physician is Dr. Ku President. Patient's family is in agreement with the treatment plan. CRITICAL CARE TIME   Total Critical Care time was 0 minutes, excluding separately reportable procedures. There was a high probability of clinically significant/life threatening deterioration in the patient's condition which required my urgent intervention. CONSULTS:  IP CONSULT TO HOSPICE    PROCEDURES:  Unless otherwise noted below, none     Procedures        FINAL IMPRESSION      1. Septicemia ([address]    2. Pneumonia due to infectious organism, unspecified laterality, unspecified part of lung    3. Hypoxia    4. Altered mental status, unspecified altered mental status type          DISPOSITION/PLAN   DISPOSITION Decision To Transfer 09/04/2021 12:26:39 PM      PATIENT REFERRED TO:  No follow-up provider specified. DISCHARGE MEDICATIONS:  New Prescriptions    No medications on file     Controlled Substances Monitoring:     No flowsheet data found. (Please note that portions of this note were completed with a voice recognition program.  Efforts were made to edit the dictations but occasionally words are mis-transcribed. )    DAVID Gershon Sacks, DO (electronically signed)  Attending Emergency Physician          Carissa Golden, DO  09/04/21 1230

## 2021-09-05 LAB
EKG ATRIAL RATE: 163 BPM
EKG DIAGNOSIS: NORMAL
EKG P AXIS: 81 DEGREES
EKG P-R INTERVAL: 128 MS
EKG Q-T INTERVAL: 286 MS
EKG QRS DURATION: 62 MS
EKG QTC CALCULATION (BAZETT): 470 MS
EKG R AXIS: 18 DEGREES
EKG T AXIS: 24 DEGREES
EKG VENTRICULAR RATE: 163 BPM

## 2021-09-05 PROCEDURE — 93010 ELECTROCARDIOGRAM REPORT: CPT | Performed by: INTERNAL MEDICINE

## 2021-11-15 NOTE — PLAN OF CARE
Problem: Pain:  Goal: Pain level will decrease  Description: Pain level will decrease  Outcome: Ongoing  Note: Pt educated to attempt non-phagological method of pain control, but it it becomes too strong use PRN analgesics. Pain and discomfort being managed PRN analgesics per MD orders. Pt able to express presence of pain. Problem: Pain:  Goal: Control of acute pain  Description: Control of acute pain  Outcome: Ongoing  Note: Patient educated on acute pain. Taught patient to use call light to ask for pain medication. PRN pain medication given for acute pain. Will continue to monitor pain per unit protocol. Problem: Pain:  Goal: Control of chronic pain  Description: Control of chronic pain  Outcome: Ongoing  Note: Patient educated on chronic pain. Taught patient to use call light to ask for pain medication. PRN pain medication given for chronic pain. Will continue to monitor pain per unit protocol. Problem: Skin Integrity:  Goal: Will show no infection signs and symptoms  Description: Will show no infection signs and symptoms  Outcome: Ongoing  Note: Patient shows no signs or symptoms of urinary tract infection at this time. Will continue to monitor throughout shift. Problem: Skin Integrity:  Goal: Absence of new skin breakdown  Description: Absence of new skin breakdown  Outcome: Ongoing  Note: Skin assessment complete. No new signs of skin breakdown noted. Repositioning patient with pillows at two hour intervals. Heels elevated off bed. Problem: Falls - Risk of:  Goal: Will remain free from falls  Description: Will remain free from falls  Outcome: Ongoing  Note: Fall risk assessment completed . Fall precautions in place, bed/ chair alarm on, side rails 2/4 up, call light in reach, educated pt on calling for assistance when needed, room clear of clutter. Pt verbalized understanding.        Problem: Falls - Risk of:  Goal: Absence of physical injury  Description: Absence of physical injury  Outcome: Ongoing  Note: Patient remains free from physical injury. Patient educated on safety precautions. Will continue to monitor to ensure patient remains free from physical injury throughout remainder of shift. Detail Level: Detailed

## 2022-08-25 NOTE — PROGRESS NOTES
Hospitalist Progress Note      PCP: Devaughn Lopez MD    Date of Admission: 6/14/2021        Subjective: Now having RUQ pain, no nausea or vomiting, no hallucination, nurse at bedside        Medications:  Reviewed    Infusion Medications    sodium chloride      sodium chloride       Scheduled Medications    folic acid  1 mg Oral Daily    sodium chloride flush  5-40 mL Intravenous 2 times per day    multivitamin  1 tablet Oral Daily    atorvastatin  20 mg Oral Daily    pantoprazole  40 mg Oral QAM AC    DULoxetine  30 mg Oral Daily    ferrous sulfate  325 mg Oral Daily with breakfast    lactobacillus  1 capsule Oral Daily with breakfast    nicotine  1 patch Transdermal Daily    [Held by provider] metoprolol succinate  25 mg Oral Daily    tiotropium  2 puff Inhalation Daily    thiamine mononitrate  100 mg Oral Daily    sodium chloride flush  5-40 mL Intravenous 2 times per day    enoxaparin  40 mg Subcutaneous Daily     PRN Meds: melatonin, sodium chloride flush, sodium chloride, albuterol sulfate HFA, prochlorperazine, sodium chloride flush, sodium chloride, ondansetron **OR** ondansetron, polyethylene glycol, acetaminophen **OR** acetaminophen    No intake or output data in the 24 hours ending 06/15/21 1335    Physical Exam Performed:    BP (!) 155/83   Pulse 82   Temp 97.3 °F (36.3 °C) (Oral)   Resp 18   Ht 5' 2\" (1.575 m)   Wt 118 lb 2.7 oz (53.6 kg)   SpO2 98%   BMI 21.61 kg/m²     General appearance: No apparent distress  Neck: Supple  Respiratory:  Normal respiratory effort. Clear to auscultation, bilaterally without Rales/Wheezes/Rhonchi. Cardiovascular: Regular rate and rhythm with normal S1/S2 without murmurs, rubs or gallops. Abdomen: Soft, mild RUQ tenderness   Musculoskeletal: No clubbing, cyanosis  Skin: Skin color, texture, turgor normal.  No rashes or lesions.   Neurologic:  No focal weakness   Psychiatric: Alert and oriented  Capillary Refill: Brisk,3 seconds, normal Peripheral Pulses: +2 palpable, equal bilaterally       Labs:   Recent Labs     06/14/21  1557 06/15/21  0436   WBC 8.0 4.5   HGB 14.3 13.4   HCT 42.1 39.0    249     Recent Labs     06/14/21  1557 06/15/21  0436    137   K 3.5 3.9    99   CO2 26 24   BUN 7 10   CREATININE <0.5* 0.5*   CALCIUM 8.8 9.4     Recent Labs     06/14/21  1557 06/15/21  0436   AST 83* 54*   ALT 58* 47*   BILIDIR  --  <0.2   BILITOT 0.5 0.4   ALKPHOS 153* 145*     No results for input(s): INR in the last 72 hours. Recent Labs     06/14/21  1557 06/14/21  1944 06/15/21  0003   TROPONINI <0.01 <0.01 <0.01       Urinalysis:      Lab Results   Component Value Date    NITRU Negative 04/09/2020    WBCUA >900 12/19/2019    BACTERIA 2+ 12/19/2019    RBCUA 20-50 12/19/2019    BLOODU Negative 04/09/2020    SPECGRAV <=1.005 04/09/2020    GLUCOSEU Negative 04/09/2020       Radiology:  NM Cardiac Stress Test Nuclear Imaging   Final Result      CT CHEST PULMONARY EMBOLISM W CONTRAST   Final Result   1. No CT evidence of a pulmonary embolism. 2. Mild consolidative opacity within the lingula, most likely representing   either atelectasis or parenchymal scar. Pneumonia is considered less likely. XR CHEST PORTABLE   Final Result   No acute cardiopulmonary disease. US ABDOMEN LIMITED Specify organ? GALLBLADDER, PANCREAS    (Results Pending)           Assessment/Plan:    Active Hospital Problems    Diagnosis     Chest pain [R07.9]      1. 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 Chest pain on presentation, troponin negative, nuclear stress test negative  3.   Alcohol abuse, daily drinker, counseled for cessation, [facility] protocol, thiamine and folic acid, no acute DTs at this Detail Level: Simple Additional Notes: Recommended to change shampoo Render Risk Assessment In Note?: no

## 2022-11-03 NOTE — CARE COORDINATION
INITIAL CASE MANAGEMENT ASSESSMENT    Reviewed chart, met with patient to assess possible discharge needs. Explained Case Management role/services. Living Situation: back to staying w/ X mother in law in a home w/ 5 cats and a dog. SO smokes. Doesn't wish to return at this time    ADLs: assisted     DME: RW    PT/OT Recs: pending     Active Services: none     Transportation: SO Nellie Boudreaux drives, he however is alcoholic     Medications: confirmed [place] dual, no barriers    PCP: needs an new PCP      HD/PD: n/a    PLAN/COMMENTS: initially requested assistance w/ new PCP and MOW's but then said that she needs to go to SNF. Will not be able to [facility] to [facility]. Wants to be somewhere near son John Paul Gutierres w/ [facility] [phone]  Does not want [facility] or [address] [place] [address] referrals to [facility], [address], [address]. Left message at [address] [facility]    SW/CM provided contact information for patient or family to call with any questions. SW/CM will follow and assist as needed.   Electronically signed by Navin Byrne RN on 5/14/2019 at 2:27 PM RN called [facility] [phone] and was connected to therapist Niko Haywood  PT notes were being sent to [address] [place] [address] Road but via [facility] practice fax #  None of the PT notes have been received so RN provided Niko Haywood with alternate fax # [phone] to fax them to [facility]

## 2023-04-14 NOTE — CARE COORDINATION
Discharge Plan: Return to [facility].   - Pt was at [facility] under long term benefits and will return under long care. Per Amber Styles with admissions- they will attempt to skill her for therapy upon return and if Buffy Lax denies they will use the Part B for therapy. Patient discharging to: LTC  prescriptions should be faxed to: [phone]    RN can call report to: [address] [facility] transport with [facility] transport will arrive at:    BASILIA intervention completed. 24

## (undated) DEVICE — BIPOLAR CABLE FLYING LEAD 12 FEET (3.7 M): Brand: MEGADYNE

## (undated) DEVICE — ELECTRODE PT RET AD L9FT HI MOIST COND ADH HYDRGEL CORDED

## (undated) DEVICE — GAUZE,PACKING STRIP,IODOFORM,1/2"X5YD,ST: Brand: CURAD

## (undated) DEVICE — Z DISCONTINUED USE 2716239 STAPLER INT STPL 51MM CUT LN L40MM STD TISS CRV CUT CR40B

## (undated) DEVICE — STAPLER SKIN H3.9MM WIRE DIA0.58MM CRWN 6.9MM 35 STPL FIX

## (undated) DEVICE — MOUTHPIECE ENDOSCP L CTRL OPN AND SIDE PORTS DISP

## (undated) DEVICE — SYRINGE, LUER SLIP, STERILE, 10ML: Brand: MEDLINE

## (undated) DEVICE — LAPAROSCOPY PK

## (undated) DEVICE — STAPLER INT L60MM REG TISS BLU B FRM 8 FIRING 2 ROW AUTO

## (undated) DEVICE — SPONGE,LAP,18"X18",DLX,XR,ST,5/PK,40/PK: Brand: MEDLINE

## (undated) DEVICE — SKIN AFFIX SURG ADHESIVE 72/CS 0.55ML: Brand: MEDLINE

## (undated) DEVICE — TOTAL TRAY, 16FR 10ML SIL FOLEY, URN: Brand: MEDLINE

## (undated) DEVICE — 3M™ IOBAN™ 2 ANTIMICROBIAL INCISE DRAPE 6650EZ: Brand: IOBAN™ 2

## (undated) DEVICE — ELECTRO LUBE IS A SINGLE PATIENT USE DEVICE THAT IS INTENDED TO BE USED ON ELECTROSURGICAL ELECTRODES TO REDUCE STICKING.: Brand: KEY SURGICAL ELECTRO LUBE

## (undated) DEVICE — OBTURATOR ROBOTIC DIA8MM BLDELSS ENDOSCP DISP DA VINCI SI

## (undated) DEVICE — SYRINGE MED 10ML TRNSLUC BRL PLUNG BLK MRK POLYPR CTRL

## (undated) DEVICE — ACCESS PLATFORM FOR MINIMALLY INVASIVE SURGERY.: Brand: GELPORT® LAPAROSCOPIC  SYSTEM

## (undated) DEVICE — FORCEPS BX 240CM 2.4MM L NDL RAD JAW 4 M00513334

## (undated) DEVICE — STAPLER INT L28CM DIA29MM CLS STPL H10-2.5MM OPN LEG L5.5MM

## (undated) DEVICE — SOLUTION IV 1000ML 0.9% SOD CHL

## (undated) DEVICE — TOWEL,OR,DSP,ST,BLUE,STD,4/PK,20PK/CS: Brand: MEDLINE

## (undated) DEVICE — SOLUTION IV 1000ML 0.9% SOD CHL PH 5 INJ USP VIAFLX PLAS

## (undated) DEVICE — SYRINGE, LUER LOCK, 60ML: Brand: MEDLINE

## (undated) DEVICE — SUTURE PERMAHAND SZ 3-0 L18IN NONABSORBABLE BLK L26MM SH C013D

## (undated) DEVICE — SUTURE PDS II SZ 0 L60IN ABSRB VLT L48MM CTX 1/2 CIR Z990G

## (undated) DEVICE — SYRINGE IRRIG 60ML SFT PLIABLE BLB EZ TO GRP 1 HND USE W/

## (undated) DEVICE — STAPLER INT L75MM CUT LN L73MM STPL LN L77MM BLU B FRM 8

## (undated) DEVICE — KIT DRP 4 ARM ACC DISP DA VINCI SI ENDOWRIST

## (undated) DEVICE — CORD ES L10FT MPLR LAP

## (undated) DEVICE — E-Z CLEAN, NON-STICK, PTFE COATED, ELECTROSURGICAL BLADE ELECTRODE, MODIFIED EXTENDED INSULATION, 6.5 INCH (16.5 CM): Brand: MEGADYNE

## (undated) DEVICE — Z DISCONTINUED BY MEDLINE USE 2711682 TRAY SKIN PREP DRY W/ PREM GLV

## (undated) DEVICE — GOWN,SIRUS,POLYRNF,BRTHSLV,XL,30/CS: Brand: MEDLINE

## (undated) DEVICE — GOWN,SIRUS,POLYRNF,SETINSLV,L,20/CS: Brand: MEDLINE

## (undated) DEVICE — SUTURE VCRL SZ 0 L18IN ABSRB UD POLYGLACTIN 910 COAT BRAID J646H

## (undated) DEVICE — STAPLER EXT 65MM S STL AUTO DISP PURSTRING

## (undated) DEVICE — ELECTROSURGICAL PENCIL BUTTON SWITCH NON COATED BLADE ELECTRODE 10 FT (3 M) CORD HOLSTER: Brand: MEGADYNE

## (undated) DEVICE — DRAPE,LAP,CHOLE,W/TROUGHS,STERILE: Brand: MEDLINE

## (undated) DEVICE — COVER LT HNDL BLU PLAS

## (undated) DEVICE — SUTURE PDS II SZ 0 L27IN ABSRB VLT L36MM CT-1 1/2 CIR Z340H

## (undated) DEVICE — GOWN,SIRUS,POLYRNF,SETINSLV,XL,20/CS: Brand: MEDLINE

## (undated) DEVICE — TIP COVER ACCESSORY

## (undated) DEVICE — TUBING, SUCTION, 1/4" X 12', STRAIGHT: Brand: MEDLINE

## (undated) DEVICE — SUTURE VCRL SZ 0 L27IN ABSRB UD L36MM CT-1 1/2 CIR J260H

## (undated) DEVICE — CONTAINER SPEC 480ML CLR POLYSTYR 10% NEUT BUFF FRMLN ZN

## (undated) DEVICE — VESSEL SEALER: Brand: ENDOWRIST;DAVINCI SI

## (undated) DEVICE — DRAPE,ROBOTICS,NO LEGGINGS,STERILE: Brand: MEDLINE

## (undated) DEVICE — CANNULA SEAL

## (undated) DEVICE — [HIGH FLOW INSUFFLATOR,  DO NOT USE IF PACKAGE IS DAMAGED,  KEEP DRY,  KEEP AWAY FROM SUNLIGHT,  PROTECT FROM HEAT AND RADIOACTIVE SOURCES.]: Brand: PNEUMOSURE

## (undated) DEVICE — SHEET,DRAPE,53X77,STERILE: Brand: MEDLINE

## (undated) DEVICE — CHLORAPREP 26ML ORANGE

## (undated) DEVICE — ENDO CARRY-ON PROCEDURE KIT: Brand: ENDO CARRY-ON PROCEDURE KIT

## (undated) DEVICE — RELOAD STPL L75MM OPN H3.8MM CLS 1.5MM WIRE DIA0.2MM REG

## (undated) DEVICE — SOLUTION ANTIFOG VIS SYS CLEARIFY LAPSCP